# Patient Record
Sex: FEMALE | Race: BLACK OR AFRICAN AMERICAN | NOT HISPANIC OR LATINO | Employment: OTHER | URBAN - METROPOLITAN AREA
[De-identification: names, ages, dates, MRNs, and addresses within clinical notes are randomized per-mention and may not be internally consistent; named-entity substitution may affect disease eponyms.]

---

## 2017-03-20 ENCOUNTER — APPOINTMENT (OUTPATIENT)
Dept: LAB | Facility: CLINIC | Age: 47
End: 2017-03-20
Payer: COMMERCIAL

## 2017-03-20 DIAGNOSIS — E66.9 OBESITY: ICD-10-CM

## 2017-03-20 DIAGNOSIS — R73.09 OTHER ABNORMAL GLUCOSE: ICD-10-CM

## 2017-03-20 LAB
ALBUMIN SERPL BCP-MCNC: 3.3 G/DL (ref 3.5–5)
ALP SERPL-CCNC: 84 U/L (ref 46–116)
ALT SERPL W P-5'-P-CCNC: 20 U/L (ref 12–78)
ANION GAP SERPL CALCULATED.3IONS-SCNC: 9 MMOL/L (ref 4–13)
AST SERPL W P-5'-P-CCNC: 10 U/L (ref 5–45)
BILIRUB SERPL-MCNC: 0.28 MG/DL (ref 0.2–1)
BUN SERPL-MCNC: 10 MG/DL (ref 5–25)
CALCIUM SERPL-MCNC: 8.3 MG/DL (ref 8.3–10.1)
CHLORIDE SERPL-SCNC: 105 MMOL/L (ref 100–108)
CHOLEST SERPL-MCNC: 193 MG/DL (ref 50–200)
CO2 SERPL-SCNC: 25 MMOL/L (ref 21–32)
CREAT SERPL-MCNC: 1.11 MG/DL (ref 0.6–1.3)
EST. AVERAGE GLUCOSE BLD GHB EST-MCNC: 146 MG/DL
GFR SERPL CREATININE-BSD FRML MDRD: 52.5 ML/MIN/1.73SQ M
GLUCOSE P FAST SERPL-MCNC: 98 MG/DL (ref 65–99)
HBA1C MFR BLD: 6.7 % (ref 4.2–6.3)
HDLC SERPL-MCNC: 62 MG/DL (ref 40–60)
LDLC SERPL CALC-MCNC: 111 MG/DL (ref 0–100)
POTASSIUM SERPL-SCNC: 3.6 MMOL/L (ref 3.5–5.3)
PROT SERPL-MCNC: 7.6 G/DL (ref 6.4–8.2)
SODIUM SERPL-SCNC: 139 MMOL/L (ref 136–145)
TRIGL SERPL-MCNC: 101 MG/DL

## 2017-03-20 PROCEDURE — 83036 HEMOGLOBIN GLYCOSYLATED A1C: CPT

## 2017-03-20 PROCEDURE — 36415 COLL VENOUS BLD VENIPUNCTURE: CPT

## 2017-03-20 PROCEDURE — 80053 COMPREHEN METABOLIC PANEL: CPT

## 2017-03-20 PROCEDURE — 80061 LIPID PANEL: CPT

## 2017-03-21 ENCOUNTER — GENERIC CONVERSION - ENCOUNTER (OUTPATIENT)
Dept: OTHER | Facility: OTHER | Age: 47
End: 2017-03-21

## 2017-03-30 ENCOUNTER — ALLSCRIPTS OFFICE VISIT (OUTPATIENT)
Dept: OTHER | Facility: OTHER | Age: 47
End: 2017-03-30

## 2017-04-05 DIAGNOSIS — Z12.31 ENCOUNTER FOR SCREENING MAMMOGRAM FOR MALIGNANT NEOPLASM OF BREAST: ICD-10-CM

## 2017-05-03 ENCOUNTER — TRANSCRIBE ORDERS (OUTPATIENT)
Dept: ADMINISTRATIVE | Facility: HOSPITAL | Age: 47
End: 2017-05-03

## 2017-05-03 DIAGNOSIS — Z12.31 VISIT FOR SCREENING MAMMOGRAM: Primary | ICD-10-CM

## 2017-06-14 ENCOUNTER — GENERIC CONVERSION - ENCOUNTER (OUTPATIENT)
Dept: OTHER | Facility: OTHER | Age: 47
End: 2017-06-14

## 2017-06-14 ENCOUNTER — HOSPITAL ENCOUNTER (OUTPATIENT)
Dept: MAMMOGRAPHY | Facility: MEDICAL CENTER | Age: 47
Discharge: HOME | End: 2017-06-14
Payer: COMMERCIAL

## 2017-06-14 DIAGNOSIS — Z12.31 ENCOUNTER FOR SCREENING MAMMOGRAM FOR MALIGNANT NEOPLASM OF BREAST: ICD-10-CM

## 2017-06-14 DIAGNOSIS — R92.8 OTHER ABNORMAL AND INCONCLUSIVE FINDINGS ON DIAGNOSTIC IMAGING OF BREAST: ICD-10-CM

## 2017-06-14 PROCEDURE — 77063 BREAST TOMOSYNTHESIS BI: CPT

## 2017-06-14 PROCEDURE — G0202 SCR MAMMO BI INCL CAD: HCPCS

## 2017-06-19 ENCOUNTER — APPOINTMENT (OUTPATIENT)
Dept: ULTRASOUND IMAGING | Facility: CLINIC | Age: 47
End: 2017-06-19
Payer: COMMERCIAL

## 2017-06-19 ENCOUNTER — HOSPITAL ENCOUNTER (OUTPATIENT)
Dept: MAMMOGRAPHY | Facility: CLINIC | Age: 47
Discharge: HOME | End: 2017-06-19
Payer: COMMERCIAL

## 2017-06-19 DIAGNOSIS — R92.8 OTHER ABNORMAL AND INCONCLUSIVE FINDINGS ON DIAGNOSTIC IMAGING OF BREAST: ICD-10-CM

## 2017-06-19 PROCEDURE — G0206 DX MAMMO INCL CAD UNI: HCPCS

## 2017-06-19 RX ORDER — MECLIZINE HCL 12.5 MG/1
TABLET ORAL
COMMUNITY
Start: 2015-11-17 | End: 2018-09-25 | Stop reason: ALTCHOICE

## 2017-06-19 RX ORDER — OXYCODONE HYDROCHLORIDE 30 MG/1
TABLET ORAL
COMMUNITY
End: 2020-04-16

## 2017-06-19 RX ORDER — AMITRIPTYLINE HYDROCHLORIDE 25 MG/1
25 TABLET, FILM COATED ORAL
COMMUNITY
End: 2020-02-17 | Stop reason: ALTCHOICE

## 2017-06-19 RX ORDER — OMEPRAZOLE 20 MG/1
20 CAPSULE, DELAYED RELEASE ORAL DAILY
COMMUNITY
End: 2018-09-25 | Stop reason: SDUPTHER

## 2017-06-19 RX ORDER — TRAZODONE HYDROCHLORIDE 100 MG/1
TABLET ORAL
COMMUNITY
Start: 2011-12-28 | End: 2019-08-05 | Stop reason: SDUPTHER

## 2017-06-19 RX ORDER — QUETIAPINE FUMARATE 200 MG/1
TABLET, FILM COATED ORAL
COMMUNITY
Start: 2011-12-28 | End: 2020-04-16

## 2017-06-19 RX ORDER — TEMAZEPAM 30 MG/1
CAPSULE ORAL
COMMUNITY
Start: 2016-01-13 | End: 2020-04-16

## 2017-06-21 ENCOUNTER — HOSPITAL ENCOUNTER (OUTPATIENT)
Dept: MAMMOGRAPHY | Facility: CLINIC | Age: 47
Discharge: HOME | End: 2017-06-21
Admitting: RADIOLOGY
Payer: COMMERCIAL

## 2017-06-21 ENCOUNTER — HOSPITAL ENCOUNTER (OUTPATIENT)
Dept: MAMMOGRAPHY | Facility: CLINIC | Age: 47
Discharge: HOME | End: 2017-06-21
Payer: COMMERCIAL

## 2017-06-21 VITALS — SYSTOLIC BLOOD PRESSURE: 120 MMHG | DIASTOLIC BLOOD PRESSURE: 70 MMHG | HEART RATE: 72 BPM

## 2017-06-21 DIAGNOSIS — R92.0 ABNORMAL FINDING ON MAMMOGRAPHY, MICROCALCIFICATION: ICD-10-CM

## 2017-06-21 DIAGNOSIS — R92.0 MAMMOGRAPHIC MICROCALCIFICATION: ICD-10-CM

## 2017-06-21 DIAGNOSIS — R92.8 OTHER ABNORMAL AND INCONCLUSIVE FINDINGS ON DIAGNOSTIC IMAGING OF BREAST: ICD-10-CM

## 2017-06-21 PROCEDURE — 19082 BX BREAST ADD LESION STRTCTC: CPT

## 2017-06-21 PROCEDURE — 19081 BX BREAST 1ST LESION STRTCTC: CPT

## 2017-06-21 PROCEDURE — 88305 TISSUE EXAM BY PATHOLOGIST: CPT | Performed by: NURSE PRACTITIONER

## 2017-06-21 PROCEDURE — 88341 IMHCHEM/IMCYTCHM EA ADD ANTB: CPT | Performed by: NURSE PRACTITIONER

## 2017-06-21 PROCEDURE — 88342 IMHCHEM/IMCYTCHM 1ST ANTB: CPT | Performed by: NURSE PRACTITIONER

## 2017-06-21 RX ORDER — LIDOCAINE HYDROCHLORIDE 10 MG/ML
4 INJECTION, SOLUTION INFILTRATION; PERINEURAL ONCE
Status: COMPLETED | OUTPATIENT
Start: 2017-06-21 | End: 2017-06-21

## 2017-06-21 RX ORDER — LIDOCAINE HYDROCHLORIDE AND EPINEPHRINE BITARTRATE 20; .01 MG/ML; MG/ML
9 INJECTION, SOLUTION SUBCUTANEOUS ONCE
Status: COMPLETED | OUTPATIENT
Start: 2017-06-21 | End: 2017-06-21

## 2017-06-21 RX ADMIN — LIDOCAINE HYDROCHLORIDE AND EPINEPHRINE 9 ML: 20; 10 INJECTION, SOLUTION INFILTRATION; PERINEURAL at 14:16

## 2017-06-21 RX ADMIN — LIDOCAINE HYDROCHLORIDE AND EPINEPHRINE 9 ML: 20; 10 INJECTION, SOLUTION INFILTRATION; PERINEURAL at 13:23

## 2017-06-21 RX ADMIN — LIDOCAINE HYDROCHLORIDE 4 ML: 10 INJECTION, SOLUTION INFILTRATION; PERINEURAL at 13:23

## 2017-06-21 RX ADMIN — LIDOCAINE HYDROCHLORIDE 4 ML: 10 INJECTION, SOLUTION INFILTRATION; PERINEURAL at 14:16

## 2017-06-26 ENCOUNTER — GENERIC CONVERSION - ENCOUNTER (OUTPATIENT)
Dept: OTHER | Facility: OTHER | Age: 47
End: 2017-06-26

## 2017-06-29 ENCOUNTER — GENERIC CONVERSION - ENCOUNTER (OUTPATIENT)
Dept: OTHER | Facility: OTHER | Age: 47
End: 2017-06-29

## 2017-07-01 DIAGNOSIS — E11.9 TYPE 2 DIABETES MELLITUS WITHOUT COMPLICATIONS (HCC): ICD-10-CM

## 2017-08-21 ENCOUNTER — APPOINTMENT (OUTPATIENT)
Dept: LAB | Facility: CLINIC | Age: 47
End: 2017-08-21
Payer: COMMERCIAL

## 2017-08-21 DIAGNOSIS — E11.9 TYPE 2 DIABETES MELLITUS WITHOUT COMPLICATIONS (HCC): ICD-10-CM

## 2017-08-21 LAB
ALBUMIN SERPL BCP-MCNC: 3.2 G/DL (ref 3.5–5)
ALP SERPL-CCNC: 83 U/L (ref 46–116)
ALT SERPL W P-5'-P-CCNC: 28 U/L (ref 12–78)
ANION GAP SERPL CALCULATED.3IONS-SCNC: 8 MMOL/L (ref 4–13)
AST SERPL W P-5'-P-CCNC: 25 U/L (ref 5–45)
BILIRUB SERPL-MCNC: 0.23 MG/DL (ref 0.2–1)
BUN SERPL-MCNC: 9 MG/DL (ref 5–25)
CALCIUM SERPL-MCNC: 8.4 MG/DL (ref 8.3–10.1)
CHLORIDE SERPL-SCNC: 103 MMOL/L (ref 100–108)
CO2 SERPL-SCNC: 26 MMOL/L (ref 21–32)
CREAT SERPL-MCNC: 1.05 MG/DL (ref 0.6–1.3)
CREAT UR-MCNC: 266 MG/DL
EST. AVERAGE GLUCOSE BLD GHB EST-MCNC: 137 MG/DL
GFR SERPL CREATININE-BSD FRML MDRD: 63 ML/MIN/1.73SQ M
GLUCOSE P FAST SERPL-MCNC: 92 MG/DL (ref 65–99)
HBA1C MFR BLD: 6.4 % (ref 4.2–6.3)
MICROALBUMIN UR-MCNC: 10.4 MG/L (ref 0–20)
MICROALBUMIN/CREAT 24H UR: 4 MG/G CREATININE (ref 0–30)
POTASSIUM SERPL-SCNC: 4 MMOL/L (ref 3.5–5.3)
PROT SERPL-MCNC: 7.6 G/DL (ref 6.4–8.2)
SODIUM SERPL-SCNC: 137 MMOL/L (ref 136–145)

## 2017-08-21 PROCEDURE — 82043 UR ALBUMIN QUANTITATIVE: CPT

## 2017-08-21 PROCEDURE — 83036 HEMOGLOBIN GLYCOSYLATED A1C: CPT

## 2017-08-21 PROCEDURE — 36415 COLL VENOUS BLD VENIPUNCTURE: CPT

## 2017-08-21 PROCEDURE — 82570 ASSAY OF URINE CREATININE: CPT

## 2017-08-21 PROCEDURE — 80053 COMPREHEN METABOLIC PANEL: CPT

## 2017-10-27 ENCOUNTER — GENERIC CONVERSION - ENCOUNTER (OUTPATIENT)
Dept: OTHER | Facility: OTHER | Age: 47
End: 2017-10-27

## 2017-11-03 ENCOUNTER — GENERIC CONVERSION - ENCOUNTER (OUTPATIENT)
Dept: OTHER | Facility: OTHER | Age: 47
End: 2017-11-03

## 2017-11-10 ENCOUNTER — GENERIC CONVERSION - ENCOUNTER (OUTPATIENT)
Dept: OTHER | Facility: OTHER | Age: 47
End: 2017-11-10

## 2017-11-10 ENCOUNTER — LAB REQUISITION (OUTPATIENT)
Dept: LAB | Facility: HOSPITAL | Age: 47
End: 2017-11-10
Payer: COMMERCIAL

## 2017-11-10 DIAGNOSIS — L91.8 OTHER HYPERTROPHIC DISORDERS OF THE SKIN: ICD-10-CM

## 2017-11-10 PROCEDURE — 88304 TISSUE EXAM BY PATHOLOGIST: CPT | Performed by: OBSTETRICS & GYNECOLOGY

## 2018-01-10 NOTE — RESULT NOTES
Verified Results  MAMMO SCREENING BILATERAL W 3D & CAD 14Jun2017 10:19AM Ashanti Montiel Order Number: [id number]    - Patient Instructions: To schedule this appointment, please contact Central Scheduling at [phone]  Do not wear any perfume, powder, lotion or deodorant on breast or underarm area  Please bring your doctors order, referral (if needed) and insurance information with you on the day of the test  Failure to bring this information may result in this test being rescheduled  Arrive 15 minutes prior to your appointment time to register  On the day of your test, please bring any prior mammogram or breast studies with you that were not performed at a [facility]  Failure to bring prior exams may result in your test needing to be rescheduled  Test Name Result Flag Reference   MAMMO SCREENING BILATERAL W 3D & CAD (Report)     Patient History:   Family history of unknown cancer in father  Benign -WBUS BREAST GUIDANCE of the left breast, February 17, 2009  Patient is a former smoker  Patient's BMI is 30 2  Reason for exam: screening, asymptomatic  Screening     Mammo Screening Bilateral W DBT and CAD: June 14, 2017 - Check In   #: [de-identified]   2D/3D Procedure   3D views: Bilateral MLO view(s) were taken  2D views: Bilateral CC and XCCL view(s) were taken  MLO view(s)    were taken of the right breast        Technologist: BEAN Arzate T (R)(M)   Prior study comparison: March 21, 2016, mammo screening bilateral   W DBT and CAD performed at [place] [address]  December 22, 2014, bilateral digital screening mammogram   performed at [address] [facility]  January 24, 2013, bilateral digital screening mammogram,    performed at [facility]  January 16, 2012,    bilateral WB digitl bilat usha, performed at [address] [place] [address]   February 17, 2009, left breast unilateral    diagnostic mammogram, performed at [address]  February 6, 2009, bilateral WB DIGITL BILAT JOHN,    performed at [address]  January 29, 2009, bilateral DIGITAL SCREENING MAMMOGRAM, performed at [facility]      There are scattered fibroglandular densities  There is a small    group of calcifications in the upper outer right breast at    approximately the 10 o'clock position, 8 cm from the nipple  These appear generally rounded and uniform in size  However,    they have increased slightly in number over time and for this    reason, I would recommend lateral and spot magnification views to   better define their morphology  The parenchymal pattern is otherwise stable  No dominant soft    tissue mass or architectural distortion are noted in either    breast  The skin and nipple contours are within normal limits  1  Upper outer right breast calcifications  Lateral and spot    magnification views recommended  2  Stable left mammogram      ACR BI-RADSï¾® Assessments: BiRad:0 - Incomplete: needs additional    imaging evaluation - Right     Recommendation:   Further imaging of the right breast    A breast health care nurse from our facility will be contacting    the patient regarding the need for additional imaging  8-10% of cancers will be missed on mammography  Management of a    palpable abnormality must be based on clinical grounds  Patients    will be notified of their results via letter from our facility  Accredited by [employer] of Radiology and FDA  [facility]  [facility]: [id number]     Risk Value(s):   Tyrer-Cuzick 10 Year: 1 900%, Tyrer-Cuzick Lifetime: 9 100%,    Myriad Table: 1 5%, BK 5 Year: 1 4%, NCI Lifetime: 11 1%   Signed by:   Praveen Cardenas MD   6/14/17       Plan  Abnormal mammogram of right breast    · *US BREAST RIGHT LIMITED (DIAGNOSTIC);  Status:Hold For - Scheduling; Requested  [facility]:[id number];

## 2018-01-10 NOTE — PROGRESS NOTES
Medical Alert: Tobacco User  Medications: Amoxicillin 500mg    Ibuprofen 600mg  Allergies:  Since Last Visit: Medical Alert: No Change    Medications: No Change    Allergies:        No Change  Pain Scale Type: Numeric Pain ScalePain Level: 0  Description: patient presented for periodic exam, jasmyne MENA  BW's  taken  oral hygiene poor  deposits of plaque and calculus  caries charted  patient has an existing upper resin partial   adult prophylaxis done using cavitron and hand instruments, polished teeth  using prophy paste and polishing cup  OHI re-inforced  told patient that we shall treat all decay and then  treatment plan for restoring missing teeth  patient agreed  patient satisfied and left in good health      NV: start restorative  NNV: more restorative  NNNV: treatment plan for missing teeth    SCOT/Dr HENSON    ----- Signed on Friday, May 13, 2016 at 4:48:33 PM  -----  ----- Provider: [id number] - Resident One, Dentist -- Clinic: [facility] -----

## 2018-01-11 NOTE — MISCELLANEOUS
Message  telephone call to patient  Informed of results and no need for additional f/u with [facility]      Active Problems    1  Anemia (285 9) (D64 9)   2  Atherosclerosis (440 9) (I70 90)   3  Backache (724 5) (M54 9)   4  Benign paroxysmal positional vertigo (386 11) (H81 10)   5  Cervicalgia (723 1) (M54 2)   6  Constipation (564 00) (K59 00)   7  Coronary arteriosclerosis (414 00) (I25 10)   8  Depression (311) (F32 9)   9  Encounter for gynecological examination with abnormal finding (V72 31) (Z01 411)   10  Esophageal reflux (530 81) (K21 9)   11  Hyperlipidemia (272 4) (E78 5)   12  Insomnia (780 52) (G47 00)   13  History of Need for prophylactic vaccination and inoculation against influenza (V04 81)    (Z23)   14  History of Need for vaccination for DTP (V06 1) (Z23)   15  Obesity (278 00) (E66 9)   16  Paresthesia (782 0) (R20 2)   17  Uterine leiomyoma, unspecified location (218 9) (D25 9)    Current Meds   1  Meclizine HCl - 12 5 MG Oral Tablet; TAKE 1 TABLET 3 TIMES DAILY AS NEEDED; Therapy: 40SOU2007 to (Evaluate:27Nov2015)  Requested for: 19Apr2016; Last   Rx:17Nov2015 Ordered   2  OxyCODONE HCl - 30 MG Oral Tablet (Roxicodone); TAKE 1 TABLET EVERY 6 HOURS   AS NEEDED FOR PAIN; Last Rx:17Nov2015 Ordered   3  QUEtiapine Fumarate 200 MG Oral Tablet (SEROquel); TAKE 1 TABLET AT BEDTIME; Therapy: [id number] to (Evaluate:30Apr2015)  Requested for: 90Znn9140; Last   Rx:31Mar2015 Ordered   4  Temazepam 30 MG Oral Capsule; Therapy: [id number] to Recorded   5  TraZODone HCl - 100 MG Oral Tablet; TAKE 1 TABLET AT BEDTIME; Therapy: 95UIC0660 to (Evaluate:01Apr2015)  Requested for: 19Apr2016; Last   Rx:31Mar2015 Ordered    Allergies    1  Adult Aspirin EC Low Strength TBEC    2  No Known Environmental Allergies   3   Other    Signatures   Electronically signed by : Ryan Gonzalez RN; Apr 28 2016 11:36AM EST                       (Author)

## 2018-01-12 NOTE — PROGRESS NOTES
Patient Health Assessment    Date:            08/05/2016  Blood Pressure:  112/79  Pulse:           87  Age:             45  Weight:          200 lbs  Height/Length:   5' 3"  Body Mass Index: 35 4  Provider:        ELIZABETH  Clinic:          [facility]: Tobacco User  Medications: Amoxicillin 500mg    Ibuprofen 600mg  Allergies:  Since Last Visit: Medical Alert: No Change    Medications: No Change    Allergies:        No Change  Pain Scale Type: Numeric Pain ScalePain Level: 0  Description:  Haley Muro has been coming to dental clinic for a while she has very poor OH and  does not brush on a regular basis  Even with OHI  She is not very motivated  She has very high risk of   root caries  She prefers to have a complete denture   and a partial lower    MQ    ----- Signed on Friday, August 05, 2016 at 1:38:50 PM  -----  ----- Provider: ELIZABETH Mcmahon DDS -- Clinic: [place] -----

## 2018-01-12 NOTE — RESULT NOTES
Message   please call pt and inform that she has abnormal labs and I do need for her to keep her appt on 3/30/17 (since she cancelled her appt for 3/23/17) to discuss and consider meds     Verified Results  (1) HEMOGLOBIN A1C 20Mar2017 09:54AM Rajendraleonidas Cody Order Number: [id number]     Test Name Result Flag Reference   HEMOGLOBIN A1C 6 7 % H 4 2-6 3   EST  AVG   GLUCOSE 146 mg/dl         Plan  Type 2 diabetes mellitus without complication, without long-term current use of insulin    · MetFORMIN HCl - 500 MG Oral Tablet; take 1 tablet by mouth every evening

## 2018-01-13 VITALS
DIASTOLIC BLOOD PRESSURE: 68 MMHG | HEIGHT: 64 IN | HEART RATE: 100 BPM | TEMPERATURE: 98.3 F | SYSTOLIC BLOOD PRESSURE: 124 MMHG | WEIGHT: 198.41 LBS | BODY MASS INDEX: 33.87 KG/M2

## 2018-01-14 NOTE — RESULT NOTES
Verified Results  (1) TISSUE EXAM 21Jun2017 01:34PM Justina Sow     Test Name Result Flag Reference   LAB AP CASE REPORT (Report)     Surgical Pathology Report             Case: [id number]                   Authorizing Provider: LILIANA Heaton    Collected:      06/21/2017 1334        Ordering Location:   [address] Received:      06/22/2017 [place] [address]                                     Pathologist:      Ainsley Segal DO                               Specimens:  A) - Breast, Right Upper Outer, site A(1)9ga sup cc approach 6 cores with calcs,SMARK         eviva 2S-13 clip                                            B) - Breast, Right Upper Outer, site A(1) remaining cores without calcifications            C) - Breast, Right Upper Inner, site B(2)9ga sup cc approach,4cores with calcs,SMark          eviva 13 clip                                             D) - Breast, Right Upper Inner, site B(2) remaining cores without calcifications   LAB AP FINAL DIAGNOSIS (Report)     A  Breast, right upper outer, site A, 6 cores with calcifications, biopsy:  - Proliferative fibrocystic changes to include; usual duct hyperplasia,   adenosis, sclerosing adenosis, apocrine cell   changes, periductal chronic inflammation and duct ectasia  - Microcalcification present in benign glands  - Negative for carcinoma  B  Breast, right upper outer, site A, remaining cores without   calcifications, biopsy:  - Fibrocystic changes to include; adenosis, sclerosing adenosis, apocrine   cell changes and duct ectasia  - Microcalcification's present in benign glands  - Negative for carcinoma    C  Breast, right upper inner, site B, 4 cores with calcifications, biopsy:  - Proliferative fibrocystic changes to include; usual duct hyperplasia,   adenosis, sclerosing adenosis, papillary apocrine   cell hyperplasia and duct ectasia  - Columnar cell changes  - Negative for carcinoma    D   Breast, right upper inner, site B, remaining cores without   calcifications, biopsy:  - Proliferative fibrocystic changes to include; usual duct hyperplasia,   adenosis, sclerosing adenosis, papillary apocrine   cell hyperplasia and duct ectasia  - Negative for carcinoma    Interpretation performed at [facility], [address] [address]  Intradepartmental consultation is in agreement with the above diagnosis   (EMM)  Electronically signed by Lucretia Kirkland DO on 6/28/2017 at 1:25 PM  Preliminary result electronically signed by Lucretia Kirkland DO on 6/23/2017 at 1:54 PM   LAB AP MICROSCOPIC DESCRIPTION      A-D: Immunostain for CK5/6 and myosin highlight circumferential   myoepithelial cells  Appropriate positive and negative controls obtained  This is an appended report  These results have been appended to a previously preliminary verified report  LAB AP SURGICAL ADDITIONAL INFORMATION (Report)     These tests were developed and their performance characteristics   determined by [facility] or [facility]  They may not be cleared or approved by the [place]  Food and   Drug Administration  The FDA has determined that such clearance or   approval is not necessary  These tests are used for clinical purposes  They should not be regarded as investigational or for research  This   laboratory has been approved by CLIA 88, designated as a high-complexity   laboratory and is qualified to perform these tests  LAB AP GROSS DESCRIPTION (Report)     A  The specimen is received in formalin, labeled with the patient's name   and hospital number, and is designated site A (1) 9ga sup cc approach 6   cores with calcifications, are six cores of tan-yellow fibrofatty tissue   ranging in length from 1 8-3 5 cm and ranging in diameter from 0 1-0 5 cm  Entirely submitted  Three cassettes  B   The specimen is received in formalin, labeled with the patient's name   and hospital number, and is designated site A (1) remaining cores   without calcifications, are three cores of tan-yellow fibrofatty tissue   ranging in length from 0 8-3 0 cm and ranging in diameter from 0 2-0 4 cm  Entirely submitted  One cassette  C  The specimen is received in formalin, labeled with the patient's name   and hospital number, and is designated site B (2) 9ga sup cc appraoch, 4   cores with calcs, are four cores of tan-yellow fibrofatty tissue ranging   in length from 2 2-3 5 cm and ranging in diameter from 0 1-0 5 cm  Entirely submitted  Two cassettes  D  The specimen is received in formalin, labeled with the patient's name   and hospital number, and is designated site B (2) remaining cores   without calcifications, are three cores of tan-yellow fibrofatty tissue   ranging in length from 1 7-3 6 cm and ranging in diameter from 0 2-0 5 cm  Also received in the specimen container are multiple irregularly shaped   fragments of tan-yellow fibrofatty tissue measuring 8 0 x 1 3 x 0 3 cm in   aggregate  The specimen is entirely submitted with the tissue cores   submitted in cassettes D1-D2 and the remaining tissue fragments submitted   in cassette D3  Note: The estimated total formalin fixation time based upon information   provided by the submitting clinician and the standard processing schedule   is 20 0 hours      RLR   Fixed in formalin

## 2018-01-15 ENCOUNTER — GENERIC CONVERSION - ENCOUNTER (OUTPATIENT)
Dept: INTERNAL MEDICINE CLINIC | Facility: CLINIC | Age: 48
End: 2018-01-15

## 2018-01-22 VITALS
HEART RATE: 93 BPM | SYSTOLIC BLOOD PRESSURE: 135 MMHG | WEIGHT: 179 LBS | DIASTOLIC BLOOD PRESSURE: 89 MMHG | HEIGHT: 64 IN | BODY MASS INDEX: 30.56 KG/M2

## 2018-01-22 VITALS
DIASTOLIC BLOOD PRESSURE: 73 MMHG | SYSTOLIC BLOOD PRESSURE: 116 MMHG | WEIGHT: 182 LBS | HEIGHT: 64 IN | BODY MASS INDEX: 31.07 KG/M2

## 2018-01-22 VITALS
WEIGHT: 183.25 LBS | DIASTOLIC BLOOD PRESSURE: 72 MMHG | HEIGHT: 64 IN | BODY MASS INDEX: 31.28 KG/M2 | SYSTOLIC BLOOD PRESSURE: 130 MMHG

## 2018-03-26 RX ORDER — TIZANIDINE 4 MG/1
4 TABLET ORAL AS NEEDED
Refills: 1 | COMMUNITY
Start: 2018-01-14 | End: 2021-05-27 | Stop reason: ALTCHOICE

## 2018-03-26 RX ORDER — GABAPENTIN 300 MG/1
CAPSULE ORAL
Refills: 0 | COMMUNITY
Start: 2018-02-22 | End: 2020-02-17 | Stop reason: ALTCHOICE

## 2018-03-26 RX ORDER — IBUPROFEN 800 MG/1
TABLET ORAL
Refills: 0 | COMMUNITY
Start: 2018-01-15 | End: 2018-09-25 | Stop reason: ALTCHOICE

## 2018-03-26 RX ORDER — QUETIAPINE FUMARATE 400 MG/1
400 TABLET, FILM COATED ORAL 2 TIMES DAILY
Refills: 0 | COMMUNITY
Start: 2018-02-22 | End: 2019-08-05 | Stop reason: SDUPTHER

## 2018-03-26 RX ORDER — OMEPRAZOLE 20 MG/1
CAPSULE, DELAYED RELEASE ORAL
COMMUNITY
Start: 2015-03-31 | End: 2018-09-25 | Stop reason: ALTCHOICE

## 2018-03-26 RX ORDER — CLONIDINE HYDROCHLORIDE 0.1 MG/1
0.1 TABLET ORAL 2 TIMES DAILY
Refills: 0 | COMMUNITY
Start: 2018-02-22 | End: 2020-02-17 | Stop reason: ALTCHOICE

## 2018-03-26 RX ORDER — BLOOD-GLUCOSE METER
EACH MISCELLANEOUS
COMMUNITY
Start: 2017-03-30 | End: 2020-04-16

## 2018-03-26 RX ORDER — BLOOD-GLUCOSE METER
EACH MISCELLANEOUS
COMMUNITY
Start: 2017-05-18 | End: 2018-09-25 | Stop reason: ALTCHOICE

## 2018-03-26 RX ORDER — AMITRIPTYLINE HYDROCHLORIDE 50 MG/1
100 TABLET, FILM COATED ORAL
Refills: 0 | COMMUNITY
Start: 2018-02-22 | End: 2019-08-05 | Stop reason: SDUPTHER

## 2018-03-26 RX ORDER — TRAZODONE HYDROCHLORIDE 150 MG/1
450 TABLET ORAL
Refills: 0 | COMMUNITY
Start: 2018-02-22 | End: 2020-07-31 | Stop reason: SDUPTHER

## 2018-03-26 RX ORDER — OMEPRAZOLE 20 MG/1
TABLET, DELAYED RELEASE ORAL
COMMUNITY
Start: 2012-08-02 | End: 2018-09-25 | Stop reason: ALTCHOICE

## 2018-03-27 ENCOUNTER — OFFICE VISIT (OUTPATIENT)
Dept: INTERNAL MEDICINE CLINIC | Facility: CLINIC | Age: 48
End: 2018-03-27
Payer: COMMERCIAL

## 2018-03-27 VITALS
DIASTOLIC BLOOD PRESSURE: 88 MMHG | TEMPERATURE: 98.1 F | BODY MASS INDEX: 32.5 KG/M2 | HEIGHT: 63 IN | SYSTOLIC BLOOD PRESSURE: 120 MMHG | HEART RATE: 76 BPM | WEIGHT: 183.42 LBS

## 2018-03-27 DIAGNOSIS — K21.9 GASTROESOPHAGEAL REFLUX DISEASE, ESOPHAGITIS PRESENCE NOT SPECIFIED: ICD-10-CM

## 2018-03-27 DIAGNOSIS — E11.8 TYPE 2 DIABETES MELLITUS WITH COMPLICATION, UNSPECIFIED LONG TERM INSULIN USE STATUS: Primary | ICD-10-CM

## 2018-03-27 LAB — SL AMB POCT HEMOGLOBIN AIC: 6

## 2018-03-27 PROCEDURE — 99203 OFFICE O/P NEW LOW 30 MIN: CPT | Performed by: INTERNAL MEDICINE

## 2018-03-27 PROCEDURE — 83036 HEMOGLOBIN GLYCOSYLATED A1C: CPT | Performed by: INTERNAL MEDICINE

## 2018-03-27 NOTE — ASSESSMENT & PLAN NOTE
Patient with diagnosis of DM, currently on metformin, last A1c was on goal <7, patient has lost 20 pounds in the last 3 months but has not been using the metformin consistently, patient had normal foot exam today, will also perform eye exam, will order microalbumin and BMP   A1c was 6 0 off metformin, will reassess in 6 months

## 2018-03-27 NOTE — PROGRESS NOTES
INTERNAL MEDICINE FOLLOW-UP OFFICE VISIT  [facility]  [address] [place] [address]    NAME: Lauren Mason  AGE: 50 y o  SEX: female    DATE OF ENCOUNTER: 3/27/2018    Assessment and Plan     Problem List Items Addressed This Visit        Digestive    GERD (gastroesophageal reflux disease)     Currently on prilosec but does not use it consistently, she was encouraged to use it first time in the morning to help with the symptoms         Relevant Medications    omeprazole (PriLOSEC) 20 mg delayed release capsule    omeprazole (PRILOSEC OTC) 20 MG tablet       Endocrine    Type 2 diabetes mellitus with complication ([facility] [address] ) - Primary     Patient with diagnosis of DM, currently on metformin, last A1c was on goal <7, patient has lost 20 pounds in the last 3 months but has not been using the metformin consistently, patient had normal foot exam today, will also perform eye exam, will order microalbumin and BMP         Relevant Orders    POCT hemoglobin A1c (Completed)    POCT diabetic eye exam    Basic metabolic panel    Microalbumin / creatinine urine ratio          Orders Placed This Encounter   Procedures    Basic metabolic panel    Microalbumin / creatinine urine ratio    POCT hemoglobin A1c    POCT diabetic eye exam       - Counseling Documentation: patient was counseled regarding: diagnostic results, instructions for management, risk factor reductions, prognosis, patient and family education, impressions, risks and benefits of treatment options and importance of compliance with treatment  - Medication Side Effects: Adverse side effects of medications were reviewed with the patient/guardian today      Chief Complaint     Chief Complaint   Patient presents with    Follow-up     pt "acid reflux"       History of Present Illness     49 yo female patient with diagnosis of DM and GERD, she has not been compliant with the medications but has lost 20 lbs over the last 3 months, patient also has GERD and is on prilosec but also does not take it consistently        The following portions of the patient's history were reviewed and updated as appropriate: allergies, current medications, past family history, past medical history, past social history, past surgical history and problem list     Review of Systems     Review of Systems   Constitutional: Negative  Negative for fever  HENT: Negative  Eyes: Negative  Respiratory: Negative  Negative for shortness of breath  Cardiovascular: Negative  Negative for chest pain and leg swelling  Gastrointestinal: Negative  Negative for abdominal pain  Endocrine: Negative  Genitourinary: Negative  Musculoskeletal: Negative  Skin: Negative  Allergic/Immunologic: Negative  Neurological: Negative  Hematological: Negative  Psychiatric/Behavioral: Negative  All other systems reviewed and are negative  Active Problem List     Patient Active Problem List   Diagnosis    Type 2 diabetes mellitus with complication (HCC)    GERD (gastroesophageal reflux disease)       Objective     /88 (BP Location: Right arm, Patient Position: Sitting, Cuff Size: Adult)   Pulse 76   Temp 98 1 °F (36 7 °C) (Oral)   Ht 5' 3 25" (1 607 m)   Wt 83 2 kg (183 lb 6 8 oz)   BMI 32 24 kg/m²     Physical Exam   Constitutional: She is oriented to person, place, and time  She appears well-developed and well-nourished  HENT:   Head: Normocephalic and atraumatic  Mouth/Throat: Oropharynx is clear and moist    Eyes: EOM are normal  Pupils are equal, round, and reactive to light  Neck: Normal range of motion  Neck supple  Cardiovascular: Normal rate and regular rhythm  Pulmonary/Chest: Effort normal and breath sounds normal    Abdominal: Soft  Bowel sounds are normal  She exhibits no mass  There is no tenderness  There is no rebound, no guarding and no CVA tenderness     Neurological: She is alert and oriented to person, place, and time  Skin: Skin is warm and dry  Psychiatric: She has a normal mood and affect  Her behavior is normal    Nursing note and vitals reviewed        Pertinent Laboratory/Diagnostic Studies:  CBC:   Lab Results   Component Value Date/Time    WBC 10 09 04/16/2014 04:30 PM    RBC 4 73 04/16/2014 04:30 PM    HGB 10 8 (L) 04/16/2014 04:30 PM    HCT 34 3 (L) 04/16/2014 04:30 PM    MCV 73 (L) 04/16/2014 04:30 PM    MCH 22 8 (L) 04/16/2014 04:30 PM    MCHC 31 5 04/16/2014 04:30 PM    RDW 17 6 (H) 04/16/2014 04:30 PM    MPV 10 3 04/16/2014 04:30 PM     (H) 04/16/2014 04:30 PM    NEUTOPHILPCT 51 04/16/2014 04:30 PM    LYMPHOPCT 40 04/16/2014 04:30 PM    MONOPCT 7 04/16/2014 04:30 PM    EOSPCT 2 04/16/2014 04:30 PM    BASOPCT 0 04/16/2014 04:30 PM    NEUTROABS 5 15 04/16/2014 04:30 PM    LYMPHSABS 4 04 04/16/2014 04:30 PM    MONOSABS 0 71 04/16/2014 04:30 PM    EOSABS 0 20 04/16/2014 04:30 PM     Chemistry Profile:   Lab Results   Component Value Date/Time     08/21/2017 10:40 AM     10/09/2015 10:00 AM    K 4 0 08/21/2017 10:40 AM    K 4 2 10/09/2015 10:00 AM     08/21/2017 10:40 AM     10/09/2015 10:00 AM    CO2 26 08/21/2017 10:40 AM    CO2 28 10/09/2015 10:00 AM    ANIONGAP 8 08/21/2017 10:40 AM    ANIONGAP 7 10/09/2015 10:00 AM    BUN 9 08/21/2017 10:40 AM    BUN 11 10/09/2015 10:00 AM    CREATININE 1 05 08/21/2017 10:40 AM    CREATININE 1 03 10/09/2015 10:00 AM    GLUCOSE 99 10/09/2015 10:00 AM    CALCIUM 8 4 08/21/2017 10:40 AM    CALCIUM 8 8 10/09/2015 10:00 AM    MG 1 8 04/16/2014 04:30 PM    AST 25 08/21/2017 10:40 AM    AST 18 10/09/2015 10:00 AM    ALT 28 08/21/2017 10:40 AM    ALT 29 10/09/2015 10:00 AM    ALKPHOS 83 08/21/2017 10:40 AM    ALKPHOS 75 10/09/2015 10:00 AM    PROT 7 6 08/21/2017 10:40 AM    PROT 7 8 10/09/2015 10:00 AM    BILITOT 0 23 08/21/2017 10:40 AM    BILITOT 0 13 (L) 10/09/2015 10:00 AM    EGFR 63 08/21/2017 10:40 AM     Endocrine Studies:   Lab Results   Component Value Date/Time    HGBA1C 6 4 (H) 08/21/2017 10:40 AM    EFEQQBIKU0A 6 0 03/27/2018 12:07 PM    VWC0GNHOSNIO 1 220 04/19/2016 04:15 PM    ZXJ5QAFCFTVC 0 846 11/24/2014 01:22 PM    L6BQFBO 0 9 11/24/2014 01:22 PM    FREET4 0 8 11/24/2014 01:22 PM    THYMICROANTI 16 11/24/2014 01:22 PM    TRIG 101 03/20/2017 09:54 AM    TRIG 136 10/09/2015 10:00 AM    CHOL 193 03/20/2017 09:54 AM    CHOL 202 10/09/2015 10:00 AM    HDL 62 (H) 03/20/2017 09:54 AM    HDL 73 10/09/2015 10:00 AM    LDLCALC 111 (H) 03/20/2017 09:54 AM    LDLCALC 102 (H) 10/09/2015 10:00 AM     CBC:     Chemistry Profile:       Invalid input(s): GLUCOSEF  Endocrine Studies:     Results from last 6 Months  Lab Units 03/27/18  1207   SL AMB HEMOGLOBIN AIC  6 0       Current Medications     Current Outpatient Prescriptions:     Blood Glucose Monitoring Suppl (ONE TOUCH ULTRA 2) w/Device KIT, by Does not apply route, Disp: , Rfl:     cloNIDine (CATAPRES) 0 1 mg tablet, Take 0 1 mg by mouth 2 (two) times a day, Disp: , Rfl: 0    gabapentin (NEURONTIN) 300 mg capsule, TAKE 4 CAPSULES AT BEDTIME, Disp: , Rfl: 0    glucose blood test strip, by In Vitro route daily, Disp: , Rfl:     ibuprofen (MOTRIN) 800 mg tablet, TAKE 1 TABLET WITH FOOD OR MILK AS NEEDED THREE TIMES A DAY, Disp: , Rfl: 0    meclizine (ANTIVERT) 12 5 MG tablet, Meclizine HCl - 12 5 MG Oral Tablet TAKE 1 TABLET 3 TIMES DAILY AS NEEDED  Quantity: 1;  Refills: 0    Soy Fletcher;  Started 17-Nov-2015 [id number] Tablet Bottle, Disp: , Rfl:     omeprazole (PRILOSEC OTC) 20 MG tablet, Take by mouth, Disp: , Rfl:     ONETOUCH DELICA LANCETS FINE MISC, by Does not apply route, Disp: , Rfl:     oxyCODONE (ROXICODONE) 30 MG immediate release tablet, OxyCODONE HCl - 30 MG Oral Tablet TAKE 1 TABLET EVERY 6 HOURS AS NEEDED FOR PAIN  Quantity: 1;  Refills: 0    Culligan, [employer];  Active, Disp: , Rfl:     temazepam (RESTORIL) 30 mg capsule, Temazepam 30 MG Oral Capsule Refills: 0   , M D ;  Started 13-Jan-2016 Active, Disp: , Rfl:     tiZANidine (ZANAFLEX) 4 mg tablet, Take 4 mg by mouth every 8 (eight) hours, Disp: , Rfl: 1    traZODone (DESYREL) 150 mg tablet, Take 450 mg by mouth daily at bedtime, Disp: , Rfl: 0    amitriptyline (ELAVIL) 25 mg tablet, Take 25 mg by mouth daily at bedtime, Disp: , Rfl:     amitriptyline (ELAVIL) 50 mg tablet, 100 mg daily at bedtime, Disp: , Rfl: 0    Blood Glucose Monitoring Suppl (ACCU-CHEK NOREEN PLUS) w/Device KIT, by Does not apply route, Disp: , Rfl:     metFORMIN (GLUCOPHAGE) 500 mg tablet, Take 500 mg by mouth daily at bedtime, Disp: , Rfl:     omeprazole (PriLOSEC) 20 mg delayed release capsule, Take 20 mg by mouth daily, Disp: , Rfl:     omeprazole (PriLOSEC) 20 mg delayed release capsule, Take by mouth, Disp: , Rfl:     QUEtiapine (SEROquel) 200 mg tablet, QUEtiapine Fumarate 200 MG Oral Tablet TAKE 1 TABLET AT BEDTIME  Quantity: 30;  Refills: 0    Fanny James;  Started 28-Dec-2011 Active, Disp: , Rfl:     QUEtiapine (SEROquel) 400 MG tablet, Take 400 mg by mouth 2 (two) times a day, Disp: , Rfl: 0    traZODone (DESYREL) 100 mg tablet, TraZODone HCl - 100 MG Oral Tablet TAKE 1 TABLET AT BEDTIME    Quantity: 1;  Refills: 0    Eleonora Jamse;  Started 28-Dec-2011 Active, Disp: , Rfl:     Health Maintenance     Health Maintenance   Topic Date Due    HIV SCREENING  1970    PNEUMOCOCCAL POLYSACCHARIDE VACCINE AGE 2-64 HIGH RISK  11/05/1970    Diabetic Foot Exam  11/05/1978    OPHTHALMOLOGY EXAM  11/05/1978    Depression Screening PHQ-9  11/05/1980    INFLUENZA VACCINE  09/01/2017    MAMMOGRAM  06/19/2018    URINE MICROALBUMIN  08/21/2018    PAP SMEAR  03/09/2020    DTaP,Tdap,and Td Vaccines (2 - Td) 11/19/2023     Immunization History   Administered Date(s) Administered     Influenza (IM) Preservative Free 03/30/2017    Influenza TIV (IM) 11/19/2013, 11/13/2014, 11/17/2015    Tdap 11/19/2013 Anna Earl MD  Internal Medicine PGY-3  3/27/2018 12:08 PM

## 2018-03-27 NOTE — ASSESSMENT & PLAN NOTE
Currently on prilosec but does not use it consistently, she was encouraged to use it first time in the morning to help with the symptoms

## 2018-05-02 ENCOUNTER — APPOINTMENT (OUTPATIENT)
Dept: LAB | Facility: CLINIC | Age: 48
End: 2018-05-02
Payer: COMMERCIAL

## 2018-05-02 DIAGNOSIS — E11.8 TYPE 2 DIABETES MELLITUS WITH COMPLICATION (HCC): ICD-10-CM

## 2018-05-02 DIAGNOSIS — Z79.899 ENCOUNTER FOR LONG-TERM (CURRENT) USE OF MEDICATIONS: Primary | ICD-10-CM

## 2018-05-02 LAB
ALBUMIN SERPL BCP-MCNC: 3.3 G/DL (ref 3.5–5)
ALP SERPL-CCNC: 69 U/L (ref 46–116)
ALT SERPL W P-5'-P-CCNC: 20 U/L (ref 12–78)
ANION GAP SERPL CALCULATED.3IONS-SCNC: 6 MMOL/L (ref 4–13)
AST SERPL W P-5'-P-CCNC: 19 U/L (ref 5–45)
BILIRUB SERPL-MCNC: 0.25 MG/DL (ref 0.2–1)
BUN SERPL-MCNC: 11 MG/DL (ref 5–25)
CALCIUM SERPL-MCNC: 8.5 MG/DL (ref 8.3–10.1)
CHLORIDE SERPL-SCNC: 105 MMOL/L (ref 100–108)
CO2 SERPL-SCNC: 25 MMOL/L (ref 21–32)
CREAT SERPL-MCNC: 1.08 MG/DL (ref 0.6–1.3)
CREAT UR-MCNC: 211 MG/DL
EST. AVERAGE GLUCOSE BLD GHB EST-MCNC: 131 MG/DL
GFR SERPL CREATININE-BSD FRML MDRD: 70 ML/MIN/1.73SQ M
GLUCOSE P FAST SERPL-MCNC: 92 MG/DL (ref 65–99)
HBA1C MFR BLD: 6.2 % (ref 4.2–6.3)
HCYS SERPL-SCNC: 6.6 UMOL/L (ref 3.7–11.2)
MICROALBUMIN UR-MCNC: 9.6 MG/L (ref 0–20)
MICROALBUMIN/CREAT 24H UR: 5 MG/G CREATININE (ref 0–30)
POTASSIUM SERPL-SCNC: 4 MMOL/L (ref 3.5–5.3)
PROLACTIN SERPL-MCNC: 12.9 NG/ML
PROT SERPL-MCNC: 7.6 G/DL (ref 6.4–8.2)
SODIUM SERPL-SCNC: 136 MMOL/L (ref 136–145)
TRIGL SERPL-MCNC: 61 MG/DL

## 2018-05-02 PROCEDURE — 82570 ASSAY OF URINE CREATININE: CPT

## 2018-05-02 PROCEDURE — 84478 ASSAY OF TRIGLYCERIDES: CPT

## 2018-05-02 PROCEDURE — 80053 COMPREHEN METABOLIC PANEL: CPT

## 2018-05-02 PROCEDURE — 36415 COLL VENOUS BLD VENIPUNCTURE: CPT

## 2018-05-02 PROCEDURE — 83036 HEMOGLOBIN GLYCOSYLATED A1C: CPT

## 2018-05-02 PROCEDURE — 3061F NEG MICROALBUMINURIA REV: CPT | Performed by: INTERNAL MEDICINE

## 2018-05-02 PROCEDURE — 82043 UR ALBUMIN QUANTITATIVE: CPT

## 2018-05-02 PROCEDURE — 84146 ASSAY OF PROLACTIN: CPT

## 2018-05-02 PROCEDURE — 83090 ASSAY OF HOMOCYSTEINE: CPT

## 2018-09-21 RX ORDER — AZITHROMYCIN 250 MG/1
TABLET, FILM COATED ORAL
COMMUNITY
Start: 2017-08-24 | End: 2018-09-25 | Stop reason: ALTCHOICE

## 2018-09-25 ENCOUNTER — OFFICE VISIT (OUTPATIENT)
Dept: INTERNAL MEDICINE CLINIC | Facility: CLINIC | Age: 48
End: 2018-09-25
Payer: COMMERCIAL

## 2018-09-25 VITALS
SYSTOLIC BLOOD PRESSURE: 100 MMHG | DIASTOLIC BLOOD PRESSURE: 62 MMHG | BODY MASS INDEX: 31.02 KG/M2 | TEMPERATURE: 98.1 F | HEIGHT: 63 IN | HEART RATE: 80 BPM | WEIGHT: 175.04 LBS

## 2018-09-25 DIAGNOSIS — L65.9 ALOPECIA: Primary | ICD-10-CM

## 2018-09-25 DIAGNOSIS — E11.8 TYPE 2 DIABETES MELLITUS WITH COMPLICATION, UNSPECIFIED WHETHER LONG TERM INSULIN USE: ICD-10-CM

## 2018-09-25 DIAGNOSIS — Z12.39 SCREENING FOR BREAST CANCER: ICD-10-CM

## 2018-09-25 PROCEDURE — 99213 OFFICE O/P EST LOW 20 MIN: CPT | Performed by: HOSPITALIST

## 2018-09-25 PROCEDURE — 3008F BODY MASS INDEX DOCD: CPT | Performed by: HOSPITALIST

## 2018-09-25 NOTE — PROGRESS NOTES
INTERNAL MEDICINE FOLLOW-UP OFFICE VISIT  [facility]  [address] [place] [address]    NAME: Justin Gabriel  AGE: 50 y o  SEX: female    DATE OF ENCOUNTER: 9/25/2018    Assessment and Plan     1  Screening for breast cancer  Referral for screening given  - Mammo screening bilateral w cad; Future    2  Alopecia  -Patient has female type of alopecia since the past one year  -Check Thyroid functions  -Check free testosterone  -None of the medications that patient is on is known to cause alopecia  -Referral to dermatology for further evaluation of alopecia since it is severe    3  Type 2 diabetes mellitus with complication, unspecified whether long term insulin use ([facility] [address] )  -Patient was on metformin but reports not taking it- she says she took it for the first few weeks but completely stopped therapy after that  -She wants to control her diabetes with diet and exercise only  -Will repeat her HbA1c  Last one done 6 months ago was 6 2; reports not being on metformin then as well  - Hemoglobin A1C; Future    Orders Placed This Encounter   Procedures    Mammo screening bilateral w cad    TSH+Free T4    Hemoglobin A1C    Bioavailable and Free Testostosterone    Ambulatory referral to Dermatology       - Counseling Documentation: patient was counseled regarding: diagnostic results, instructions for management, risk factor reductions, prognosis, patient and family education, impressions, risks and benefits of treatment options and importance of compliance with treatment  - Counseling Time: not applicable  - Barriers to treatment: Non complaince to medications  - Medication Side Effects: Adverse side effects of medications were reviewed with the patient/guardian today    - Self Referrals: Yes     Chief Complaint     Chief Complaint   Patient presents with    Follow-up     pt "lab review; lab request for thyroid"       History of Present Illness     50year old female with PMH of GERD, Type 2 DM, Depression presents with complaints of hair loss since the past one year  Patient has noticed increased hair loss since 1 year  She is worried that the hairfall might be because of thyroid and wants to get her thyroid tested  No other symptoms of hypothyroidism  Patient has Diabetes mellitus and was put on metformin but says she never takes the medicine  When asked to take it she said she would like to continue controlling her diabetes with diet and exercise only  Hence I have discontinued the metformin  Patient is a smoker- smokes e-cigarettes and is not willing to quit          The following portions of the patient's history were reviewed and updated as appropriate: allergies, current medications, past family history, past medical history, past social history, past surgical history and problem list     Review of Systems     Review of Systems   Constitutional: Negative  HENT: Negative  Eyes: Negative  Respiratory: Negative  Cardiovascular: Negative  Gastrointestinal: Negative  Endocrine: Negative  Genitourinary: Negative  Musculoskeletal: Negative  Skin: Negative  Allergic/Immunologic: Negative  Neurological: Negative  Hematological: Negative  Psychiatric/Behavioral: Positive for sleep disturbance  Active Problem List     Patient Active Problem List   Diagnosis    Type 2 diabetes mellitus with complication (HCC)    GERD (gastroesophageal reflux disease)    Alopecia       Objective     /62 (BP Location: Left arm, Patient Position: Sitting, Cuff Size: Adult)   Pulse 80   Temp 98 1 °F (36 7 °C) (Oral)   Ht 5' 3" (1 6 m)   Wt 79 4 kg (175 lb 0 7 oz)   BMI 31 01 kg/m²     Physical Exam   Constitutional: She is oriented to person, place, and time  She appears well-developed and well-nourished  HENT:   Head: Normocephalic  Neck: Normal range of motion  Neck supple  Cardiovascular: Normal rate, regular rhythm and normal heart sounds  Pulmonary/Chest: Effort normal and breath sounds normal    Abdominal: Soft  Bowel sounds are normal    Musculoskeletal: Normal range of motion  Neurological: She is alert and oriented to person, place, and time  Skin:   Severe alopecia present- female type   Vitals reviewed        Pertinent Laboratory/Diagnostic Studies:  Chemistry Profile:   Lab Results   Component Value Date/Time     05/02/2018 09:12 AM     10/09/2015 10:00 AM    K 4 0 05/02/2018 09:12 AM    K 4 2 10/09/2015 10:00 AM     05/02/2018 09:12 AM     10/09/2015 10:00 AM    CO2 25 05/02/2018 09:12 AM    CO2 28 10/09/2015 10:00 AM    ANIONGAP 7 10/09/2015 10:00 AM    BUN 11 05/02/2018 09:12 AM    BUN 11 10/09/2015 10:00 AM    CREATININE 1 08 05/02/2018 09:12 AM    CREATININE 1 03 10/09/2015 10:00 AM    GLUF 92 05/02/2018 09:12 AM    GLUCOSE 99 10/09/2015 10:00 AM    CALCIUM 8 5 05/02/2018 09:12 AM    CALCIUM 8 8 10/09/2015 10:00 AM    MG 1 8 04/16/2014 04:30 PM    AST 19 05/02/2018 09:12 AM    AST 18 10/09/2015 10:00 AM    ALT 20 05/02/2018 09:12 AM    ALT 29 10/09/2015 10:00 AM    ALKPHOS 69 05/02/2018 09:12 AM    ALKPHOS 75 10/09/2015 10:00 AM    PROT 7 8 10/09/2015 10:00 AM    BILITOT 0 13 (L) 10/09/2015 10:00 AM    EGFR 70 05/02/2018 09:12 AM     CBC:     Chemistry Profile:   Results from last 6 Months  Lab Units 05/02/18  0912   SODIUM mmol/L 136   POTASSIUM mmol/L 4 0   CHLORIDE mmol/L 105   CO2 mmol/L 25   BUN mg/dL 11   CREATININE mg/dL 1 08   GLUCOSE FASTING mg/dL 92   CALCIUM mg/dL 8 5   AST U/L 19   ALT U/L 20   ALK PHOS U/L 69   EGFR ml/min/1 73sq m 70       Current Medications     Current Outpatient Prescriptions:     amitriptyline (ELAVIL) 50 mg tablet, 100 mg daily at bedtime, Disp: , Rfl: 0    gabapentin (NEURONTIN) 300 mg capsule, TAKE 4 CAPSULES AT BEDTIME, Disp: , Rfl: 0    oxyCODONE (ROXICODONE) 30 MG immediate release tablet, OxyCODONE HCl - 30 MG Oral Tablet TAKE 1 TABLET EVERY 6 HOURS AS NEEDED FOR PAIN   Quantity: 1;  Refills: 0    Jacob [employer]; Active, Disp: , Rfl:     QUEtiapine (SEROquel) 200 mg tablet, QUEtiapine Fumarate 200 MG Oral Tablet TAKE 1 TABLET AT BEDTIME  Quantity: 30;  Refills: 0    Geremiassamantha [facility];  Started 28-Dec-2011 Active, Disp: , Rfl:     QUEtiapine (SEROquel) 400 MG tablet, Take 400 mg by mouth 2 (two) times a day, Disp: , Rfl: 0    temazepam (RESTORIL) 30 mg capsule, Temazepam 30 MG Oral Capsule  Refills: 0   , M D ;  Started 13-Jan-2016 Active, Disp: , Rfl:     tiZANidine (ZANAFLEX) 4 mg tablet, Take 4 mg by mouth every 8 (eight) hours, Disp: , Rfl: 1    traZODone (DESYREL) 150 mg tablet, Take 450 mg by mouth daily at bedtime, Disp: , Rfl: 0    amitriptyline (ELAVIL) 25 mg tablet, Take 25 mg by mouth daily at bedtime, Disp: , Rfl:     azithromycin (ZITHROMAX) 250 mg tablet, Take 2 tablets by mouth on day one; then one tablet daily for 4 days  , Disp: , Rfl:     Blood Glucose Monitoring Suppl (ACCU-CHEK NOREEN PLUS) w/Device KIT, by Does not apply route, Disp: , Rfl:     Blood Glucose Monitoring Suppl (ONE TOUCH ULTRA 2) w/Device KIT, by Does not apply route, Disp: , Rfl:     cloNIDine (CATAPRES) 0 1 mg tablet, Take 0 1 mg by mouth 2 (two) times a day, Disp: , Rfl: 0    glucose blood test strip, by In Vitro route daily, Disp: , Rfl:     meclizine (ANTIVERT) 12 5 MG tablet, Meclizine HCl - 12 5 MG Oral Tablet TAKE 1 TABLET 3 TIMES DAILY AS NEEDED  Quantity: 1;  Refills: 0    [employer];  Started 17-Nov-2015 [id number] Tablet Bottle, Disp: , Rfl:     traZODone (DESYREL) 100 mg tablet, TraZODone HCl - 100 MG Oral Tablet TAKE 1 TABLET AT BEDTIME    Quantity: 1;  Refills: 0    [facility]ob [facility];  Started 28-Dec-2011 Active, Disp: , Rfl:     Health Maintenance     Health Maintenance   Topic Date Due    Depression Screening PHQ  1970    Diabetic Foot Exam  11/05/1978    DM Eye Exam  11/05/1978    PAP SMEAR  11/05/1989    MAMMOGRAM  06/19/2018    INFLUENZA VACCINE  10/25/2018 (Originally 9/1/2018)    Pneumococcal PPSV23 Medium Risk Adult (1 of 1 - PPSV23) 10/25/2018 (Originally 11/5/1987)    HEMOGLOBIN A1C  11/02/2018    URINE MICROALBUMIN  05/02/2019    DTaP,Tdap,and Td Vaccines (2 - Td) 11/19/2023     Immunization History   Administered Date(s) Administered     Influenza (IM) Preservative Free 03/30/2017    Influenza TIV (IM) 11/19/2013, 11/13/2014, 11/17/2015    Tdap 11/19/2013       Kieran Bread  9/25/2018 3:33 PM

## 2018-10-02 ENCOUNTER — APPOINTMENT (OUTPATIENT)
Dept: LAB | Facility: CLINIC | Age: 48
End: 2018-10-02
Payer: COMMERCIAL

## 2018-10-02 DIAGNOSIS — L65.9 ALOPECIA: ICD-10-CM

## 2018-10-02 DIAGNOSIS — E11.8 TYPE 2 DIABETES MELLITUS WITH COMPLICATION, UNSPECIFIED WHETHER LONG TERM INSULIN USE: ICD-10-CM

## 2018-10-02 LAB
EST. AVERAGE GLUCOSE BLD GHB EST-MCNC: 134 MG/DL
HBA1C MFR BLD: 6.3 % (ref 4.2–6.3)
T4 FREE SERPL-MCNC: 0.7 NG/DL (ref 0.76–1.46)
TSH SERPL DL<=0.05 MIU/L-ACNC: 3.2 UIU/ML (ref 0.36–3.74)

## 2018-10-02 PROCEDURE — 84439 ASSAY OF FREE THYROXINE: CPT

## 2018-10-02 PROCEDURE — 83036 HEMOGLOBIN GLYCOSYLATED A1C: CPT

## 2018-10-02 PROCEDURE — 36415 COLL VENOUS BLD VENIPUNCTURE: CPT

## 2018-10-02 PROCEDURE — 84410 TESTOSTERONE BIOAVAILABLE: CPT

## 2018-10-02 PROCEDURE — 84443 ASSAY THYROID STIM HORMONE: CPT

## 2018-10-09 ENCOUNTER — TELEPHONE (OUTPATIENT)
Dept: INTERNAL MEDICINE CLINIC | Facility: CLINIC | Age: 48
End: 2018-10-09

## 2018-10-09 ENCOUNTER — HOSPITAL ENCOUNTER (OUTPATIENT)
Dept: RADIOLOGY | Facility: HOSPITAL | Age: 48
Discharge: HOME | End: 2018-10-09
Payer: COMMERCIAL

## 2018-10-09 DIAGNOSIS — Z12.39 SCREENING FOR BREAST CANCER: ICD-10-CM

## 2018-10-09 LAB — MISCELLANEOUS LAB TEST RESULT: NORMAL

## 2018-10-09 PROCEDURE — 77067 SCR MAMMO BI INCL CAD: CPT

## 2018-10-09 NOTE — TELEPHONE ENCOUNTER
PATIENT CAME INTO CLINIC TODAY REQUESTING LAB RESULTS FROM 10/02/2018  PLEASE HANDLE , PATIENT WOULD LIKE TO KNOW ASAP

## 2018-10-24 NOTE — TELEPHONE ENCOUNTER
Dr Brain Campos patient came into the office again today requesting her lab test results  Patient is upset she has not been notified please review and call back the patient

## 2018-10-25 ENCOUNTER — TELEPHONE (OUTPATIENT)
Dept: INTERNAL MEDICINE CLINIC | Facility: CLINIC | Age: 48
End: 2018-10-25

## 2018-12-19 ENCOUNTER — HOSPITAL ENCOUNTER (EMERGENCY)
Facility: HOSPITAL | Age: 48
Discharge: HOME | End: 2018-12-19
Attending: EMERGENCY MEDICINE | Admitting: EMERGENCY MEDICINE
Payer: COMMERCIAL

## 2018-12-19 ENCOUNTER — APPOINTMENT (EMERGENCY)
Dept: RADIOLOGY | Facility: HOSPITAL | Age: 48
End: 2018-12-19
Payer: COMMERCIAL

## 2018-12-19 VITALS
DIASTOLIC BLOOD PRESSURE: 107 MMHG | BODY MASS INDEX: 31.02 KG/M2 | HEART RATE: 83 BPM | RESPIRATION RATE: 18 BRPM | OXYGEN SATURATION: 97 % | TEMPERATURE: 98.2 F | WEIGHT: 175.04 LBS | SYSTOLIC BLOOD PRESSURE: 180 MMHG | HEIGHT: 63 IN

## 2018-12-19 DIAGNOSIS — M79.602 LEFT ARM PAIN: Primary | ICD-10-CM

## 2018-12-19 LAB
ALBUMIN SERPL BCP-MCNC: 3.1 G/DL (ref 3.5–5)
ALP SERPL-CCNC: 74 U/L (ref 46–116)
ALT SERPL W P-5'-P-CCNC: 25 U/L (ref 12–78)
ANION GAP SERPL CALCULATED.3IONS-SCNC: 7 MMOL/L (ref 4–13)
AST SERPL W P-5'-P-CCNC: 13 U/L (ref 5–45)
BASOPHILS # BLD AUTO: 0.02 THOUSANDS/ΜL (ref 0–0.1)
BASOPHILS NFR BLD AUTO: 0 % (ref 0–1)
BILIRUB SERPL-MCNC: <0.1 MG/DL (ref 0.2–1)
BUN SERPL-MCNC: 10 MG/DL (ref 5–25)
CALCIUM SERPL-MCNC: 8.5 MG/DL (ref 8.3–10.1)
CHLORIDE SERPL-SCNC: 105 MMOL/L (ref 100–108)
CK SERPL-CCNC: 97 U/L (ref 26–192)
CO2 SERPL-SCNC: 24 MMOL/L (ref 21–32)
CREAT SERPL-MCNC: 0.95 MG/DL (ref 0.6–1.3)
EOSINOPHIL # BLD AUTO: 0.18 THOUSAND/ΜL (ref 0–0.61)
EOSINOPHIL NFR BLD AUTO: 2 % (ref 0–6)
ERYTHROCYTE [DISTWIDTH] IN BLOOD BY AUTOMATED COUNT: 16.2 % (ref 11.6–15.1)
GFR SERPL CREATININE-BSD FRML MDRD: 81 ML/MIN/1.73SQ M
GLUCOSE SERPL-MCNC: 90 MG/DL (ref 65–140)
HCT VFR BLD AUTO: 35.2 % (ref 34.8–46.1)
HGB BLD-MCNC: 11.1 G/DL (ref 11.5–15.4)
IMM GRANULOCYTES # BLD AUTO: 0.02 THOUSAND/UL (ref 0–0.2)
IMM GRANULOCYTES NFR BLD AUTO: 0 % (ref 0–2)
LYMPHOCYTES # BLD AUTO: 2.98 THOUSANDS/ΜL (ref 0.6–4.47)
LYMPHOCYTES NFR BLD AUTO: 37 % (ref 14–44)
MCH RBC QN AUTO: 24.8 PG (ref 26.8–34.3)
MCHC RBC AUTO-ENTMCNC: 31.5 G/DL (ref 31.4–37.4)
MCV RBC AUTO: 79 FL (ref 82–98)
MONOCYTES # BLD AUTO: 0.58 THOUSAND/ΜL (ref 0.17–1.22)
MONOCYTES NFR BLD AUTO: 7 % (ref 4–12)
NEUTROPHILS # BLD AUTO: 4.18 THOUSANDS/ΜL (ref 1.85–7.62)
NEUTS SEG NFR BLD AUTO: 54 % (ref 43–75)
NRBC BLD AUTO-RTO: 0 /100 WBCS
NT-PROBNP SERPL-MCNC: 34 PG/ML
PLATELET # BLD AUTO: 288 THOUSANDS/UL (ref 149–390)
PMV BLD AUTO: 11.6 FL (ref 8.9–12.7)
POTASSIUM SERPL-SCNC: 3.8 MMOL/L (ref 3.5–5.3)
PROT SERPL-MCNC: 7.4 G/DL (ref 6.4–8.2)
RBC # BLD AUTO: 4.47 MILLION/UL (ref 3.81–5.12)
SODIUM SERPL-SCNC: 136 MMOL/L (ref 136–145)
TROPONIN I SERPL-MCNC: <0.02 NG/ML
WBC # BLD AUTO: 7.96 THOUSAND/UL (ref 4.31–10.16)

## 2018-12-19 PROCEDURE — 99284 EMERGENCY DEPT VISIT MOD MDM: CPT

## 2018-12-19 PROCEDURE — 84484 ASSAY OF TROPONIN QUANT: CPT | Performed by: EMERGENCY MEDICINE

## 2018-12-19 PROCEDURE — 73060 X-RAY EXAM OF HUMERUS: CPT

## 2018-12-19 PROCEDURE — 83880 ASSAY OF NATRIURETIC PEPTIDE: CPT | Performed by: EMERGENCY MEDICINE

## 2018-12-19 PROCEDURE — 73090 X-RAY EXAM OF FOREARM: CPT

## 2018-12-19 PROCEDURE — 96372 THER/PROPH/DIAG INJ SC/IM: CPT

## 2018-12-19 PROCEDURE — 71046 X-RAY EXAM CHEST 2 VIEWS: CPT

## 2018-12-19 PROCEDURE — 82550 ASSAY OF CK (CPK): CPT | Performed by: EMERGENCY MEDICINE

## 2018-12-19 PROCEDURE — 80053 COMPREHEN METABOLIC PANEL: CPT | Performed by: EMERGENCY MEDICINE

## 2018-12-19 PROCEDURE — 36415 COLL VENOUS BLD VENIPUNCTURE: CPT

## 2018-12-19 PROCEDURE — 85025 COMPLETE CBC W/AUTO DIFF WBC: CPT | Performed by: EMERGENCY MEDICINE

## 2018-12-19 PROCEDURE — 93005 ELECTROCARDIOGRAM TRACING: CPT

## 2018-12-19 RX ORDER — HYDROMORPHONE HCL/PF 1 MG/ML
1 SYRINGE (ML) INJECTION ONCE
Status: COMPLETED | OUTPATIENT
Start: 2018-12-19 | End: 2018-12-19

## 2018-12-19 RX ADMIN — HYDROMORPHONE HYDROCHLORIDE 1 MG: 1 INJECTION, SOLUTION INTRAMUSCULAR; INTRAVENOUS; SUBCUTANEOUS at 19:33

## 2018-12-20 LAB
ATRIAL RATE: 76 BPM
P AXIS: 76 DEGREES
PR INTERVAL: 144 MS
QRS AXIS: 79 DEGREES
QRSD INTERVAL: 80 MS
QT INTERVAL: 356 MS
QTC INTERVAL: 400 MS
T WAVE AXIS: 76 DEGREES
VENTRICULAR RATE: 76 BPM

## 2018-12-20 NOTE — ED PROVIDER NOTES
History  Chief Complaint   Patient presents with    Arm Pain     left arm pain x 2 weeks  patient reports numbness and tingling  chest discomfort x few days  HPI    50yo female pmhx chronic pain, depression, insomina presents for evaluation of left arm pain  Patient says she has been having gradually worsening left forearm and upper arm pain for last 2 weeks  Pain is a 10/10 now and exacerbated by movement  Denies any trauma or heavy lifting  Admits to tingling down arm  Patient says she has been taking her prescribed oxycodone at home with no relief of pain  Denies fever, chills, chest pain, shortness of breath, abdominal pain, nausea, vomiting, diarrhea, or dysuria  Denies history of DVT or PE  No recent travel or immobilization  Prior to Admission Medications   Prescriptions Last Dose Informant Patient Reported? Taking? Blood Glucose Monitoring Suppl (ACCU-CHEK NOREEN PLUS) w/Device KIT  Self Yes No   Sig: by Does not apply route   QUEtiapine (SEROquel) 200 mg tablet  Self Yes No   Sig: QUEtiapine Fumarate 200 MG Oral Tablet  TAKE 1 TABLET AT BEDTIME  Quantity: 30;  Refills: 0       Fanny James;  Started 28-Dec-2011  Active   QUEtiapine (SEROquel) 400 MG tablet  Self Yes No   Sig: Take 400 mg by mouth 2 (two) times a day   amitriptyline (ELAVIL) 25 mg tablet  Self Yes No   Sig: Take 25 mg by mouth daily at bedtime   amitriptyline (ELAVIL) 50 mg tablet  Self Yes No   Si mg daily at bedtime   cloNIDine (CATAPRES) 0 1 mg tablet  Self Yes No   Sig: Take 0 1 mg by mouth 2 (two) times a day   gabapentin (NEURONTIN) 300 mg capsule  Self Yes No   Sig: TAKE 4 CAPSULES AT BEDTIME   glucose blood test strip  Self Yes No   Sig: by In Vitro route daily   oxyCODONE (ROXICODONE) 30 MG immediate release tablet  Self Yes No   Sig: OxyCODONE HCl - 30 MG Oral Tablet  TAKE 1 TABLET EVERY 6 HOURS AS NEEDED FOR PAIN  Quantity: 1;  Refills: 0       Culligan[employer];  Active   temazepam (RESTORIL) 30 mg capsule  Self Yes No   Sig: Temazepam 30 MG Oral Capsule   Refills: 0      , M D ;  Started 13-Jan-2016  Active   tiZANidine (ZANAFLEX) 4 mg tablet  Self Yes No   Sig: Take 4 mg by mouth every 8 (eight) hours   traZODone (DESYREL) 100 mg tablet  Self Yes No   Sig: TraZODone HCl - 100 MG Oral Tablet  TAKE 1 TABLET AT BEDTIME  Quantity: 1;  Refills: 0       Culligan, [employer];  Started 28-Dec-2011  Active   traZODone (DESYREL) 150 mg tablet  Self Yes No   Sig: Take 450 mg by mouth daily at bedtime      Facility-Administered Medications: None       Past Medical History:   Diagnosis Date    Abnormal thyroid blood test     last assessed 11/13/14    Anemia     last assessed  11/19/13    Chronic pain     back    Depression     Sleep disorder        Past Surgical History:   Procedure Laterality Date    ABSCESS DRAINAGE      incision - skin abscess    BACK SURGERY      2002    BACK SURGERY      lower    MAMMO STEREOTACTIC BREAST BIOPSY RIGHT (ALL INC) Right 6/21/2017    MAMMO STEREOTACTIC BREAST BIOPSY RIGHT (ALL INC) EACH ADD Right 6/21/2017       Family History   Problem Relation Age of Onset    Kidney disease Mother         chronic    Bone cancer Father     Heart attack Sister         Acute MI    Diabetes Sister     Hypertension Brother     Cancer Family     Diabetes Family     Heart disease Family     Hyperlipidemia Family     Hypertension Family      I have reviewed and agree with the history as documented  Social History   Substance Use Topics    Smoking status: Former Smoker    Smokeless tobacco: Never Used      Comment: pt "quit five years ago"    Alcohol use No        Review of Systems   Constitutional: Negative for chills, diaphoresis, fatigue and fever  HENT: Negative for congestion, rhinorrhea and sore throat  Eyes: Negative for photophobia and visual disturbance  Respiratory: Negative for cough, chest tightness and shortness of breath      Cardiovascular: Negative for chest pain and palpitations  Gastrointestinal: Negative for abdominal pain, blood in stool, constipation, diarrhea, nausea and vomiting  Genitourinary: Negative for dysuria, frequency and hematuria  Musculoskeletal: Positive for myalgias  Negative for back pain, gait problem, neck pain and neck stiffness  Skin: Negative for pallor and rash  Neurological: Negative for weakness, light-headedness, numbness and headaches  Hematological: Negative for adenopathy  Does not bruise/bleed easily  All other systems reviewed and are negative  Physical Exam  ED Triage Vitals [12/19/18 1737]   Temperature Pulse Respirations Blood Pressure SpO2   98 2 °F (36 8 °C) 83 18 (!) 180/107 97 %      Temp Source Heart Rate Source Patient Position - Orthostatic VS BP Location FiO2 (%)   Tympanic Monitor Sitting Right arm --      Pain Score       Worst Possible Pain           Orthostatic Vital Signs  Vitals:    12/19/18 1737   BP: (!) 180/107   Pulse: 83   Patient Position - Orthostatic VS: Sitting       Physical Exam   Constitutional: She is oriented to person, place, and time  She appears well-developed and well-nourished  No distress  Patient is alert and oriented, appears nontoxic but in moderate pain   HENT:   Head: Normocephalic and atraumatic  Mouth/Throat: Oropharynx is clear and moist  No oropharyngeal exudate  Eyes: Pupils are equal, round, and reactive to light  Conjunctivae and EOM are normal    Neck: Normal range of motion  Neck supple  Cardiovascular: Normal rate, regular rhythm, normal heart sounds and intact distal pulses  Pulmonary/Chest: Effort normal and breath sounds normal  No respiratory distress  Abdominal: Soft  Bowel sounds are normal  She exhibits no distension  There is no tenderness  Musculoskeletal: She exhibits tenderness  She exhibits no edema or deformity     LUE:  No obvious edema or deformity of arm, tender to mild palpation of forearm and upper arm, limited range of motion due to pain, able to abduct up to 20-25 degrees, strength/sensation/pulse intact   Lymphadenopathy:     She has no cervical adenopathy  Neurological: She is alert and oriented to person, place, and time  No facial asymmetry noted, CN 2-12 intact, full ROM of upper and lower extremities, muscle strength 5/5 throughout, DTRs normal, sensation intact throughout   Skin: Skin is warm and dry  Capillary refill takes less than 2 seconds  No rash noted  She is not diaphoretic  No erythema  No pallor  Psychiatric: She has a normal mood and affect  Her behavior is normal  Judgment and thought content normal    Nursing note and vitals reviewed  ED Medications  Medications   HYDROmorphone (DILAUDID) injection 1 mg (1 mg Intramuscular Given 12/19/18 1933)       Diagnostic Studies  Results Reviewed     Procedure Component Value Units Date/Time    CK (with reflex to MB) [[id number]]  (Normal) Collected:  12/19/18 1743    Lab Status:  Final result Specimen:  Blood from Arm, Right Updated:  12/19/18 2003     Total CK 97 U/L     Comprehensive metabolic panel [[id number]]  (Abnormal) Collected:  12/19/18 1743    Lab Status:  Final result Specimen:  Blood from Arm, Right Updated:  12/19/18 1815     Sodium 136 mmol/L      Potassium 3 8 mmol/L      Chloride 105 mmol/L      CO2 24 mmol/L      ANION GAP 7 mmol/L      BUN 10 mg/dL      Creatinine 0 95 mg/dL      Glucose 90 mg/dL      Calcium 8 5 mg/dL      AST 13 U/L      ALT 25 U/L      Alkaline Phosphatase 74 U/L      Total Protein 7 4 g/dL      Albumin 3 1 (L) g/dL      Total Bilirubin <0 10 (L) mg/dL      eGFR 81 ml/min/1 73sq m     Narrative:         National Kidney Disease Education Program recommendations are as follows:  GFR calculation is accurate only with a steady state creatinine  Chronic Kidney disease less than 60 ml/min/1 73 sq  meters  Kidney failure less than 15 ml/min/1 73 sq  meters      Troponin I [[id number]]  (Normal) Collected:  12/19/18 1743    Lab Status:  Final result Specimen:  Blood from Arm, Right Updated:  12/19/18 1815     Troponin I <0 02 ng/mL     B-type natriuretic peptide [[id number]]  (Normal) Collected:  12/19/18 1743    Lab Status:  Final result Specimen:  Blood from Arm, Right Updated:  12/19/18 1815     NT-proBNP 34 pg/mL     CBC and differential [[id number]]  (Abnormal) Collected:  12/19/18 1743    Lab Status:  Final result Specimen:  Blood from Arm, Right Updated:  12/19/18 1759     WBC 7 96 Thousand/uL      RBC 4 47 Million/uL      Hemoglobin 11 1 (L) g/dL      Hematocrit 35 2 %      MCV 79 (L) fL      MCH 24 8 (L) pg      MCHC 31 5 g/dL      RDW 16 2 (H) %      MPV 11 6 fL      Platelets 626 Thousands/uL      nRBC 0 /100 WBCs      Neutrophils Relative 54 %      Immat GRANS % 0 %      Lymphocytes Relative 37 %      Monocytes Relative 7 %      Eosinophils Relative 2 %      Basophils Relative 0 %      Neutrophils Absolute 4 18 Thousands/µL      Immature Grans Absolute 0 02 Thousand/uL      Lymphocytes Absolute 2 98 Thousands/µL      Monocytes Absolute 0 58 Thousand/µL      Eosinophils Absolute 0 18 Thousand/µL      Basophils Absolute 0 02 Thousands/µL                  X-ray chest 2 views    (Results Pending)   XR humerus LEFT    (Results Pending)   XR forearm 2 views LEFT    (Results Pending)         Procedures  Procedures      Phone Consults  ED Phone Contact    ED Course         HEART Risk Score      Most Recent Value   History  0 Filed at: 12/19/2018 2000   ECG  0 Filed at: 12/19/2018 2000   Age  1 Filed at: 12/19/2018 2000   Risk Factors  0 Filed at: 12/19/2018 2000   Troponin  0 Filed at: 12/19/2018 2000   Heart Score Risk Calculator   History  0 Filed at: 12/19/2018 2000   ECG  0 Filed at: 12/19/2018 2000   Age  1 Filed at: 12/19/2018 2000   Risk Factors  0 Filed at: 12/19/2018 2000   Troponin  0 Filed at: 12/19/2018 2000   HEART Score  1 Filed at: 12/19/2018 2000   HEART Score  1 Filed at: 12/19/2018 2000                            [facility]  Number of Diagnoses or Management Options  Left arm pain:   Diagnosis management comments: Impression:  47-year-old female presents for evaluation of left arm pain  Ddx: musculoskeletal, neuropathy  Plan: xray, cardiac, pain control    The patient was instructed to follow up as documented  Strict return precautions were discussed with the patient and the patient was instructed to return to the emergency department immediately if symptoms worsen  The patient/patient family member acknowledged and were in agreement with plan  CritCare Time    Disposition  Final diagnoses:   Left arm pain     Time reflects when diagnosis was documented in both MDM as applicable and the Disposition within this note     Time User Action Codes Description Comment    12/19/2018  8:33 PM Holland Avilez Add [[id number]] Left arm pain       ED Disposition     ED Disposition Condition Comment    [facility]charge  [facility]self care      Condition at discharge: Good        Follow-up Information     Follow up With Specialties Details Why Contact Info Additional [address] [facility] [place] Go to As needed, If symptoms worsen [address] [id number]  [phone] [address] [place] [address], [phone]          Discharge Medication List as of 12/19/2018  8:36 PM      CONTINUE these medications which have NOT CHANGED    Details   !! amitriptyline (ELAVIL) 25 mg tablet Take 25 mg by mouth daily at bedtime, Historical Med      !! amitriptyline (ELAVIL) 50 mg tablet 100 mg daily at bedtime, Starting Thu 2/22/2018, Historical Med      Blood Glucose Monitoring Suppl (ACCU-CHEK NOREEN PLUS) w/Device KIT by Does not apply route, Starting Thu 3/30/2017, Historical Med      cloNIDine (CATAPRES) 0 1 mg tablet Take 0 1 mg by mouth 2 (two) times a day, Starting Thu 2/22/2018, Historical Med      gabapentin (NEURONTIN) 300 mg capsule TAKE 4 CAPSULES AT BEDTIME, Historical Med      glucose blood test strip by In Vitro route daily, Starting u 3/30/2017, Historical Med      oxyCODONE (ROXICODONE) 30 MG immediate release tablet OxyCODONE HCl - 30 MG Oral Tablet  TAKE 1 TABLET EVERY 6 HOURS AS NEEDED FOR PAIN  Quantity: 1;  Refills: 0       Culligan, [employer]; Active, Historical Med      !! QUEtiapine (SEROquel) 200 mg tablet QUEtiapine Fumarate 200 MG Oral Tablet  TAKE 1 TABLET AT BEDTIME  Quantity: 30;  Refills: 0       Fanny James;  Started 28-Dec-2011  Active, Historical Med      !! QUEtiapine (SEROquel) 400 MG tablet Take 400 mg by mouth 2 (two) times a day, Starting Thu 2/22/2018, Historical Med      temazepam (RESTORIL) 30 mg capsule Temazepam 30 MG Oral Capsule   Refills: 0      , M D ;  Started 13-Jan-2016  Active, Historical Med      tiZANidine (ZANAFLEX) 4 mg tablet Take 4 mg by mouth every 8 (eight) hours, Starting Sun 1/14/2018, Historical Med      !! traZODone (DESYREL) 100 mg tablet TraZODone HCl - 100 MG Oral Tablet  TAKE 1 TABLET AT BEDTIME  Quantity: 1;  Refills: 0       Culligan, [employer] [facility];  Started 28-Dec-2011  Active, Historical Med      !! traZODone (DESYREL) 150 mg tablet Take 450 mg by mouth daily at bedtime, Starting Thu 2/22/2018, Historical Med       !! - Potential duplicate medications found  Please discuss with provider  No discharge procedures on file  ED Provider  Attending physically available and evaluated Zandra Mckeon  VIRGIE managed the patient along with the ED Attending      Electronically Signed by         Myesha Rico MD  12/20/18 5097

## 2018-12-20 NOTE — ED PROCEDURE NOTE
Procedure  ECG 12 Lead Documentation  Date/Time: 12/20/2018 1:45 AM  Performed by: Reina Paredes  Authorized by: Reina Paredes     Indications / Diagnosis:  Left arm pain  ECG reviewed by me, the ED Provider: yes    Patient location:  ED and bedside  Previous ECG:     Previous ECG:  Compared to current    Similarity:  No change  Interpretation:     Interpretation: normal    Rate:     ECG rate:  76    ECG rate assessment: normal    Rhythm:     Rhythm: sinus rhythm    Ectopy:     Ectopy: none    QRS:     QRS axis:  Normal    QRS intervals:  Normal  Conduction:     Conduction: normal    ST segments:     ST segments:  Normal  T waves:     T waves: normal                       Leslie Lange MD  12/20/18 [id number]

## 2018-12-20 NOTE — DISCHARGE INSTRUCTIONS
Arm Pain   WHAT YOU NEED TO KNOW:   Your arm pain may be caused by a number of conditions  Examples include arthritis, nerve problems, or an awkward position while you sleep  X-rays did not show a broken bone in your arm or wrist  Arm pain may be a sign of a serious condition that needs immediate care, such as a heart attack  DISCHARGE INSTRUCTIONS:   Call 911 for any of the following: You have any of the following signs of a heart attack:   · Squeezing, pressure, or pain in your chest that lasts longer than 5 minutes or returns    · Discomfort or pain in your back, neck, jaw, stomach, or arm     · Trouble breathing or a fast, fluttery heartbeat    · Nausea or vomiting    · Lightheadedness or a sudden cold sweat, especially with chest pain or trouble breathing  Return to the emergency department if:   · You have severe pain, or pain that spreads from your arm to other areas  · You have swelling, tingling, or numbness in your hand or fingers, or the skin turns blue  · You cannot move your arm  Contact your healthcare provider if:   · You have questions or concerns about your condition or care  Medicines: You may need any of the following:  · Prescription pain medicine  may be given  Ask how to take this medicine safely  · NSAIDs , such as ibuprofen, help decrease swelling, pain, and fever  This medicine is available with or without a doctor's order  NSAIDs can cause stomach bleeding or kidney problems in certain people  If you take blood thinner medicine, always ask your healthcare provider if NSAIDs are safe for you  Always read the medicine label and follow directions  · Take your medicine as directed  Contact your healthcare provider if you think your medicine is not helping or if you have side effects  Tell him or her if you are allergic to any medicine  Keep a list of the medicines, vitamins, and herbs you take  Include the amounts, and when and why you take them   Bring the list or the pill bottles to follow-up visits  Carry your medicine list with you in case of an emergency  Self-care:   · Rest your arm as directed  A sling may be used to keep your arm from moving while it heals  · Apply ice as directed  Ice helps decrease pain and swelling  Ice may also help prevent tissue damage  Use an ice pack, or put crushed ice in a plastic bag  Cover it with a towel  Apply it to your arm for 20 minutes every few hours, or as directed  Ask how many times to apply ice each day, and for how many days  · Elevate your arm above the level of your heart as often as you can  This will help decrease swelling and pain  Prop your arm on pillows or blankets to keep the area elevated comfortably  · Adjust your position if you work in front of a computer  You may need arm or wrist supports or change the height of your chair  · Keep a pain record  Write down when your pain happens and how severe it is  Include any other symptoms you have with your pain  A record will help you keep track of pain cycles  Bring the record with you to your follow-up visits  It may also help your healthcare provider find out what is causing your pain  Follow up with your healthcare provider as directed: You may need physical therapy  You may need to see an orthopedic specialist  Write down your questions so you remember to ask them during your visits  © 2017 [employer] Information is for End User's use only and may not be sold, redistributed or otherwise used for commercial purposes  All illustrations and images included in CareNotes® are the copyrighted property of [employer]  or [facility]  The above information is an  only  It is not intended as medical advice for individual conditions or treatments  Talk to your doctor, nurse or pharmacist before following any medical regimen to see if it is safe and effective for you

## 2018-12-20 NOTE — ED ATTENDING ATTESTATION
Placido Encarnacion MD, saw and evaluated the patient  I have discussed the patient with the resident/non-physician practitioner and agree with the resident's/non-physician practitioner's findings, Plan of Care, and MDM as documented in the resident's/non-physician practitioner's note, except where noted  All available labs and Radiology studies were reviewed  At this point I agree with the current assessment done in the Emergency Department    I have conducted an independent evaluation of this patient a history and physical is as follows:  Patient complains of left arm pain that started approximately 2 weeks ago she states the pain is worse if she moves her elbow or shoulder  No swelling, no skin rash no no chills no neck pain no chest pain or shortness of breath  Past medical history hypertension    Chronic low back pain for which she uses oxycodone  Patient states she took Tylenol and Motrin and oxycodone with minimal relief  Exam neck no JVD normal carotid upstrokes no bruits neck nontender lungs clear heart regular mild tenderness over the left shoulder patient has pain when she ranges her shoulder the forearm and upper arm is soft there is no skin changes is no redness there is no warmth normal pulses normal sensation normal motor strength  First nurse cardiac labs were sent and triage EKG is normal Labs unremarkable  Will x-ray the humerus and elbow  The [facility]  ED narcotic database was queried   Patient is on 235 mg morphine equivalent a day  Last prescription 12 13 150 tablets of 30 mg of oxycodone  Patient was also noted to be prescribed benzodiazepines and Soma and sleeping pills      Critical Care Time  CritCare Time    Procedures

## 2019-05-08 ENCOUNTER — TELEPHONE (OUTPATIENT)
Dept: INTERNAL MEDICINE CLINIC | Facility: CLINIC | Age: 49
End: 2019-05-08

## 2019-07-10 ENCOUNTER — APPOINTMENT (EMERGENCY)
Dept: RADIOLOGY | Facility: HOSPITAL | Age: 49
End: 2019-07-10
Payer: COMMERCIAL

## 2019-07-10 ENCOUNTER — HOSPITAL ENCOUNTER (EMERGENCY)
Facility: HOSPITAL | Age: 49
Discharge: HOME | End: 2019-07-10
Attending: EMERGENCY MEDICINE | Admitting: EMERGENCY MEDICINE
Payer: COMMERCIAL

## 2019-07-10 VITALS
DIASTOLIC BLOOD PRESSURE: 76 MMHG | BODY MASS INDEX: 31.28 KG/M2 | TEMPERATURE: 99.3 F | RESPIRATION RATE: 16 BRPM | SYSTOLIC BLOOD PRESSURE: 114 MMHG | HEIGHT: 62 IN | OXYGEN SATURATION: 97 % | HEART RATE: 83 BPM | WEIGHT: 170 LBS

## 2019-07-10 DIAGNOSIS — S93.401A RIGHT ANKLE SPRAIN: Primary | ICD-10-CM

## 2019-07-10 PROCEDURE — 73630 X-RAY EXAM OF FOOT: CPT

## 2019-07-10 PROCEDURE — 73610 X-RAY EXAM OF ANKLE: CPT

## 2019-07-10 PROCEDURE — 99283 EMERGENCY DEPT VISIT LOW MDM: CPT | Performed by: PHYSICIAN ASSISTANT

## 2019-07-10 PROCEDURE — 99283 EMERGENCY DEPT VISIT LOW MDM: CPT

## 2019-07-10 NOTE — ED PROVIDER NOTES
History  Chief Complaint   Patient presents with    Ankle Injury     pt states twisted R ankle yesterday, has broken R ankle in past     Susy Truong is a 46 y o  female who presents to the ED with complaints of right lateral ankle pain and swelling x2 days  Patient states yesterday she accidentally twisted her right ankle outward  Patient states immediately after the injury she was able to ambulate but was having pain with ambulation  Denies numbness, tingling, weakness, erythema, decreased range of motion, previous surgery, chest pain, shortness of breath, fever, chills  Patient states she did previously fracture her lateral malleolus  No OTC medications taken PTA  Patient was able to drive to the emergency room  History provided by:  Patient  Leg Pain   Location:  Ankle  Time since incident:  2 days  Injury: yes    Ankle location:  R ankle  Pain details:     Duration:  2 days    Timing:  Constant  Associated symptoms: swelling    Associated symptoms: no back pain, no decreased ROM, no fatigue, no fever, no itching, no muscle weakness, no neck pain, no numbness, no stiffness and no tingling        Prior to Admission Medications   Prescriptions Last Dose Informant Patient Reported? Taking? Blood Glucose Monitoring Suppl (ACCU-CHEK NOREEN PLUS) w/Device KIT  Self Yes Yes   Sig: by Does not apply route   QUEtiapine (SEROquel) 200 mg tablet  Self Yes Yes   Sig: QUEtiapine Fumarate 200 MG Oral Tablet  TAKE 1 TABLET AT BEDTIME     Quantity: 30;  Refills: 0       Fanny James;  Started 28-Dec-2011  Active   QUEtiapine (SEROquel) 400 MG tablet  Self Yes Yes   Sig: Take 400 mg by mouth 2 (two) times a day   amitriptyline (ELAVIL) 25 mg tablet  Self Yes Yes   Sig: Take 25 mg by mouth daily at bedtime   amitriptyline (ELAVIL) 50 mg tablet  Self Yes Yes   Si mg daily at bedtime   cloNIDine (CATAPRES) 0 1 mg tablet  Self Yes Yes   Sig: Take 0 1 mg by mouth 2 (two) times a day   gabapentin (NEURONTIN) 300 mg capsule  Self Yes Yes   Sig: TAKE 4 CAPSULES AT BEDTIME   glucose blood test strip  Self Yes Yes   Sig: by In Vitro route daily   oxyCODONE (ROXICODONE) 30 MG immediate release tablet  Self Yes Yes   Sig: OxyCODONE HCl - 30 MG Oral Tablet  TAKE 1 TABLET EVERY 6 HOURS AS NEEDED FOR PAIN  Quantity: 1;  Refills: 0       [employer]; Active   temazepam (RESTORIL) 30 mg capsule  Self Yes Yes   Sig: Temazepam 30 MG Oral Capsule   Refills: 0      , M D ;  Started 13-Jan-2016  Active   tiZANidine (ZANAFLEX) 4 mg tablet Not Taking at Unknown time Self Yes No   Sig: Take 4 mg by mouth every 8 (eight) hours   traZODone (DESYREL) 100 mg tablet  Self Yes Yes   Sig: TraZODone HCl - 100 MG Oral Tablet  TAKE 1 TABLET AT BEDTIME  Quantity: 1;  Refills: 0       [employer] [facility] [employer];  Started 28-Dec-2011  Active   traZODone (DESYREL) 150 mg tablet  Self Yes Yes   Sig: Take 450 mg by mouth daily at bedtime      Facility-Administered Medications: None       Past Medical History:   Diagnosis Date    Abnormal thyroid blood test     last assessed 11/13/14    Anemia     last assessed  11/19/13    Chronic pain     back    Depression     Sleep disorder        Past Surgical History:   Procedure Laterality Date    ABSCESS DRAINAGE      incision - skin abscess    BACK SURGERY      2002    BACK SURGERY      lower    MAMMO STEREOTACTIC BREAST BIOPSY RIGHT (ALL INC) Right 6/21/2017    MAMMO STEREOTACTIC BREAST BIOPSY RIGHT (ALL INC) EACH ADD Right 6/21/2017       Family History   Problem Relation Age of Onset    Kidney disease Mother         chronic    Bone cancer Father     Heart attack Sister         Acute MI    Diabetes Sister     Hypertension Brother     Cancer Family     Diabetes Family     Heart disease Family     Hyperlipidemia Family     Hypertension Family      I have reviewed and agree with the history as documented      Social History     Tobacco Use    Smoking status: Former Smoker    Smokeless tobacco: Never Used    Tobacco comment: pt "quit five years ago"   Substance Use Topics    Alcohol use: No    Drug use: No        Review of Systems   Constitutional: Negative for appetite change, chills, fatigue, fever and unexpected weight change  HENT: Negative for congestion, drooling, ear pain, rhinorrhea, sore throat, trouble swallowing and voice change  Eyes: Negative for pain, discharge, redness and visual disturbance  Respiratory: Negative for cough, shortness of breath, wheezing and stridor  Cardiovascular: Negative for chest pain, palpitations and leg swelling  Gastrointestinal: Negative for abdominal pain, blood in stool, constipation, diarrhea, nausea and vomiting  Genitourinary: Negative for dysuria, flank pain, frequency, hematuria and urgency  Musculoskeletal: Positive for arthralgias  Negative for back pain, gait problem, joint swelling, neck pain, neck stiffness and stiffness  Skin: Negative for color change, itching and rash  Neurological: Negative for dizziness, seizures, light-headedness and headaches  Physical Exam  Physical Exam   Constitutional: She is oriented to person, place, and time  She appears well-developed and well-nourished  HENT:   Head: Normocephalic and atraumatic  Nose: Nose normal    Eyes: Pupils are equal, round, and reactive to light  Conjunctivae and EOM are normal    Cardiovascular: Normal rate, regular rhythm and intact distal pulses  Pulmonary/Chest: Effort normal and breath sounds normal    Musculoskeletal:        Right ankle: She exhibits swelling  She exhibits normal range of motion  Tenderness  Lateral malleolus and AITFL tenderness found  Achilles tendon normal    No bony deformities, edema, or TTP of the medial malleolus, dorsal and plantar aspect of the foot  Full ROM dorsi/plantar flexion, inversion and eversion  NVI  Neurological: She is alert and oriented to person, place, and time   She has normal strength and normal reflexes  GCS eye subscore is 4  GCS verbal subscore is 5  GCS motor subscore is 6  Skin: Skin is warm and dry  Capillary refill takes less than 2 seconds  Psychiatric: She has a normal mood and affect  Nursing note and vitals reviewed  Vital Signs  ED Triage Vitals [07/10/19 1111]   Temperature Pulse Respirations Blood Pressure SpO2   99 3 °F (37 4 °C) 83 16 114/76 97 %      Temp Source Heart Rate Source Patient Position - Orthostatic VS BP Location FiO2 (%)   Oral -- -- -- --      Pain Score       Worst Possible Pain           Vitals:    07/10/19 1111   BP: 114/76   Pulse: 83         Visual Acuity      ED Medications  Medications - No data to display    Diagnostic Studies  Results Reviewed     None                 XR foot 3+ views RIGHT   Final Result by  (07/10 1628)   Addendum 1 of 1 by Karissa Dugan MD (07/10 1309)   ADDENDUM:   IMPRESSION:      NO Acute displaced fracture   Mild tibiotalar arthritis   Hallux valgus deformity            Final      XR ankle 3+ views RIGHT   Final Result by Karissa Dugan MD (07/10 1237)      No acute displaced fracture               Workstation performed: [id number]                    Procedures  Procedures       ED Course  ED Course as of Jul 10 1628   Wed Jul 10, 2019   1258 Educated patient regarding diagnosis and management  Advised patient to follow up with PCP  Advised patient to RTER for persistent or worsening symptoms  MDM  Number of Diagnoses or Management Options  Right ankle sprain: new and does not require workup  Diagnosis management comments: Differential diagnosis included but not limited to:  Fracture, dislocation, sprain, strain, contusion, ligament injury, tendon injury, unlikely septic arthritis    X-ray right ankle without acute findings  X-ray right foot without acute findings  Patient was instructed to follow up at outpatient orthopedics  I provided patient with strict RTER precautions   I advised patient follow-up with PCP in 24-48 hours  Patient verbalized understanding  Amount and/or Complexity of Data Reviewed  Tests in the radiology section of CPT®: ordered and reviewed  Review and summarize past medical records: yes    Risk of Complications, Morbidity, and/or Mortality  Presenting problems: low  Diagnostic procedures: low  Management options: low    Patient Progress  Patient progress: improved      Disposition  Final diagnoses:   Right ankle sprain     Time reflects when diagnosis was documented in both MDM as applicable and the Disposition within this note     Time User Action Codes Description Comment    7/10/2019 12:56 PM Dionicio Chavez Add [T07 [id number]] Right ankle sprain       ED Disposition     ED Disposition Condition Date/Time Comment    Discharge Stable Wed Jul 10, 2019 12:56 PM Justin Gabriel discharge to home/self care              Follow-up Information     Follow up With Specialties Details Why Contact Info Additional [address] Emergency Department Emergency Medicine Go to  If symptoms worsen [address] [address]  [phone] AN ED,  [address] [place] [address], [address] [place], [place] [address]in [facility] Schedule an appointment as soon as possible for a visit   Parmjit Phipps [address] [place] [address], [phone]          Discharge Medication List as of 7/10/2019  1:00 PM      CONTINUE these medications which have NOT CHANGED    Details   !! amitriptyline (ELAVIL) 25 mg tablet Take 25 mg by mouth daily at bedtime, Historical Med      !! amitriptyline (ELAVIL) 50 mg tablet 100 mg daily at bedtime, Starting Thu 2/22/2018, Historical Med      Blood Glucose Monitoring Suppl (ACCU-CHEK NOREEN PLUS) w/Device KIT by Does not apply route, Starting u 3/30/2017, Historical Med      cloNIDine (CATAPRES) 0 1 mg tablet Take 0 1 mg by mouth 2 (two) times a day, Starting u 2/22/2018, Historical Med      gabapentin (NEURONTIN) 300 mg capsule TAKE 4 CAPSULES AT BEDTIME, Historical Med      glucose blood test strip by In Vitro route daily, Starting u 3/30/2017, Historical Med      oxyCODONE (ROXICODONE) 30 MG immediate release tablet OxyCODONE HCl - 30 MG Oral Tablet  TAKE 1 TABLET EVERY 6 HOURS AS NEEDED FOR PAIN  Quantity: 1;  Refills: 0       Culligan, [employer] [facility] [employer]; Active, Historical Med      !! QUEtiapine (SEROquel) 200 mg tablet QUEtiapine Fumarate 200 MG Oral Tablet  TAKE 1 TABLET AT BEDTIME  Quantity: 30;  Refills: 0       Fanny James;  Started 28-Dec-2011  Active, Historical Med      !! QUEtiapine (SEROquel) 400 MG tablet Take 400 mg by mouth 2 (two) times a day, Starting Thu 2/22/2018, Historical Med      temazepam (RESTORIL) 30 mg capsule Temazepam 30 MG Oral Capsule   Refills: 0      , M D ;  Started 13-Jan-2016  Active, Historical Med      !! traZODone (DESYREL) 100 mg tablet TraZODone HCl - 100 MG Oral Tablet  TAKE 1 TABLET AT BEDTIME  Quantity: 1;  Refills: 0       Fanny James;  Started 28-Dec-2011  Active, Historical Med      !! traZODone (DESYREL) 150 mg tablet Take 450 mg by mouth daily at bedtime, Starting Thu 2/22/2018, Historical Med      tiZANidine (ZANAFLEX) 4 mg tablet Take 4 mg by mouth every 8 (eight) hours, Starting Sun 1/14/2018, Historical Med       !! - Potential duplicate medications found  Please discuss with provider  No discharge procedures on file      ED Provider  Electronically Signed by           Manohar Hitchcock PA-C  07/10/19 4987

## 2019-07-10 NOTE — DISCHARGE INSTRUCTIONS

## 2019-08-05 ENCOUNTER — OFFICE VISIT (OUTPATIENT)
Dept: INTERNAL MEDICINE CLINIC | Facility: CLINIC | Age: 49
End: 2019-08-05

## 2019-08-05 VITALS
TEMPERATURE: 97.9 F | BODY MASS INDEX: 31.88 KG/M2 | HEART RATE: 80 BPM | WEIGHT: 179.9 LBS | SYSTOLIC BLOOD PRESSURE: 114 MMHG | DIASTOLIC BLOOD PRESSURE: 76 MMHG | HEIGHT: 63 IN

## 2019-08-05 DIAGNOSIS — E11.8 TYPE 2 DIABETES MELLITUS WITH COMPLICATION, UNSPECIFIED WHETHER LONG TERM INSULIN USE: Primary | ICD-10-CM

## 2019-08-05 DIAGNOSIS — Z12.11 SCREENING FOR COLON CANCER: ICD-10-CM

## 2019-08-05 DIAGNOSIS — Z12.4 CERVICAL CANCER SCREENING: ICD-10-CM

## 2019-08-05 PROBLEM — Z87.898 HISTORY OF PREDIABETES: Status: ACTIVE | Noted: 2019-08-05

## 2019-08-05 LAB — SL AMB POCT HEMOGLOBIN AIC: 5.8 (ref ?–6.5)

## 2019-08-05 PROCEDURE — 99213 OFFICE O/P EST LOW 20 MIN: CPT | Performed by: INTERNAL MEDICINE

## 2019-08-05 PROCEDURE — 83036 HEMOGLOBIN GLYCOSYLATED A1C: CPT | Performed by: INTERNAL MEDICINE

## 2019-08-05 PROCEDURE — 3008F BODY MASS INDEX DOCD: CPT | Performed by: INTERNAL MEDICINE

## 2019-08-05 RX ORDER — VENLAFAXINE HYDROCHLORIDE 150 MG/1
CAPSULE, EXTENDED RELEASE ORAL
COMMUNITY
Start: 2017-06-15 | End: 2020-04-16

## 2019-08-05 RX ORDER — GABAPENTIN 600 MG/1
TABLET ORAL
COMMUNITY
Start: 2019-05-08 | End: 2019-08-05 | Stop reason: SDUPTHER

## 2019-08-05 RX ORDER — OMEPRAZOLE 20 MG/1
CAPSULE, DELAYED RELEASE ORAL
COMMUNITY
Start: 2017-06-25 | End: 2019-12-10

## 2019-08-05 RX ORDER — MIRTAZAPINE 30 MG/1
TABLET, FILM COATED ORAL
COMMUNITY
Start: 2017-06-15 | End: 2020-04-16

## 2019-08-05 RX ORDER — AZITHROMYCIN 250 MG/1
TABLET, FILM COATED ORAL
COMMUNITY
Start: 2017-08-24 | End: 2019-08-05 | Stop reason: ALTCHOICE

## 2019-08-05 NOTE — PROGRESS NOTES
INTERNAL MEDICINE FOLLOW-UP OFFICE VISIT  [facility]  [address] [place] [address] [facility]    NAME: Ben Beyer  AGE: 46 y o  SEX: female    DATE OF ENCOUNTER: 8/5/2019    Assessment and Plan     1  Prediabetes  -A1c went from 6 3-->5 8  -Patient not on any medication  -Continue with diet and exercise  - POCT hemoglobin A1c  - Microalbumin / creatinine urine ratio    2  Screening for colon cancer  - Ambulatory referral to Gastroenterology; Future    3  Cervical cancer screening  - Ambulatory referral to Gynecology; Future    4  Health maintenance  -Mammography done in 2018 was normal  -Due for colonoscopy  -Due for pap smear    Orders Placed This Encounter   Procedures    Microalbumin / creatinine urine ratio    Ambulatory referral to Gastroenterology    Ambulatory referral to Gynecology    POCT hemoglobin A1c       - Counseling Documentation: patient was counseled regarding: diagnostic results, instructions for management, risk factor reductions, prognosis, patient and family education, impressions, risks and benefits of treatment options and importance of compliance with treatment  - Counseling Time: not applicable  - Barriers to treatment: None  - Medication Side Effects: Adverse side effects of medications were reviewed with the patient/guardian today  - Self Referrals: No    Chief Complaint     Chief Complaint   Patient presents with    Diabetes    Follow-up       History of Present Illness     17-year-old female with a past medical history of prediabetes, alopecia for which she sees Dermatology presents for routine follow-up  Patient is here because she was to follow up on her A1c  She denies any symptoms at this point  She was screened for breast cancer last year and abnormal mammography  She is due for a colonoscopy and Pap smear        The following portions of the patient's history were reviewed and updated as appropriate: allergies, current medications, past family history, past medical history, past social history, past surgical history and problem list     Review of Systems     Review of Systems   All other systems reviewed and are negative  Active Problem List     Patient Active Problem List   Diagnosis    GERD (gastroesophageal reflux disease)    Alopecia    History of prediabetes       Objective     /76 (BP Location: Right arm, Patient Position: Sitting, Cuff Size: Adult)   Pulse 80   Temp 97 9 °F (36 6 °C) (Oral)   Ht 5' 3" (1 6 m)   Wt 81 6 kg (179 lb 14 3 oz)   BMI 31 87 kg/m²     Physical Exam:   GENERAL: NAD  HEENT:  NC/AT, MMM, no scleral icterus  CARDIAC:  RRR, +S1/S2, no S3/S4 heard, no m/g/r  PULMONARY:  CTA B/L, no wheezing/rales/rhonci, non-labored breathing  ABDOMEN:  Soft, NT/ND, +BS, no rebound/guarding/rigidity  Extremities:  2+ Pulses in DP/PT  No edema, cyanosis, or clubbing  NEUROLOGIC:  Alert/oriented x3  SKIN:  No rashes or erythema    Current Medications     Current Outpatient Medications:     amitriptyline (ELAVIL) 25 mg tablet, Take 25 mg by mouth daily at bedtime, Disp: , Rfl:     cloNIDine (CATAPRES) 0 1 mg tablet, Take 0 1 mg by mouth 2 (two) times a day, Disp: , Rfl: 0    gabapentin (NEURONTIN) 300 mg capsule, TAKE 4 CAPSULES AT BEDTIME, Disp: , Rfl: 0    mirtazapine (REMERON) 30 mg tablet, , Disp: , Rfl:     omeprazole (PriLOSEC) 20 mg delayed release capsule, , Disp: , Rfl:     oxyCODONE (ROXICODONE) 30 MG immediate release tablet, OxyCODONE HCl - 30 MG Oral Tablet TAKE 1 TABLET EVERY 6 HOURS AS NEEDED FOR PAIN  Quantity: 1;  Refills: 0    Culligan, [employer] [facility] [employer]; Active, Disp: , Rfl:     QUEtiapine (SEROquel) 200 mg tablet, QUEtiapine Fumarate 200 MG Oral Tablet TAKE 1 TABLET AT BEDTIME    Quantity: 30;  Refills: 0    Fanny James;  Started 28-Dec-2011 Active, Disp: , Rfl:     temazepam (RESTORIL) 30 mg capsule, Temazepam 30 MG Oral Capsule  Refills: 0   , M D ;  Started 13-Jan-2016 Active, Disp: , Rfl:     traZODone (DESYREL) 150 mg tablet, Take 450 mg by mouth daily at bedtime, Disp: , Rfl: 0    Blood Glucose Monitoring Suppl (ACCU-CHEK NOREEN PLUS) w/Device KIT, by Does not apply route, Disp: , Rfl:     glucose blood test strip, by In Vitro route daily, Disp: , Rfl:     metFORMIN (GLUCOPHAGE) 500 mg tablet, , Disp: , Rfl:     tiZANidine (ZANAFLEX) 4 mg tablet, Take 4 mg by mouth every 8 (eight) hours, Disp: , Rfl: 1    venlafaxine (EFFEXOR-XR) 150 mg 24 hr capsule, , Disp: , Rfl:     Health Maintenance     Health Maintenance   Topic Date Due    Medicare Annual Wellness Visit (AWV)  1970    CRC Screening: Colonoscopy  1970    Pneumococcal Vaccine: Pediatrics (0 to 5 Years) and At-Risk Patients (6 to 59 Years) (1 of 3 - PCV13) 11/05/1974    Diabetic Foot Exam  11/05/1978    DM Eye Exam  11/05/1978    BMI: Followup Plan  11/05/1986    HEPATITIS B VACCINES (1 of 3 - Risk 3-dose series) 11/05/1987    HEMOGLOBIN A1C  04/02/2019    URINE MICROALBUMIN  05/02/2019    INFLUENZA VACCINE  07/01/2019    MAMMOGRAM  10/09/2019    PAP SMEAR  03/09/2020    BMI: Adult  08/05/2020    DTaP,Tdap,and Td Vaccines (2 - Td) 11/19/2023    Pneumococcal Vaccine: 65+ Years (1 of 2 - PCV13) 11/05/2033     Immunization History   Administered Date(s) Administered     Influenza (IM) Preservative Free 03/30/2017    INFLUENZA 03/30/2017    Influenza TIV (IM) 11/19/2013, 11/13/2014, 11/17/2015    Tdap 11/19/2013       Christine Perez  8/5/2019 4:09 PM

## 2019-09-30 ENCOUNTER — ANNUAL EXAM (OUTPATIENT)
Dept: OBGYN CLINIC | Facility: CLINIC | Age: 49
End: 2019-09-30

## 2019-09-30 VITALS
HEART RATE: 80 BPM | WEIGHT: 178 LBS | BODY MASS INDEX: 30.39 KG/M2 | HEIGHT: 64 IN | SYSTOLIC BLOOD PRESSURE: 113 MMHG | DIASTOLIC BLOOD PRESSURE: 72 MMHG

## 2019-09-30 DIAGNOSIS — Z12.4 CERVICAL CANCER SCREENING: ICD-10-CM

## 2019-09-30 DIAGNOSIS — Z20.2 POSSIBLE EXPOSURE TO STD: Primary | ICD-10-CM

## 2019-09-30 PROCEDURE — 99213 OFFICE O/P EST LOW 20 MIN: CPT | Performed by: OBSTETRICS & GYNECOLOGY

## 2019-09-30 PROCEDURE — 3725F SCREEN DEPRESSION PERFORMED: CPT | Performed by: OBSTETRICS & GYNECOLOGY

## 2019-09-30 PROCEDURE — 87491 CHLMYD TRACH DNA AMP PROBE: CPT | Performed by: OBSTETRICS & GYNECOLOGY

## 2019-09-30 PROCEDURE — 87591 N.GONORRHOEAE DNA AMP PROB: CPT | Performed by: OBSTETRICS & GYNECOLOGY

## 2019-09-30 NOTE — PROGRESS NOTES
Annual Well Woman visit  Momo Oakes is a 46 y o  female who presents for annual well woman exam  Periods are regular every 28-30 days, lasting 5 days  No intermenstrual bleeding, spotting, or discharge  GYN:  · Denies vaginal discharge, labial erythema or lesions, dyspareunia  · Contraception:  Tubal ligation 22 years ago  · Patient is  sexually active with 1 partner  · No gynecologic surgeries  OB:  · P6 female; all vaginal deliveries; sexually active  · Tubal 22 years ago    :  · Denies dysuria, urinary frequency or urgency  · Denies hematuria, flank pain, incontinence  Breast:  · Denies breast mass, skin changes, dimpling, reddening, nipple retraction  · Denies breast discharge  · Patient does not have a family history of breast, endometrial, or ovarian ca  General:  · Diet:  Recently changed diet and has lost 20 lb  · Exercise:  Stationary bicycle and walking  · Work:  Not employed, current looking for employment  · ETOH use:  None  · Tobacco use:  None  · Recreational drug use:  None    Screening:  · Cervical cancer: last pap smear in 2015  Results were normal; next Pap due in 2020  · Breast cancer: last mammogram in 2018  Results were normal   · Colon cancer:  Scheduled for this year  · STD screening:  Patient requesting STD testing today    Review of Systems  Pertinent items are noted in HPI        Objective      /72 (BP Location: Left arm, Patient Position: Sitting, Cuff Size: Adult)   Pulse 80   Ht 5' 3 5" (1 613 m)   Wt 80 7 kg (178 lb)   LMP 09/15/2019   BMI 31 04 kg/m²     General:   alert and oriented, in no acute distress, alert, appears stated age and cooperative   Heart: regular rate and rhythm, S1, S2 normal, no murmur, click, rub or gallop   Lungs: clear to auscultation bilaterally   Abdomen: soft, non-tender, without masses or organomegaly   Vulva: normal   Vagina: normal mucosa, normal discharge   Cervix: multiparous appearance, no cervical motion tenderness and no lesions   Uterus: normal size, anteverted, normal shape and consistency   Adnexa: no mass, fullness, tenderness             Assessment:  78-year-old P6 female who presents for annual well-woman exam   She has no complaints at this time  She is still getting periods regularly  No complaints of menopausal symptoms  Colonoscopy is scheduled      Plan     Patient requesting STD testing:  HIV, RPR, hep B ordered today; GC/CT collected  Pap smear due in 1 year  RTC in 1 year for routine exam or as needed      D/w Dr Mary Dubon MD, PGY-2  9/30/2019  11:05 AM

## 2019-10-01 LAB
C TRACH DNA SPEC QL NAA+PROBE: NEGATIVE
N GONORRHOEA DNA SPEC QL NAA+PROBE: NEGATIVE

## 2019-10-04 ENCOUNTER — TRANSCRIBE ORDERS (OUTPATIENT)
Dept: INTERNAL MEDICINE CLINIC | Facility: CLINIC | Age: 49
End: 2019-10-04

## 2019-10-04 DIAGNOSIS — L65.9 ALOPECIA: Primary | ICD-10-CM

## 2019-10-07 ENCOUNTER — TRANSCRIBE ORDERS (OUTPATIENT)
Dept: ADMINISTRATIVE | Facility: HOSPITAL | Age: 49
End: 2019-10-07

## 2019-10-07 DIAGNOSIS — Z12.31 SCREENING MAMMOGRAM FOR HIGH-RISK PATIENT: Primary | ICD-10-CM

## 2019-10-09 ENCOUNTER — APPOINTMENT (OUTPATIENT)
Dept: LAB | Facility: CLINIC | Age: 49
End: 2019-10-09
Payer: COMMERCIAL

## 2019-10-09 DIAGNOSIS — Z20.2 POSSIBLE EXPOSURE TO STD: ICD-10-CM

## 2019-10-09 LAB — HBV SURFACE AG SER QL: NORMAL

## 2019-10-09 PROCEDURE — 87389 HIV-1 AG W/HIV-1&-2 AB AG IA: CPT

## 2019-10-09 PROCEDURE — 86592 SYPHILIS TEST NON-TREP QUAL: CPT

## 2019-10-09 PROCEDURE — 87340 HEPATITIS B SURFACE AG IA: CPT

## 2019-10-09 PROCEDURE — 36415 COLL VENOUS BLD VENIPUNCTURE: CPT

## 2019-10-10 LAB
HIV 1+2 AB+HIV1 P24 AG SERPL QL IA: NORMAL
RPR SER QL: NORMAL

## 2019-11-04 ENCOUNTER — APPOINTMENT (OUTPATIENT)
Dept: LAB | Facility: CLINIC | Age: 49
End: 2019-11-04
Payer: COMMERCIAL

## 2019-11-04 DIAGNOSIS — Z79.899 ENCOUNTER FOR LONG-TERM (CURRENT) USE OF OTHER MEDICATIONS: Primary | ICD-10-CM

## 2019-11-04 LAB
ALBUMIN SERPL BCP-MCNC: 3.5 G/DL (ref 3.5–5)
ALP SERPL-CCNC: 73 U/L (ref 46–116)
ALT SERPL W P-5'-P-CCNC: 14 U/L (ref 12–78)
ANION GAP SERPL CALCULATED.3IONS-SCNC: 7 MMOL/L (ref 4–13)
AST SERPL W P-5'-P-CCNC: 14 U/L (ref 5–45)
BILIRUB SERPL-MCNC: 0.19 MG/DL (ref 0.2–1)
BUN SERPL-MCNC: 10 MG/DL (ref 5–25)
CALCIUM SERPL-MCNC: 8.4 MG/DL (ref 8.3–10.1)
CHLORIDE SERPL-SCNC: 107 MMOL/L (ref 100–108)
CO2 SERPL-SCNC: 25 MMOL/L (ref 21–32)
CREAT SERPL-MCNC: 1.06 MG/DL (ref 0.6–1.3)
EST. AVERAGE GLUCOSE BLD GHB EST-MCNC: 126 MG/DL
GFR SERPL CREATININE-BSD FRML MDRD: 71 ML/MIN/1.73SQ M
GLUCOSE P FAST SERPL-MCNC: 90 MG/DL (ref 65–99)
HBA1C MFR BLD: 6 % (ref 4.2–6.3)
POTASSIUM SERPL-SCNC: 3.9 MMOL/L (ref 3.5–5.3)
PROT SERPL-MCNC: 7.2 G/DL (ref 6.4–8.2)
SODIUM SERPL-SCNC: 139 MMOL/L (ref 136–145)
TRIGL SERPL-MCNC: 123 MG/DL

## 2019-11-04 PROCEDURE — 80335 ANTIDEPRESSANT TRICYCLIC 1/2: CPT

## 2019-11-04 PROCEDURE — 83036 HEMOGLOBIN GLYCOSYLATED A1C: CPT

## 2019-11-04 PROCEDURE — 80053 COMPREHEN METABOLIC PANEL: CPT

## 2019-11-04 PROCEDURE — 84478 ASSAY OF TRIGLYCERIDES: CPT

## 2019-11-04 PROCEDURE — 36415 COLL VENOUS BLD VENIPUNCTURE: CPT

## 2019-11-07 LAB — NORTRIP SERPL-MCNC: 133 NG/ML (ref 50–150)

## 2019-12-05 ENCOUNTER — OFFICE VISIT (OUTPATIENT)
Dept: INTERNAL MEDICINE CLINIC | Facility: CLINIC | Age: 49
End: 2019-12-05

## 2019-12-05 VITALS
SYSTOLIC BLOOD PRESSURE: 114 MMHG | HEART RATE: 79 BPM | WEIGHT: 179.45 LBS | TEMPERATURE: 97.9 F | BODY MASS INDEX: 31.8 KG/M2 | OXYGEN SATURATION: 97 % | HEIGHT: 63 IN | DIASTOLIC BLOOD PRESSURE: 82 MMHG

## 2019-12-05 DIAGNOSIS — R13.19 ESOPHAGEAL DYSPHAGIA: Primary | ICD-10-CM

## 2019-12-05 DIAGNOSIS — R42 VERTIGO: ICD-10-CM

## 2019-12-05 PROCEDURE — 99213 OFFICE O/P EST LOW 20 MIN: CPT | Performed by: HOSPITALIST

## 2019-12-05 RX ORDER — MECLIZINE HCL 12.5 MG/1
12.5 TABLET ORAL EVERY 8 HOURS PRN
Qty: 30 TABLET | Refills: 1 | Status: SHIPPED | OUTPATIENT
Start: 2019-12-05 | End: 2020-04-16

## 2019-12-05 RX ORDER — ZALEPLON 10 MG/1
CAPSULE ORAL
Refills: 2 | COMMUNITY
Start: 2019-09-22 | End: 2020-04-16

## 2019-12-05 RX ORDER — ZOLPIDEM TARTRATE 12.5 MG/1
12.5 TABLET, FILM COATED, EXTENDED RELEASE ORAL
Refills: 2 | COMMUNITY
Start: 2019-10-29 | End: 2020-02-17 | Stop reason: ALTCHOICE

## 2019-12-05 RX ORDER — CYCLOBENZAPRINE HCL 10 MG
TABLET ORAL
COMMUNITY
Start: 2019-09-28 | End: 2020-02-17 | Stop reason: ALTCHOICE

## 2019-12-05 RX ORDER — IBUPROFEN 800 MG/1
TABLET ORAL
Refills: 0 | COMMUNITY
Start: 2019-09-05 | End: 2020-04-16

## 2019-12-05 NOTE — PROGRESS NOTES
ASSESSMENT/PLAN:  Diagnoses and all orders for this visit:    Esophageal dysphagia  -     Ambulatory referral to Gastroenterology; Future  Suspect this is likely due to esophageal stricture  Will refer patient to GI for endoscopy    Vertigo  Chronic, stable issue, has few episodes of vertigo sporadically throughout year  -will refill meclizine      Health Maintenance:  Advised diet and exercise  Advised to refrain from tobacco, alcohol, illicit drug use  Advised medical compliance      Schedule a follow-up appointment in 3 months with PCP  CHIEF COMPLAINT:   Difficulty swallowing    HISTORY OF PRESENT ILLNESS:  Patient is a 61-year-old female with past medical history significant for obesity, prediabetes and depression who presents for evaluation of difficulty swallowing  Patient states this restarted roughly 5-6 months ago, states that she has intermittent episodes of pain located in the center of her chest, immediately after eating  States "it feels like the food is getting stuck in her chest"  States the pain lasted approximately 2-3 minutes  States these episodes occur roughly 3 to 4 times a week  Admits to occasionally vomiting up undigested food after these episodes  States this only occurs with solid foods, does not occur with liquids  States this pain is different than GERD pain she had in past   Denies any headaches, chest pain, chest pain on exertion, dyspnea, nausea, diarrhea, constipation, or urinary changes        The following portions of the patient's history were reviewed and updated as appropriate: allergies, current medications, past family history, past medical history, past social history, past surgical history and problem list     Review of Systems   Constitutional: Negative  HENT: Negative  Eyes: Negative  Respiratory: Negative  Cardiovascular: Negative  Gastrointestinal: Negative  Genitourinary: Negative  Musculoskeletal: Negative      Neurological: Negative  Hematological: Negative  Psychiatric/Behavioral: Negative  OBJECTIVE:  Vitals:    12/05/19 1016   BP: 114/82   BP Location: Right arm   Patient Position: Sitting   Cuff Size: Adult   Pulse: 79   Temp: 97 9 °F (36 6 °C)   TempSrc: Oral   SpO2: 97%   Weight: 81 4 kg (179 lb 7 3 oz)   Height: 5' 3" (1 6 m)     Physical Exam   Constitutional: She is oriented to person, place, and time  She appears well-developed and well-nourished  Obese   HENT:   Head: Normocephalic and atraumatic  Eyes: Pupils are equal, round, and reactive to light  Conjunctivae and EOM are normal    Neck: Normal range of motion  Neck supple  Cardiovascular: Normal rate, regular rhythm, normal heart sounds and intact distal pulses  Pulmonary/Chest: Effort normal and breath sounds normal    Abdominal: Soft  Bowel sounds are normal    Musculoskeletal: Normal range of motion  Neurological: She is alert and oriented to person, place, and time  Skin: Skin is warm and dry  Psychiatric: She has a normal mood and affect  Her behavior is normal  Judgment and thought content normal    Nursing note and vitals reviewed  Current Outpatient Medications:     mirtazapine (REMERON) 30 mg tablet, , Disp: , Rfl:     omeprazole (PriLOSEC) 20 mg delayed release capsule, , Disp: , Rfl:     oxyCODONE (ROXICODONE) 30 MG immediate release tablet, OxyCODONE HCl - 30 MG Oral Tablet TAKE 1 TABLET EVERY 6 HOURS AS NEEDED FOR PAIN  Quantity: 1;  Refills: 0    Culligan, [facility] Ef[facility]; Active, Disp: , Rfl:     QUEtiapine (SEROquel) 200 mg tablet, QUEtiapine Fumarate 200 MG Oral Tablet TAKE 1 TABLET AT BEDTIME    Quantity: 30;  Refills: 0    Fanny James;  Started 28-Dec-2011 Active, Disp: , Rfl:     temazepam (RESTORIL) 30 mg capsule, Temazepam 30 MG Oral Capsule  Refills: 0   , M D ;  Started 13-Jan-2016 Active, Disp: , Rfl:     tiZANidine (ZANAFLEX) 4 mg tablet, Take 4 mg by mouth every 8 (eight) hours, Disp: , Rfl: 1   traZODone (DESYREL) 150 mg tablet, Take 450 mg by mouth daily at bedtime, Disp: , Rfl: 0    venlafaxine (EFFEXOR-XR) 150 mg 24 hr capsule, , Disp: , Rfl:     zaleplon (SONATA) 10 MG capsule, daily at bedtime, Disp: , Rfl: 2    zolpidem (AMBIEN CR) 12 5 MG CR tablet, Take 12 5 mg by mouth daily at bedtime, Disp: , Rfl: 2    amitriptyline (ELAVIL) 25 mg tablet, Take 25 mg by mouth daily at bedtime, Disp: , Rfl:     Blood Glucose Monitoring Suppl (ACCU-CHEK NOREEN PLUS) w/Device KIT, by Does not apply route, Disp: , Rfl:     cloNIDine (CATAPRES) 0 1 mg tablet, Take 0 1 mg by mouth 2 (two) times a day, Disp: , Rfl: 0    cyclobenzaprine (FLEXERIL) 10 mg tablet, , Disp: , Rfl:     gabapentin (NEURONTIN) 300 mg capsule, TAKE 4 CAPSULES AT BEDTIME, Disp: , Rfl: 0    glucose blood test strip, by In Vitro route daily, Disp: , Rfl:     ibuprofen (MOTRIN) 800 mg tablet, TAKE 1 TABLET BY MOUTH THREE TIMES A DAY AS NEEDED WITH FOOD OR MILK 90 DAYS, Disp: , Rfl: 0    Past Medical History:   Diagnosis Date    Abnormal thyroid blood test     last assessed 11/13/14    Anemia     last assessed  11/19/13    Chronic pain     back    Depression     Sleep disorder      Past Surgical History:   Procedure Laterality Date    ABSCESS DRAINAGE      incision - skin abscess    BACK SURGERY      2002    BACK SURGERY      lower    MAMMO STEREOTACTIC BREAST BIOPSY RIGHT (ALL INC) Right 6/21/2017    MAMMO STEREOTACTIC BREAST BIOPSY RIGHT (ALL INC) EACH ADD Right 6/21/2017     Social History     Socioeconomic History    Marital status: /Civil Union     Spouse name: Not on file    Number of children: Not on file    Years of education: Not on file    Highest education level: Not on file   Occupational History    Not on file   Social Needs    Financial resource strain: Not on file    Food insecurity:     Worry: Not on file     Inability: Not on file    Transportation needs:     Medical: Not on file     Non-medical: Not on file   Tobacco Use    Smoking status: Former Smoker    Smokeless tobacco: Never Used    Tobacco comment: pt "quit five years ago"   Substance and Sexual Activity    Alcohol use: No    Drug use: No    Sexual activity: Yes     Partners: Male     Birth control/protection: None   Lifestyle    Physical activity:     Days per week: Not on file     Minutes per session: Not on file    Stress: Not on file   Relationships    Social connections:     Talks on phone: Not on file     Gets together: Not on file     Attends Confucianist service: Not on file     Active member of club or organization: Not on file     Attends meetings of clubs or organizations: Not on file     Relationship status: Not on file    Intimate partner violence:     Fear of current or ex partner: Not on file     Emotionally abused: Not on file     Physically abused: Not on file     Forced sexual activity: Not on file   Other Topics Concern    Not on file   Social History Narrative    Death in the family - sister    Lack of adequate sleep    Parentage    Sedentary lifestyle    Under stress     Family History   Problem Relation Age of Onset    Kidney disease Mother         chronic    Bone cancer Father     Heart attack Sister         Acute MI    Diabetes Sister     Hypertension Brother     Cancer Family     Diabetes Family     Heart disease Family     Hyperlipidemia Family     Hypertension Family        ==  DO Jos[facility] Rainey [facility] PGY-2    [facility]  [address] , [address] [address] [facility] [address] [facility]  Office: [phone]  Fax: [phone]     Portions of the record may have been created with voice recognition software  Occasional wrong word or "sound a like" substitutions may have occurred due to the inherent limitations of voice recognition software  Read the chart carefully and recognize, using context, where substitutions have occurred

## 2019-12-10 ENCOUNTER — TRANSCRIBE ORDERS (OUTPATIENT)
Dept: GASTROENTEROLOGY | Facility: CLINIC | Age: 49
End: 2019-12-10

## 2019-12-10 ENCOUNTER — OFFICE VISIT (OUTPATIENT)
Dept: GASTROENTEROLOGY | Facility: CLINIC | Age: 49
End: 2019-12-10
Payer: COMMERCIAL

## 2019-12-10 VITALS
TEMPERATURE: 97.1 F | SYSTOLIC BLOOD PRESSURE: 114 MMHG | HEIGHT: 63 IN | BODY MASS INDEX: 31.71 KG/M2 | WEIGHT: 179 LBS | HEART RATE: 85 BPM | DIASTOLIC BLOOD PRESSURE: 77 MMHG

## 2019-12-10 DIAGNOSIS — R11.11 VOMITING WITHOUT NAUSEA, INTRACTABILITY OF VOMITING NOT SPECIFIED, UNSPECIFIED VOMITING TYPE: ICD-10-CM

## 2019-12-10 DIAGNOSIS — Z12.11 SCREENING FOR COLON CANCER: ICD-10-CM

## 2019-12-10 DIAGNOSIS — K21.9 GASTROESOPHAGEAL REFLUX DISEASE, ESOPHAGITIS PRESENCE NOT SPECIFIED: Primary | ICD-10-CM

## 2019-12-10 DIAGNOSIS — R13.19 ESOPHAGEAL DYSPHAGIA: ICD-10-CM

## 2019-12-10 PROCEDURE — 99204 OFFICE O/P NEW MOD 45 MIN: CPT | Performed by: INTERNAL MEDICINE

## 2019-12-10 RX ORDER — OMEPRAZOLE 40 MG/1
40 CAPSULE, DELAYED RELEASE ORAL DAILY
Qty: 90 CAPSULE | Refills: 1 | Status: SHIPPED | OUTPATIENT
Start: 2019-12-10 | End: 2020-04-16

## 2019-12-10 RX ORDER — OXYCODONE 27 MG/1
CAPSULE, EXTENDED RELEASE ORAL
Refills: 0 | COMMUNITY
Start: 2019-11-08 | End: 2020-02-17 | Stop reason: ALTCHOICE

## 2019-12-10 RX ORDER — GABAPENTIN 600 MG/1
1200 TABLET ORAL 2 TIMES DAILY
Refills: 1 | COMMUNITY
Start: 2019-11-27 | End: 2020-07-31 | Stop reason: SDUPTHER

## 2019-12-10 NOTE — PATIENT INSTRUCTIONS
Avoid NSAIDs  Omeprazole 40 milligrams once daily at least 30 minutes before 1st meal of the day  Schedule EGD and colonoscopy    Gastroesophageal Reflux Disease   WHAT YOU NEED TO KNOW:   Gastroesophageal reflux occurs when acid and food in the stomach back up into the esophagus  Gastroesophageal reflux disease (GERD) is reflux that occurs more than twice a week for a few weeks  It usually causes heartburn and other symptoms  GERD can cause other health problems over time if it is not treated  DISCHARGE INSTRUCTIONS:   Return to the emergency department if:   · You feel full and cannot burp or vomit  · You have severe chest pain and sudden trouble breathing  · Your bowel movements are black, bloody, or tarry-looking  · Your vomit looks like coffee grounds or has blood in it  Contact your healthcare provider if:   · You vomit large amounts, or you vomit often  · You have trouble breathing after you vomit  · You have trouble swallowing, or pain with swallowing  · You are losing weight without trying  · Your symptoms get worse or do not improve with treatment  · You have questions or concerns about your condition or care  Medicines:   · Medicines  are used to decrease stomach acid  Medicine may also be used to help your lower esophageal sphincter and stomach contract (tighten) more  · Take your medicine as directed  Contact your healthcare provider if you think your medicine is not helping or if you have side effects  Tell him of her if you are allergic to any medicine  Keep a list of the medicines, vitamins, and herbs you take  Include the amounts, and when and why you take them  Bring the list or the pill bottles to follow-up visits  Carry your medicine list with you in case of an emergency  Manage GERD:   · Do not have foods or drinks that may increase heartburn    These include chocolate, peppermint, fried or fatty foods, drinks that contain caffeine, or carbonated drinks (soda)  Other foods include spicy foods, onions, tomatoes, and tomato-based foods  Do not have foods or drinks that can irritate your esophagus, such as citrus fruits, juices, and alcohol  · Do not eat large meals  When you eat a lot of food at one time, your stomach needs more acid to digest it  Eat 6 small meals each day instead of 3 large ones, and eat slowly  Do not eat meals 2 to 3 hours before bedtime  · Elevate the head of your bed  Place 6-inch blocks under the head of your bed frame  You may also use more than one pillow under your head and shoulders while you sleep  · Maintain a healthy weight  If you are overweight, weight loss may help relieve symptoms of GERD  · Do not smoke  Smoking weakens the lower esophageal sphincter and increases the risk of GERD  Ask your healthcare provider for information if you currently smoke and need help to quit  E-cigarettes or smokeless tobacco still contain nicotine  Talk to your healthcare provider before you use these products  · Do not wear clothing that is tight around your waist   Tight clothing can put pressure on your stomach and cause or worsen GERD symptoms  Follow up with your healthcare provider as directed:  Write down your questions so you remember to ask them during your visits  © 2017 [facility] Information is for End User's use only and may not be sold, redistributed or otherwise used for commercial purposes  All illustrations and images included in CareNotes® are the copyrighted property of A D A M , Inc  or Jim Mcclellan  The above information is an  only  It is not intended as medical advice for individual conditions or treatments  Talk to your doctor, nurse or pharmacist before following any medical regimen to see if it is safe and effective for you  EGD/colonoscopy 2/17/20 with Dr Thalia Beckford at [facility]  Golytely instructions given to pt during OV

## 2019-12-10 NOTE — PROGRESS NOTES
[facility] - Outpatient Consultation  Blair Luna 44 y o  female MRN: [id number]  Encounter: [id number]          ASSESSMENT AND PLAN:      1  Screening for colon cancer  -overdue for colon cancer screening given of  descent  -patient agreeable to proceed with colonoscopy for colon cancer screening  -schedule colonoscopy  - Ambulatory referral to Gastroenterology  - polyethylene glycol (GOLYTELY) 4000 mL solution; Take 4,000 mL by mouth once for 1 dose  Dispense: 4000 mL; Refill: 0  - Colonoscopy; Future    2  Gastroesophageal reflux disease, esophagitis presence not specified  3  Esophageal dysphagia  4  Vomiting  -patient with longstanding GERD previously controlled on PPI however has not been on it for quite some time  -patient with complaints of esophageal dysphagia and odynophagia to mainly solids but also with liquids associated with vomiting  -differential diagnosis includes GERD, candidiasis, stricture, web, gastroparesis, esophageal dysmotility especially in the setting of opioid use  -start omeprazole 40 milligrams once daily  -schedule EGD  -may need to consider esophageal manometry +/-pH monitoring  -avoid NSAIDs  - omeprazole (PriLOSEC) 40 MG capsule; Take 1 capsule (40 mg total) by mouth daily  Dispense: 90 capsule; Refill: 1  - EGD; Future    Return to clinic after procedure in about 3 months        ______________________________________________________________________    HPI:  47-year-old female seen in consultation for colon cancer screening as well as complaints of GERD, esophageal dysphagia and odynophagia with vomiting  Patient with complaints of GERD symptoms particularly heartburn for many years previously controlled on PPI however has not been on it for many months  She also has complaints of difficulty swallowing mainly solids but sometimes with liquids as well as painful swallowing that has been going on for the past few months    She denies unintentional weight loss but endorses 22 pound weight loss with dietary lifestyle modification  Has vomiting with certain types of foods but denies nausea or abdominal pain  Patient denies family history of GI tract cancers  Never has undergone endoscopic evaluation the past   Takes ibuprofen occasionally  REVIEW OF SYSTEMS:    CONSTITUTIONAL: Denies any fever, chills, rigors, and weight loss  HEENT: No earache or tinnitus  Denies hearing loss or visual disturbances  CARDIOVASCULAR: No chest pain or palpitations  RESPIRATORY: Denies any cough, hemoptysis, shortness of breath or dyspnea on exertion  GASTROINTESTINAL: As noted in the History of Present Illness  GENITOURINARY: No problems with urination  Denies any hematuria or dysuria  NEUROLOGIC: No dizziness or vertigo, denies headaches  MUSCULOSKELETAL: Denies any muscle or joint pain  SKIN: Denies skin rashes or itching  ENDOCRINE: Denies excessive thirst  Denies intolerance to heat or cold  PSYCHOSOCIAL: Denies depression or anxiety  Denies any recent memory loss         Historical Information   Past Medical History:   Diagnosis Date    Abnormal thyroid blood test     last assessed 11/13/14    Anemia     last assessed  11/19/13    Chronic pain     back    Depression     Sleep disorder      Past Surgical History:   Procedure Laterality Date    ABSCESS DRAINAGE      incision - skin abscess    BACK SURGERY      2002    BACK SURGERY      lower    MAMMO STEREOTACTIC BREAST BIOPSY RIGHT (ALL INC) Right 6/21/2017    MAMMO STEREOTACTIC BREAST BIOPSY RIGHT (ALL INC) EACH ADD Right 6/21/2017     Social History   Social History     Substance and Sexual Activity   Alcohol Use No     Social History     Substance and Sexual Activity   Drug Use No     Social History     Tobacco Use   Smoking Status Former Smoker   Smokeless Tobacco Never Used   Tobacco Comment    pt "quit five years ago"     Family History   Problem Relation Age of Onset    Kidney disease Mother         chronic    Bone cancer Father     Heart attack Sister         Acute MI    Diabetes Sister     Hypertension Brother     Cancer Family     Diabetes Family     Heart disease Family     Hyperlipidemia Family     Hypertension Family        Meds/Allergies       Current Outpatient Medications:     amitriptyline (ELAVIL) 25 mg tablet    cloNIDine (CATAPRES) 0 1 mg tablet    cyclobenzaprine (FLEXERIL) 10 mg tablet    diclofenac sodium (VOLTAREN) 1 %    gabapentin (NEURONTIN) 300 mg capsule    gabapentin (NEURONTIN) 600 MG tablet    ibuprofen (MOTRIN) 800 mg tablet    meclizine (ANTIVERT) 12 5 MG tablet    mirtazapine (REMERON) 30 mg tablet    oxyCODONE (ROXICODONE) 30 MG immediate release tablet    QUEtiapine (SEROquel) 200 mg tablet    temazepam (RESTORIL) 30 mg capsule    tiZANidine (ZANAFLEX) 4 mg tablet    traZODone (DESYREL) 150 mg tablet    venlafaxine (EFFEXOR-XR) 150 mg 24 hr capsule    XTAMPZA ER 27 MG C12A    zaleplon (SONATA) 10 MG capsule    zolpidem (AMBIEN CR) 12 5 MG CR tablet    Blood Glucose Monitoring Suppl (ACCU-CHEK NOREEN PLUS) w/Device KIT    glucose blood test strip    omeprazole (PriLOSEC) 40 MG capsule    polyethylene glycol (GOLYTELY) 4000 mL solution    Allergies   Allergen Reactions    Aspirin GI Intolerance     Other reaction(s): Other (See Comments)  Other reaction(s): Other (See Comments)  Other reaction(s): Unknown Allergic Reaction    Other      Annotation - [id number]: raw onions- causes throat to feel like it wants to close           Objective     Blood pressure 114/77, pulse 85, temperature (!) 97 1 °F (36 2 °C), temperature source Tympanic, height 5' 3" (1 6 m), weight 81 2 kg (179 lb)  Body mass index is 31 71 kg/m²          PHYSICAL EXAM:      General Appearance:   Alert, cooperative, no distress   HEENT:   Normocephalic, atraumatic, anicteric      Neck:  Supple, symmetrical, trachea midline   Lungs:   Clear to auscultation bilaterally; no rales, rhonchi or wheezing; respirations unlabored    Heart[de-identified]   Regular rate and rhythm; no murmur, rub, or gallop  Abdomen:   Soft, non-tender, non-distended; normal bowel sounds; no masses, no organomegaly    Genitalia:   Deferred    Rectal:   Deferred    Extremities:  No cyanosis, clubbing or edema        Skin:  No jaundice, rashes, or lesions    Lymph nodes:  No palpable cervical lymphadenopathy        Lab Results:   No visits with results within 1 Day(s) from this visit  Latest known visit with results is:   Appointment on 11/04/2019   Component Date Value    Hemoglobin A1C 11/04/2019 6 0     EAG 11/04/2019 126     Sodium 11/04/2019 139     Potassium 11/04/2019 3 9     Chloride 11/04/2019 107     CO2 11/04/2019 25     ANION GAP 11/04/2019 7     BUN 11/04/2019 10     Creatinine 11/04/2019 1 06     Glucose, Fasting 11/04/2019 90     Calcium 11/04/2019 8 4     AST 11/04/2019 14     ALT 11/04/2019 14     Alkaline Phosphatase 11/04/2019 73     Total Protein 11/04/2019 7 2     Albumin 11/04/2019 3 5     Total Bilirubin 11/04/2019 0 19*    eGFR 11/04/2019 71     Triglycerides 11/04/2019 123     NORTRIPTYLINE, SERUM 11/04/2019 133          Radiology Results:   No results found

## 2019-12-23 ENCOUNTER — TRANSCRIBE ORDERS (OUTPATIENT)
Dept: ADMINISTRATIVE | Facility: HOSPITAL | Age: 49
End: 2019-12-23

## 2019-12-23 DIAGNOSIS — M79.605 LEFT LEG PAIN: Primary | ICD-10-CM

## 2019-12-30 ENCOUNTER — HOSPITAL ENCOUNTER (OUTPATIENT)
Dept: RADIOLOGY | Facility: HOSPITAL | Age: 49
Discharge: HOME | End: 2019-12-30
Payer: COMMERCIAL

## 2019-12-30 DIAGNOSIS — M79.605 LEFT LEG PAIN: ICD-10-CM

## 2019-12-30 PROCEDURE — 73700 CT LOWER EXTREMITY W/O DYE: CPT

## 2020-02-03 ENCOUNTER — ANESTHESIA EVENT (OUTPATIENT)
Dept: GASTROENTEROLOGY | Facility: AMBULARY SURGERY CENTER | Age: 50
End: 2020-02-03

## 2020-02-05 ENCOUNTER — TELEPHONE (OUTPATIENT)
Dept: GASTROENTEROLOGY | Facility: CLINIC | Age: 50
End: 2020-02-05

## 2020-02-05 ENCOUNTER — TRANSCRIBE ORDERS (OUTPATIENT)
Dept: GASTROENTEROLOGY | Facility: CLINIC | Age: 50
End: 2020-02-05

## 2020-02-05 NOTE — TELEPHONE ENCOUNTER
Called [facility] and spoke to [employer] for a prior authorization for a colonoscopy & EGD  No auth required    Call ref #[id number]

## 2020-02-17 ENCOUNTER — ANESTHESIA (OUTPATIENT)
Dept: GASTROENTEROLOGY | Facility: AMBULARY SURGERY CENTER | Age: 50
End: 2020-02-17

## 2020-02-17 ENCOUNTER — HOSPITAL ENCOUNTER (OUTPATIENT)
Dept: GASTROENTEROLOGY | Facility: AMBULARY SURGERY CENTER | Age: 50
Setting detail: OUTPATIENT SURGERY
Discharge: HOME | End: 2020-02-17
Attending: INTERNAL MEDICINE | Admitting: INTERNAL MEDICINE
Payer: COMMERCIAL

## 2020-02-17 VITALS
RESPIRATION RATE: 18 BRPM | SYSTOLIC BLOOD PRESSURE: 112 MMHG | BODY MASS INDEX: 33.23 KG/M2 | HEART RATE: 64 BPM | DIASTOLIC BLOOD PRESSURE: 68 MMHG | OXYGEN SATURATION: 100 % | HEIGHT: 62 IN | TEMPERATURE: 98.1 F | WEIGHT: 180.6 LBS

## 2020-02-17 DIAGNOSIS — R13.19 ESOPHAGEAL DYSPHAGIA: ICD-10-CM

## 2020-02-17 DIAGNOSIS — Z12.11 SCREENING FOR COLON CANCER: ICD-10-CM

## 2020-02-17 DIAGNOSIS — K21.9 GASTROESOPHAGEAL REFLUX DISEASE, ESOPHAGITIS PRESENCE NOT SPECIFIED: ICD-10-CM

## 2020-02-17 PROCEDURE — 43239 EGD BIOPSY SINGLE/MULTIPLE: CPT | Performed by: INTERNAL MEDICINE

## 2020-02-17 PROCEDURE — G0121 COLON CA SCRN NOT HI RSK IND: HCPCS | Performed by: INTERNAL MEDICINE

## 2020-02-17 PROCEDURE — 88305 TISSUE EXAM BY PATHOLOGIST: CPT | Performed by: PATHOLOGY

## 2020-02-17 PROCEDURE — NC001 PR NO CHARGE: Performed by: INTERNAL MEDICINE

## 2020-02-17 PROCEDURE — 88342 IMHCHEM/IMCYTCHM 1ST ANTB: CPT | Performed by: PATHOLOGY

## 2020-02-17 RX ORDER — GLYCOPYRROLATE 0.2 MG/ML
INJECTION INTRAMUSCULAR; INTRAVENOUS AS NEEDED
Status: DISCONTINUED | OUTPATIENT
Start: 2020-02-17 | End: 2020-02-17 | Stop reason: SURG

## 2020-02-17 RX ORDER — PROPOFOL 10 MG/ML
INJECTION, EMULSION INTRAVENOUS AS NEEDED
Status: DISCONTINUED | OUTPATIENT
Start: 2020-02-17 | End: 2020-02-17 | Stop reason: SURG

## 2020-02-17 RX ORDER — SODIUM CHLORIDE, SODIUM LACTATE, POTASSIUM CHLORIDE, CALCIUM CHLORIDE 600; 310; 30; 20 MG/100ML; MG/100ML; MG/100ML; MG/100ML
INJECTION, SOLUTION INTRAVENOUS CONTINUOUS PRN
Status: DISCONTINUED | OUTPATIENT
Start: 2020-02-17 | End: 2020-02-17 | Stop reason: SURG

## 2020-02-17 RX ORDER — LIDOCAINE HYDROCHLORIDE 10 MG/ML
INJECTION, SOLUTION EPIDURAL; INFILTRATION; INTRACAUDAL; PERINEURAL AS NEEDED
Status: DISCONTINUED | OUTPATIENT
Start: 2020-02-17 | End: 2020-02-17 | Stop reason: SURG

## 2020-02-17 RX ADMIN — PROPOFOL 20 MG: 10 INJECTION, EMULSION INTRAVENOUS at 08:03

## 2020-02-17 RX ADMIN — PROPOFOL 120 MG: 10 INJECTION, EMULSION INTRAVENOUS at 08:01

## 2020-02-17 RX ADMIN — GLYCOPYRROLATE 0.2 MG: 0.2 INJECTION, SOLUTION INTRAMUSCULAR; INTRAVENOUS at 08:15

## 2020-02-17 RX ADMIN — PROPOFOL 20 MG: 10 INJECTION, EMULSION INTRAVENOUS at 08:10

## 2020-02-17 RX ADMIN — PROPOFOL 20 MG: 10 INJECTION, EMULSION INTRAVENOUS at 08:06

## 2020-02-17 RX ADMIN — PROPOFOL 20 MG: 10 INJECTION, EMULSION INTRAVENOUS at 08:26

## 2020-02-17 RX ADMIN — SODIUM CHLORIDE, SODIUM LACTATE, POTASSIUM CHLORIDE, AND CALCIUM CHLORIDE: .6; .31; .03; .02 INJECTION, SOLUTION INTRAVENOUS at 07:57

## 2020-02-17 RX ADMIN — PROPOFOL 20 MG: 10 INJECTION, EMULSION INTRAVENOUS at 08:29

## 2020-02-17 RX ADMIN — PROPOFOL 20 MG: 10 INJECTION, EMULSION INTRAVENOUS at 08:14

## 2020-02-17 RX ADMIN — LIDOCAINE HYDROCHLORIDE 50 MG: 10 INJECTION, SOLUTION EPIDURAL; INFILTRATION; INTRACAUDAL; PERINEURAL at 08:01

## 2020-02-17 RX ADMIN — PROPOFOL 20 MG: 10 INJECTION, EMULSION INTRAVENOUS at 08:23

## 2020-02-17 RX ADMIN — PROPOFOL 20 MG: 10 INJECTION, EMULSION INTRAVENOUS at 08:21

## 2020-02-17 RX ADMIN — PROPOFOL 20 MG: 10 INJECTION, EMULSION INTRAVENOUS at 08:18

## 2020-02-17 NOTE — ANESTHESIA POSTPROCEDURE EVALUATION
Post-Op Assessment Note    CV Status:  Stable    Pain management: adequate     Mental Status:  Alert and awake   Hydration Status:  Euvolemic   PONV Controlled:  Controlled   Airway Patency:  Patent   Post Op Vitals Reviewed: Yes      Staff: CRNA           /65 (02/17/20 0842)    Temp 98 °F (36 7 °C) (02/17/20 0842)    Pulse 64 (02/17/20 0842)   Resp 18 (02/17/20 0842)    SpO2 99 % (02/17/20 0842)

## 2020-02-17 NOTE — ANESTHESIA PREPROCEDURE EVALUATION
Review of Systems/Medical History  Patient summary reviewed  Chart reviewed  No history of anesthetic complications     Cardiovascular  Exercise tolerance (METS): >4,     Pulmonary  Negative pulmonary ROS        GI/Hepatic    GERD , Bowel prep       Negative  ROS        Endo/Other  Negative endo/other ROS      GYN  Negative gynecology ROS          Hematology  Anemia ,     Musculoskeletal  Negative musculoskeletal ROS        Neurology  Negative neurology ROS      Psychology   Depression ,              Physical Exam    Airway    Mallampati score: II  TM Distance: >3 FB  Neck ROM: full     Dental   upper dentures,     Cardiovascular  Rhythm: regular, Rate: normal, Cardiovascular exam normal    Pulmonary  Pulmonary exam normal Breath sounds clear to auscultation,     Other Findings        Anesthesia Plan  ASA Score- 2     Anesthesia Type- general with ASA Monitors  Additional Monitors:   Airway Plan:         Plan Factors- Patient instructed to abstain from smoking on day of procedure  Patient did not smoke on day of surgery  Induction- intravenous  Postoperative Plan-     Informed Consent- Anesthetic plan and risks discussed with patient  I personally reviewed this patient with the CRNA  Discussed and agreed on the Anesthesia Plan with the CRNA  Nabila Medrano

## 2020-02-17 NOTE — H&P
History and Physical -  [facility]  Rebeka Tran 44 y o  female MRN: [id number]    HPI: Rebeka Tran is a 44y o  year old female who presents for EGD and colonoscopy  She has gastroesophageal reflux disease with dysphagia  Colonoscopy is for colon cancer screening  No change in medical conditions since her office visit  Review of Systems    Historical Information   Past Medical History:   Diagnosis Date    Abnormal thyroid blood test     last assessed 11/13/14    Anemia     last assessed  11/19/13    Chronic pain     back    Depression     Sleep disorder      Past Surgical History:   Procedure Laterality Date    ABSCESS DRAINAGE      incision - skin abscess    BACK SURGERY      2002    BACK SURGERY      lower    MAMMO STEREOTACTIC BREAST BIOPSY RIGHT (ALL INC) Right 6/21/2017    MAMMO STEREOTACTIC BREAST BIOPSY RIGHT (ALL INC) EACH ADD Right 6/21/2017     Social History   Social History     Substance and Sexual Activity   Alcohol Use No     Social History     Substance and Sexual Activity   Drug Use No     Social History     Tobacco Use   Smoking Status Former Smoker   Smokeless Tobacco Never Used   Tobacco Comment    pt "quit five years ago"     Family History   Problem Relation Age of Onset    Kidney disease Mother         chronic    Bone cancer Father     Heart attack Sister         Acute MI    Diabetes Sister     Hypertension Brother     Cancer Family     Diabetes Family     Heart disease Family     Hyperlipidemia Family     Hypertension Family        Meds/Allergies       (Not in a hospital admission)    Allergies   Allergen Reactions    Aspirin GI Intolerance     Other reaction(s): Other (See Comments)  Other reaction(s):  Other (See Comments)  Other reaction(s): Unknown Allergic Reaction    Other      Annotation - [id number]: raw onions- causes throat to feel like it wants to close       Objective     There were no vitals taken for this visit       PHYSICAL EXAM    Gen: NAD  CV: RRR  CHEST: Clear  ABD: soft, NT/ND  EXT: no edema  Neuro: AAO      ASSESSMENT/PLAN:  This is a 44y o  year old female here for EGD for evaluation of GERD and dysphagia  Colonoscopy for colon cancer screening      PLAN:   Procedure:  EGD and colonoscopy with biopsy and possible polypectomy

## 2020-02-20 DIAGNOSIS — K29.70 HELICOBACTER PYLORI GASTRITIS: Primary | ICD-10-CM

## 2020-02-20 DIAGNOSIS — B96.81 HELICOBACTER PYLORI GASTRITIS: Primary | ICD-10-CM

## 2020-02-20 RX ORDER — TETRACYCLINE HYDROCHLORIDE 500 MG/1
500 CAPSULE ORAL 4 TIMES DAILY
Qty: 56 CAPSULE | Refills: 0 | Status: SHIPPED | OUTPATIENT
Start: 2020-02-20 | End: 2020-03-05

## 2020-02-20 RX ORDER — METRONIDAZOLE 250 MG/1
250 TABLET ORAL 4 TIMES DAILY
Qty: 56 TABLET | Refills: 0 | Status: SHIPPED | OUTPATIENT
Start: 2020-02-20 | End: 2020-03-05

## 2020-02-20 RX ORDER — OMEPRAZOLE 40 MG/1
40 CAPSULE, DELAYED RELEASE ORAL
Qty: 28 CAPSULE | Refills: 0 | Status: SHIPPED | OUTPATIENT
Start: 2020-02-20 | End: 2020-04-16

## 2020-02-20 RX ORDER — BISMUTH SUBSALICYLATE 262 MG/1
524 TABLET, CHEWABLE ORAL
Qty: 112 TABLET | Refills: 0 | Status: SHIPPED | OUTPATIENT
Start: 2020-02-20 | End: 2020-03-05

## 2020-02-25 ENCOUNTER — TELEPHONE (OUTPATIENT)
Dept: INTERNAL MEDICINE CLINIC | Facility: CLINIC | Age: 50
End: 2020-02-25

## 2020-02-25 DIAGNOSIS — Z11.1 TUBERCULOSIS SCREENING: Primary | ICD-10-CM

## 2020-02-25 NOTE — TELEPHONE ENCOUNTER
Patient needs a PPD for her work  She said she can't have a PPD done  She has to go for a chest xray  Can this be ordered? She states she does not need a physical   Please let patient know

## 2020-02-26 NOTE — TELEPHONE ENCOUNTER
Called patient and made her aware/  Patient will get lab work done and then will call in 1-2 days after test for results

## 2020-02-28 ENCOUNTER — APPOINTMENT (OUTPATIENT)
Dept: LAB | Facility: CLINIC | Age: 50
End: 2020-02-28
Payer: COMMERCIAL

## 2020-02-28 DIAGNOSIS — Z11.1 TUBERCULOSIS SCREENING: ICD-10-CM

## 2020-02-28 PROCEDURE — 86480 TB TEST CELL IMMUN MEASURE: CPT

## 2020-02-28 PROCEDURE — 36415 COLL VENOUS BLD VENIPUNCTURE: CPT

## 2020-03-02 ENCOUNTER — HOSPITAL ENCOUNTER (EMERGENCY)
Facility: HOSPITAL | Age: 50
Discharge: HOME | End: 2020-03-02
Attending: EMERGENCY MEDICINE | Admitting: EMERGENCY MEDICINE
Payer: COMMERCIAL

## 2020-03-02 ENCOUNTER — APPOINTMENT (EMERGENCY)
Dept: RADIOLOGY | Facility: HOSPITAL | Age: 50
End: 2020-03-02
Payer: COMMERCIAL

## 2020-03-02 VITALS
SYSTOLIC BLOOD PRESSURE: 150 MMHG | BODY MASS INDEX: 33.13 KG/M2 | RESPIRATION RATE: 16 BRPM | HEART RATE: 82 BPM | WEIGHT: 180 LBS | OXYGEN SATURATION: 99 % | DIASTOLIC BLOOD PRESSURE: 80 MMHG | HEIGHT: 62 IN | TEMPERATURE: 98.2 F

## 2020-03-02 DIAGNOSIS — J06.9 VIRAL URI WITH COUGH: Primary | ICD-10-CM

## 2020-03-02 DIAGNOSIS — R10.9 ABDOMINAL PAIN: ICD-10-CM

## 2020-03-02 LAB
GAMMA INTERFERON BACKGROUND BLD IA-ACNC: 0.16 IU/ML
M TB IFN-G BLD-IMP: POSITIVE
M TB IFN-G CD4+ BCKGRND COR BLD-ACNC: >10 IU/ML
M TB IFN-G CD4+ BCKGRND COR BLD-ACNC: >10 IU/ML
MITOGEN IGNF BCKGRD COR BLD-ACNC: >10 IU/ML

## 2020-03-02 PROCEDURE — 99283 EMERGENCY DEPT VISIT LOW MDM: CPT

## 2020-03-02 PROCEDURE — 71046 X-RAY EXAM CHEST 2 VIEWS: CPT

## 2020-03-02 PROCEDURE — 99284 EMERGENCY DEPT VISIT MOD MDM: CPT | Performed by: EMERGENCY MEDICINE

## 2020-03-02 RX ORDER — BENZONATATE 100 MG/1
100 CAPSULE ORAL EVERY 8 HOURS
Qty: 21 CAPSULE | Refills: 0 | Status: SHIPPED | OUTPATIENT
Start: 2020-03-02 | End: 2020-04-16

## 2020-03-02 RX ORDER — BENZONATATE 100 MG/1
100 CAPSULE ORAL EVERY 8 HOURS
Qty: 21 CAPSULE | Refills: 0 | Status: SHIPPED | OUTPATIENT
Start: 2020-03-02 | End: 2020-03-02 | Stop reason: SDUPTHER

## 2020-03-03 ENCOUNTER — OFFICE VISIT (OUTPATIENT)
Dept: INTERNAL MEDICINE CLINIC | Facility: CLINIC | Age: 50
End: 2020-03-03

## 2020-03-03 VITALS
HEART RATE: 77 BPM | OXYGEN SATURATION: 97 % | BODY MASS INDEX: 31.97 KG/M2 | HEIGHT: 62 IN | SYSTOLIC BLOOD PRESSURE: 126 MMHG | WEIGHT: 173.72 LBS | TEMPERATURE: 97.9 F | DIASTOLIC BLOOD PRESSURE: 82 MMHG

## 2020-03-03 DIAGNOSIS — J06.9 VIRAL URI: ICD-10-CM

## 2020-03-03 DIAGNOSIS — Z22.7 LATENT TUBERCULOSIS BY BLOOD TEST: Primary | ICD-10-CM

## 2020-03-03 PROCEDURE — 3008F BODY MASS INDEX DOCD: CPT | Performed by: INTERNAL MEDICINE

## 2020-03-03 PROCEDURE — 99213 OFFICE O/P EST LOW 20 MIN: CPT | Performed by: INTERNAL MEDICINE

## 2020-03-03 PROCEDURE — 1036F TOBACCO NON-USER: CPT | Performed by: INTERNAL MEDICINE

## 2020-03-03 RX ORDER — QUETIAPINE FUMARATE 400 MG/1
400 TABLET, FILM COATED ORAL 2 TIMES DAILY
COMMUNITY
Start: 2020-02-06 | End: 2020-07-31 | Stop reason: SDUPTHER

## 2020-03-03 RX ORDER — NORTRIPTYLINE HYDROCHLORIDE 50 MG/1
CAPSULE ORAL
COMMUNITY
Start: 2020-02-10 | End: 2020-07-31 | Stop reason: SDUPTHER

## 2020-03-03 RX ORDER — TEMAZEPAM 15 MG/1
CAPSULE ORAL
COMMUNITY
Start: 2020-01-27 | End: 2021-02-02 | Stop reason: ALTCHOICE

## 2020-03-03 RX ORDER — AMITRIPTYLINE HYDROCHLORIDE 100 MG/1
TABLET, FILM COATED ORAL
COMMUNITY
End: 2020-04-16

## 2020-03-03 RX ORDER — TRAZODONE HYDROCHLORIDE 50 MG/1
TABLET ORAL
COMMUNITY
End: 2020-04-16

## 2020-03-03 RX ORDER — QUETIAPINE FUMARATE 300 MG/1
TABLET, FILM COATED ORAL
COMMUNITY
End: 2020-04-16

## 2020-03-03 RX ORDER — OXYCODONE HYDROCHLORIDE 30 MG/1
TABLET, FILM COATED, EXTENDED RELEASE ORAL
COMMUNITY
End: 2020-04-16

## 2020-03-03 NOTE — PROGRESS NOTES
[address]  INTERNAL MEDICINE  [address], [facility]    NAME: Patricia Hwang  AGE: 44 y o  SEX: female    DATE OF ENCOUNTER: 3/3/2020    Assessment and Plan     1  Latent tuberculosis by blood test  · Patient was apparently in contact with someone who had tuberculosis 20 years ago  · Quantiferon gold was positive  · CXR did not reveal any fibrosis or cavity or nodules  · Patient does not have chronic TB symptoms   · We will treat her with rifampin 600mg daily x 4 months with monthly LFT monitoring  · Adverse effects and interaction with psychiatric medications explained to her  · She was explained to not stop the medication if her secretions turn orange  - rifampin (RIFADIN) 300 mg capsule; Take 2 capsules (600 mg total) by mouth daily  Dispense: 60 capsule; Refill: 3  - Hepatic function panel; Future    2  Viral URI  - pseudoephedrine-dextromethorphan-guaifenesin (ROBITUSSIN-PE) 30- MG/5ML solution; Take 10 mL by mouth 4 (four) times a day as needed for allergies  Dispense: 118 mL; Refill: 0    Orders Placed This Encounter   Procedures    Hepatic function panel       - Counseling Documentation: patient was counseled regarding: diagnostic results, instructions for management, risk factor reductions, prognosis, patient and family education, impressions, risks and benefits of treatment options and importance of compliance with treatment  - Counseling Time: counseling time more than 50% of visit: 20 minutes  - Barriers to treatment: None  - Medication Side Effects: Adverse side effects of medications were reviewed with the patient/guardian today  - Self Referrals: No    Chief Complaint     Chief Complaint   Patient presents with    Follow-up       History of Present Illness     Patient complains of 2 days of viral URI symptoms, says her children have been sick too  She has cough and a mild sore throat  No fevers or chills   She is here because her quantiferon gold was positive  She denies chronic fevers, chills, night sweats, cough, SOB, weight loss  She was in contact with someone who had TB 20 years ago (does not want to share information)       The following portions of the patient's history were reviewed and updated as appropriate: allergies, current medications, past family history, past medical history, past social history, past surgical history and problem list     Review of Systems     Review of Systems   Respiratory: Positive for cough  All other systems reviewed and are negative  Active Problem List     Patient Active Problem List   Diagnosis    GERD (gastroesophageal reflux disease)    Alopecia    History of prediabetes    Vertigo    Esophageal dysphagia    Latent tuberculosis by blood test       Objective     /82 (BP Location: Left arm, Patient Position: Sitting, Cuff Size: Standard)   Pulse 77   Temp 97 9 °F (36 6 °C) (Oral)   Ht 5' 2" (1 575 m)   Wt 78 8 kg (173 lb 11 6 oz)   SpO2 97%   BMI 31 77 kg/m²     Physical Exam:   GENERAL: NAD, patient tearful after hearing about her TB test   HEENT:  NC/AT, MMM, no scleral icterus  CARDIAC:  RRR, +S1/S2, no S3/S4 heard, no m/g/r  PULMONARY:  CTA B/L, no wheezing/rales/rhonci, non-labored breathing  ABDOMEN:  Soft, NT/ND, +BS, no rebound/guarding/rigidity  Extremities:  2+ Pulses in DP/PT  No edema, cyanosis, or clubbing  NEUROLOGIC:  Alert/oriented x3     SKIN:  No rashes or erythema      Current Medications     Current Outpatient Medications:     amitriptyline (ELAVIL) 100 mg tablet, Take by mouth, Disp: , Rfl:     benzonatate (TESSALON PERLES) 100 mg capsule, Take 1 capsule (100 mg total) by mouth every 8 (eight) hours, Disp: 21 capsule, Rfl: 0    bismuth subsalicylate (PEPTO BISMOL) 262 MG chewable tablet, Chew 2 tablets (524 mg total) 4 (four) times a day (before meals and at bedtime) for 14 days, Disp: 112 tablet, Rfl: 0    Blood Glucose Monitoring Suppl (ACCU-CHEK NOREEN PLUS) w/Device KIT, by Does not apply route, Disp: , Rfl:     diclofenac sodium (VOLTAREN) 1 %, 4 (four) times a day Apply to affectedd area, Disp: , Rfl: 0    gabapentin (NEURONTIN) 600 MG tablet, 1,200 mg 2 (two) times a day, Disp: , Rfl: 1    glucose blood test strip, by In Vitro route daily, Disp: , Rfl:     ibuprofen (MOTRIN) 800 mg tablet, TAKE 1 TABLET BY MOUTH THREE TIMES A DAY AS NEEDED WITH FOOD OR MILK 90 DAYS, Disp: , Rfl: 0    meclizine (ANTIVERT) 12 5 MG tablet, Take 1 tablet (12 5 mg total) by mouth every 8 (eight) hours as needed for dizziness, Disp: 30 tablet, Rfl: 1    metroNIDAZOLE (FLAGYL) 250 mg tablet, Take 1 tablet (250 mg total) by mouth 4 (four) times a day for 14 days, Disp: 56 tablet, Rfl: 0    mirtazapine (REMERON) 30 mg tablet, , Disp: , Rfl:     nortriptyline (PAMELOR) 50 mg capsule, TAKE 2 CAPSULES AT 8 AM AND 1 CAP AT BEDTIME, Disp: , Rfl:     omeprazole (PriLOSEC) 40 MG capsule, Take 1 capsule (40 mg total) by mouth daily, Disp: 90 capsule, Rfl: 1    omeprazole (PriLOSEC) 40 MG capsule, Take 1 capsule (40 mg total) by mouth 2 (two) times a day before meals for 14 days, Disp: 28 capsule, Rfl: 0    oxyCODONE HCl ER (OxyCONTIN) 30 MG T12A, Take by mouth, Disp: , Rfl:     temazepam (RESTORIL) 15 mg capsule, , Disp: , Rfl:     tetracycline (ACHROMYCIN,SUMYCIN) 500 MG capsule, Take 1 capsule (500 mg total) by mouth 4 (four) times a day for 14 days, Disp: 56 capsule, Rfl: 0    tiZANidine (ZANAFLEX) 4 mg tablet, Take 4 mg by mouth every 8 (eight) hours, Disp: , Rfl: 1    traZODone (DESYREL) 150 mg tablet, Take 450 mg by mouth daily at bedtime, Disp: , Rfl: 0    traZODone (DESYREL) 50 mg tablet, Take by mouth, Disp: , Rfl:     venlafaxine (EFFEXOR-XR) 150 mg 24 hr capsule, , Disp: , Rfl:     zaleplon (SONATA) 10 MG capsule, daily at bedtime, Disp: , Rfl: 2    oxyCODONE (ROXICODONE) 30 MG immediate release tablet, OxyCODONE HCl - 30 MG Oral Tablet TAKE 1 TABLET EVERY 6 HOURS AS NEEDED FOR PAIN  Quantity: 1;  Refills: 0    [employer]; Active, Disp: , Rfl:     pseudoephedrine-dextromethorphan-guaifenesin (ROBITUSSIN-PE) 30- MG/5ML solution, Take 10 mL by mouth 4 (four) times a day as needed for allergies, Disp: 118 mL, Rfl: 0    QUEtiapine (SEROquel) 200 mg tablet, QUEtiapine Fumarate 200 MG Oral Tablet TAKE 1 TABLET AT BEDTIME    Quantity: 30;  Refills: 0    Culligan, [employer];  Started 28-Dec-2011 Active, Disp: , Rfl:     QUEtiapine (SEROquel) 300 mg tablet, Take by mouth, Disp: , Rfl:     QUEtiapine (SEROquel) 400 MG tablet, Take 400 mg by mouth 2 (two) times a day, Disp: , Rfl:     rifampin (RIFADIN) 300 mg capsule, Take 2 capsules (600 mg total) by mouth daily, Disp: 60 capsule, Rfl: 3    temazepam (RESTORIL) 30 mg capsule, Temazepam 30 MG Oral Capsule  Refills: 0   , M D ;  Started 13-Jan-2016 Active, Disp: , Rfl:     Health Maintenance     Health Maintenance   Topic Date Due    Medicare Annual Wellness Visit (AWV)  1970    Pneumococcal Vaccine: Pediatrics (0 to 5 Years) and At-Risk Patients (6 to 59 Years) (1 of 1 - PPSV23) 11/05/1974    BMI: Followup Plan  11/05/1986    MAMMOGRAM  10/09/2019    Cervical Cancer Screening  03/09/2020    Influenza Vaccine  03/30/2020 (Originally 7/1/2019)    Depression Screening PHQ  09/30/2020    CRC Screening: Colonoscopy  02/17/2021    BMI: Adult  03/02/2021    DTaP,Tdap,and Td Vaccines (2 - Td) 11/19/2023    Pneumococcal Vaccine: 65+ Years (1 of 2 - PCV13) 11/05/2033    HIV Screening  Completed    Hepatitis C Screening  Completed    HIB Vaccine  Aged Out    Hepatitis B Vaccine  Aged Out    IPV Vaccine  Aged Out    Hepatitis A Vaccine  Aged Out    Meningococcal ACWY Vaccine  Aged Out    HPV Vaccine  Aged [employer] History   Administered Date(s) Administered     Influenza (IM) Preservative Free 03/30/2017    INFLUENZA 03/30/2017    Influenza TIV (IM) 11/19/2013, 11/13/2014, 11/17/2015    Tdap 11/19/2013         Asha Guzman  3/3/2020 5:01 PM

## 2020-03-03 NOTE — ED ATTENDING ATTESTATION
3/2/2020  I, Michael Lennon MD, saw and evaluated the patient  I have discussed the patient with the resident/non-physician practitioner and agree with the resident's/non-physician practitioner's findings, Plan of Care, and MDM as documented in the resident's/non-physician practitioner's note, except where noted  All available labs and Radiology studies were reviewed  I was present for key portions of any procedure(s) performed by the resident/non-physician practitioner and I was immediately available to provide assistance  At this point I agree with the current assessment done in the Emergency Department  I have conducted an independent evaluation of this patient a history and physical is as follows:   Pt has multiple co Pt was at PMD today but came to ED also Pt has been on antibiotics for a stomach infection following an EGD  Pt has had 3 days of diarrhea and a cough no fevers cough is productive for some phlegm   No cp no sob no abd pain Pt alos co insomnia which is long standing and for which she has medications PE: alert nad heart reg lungs clear abd soft nontender  MDM: will co cxr instructed about diet for diarrhea and likely secondary to antibiotics   ED Course         Critical Care Time  Procedures

## 2020-03-03 NOTE — ED PROVIDER NOTES
History  Chief Complaint   Patient presents with    Cough     Pt c/o productive cough with green/yellow sputum for several days  Pt also states she is taking antibiotics for infection and has the "runs "     HPI  Patient is a 64-year-old female presenting with multiple complaints including productive cough, loose bowel movements, insomnia  Patient states that she has had persistent insomnia for which [facility]  Patient states that she had issues getting his prescriptions and has had difficulty sleeping for the past few nights  Patient states that she was able to see her primary care provider today and was provided with several new prescriptions all those concern that they will not be covered by her insurance  Patient additionally complaining of 3 days of diarrhea, cough productive of green sputum  Patient had EGD several weeks ago and was started on course of tetracycline and metronidazole for possible H pylori infection although patient is unsure of this and unable to find documentation of such  Patient states that she started her antibiotics several days ago and loose bowel movements started following this  Patient denies bloody stool, mucousy stool, abdominal pain, nausea, vomiting, fevers, chills  Patient denies significant wheezing or shortness of breath  Patient denies recent travel or sick contacts  Prior to Admission Medications   Prescriptions Last Dose Informant Patient Reported? Taking? Blood Glucose Monitoring Suppl (ACCU-CHEK NOREEN PLUS) w/Device KIT  Self Yes No   Sig: by Does not apply route   QUEtiapine (SEROquel) 200 mg tablet  Self Yes No   Sig: QUEtiapine Fumarate 200 MG Oral Tablet  TAKE 1 TABLET AT BEDTIME     Quantity: 30;  Refills: 0       Fanny James;  Started 28-Dec-2011  Active   bismuth subsalicylate (PEPTO BISMOL) 262 MG chewable tablet   No No   Sig: Chew 2 tablets (524 mg total) 4 (four) times a day (before meals and at bedtime) for 14 days   diclofenac sodium (VOLTAREN) 1 %  Self Yes No   Si (four) times a day Apply to affectedd area   gabapentin (NEURONTIN) 600 MG tablet  Self Yes No   Si,200 mg 2 (two) times a day   glucose blood test strip  Self Yes No   Sig: by In Vitro route daily   ibuprofen (MOTRIN) 800 mg tablet  Self Yes No   Sig: TAKE 1 TABLET BY MOUTH THREE TIMES A DAY AS NEEDED WITH FOOD OR MILK 90 DAYS   meclizine (ANTIVERT) 12 5 MG tablet  Self No No   Sig: Take 1 tablet (12 5 mg total) by mouth every 8 (eight) hours as needed for dizziness   metroNIDAZOLE (FLAGYL) 250 mg tablet   No No   Sig: Take 1 tablet (250 mg total) by mouth 4 (four) times a day for 14 days   mirtazapine (REMERON) 30 mg tablet  Self Yes No   omeprazole (PriLOSEC) 40 MG capsule   No No   Sig: Take 1 capsule (40 mg total) by mouth daily   omeprazole (PriLOSEC) 40 MG capsule   No No   Sig: Take 1 capsule (40 mg total) by mouth 2 (two) times a day before meals for 14 days   oxyCODONE (ROXICODONE) 30 MG immediate release tablet  Self Yes No   Sig: OxyCODONE HCl - 30 MG Oral Tablet  TAKE 1 TABLET EVERY 6 HOURS AS NEEDED FOR PAIN  Quantity: 1;  Refills: 0       Culligan, [employer];  Active   temazepam (RESTORIL) 30 mg capsule  Self Yes No   Sig: Temazepam 30 MG Oral Capsule   Refills: 0      , M D ;  Started 2016  Active   tetracycline (ACHROMYCIN,SUMYCIN) 500 MG capsule   No No   Sig: Take 1 capsule (500 mg total) by mouth 4 (four) times a day for 14 days   tiZANidine (ZANAFLEX) 4 mg tablet  Self Yes No   Sig: Take 4 mg by mouth every 8 (eight) hours   traZODone (DESYREL) 150 mg tablet  Self Yes No   Sig: Take 450 mg by mouth daily at bedtime   venlafaxine (EFFEXOR-XR) 150 mg 24 hr capsule  Self Yes No   zaleplon (SONATA) 10 MG capsule  Self Yes No   Sig: daily at bedtime      Facility-Administered Medications: None       Past Medical History:   Diagnosis Date    Abnormal thyroid blood test     last assessed 14    Anemia last assessed  11/19/13    Chronic pain     back    Depression     Sleep disorder        Past Surgical History:   Procedure Laterality Date    ABSCESS DRAINAGE      incision - skin abscess    BACK SURGERY      2002    BACK SURGERY      lower    MAMMO STEREOTACTIC BREAST BIOPSY RIGHT (ALL INC) Right 6/21/2017    MAMMO STEREOTACTIC BREAST BIOPSY RIGHT (ALL INC) EACH ADD Right 6/21/2017       Family History   Problem Relation Age of Onset    Kidney disease Mother         chronic    Bone cancer Father     Heart attack Sister         Acute MI    Diabetes Sister     Hypertension Brother     Cancer Family     Diabetes Family     Heart disease Family     Hyperlipidemia Family     Hypertension Family     Colon cancer Paternal Grandfather      I have reviewed and agree with the history as documented  E-Cigarette/Vaping     E-Cigarette/Vaping Substances     Social History     Tobacco Use    Smoking status: Former Smoker    Smokeless tobacco: Never Used    Tobacco comment: pt "quit five years ago"   Substance Use Topics    Alcohol use: No    Drug use: No        Review of Systems   Constitutional: Positive for fatigue (secondary to sleep disturbance)  Negative for chills and fever  HENT: Negative for hearing loss  Eyes: Negative for visual disturbance  Respiratory: Positive for cough  Negative for chest tightness and shortness of breath  Cardiovascular: Negative for chest pain  Gastrointestinal: Positive for diarrhea  Negative for abdominal distention, abdominal pain, blood in stool, constipation, nausea, rectal pain and vomiting  Endocrine: Negative for polydipsia and polyuria  Genitourinary: Negative for dysuria and hematuria  Skin: Negative for color change and rash  Neurological: Negative for dizziness and headaches         Physical Exam  ED Triage Vitals [03/02/20 1805]   Temperature Pulse Respirations Blood Pressure SpO2   98 2 °F (36 8 °C) 82 16 150/80 99 %      Temp Source Viral URI with cough:   Diagnosis management comments: 61-year-old female presenting with multiple complaints including cough, loose bowel movements, insomnia  Patient counseled on sleep hygiene  Nonfocal lung exam but given productive nature cough, checked chest x-ray which did not demonstrate effusion or consolidation  Patient recommended to continue her previously prescribed course of antibiotics for presumed H pylori infection, educated that loose bowel movements are common side effect of antibiotics and to seek further evaluation if this is persistent following completion of her course of antibiotics  Patient discharged with return precautions  Amount and/or Complexity of Data Reviewed  Decide to obtain previous medical records or to obtain history from someone other than the patient: yes  Review and summarize past medical records: yes  Independent visualization of images, tracings, or specimens: yes    Risk of Complications, Morbidity, and/or Mortality  Presenting problems: low  Diagnostic procedures: low  Management options: minimal    Patient Progress  Patient progress: improved        Disposition  Final diagnoses:   Viral URI with cough   Abdominal pain     Time reflects when diagnosis was documented in both MDM as applicable and the Disposition within this note     Time User Action Codes Description Comment    3/2/2020  8:22 PM Iris Fong Add [J06 9,  B97 89] Viral URI with cough     3/2/2020  8:22 PM Iris Fong Add [R10 9] Abdominal pain       ED Disposition     ED Disposition Condition Date/Time Comment    Discharge Stable Mon Mar 2, 2020  8:22 PM Yue Bowser discharge to home/self care              Follow-up Information    None         Discharge Medication List as of 3/2/2020  8:23 PM      START taking these medications    Details   benzonatate (TESSALON PERLES) 100 mg capsule Take 1 capsule (100 mg total) by mouth every 8 (eight) hours, Starting Mon 3/2/2020, Normal CONTINUE these medications which have NOT CHANGED    Details   bismuth subsalicylate (PEPTO BISMOL) 262 MG chewable tablet Chew 2 tablets (524 mg total) 4 (four) times a day (before meals and at bedtime) for 14 days, Starting Thu 2/20/2020, Until Thu 3/5/2020, Normal      Blood Glucose Monitoring Suppl (ACCU-CHEK NOREEN PLUS) w/Device KIT by Does not apply route, Starting Thu 3/30/2017, Historical Med      diclofenac sodium (VOLTAREN) 1 % 4 (four) times a day Apply to affectedd area, Starting Tue 11/5/2019, Historical Med      gabapentin (NEURONTIN) 600 MG tablet 1,200 mg 2 (two) times a day, Starting Wed 11/27/2019, Historical Med      glucose blood test strip by In Vitro route daily, Starting Thu 3/30/2017, Historical Med      ibuprofen (MOTRIN) 800 mg tablet TAKE 1 TABLET BY MOUTH THREE TIMES A DAY AS NEEDED WITH FOOD OR MILK 90 DAYS, Historical Med      meclizine (ANTIVERT) 12 5 MG tablet Take 1 tablet (12 5 mg total) by mouth every 8 (eight) hours as needed for dizziness, Starting Thu 12/5/2019, Normal      metroNIDAZOLE (FLAGYL) 250 mg tablet Take 1 tablet (250 mg total) by mouth 4 (four) times a day for 14 days, Starting Thu 2/20/2020, Until Thu 3/5/2020, Normal      mirtazapine (REMERON) 30 mg tablet Starting Thu 6/15/2017, Historical Med      !! omeprazole (PriLOSEC) 40 MG capsule Take 1 capsule (40 mg total) by mouth daily, Starting Tue 12/10/2019, Normal      !! omeprazole (PriLOSEC) 40 MG capsule Take 1 capsule (40 mg total) by mouth 2 (two) times a day before meals for 14 days, Starting Thu 2/20/2020, Until Thu 3/5/2020, Normal      oxyCODONE (ROXICODONE) 30 MG immediate release tablet OxyCODONE HCl - 30 MG Oral Tablet  TAKE 1 TABLET EVERY 6 HOURS AS NEEDED FOR PAIN  Quantity: 1;  Refills: 0       Culligan, [employer]; Active, Historical Med      QUEtiapine (SEROquel) 200 mg tablet QUEtiapine Fumarate 200 MG Oral Tablet  TAKE 1 TABLET AT BEDTIME     Quantity: 30;  Refills: 0       Mary Gutierrez LILIANA;  Started 28-Dec-2011  Active, Historical Med      temazepam (RESTORIL) 30 mg capsule Temazepam 30 MG Oral Capsule   Refills: 0      , M D ;  Started 13-Jan-2016  Active, Historical Med      tetracycline (ACHROMYCIN,SUMYCIN) 500 MG capsule Take 1 capsule (500 mg total) by mouth 4 (four) times a day for 14 days, Starting Thu 2/20/2020, Until Thu 3/5/2020, Normal      tiZANidine (ZANAFLEX) 4 mg tablet Take 4 mg by mouth every 8 (eight) hours, Starting Sun 1/14/2018, Historical Med      traZODone (DESYREL) 150 mg tablet Take 450 mg by mouth daily at bedtime, Starting Thu 2/22/2018, Historical Med      venlafaxine (EFFEXOR-XR) 150 mg 24 hr capsule Starting Thu 6/15/2017, Historical Med      zaleplon (SONATA) 10 MG capsule daily at bedtime, Starting Sun 9/22/2019, Historical Med       !! - Potential duplicate medications found  Please discuss with provider  No discharge procedures on file  PDMP Review     None           ED Provider  Attending physically available and evaluated Saúl Doran  VIRGIE managed the patient along with the ED Attending      Electronically Signed by         Ro Pyle MD  03/03/20 8510

## 2020-03-10 ENCOUNTER — OFFICE VISIT (OUTPATIENT)
Dept: GASTROENTEROLOGY | Facility: CLINIC | Age: 50
End: 2020-03-10
Payer: COMMERCIAL

## 2020-03-10 VITALS
BODY MASS INDEX: 32.57 KG/M2 | SYSTOLIC BLOOD PRESSURE: 113 MMHG | HEART RATE: 100 BPM | TEMPERATURE: 97.3 F | WEIGHT: 177 LBS | DIASTOLIC BLOOD PRESSURE: 61 MMHG | HEIGHT: 62 IN

## 2020-03-10 DIAGNOSIS — A04.8 H. PYLORI INFECTION: ICD-10-CM

## 2020-03-10 DIAGNOSIS — R13.19 ESOPHAGEAL DYSPHAGIA: Primary | ICD-10-CM

## 2020-03-10 DIAGNOSIS — K21.00 GASTROESOPHAGEAL REFLUX DISEASE WITH ESOPHAGITIS: ICD-10-CM

## 2020-03-10 PROCEDURE — 99214 OFFICE O/P EST MOD 30 MIN: CPT | Performed by: INTERNAL MEDICINE

## 2020-03-10 PROCEDURE — 3008F BODY MASS INDEX DOCD: CPT | Performed by: INTERNAL MEDICINE

## 2020-03-10 PROCEDURE — 1036F TOBACCO NON-USER: CPT | Performed by: INTERNAL MEDICINE

## 2020-03-10 RX ORDER — OMEPRAZOLE 40 MG/1
CAPSULE, DELAYED RELEASE ORAL
COMMUNITY
Start: 2020-03-06 | End: 2020-04-16

## 2020-03-10 NOTE — PATIENT INSTRUCTIONS
Submit stool sample after 1 month of H pylori treatment and 2 weeks off of omeprazole  During the 2 weeks you are off omeprazole if you have symptoms of heartburn ok to take over the counter pepcid  If no symptoms, ok to stary off of acid suppression medication  Schedule colonoscopy for 2021    High Fiber Diet   WHAT YOU NEED TO KNOW:   A high-fiber diet includes foods that have a high amount of fiber  Fiber is the part of fruits, vegetables, and grains that is not broken down by your body  Fiber keeps your bowel movements regular  Fiber can also help lower your cholesterol level, control blood sugar in people with diabetes, and relieve constipation  Fiber can also help you control your weight because it helps you feel full faster  Most adults should eat 25 to 35 grams of fiber each day  Talk to your dietitian or healthcare provider about the amount of fiber you need  DISCHARGE INSTRUCTIONS:   Good sources of fiber:   · Foods with at least 4 grams of fiber per serving:      ¨ ? to ½ cup of high-fiber cereal (check the nutrition label on the box)    ¨ ½ cup of blackberries or raspberries    ¨ 4 dried prunes    ¨ 1 cooked artichoke    ¨ ½ cup of cooked legumes, such as lentils, or red, kidney, and rivera beans    · Foods with 1 to 3 grams of fiber per serving:      ¨ 1 slice of whole-wheat, pumpernickel, or rye bread    ¨ ½ cup of cooked brown rice    ¨ 4 whole-wheat crackers    ¨ 1 cup of oatmeal    ¨ ½ cup of cereal with 1 to 3 grams of fiber per serving (check the nutrition label on the box)    ¨ 1 small piece of fruit, such as an apple, banana, pear, kiwi, or orange    ¨ 3 dates    ¨ ½ cup of canned apricots, fruit cocktail, peaches, or pears    ¨ ½ cup of raw or cooked vegetables, such as carrots, cauliflower, cabbage, spinach, squash, or corn  Ways that you can increase fiber in your diet:   · Choose brown or wild rice instead of white rice       · Use whole wheat flour in recipes instead of white or all-purpose flour  · Add beans and peas to casseroles or soups  · Choose fresh fruit and vegetables with peels or skins on instead of juices  Other diet guidelines to follow:   · Add fiber to your diet slowly  You may have abdominal discomfort, bloating, and gas if you add fiber to your diet too quickly  · Drink plenty of liquids as you add fiber to your diet  You may have nausea or develop constipation if you do not drink enough water  Ask how much liquid to drink each day and which liquids are best for you  © 2017 [facility] Information is for End User's use only and may not be sold, redistributed or otherwise used for commercial purposes  All illustrations and images included in CareNotes® are the copyrighted property of A D A M , Inc  or Jim Mcclellan  The above information is an  only  It is not intended as medical advice for individual conditions or treatments  Talk to your doctor, nurse or pharmacist before following any medical regimen to see if it is safe and effective for you

## 2020-04-16 ENCOUNTER — TELEMEDICINE (OUTPATIENT)
Dept: INTERNAL MEDICINE CLINIC | Facility: CLINIC | Age: 50
End: 2020-04-16

## 2020-04-16 DIAGNOSIS — Z22.7 LATENT TUBERCULOSIS BY BLOOD TEST: ICD-10-CM

## 2020-04-16 DIAGNOSIS — Z22.7 TB LUNG, LATENT: ICD-10-CM

## 2020-04-16 DIAGNOSIS — N18.2 STAGE 2 CHRONIC KIDNEY DISEASE: Primary | ICD-10-CM

## 2020-04-16 PROCEDURE — G2012 BRIEF CHECK IN BY MD/QHP: HCPCS | Performed by: INTERNAL MEDICINE

## 2020-04-16 PROCEDURE — 3066F NEPHROPATHY DOC TX: CPT | Performed by: INTERNAL MEDICINE

## 2020-04-16 RX ORDER — DOXEPIN HYDROCHLORIDE 10 MG/1
30 CAPSULE ORAL
COMMUNITY
Start: 2020-03-30 | End: 2020-07-31 | Stop reason: SDUPTHER

## 2020-04-16 RX ORDER — DOXEPIN HYDROCHLORIDE 25 MG/1
25 CAPSULE ORAL
COMMUNITY
End: 2020-04-16

## 2020-04-16 RX ORDER — CYCLOBENZAPRINE HCL 10 MG
10 TABLET ORAL AS NEEDED
COMMUNITY
Start: 2020-04-06

## 2020-05-22 ENCOUNTER — TELEPHONE (OUTPATIENT)
Dept: INTERNAL MEDICINE CLINIC | Facility: CLINIC | Age: 50
End: 2020-05-22

## 2020-05-23 ENCOUNTER — APPOINTMENT (OUTPATIENT)
Dept: LAB | Facility: HOSPITAL | Age: 50
End: 2020-05-23
Payer: COMMERCIAL

## 2020-05-23 DIAGNOSIS — N18.2 STAGE 2 CHRONIC KIDNEY DISEASE: ICD-10-CM

## 2020-05-23 DIAGNOSIS — Z22.7 LATENT TUBERCULOSIS BY BLOOD TEST: ICD-10-CM

## 2020-05-23 LAB
ALBUMIN SERPL BCP-MCNC: 3.5 G/DL (ref 3.5–5)
ALP SERPL-CCNC: 67 U/L (ref 46–116)
ALT SERPL W P-5'-P-CCNC: 28 U/L (ref 12–78)
ANION GAP SERPL CALCULATED.3IONS-SCNC: 4 MMOL/L (ref 4–13)
AST SERPL W P-5'-P-CCNC: 22 U/L (ref 5–45)
BACTERIA UR QL AUTO: ABNORMAL /HPF
BASOPHILS # BLD AUTO: 0.03 THOUSANDS/ΜL (ref 0–0.1)
BASOPHILS NFR BLD AUTO: 1 % (ref 0–1)
BILIRUB DIRECT SERPL-MCNC: 0.15 MG/DL (ref 0–0.2)
BILIRUB SERPL-MCNC: 0.31 MG/DL (ref 0.2–1)
BILIRUB UR QL STRIP: ABNORMAL
BUN SERPL-MCNC: 10 MG/DL (ref 5–25)
CALCIUM SERPL-MCNC: 8.9 MG/DL (ref 8.3–10.1)
CAOX CRY URNS QL MICRO: ABNORMAL /HPF
CHLORIDE SERPL-SCNC: 110 MMOL/L (ref 100–108)
CLARITY UR: ABNORMAL
CO2 SERPL-SCNC: 26 MMOL/L (ref 21–32)
COLOR UR: ABNORMAL
CREAT SERPL-MCNC: 1.09 MG/DL (ref 0.6–1.3)
CREAT UR-MCNC: 316 MG/DL
EOSINOPHIL # BLD AUTO: 0.17 THOUSAND/ΜL (ref 0–0.61)
EOSINOPHIL NFR BLD AUTO: 3 % (ref 0–6)
ERYTHROCYTE [DISTWIDTH] IN BLOOD BY AUTOMATED COUNT: 18.6 % (ref 11.6–15.1)
EST. AVERAGE GLUCOSE BLD GHB EST-MCNC: 134 MG/DL
GFR SERPL CREATININE-BSD FRML MDRD: 68 ML/MIN/1.73SQ M
GLUCOSE SERPL-MCNC: 64 MG/DL (ref 65–140)
GLUCOSE UR STRIP-MCNC: NEGATIVE MG/DL
HBA1C MFR BLD: 6.3 %
HCT VFR BLD AUTO: 37.8 % (ref 34.8–46.1)
HGB BLD-MCNC: 11.7 G/DL (ref 11.5–15.4)
HGB UR QL STRIP.AUTO: NEGATIVE
IMM GRANULOCYTES # BLD AUTO: 0.02 THOUSAND/UL (ref 0–0.2)
IMM GRANULOCYTES NFR BLD AUTO: 0 % (ref 0–2)
KETONES UR STRIP-MCNC: ABNORMAL MG/DL
LEUKOCYTE ESTERASE UR QL STRIP: ABNORMAL
LYMPHOCYTES # BLD AUTO: 2.18 THOUSANDS/ΜL (ref 0.6–4.47)
LYMPHOCYTES NFR BLD AUTO: 44 % (ref 14–44)
MCH RBC QN AUTO: 24.1 PG (ref 26.8–34.3)
MCHC RBC AUTO-ENTMCNC: 31 G/DL (ref 31.4–37.4)
MCV RBC AUTO: 78 FL (ref 82–98)
MICROALBUMIN UR-MCNC: 22.1 MG/L (ref 0–20)
MICROALBUMIN/CREAT 24H UR: 7 MG/G CREATININE (ref 0–30)
MONOCYTES # BLD AUTO: 0.67 THOUSAND/ΜL (ref 0.17–1.22)
MONOCYTES NFR BLD AUTO: 13 % (ref 4–12)
NEUTROPHILS # BLD AUTO: 1.97 THOUSANDS/ΜL (ref 1.85–7.62)
NEUTS SEG NFR BLD AUTO: 39 % (ref 43–75)
NITRITE UR QL STRIP: POSITIVE
NON-SQ EPI CELLS URNS QL MICRO: ABNORMAL /HPF
NRBC BLD AUTO-RTO: 0 /100 WBCS
PH UR STRIP.AUTO: 5.5 [PH]
PLATELET # BLD AUTO: 318 THOUSANDS/UL (ref 149–390)
PMV BLD AUTO: 12.1 FL (ref 8.9–12.7)
POTASSIUM SERPL-SCNC: 3.9 MMOL/L (ref 3.5–5.3)
PROT SERPL-MCNC: 7.9 G/DL (ref 6.4–8.2)
PROT UR STRIP-MCNC: ABNORMAL MG/DL
RBC # BLD AUTO: 4.86 MILLION/UL (ref 3.81–5.12)
RBC #/AREA URNS AUTO: ABNORMAL /HPF
SODIUM SERPL-SCNC: 140 MMOL/L (ref 136–145)
SP GR UR STRIP.AUTO: 1.03 (ref 1–1.03)
UROBILINOGEN UR QL STRIP.AUTO: 0.2 E.U./DL
WBC # BLD AUTO: 5.04 THOUSAND/UL (ref 4.31–10.16)
WBC #/AREA URNS AUTO: ABNORMAL /HPF

## 2020-05-23 PROCEDURE — 36415 COLL VENOUS BLD VENIPUNCTURE: CPT | Performed by: INTERNAL MEDICINE

## 2020-05-23 PROCEDURE — 82570 ASSAY OF URINE CREATININE: CPT | Performed by: INTERNAL MEDICINE

## 2020-05-23 PROCEDURE — 81001 URINALYSIS AUTO W/SCOPE: CPT | Performed by: INTERNAL MEDICINE

## 2020-05-23 PROCEDURE — 3044F HG A1C LEVEL LT 7.0%: CPT | Performed by: INTERNAL MEDICINE

## 2020-05-23 PROCEDURE — 82043 UR ALBUMIN QUANTITATIVE: CPT | Performed by: INTERNAL MEDICINE

## 2020-05-23 PROCEDURE — 80053 COMPREHEN METABOLIC PANEL: CPT | Performed by: INTERNAL MEDICINE

## 2020-05-23 PROCEDURE — 82248 BILIRUBIN DIRECT: CPT

## 2020-05-23 PROCEDURE — 83036 HEMOGLOBIN GLYCOSYLATED A1C: CPT

## 2020-05-23 PROCEDURE — 85025 COMPLETE CBC W/AUTO DIFF WBC: CPT | Performed by: INTERNAL MEDICINE

## 2020-06-19 DIAGNOSIS — Z22.7 LATENT TUBERCULOSIS BY BLOOD TEST: ICD-10-CM

## 2020-06-30 ENCOUNTER — TELEPHONE (OUTPATIENT)
Dept: OBGYN CLINIC | Facility: CLINIC | Age: 50
End: 2020-06-30

## 2020-07-01 ENCOUNTER — OFFICE VISIT (OUTPATIENT)
Dept: OBGYN CLINIC | Facility: CLINIC | Age: 50
End: 2020-07-01

## 2020-07-01 VITALS
WEIGHT: 165 LBS | TEMPERATURE: 98.5 F | SYSTOLIC BLOOD PRESSURE: 129 MMHG | HEIGHT: 61 IN | BODY MASS INDEX: 31.15 KG/M2 | HEART RATE: 92 BPM | DIASTOLIC BLOOD PRESSURE: 87 MMHG

## 2020-07-01 DIAGNOSIS — R35.0 URINARY FREQUENCY: ICD-10-CM

## 2020-07-01 DIAGNOSIS — Z20.2 POSSIBLE EXPOSURE TO STD: Primary | ICD-10-CM

## 2020-07-01 DIAGNOSIS — R39.15 URINARY URGENCY: ICD-10-CM

## 2020-07-01 DIAGNOSIS — N89.8 VAGINAL DISCHARGE: ICD-10-CM

## 2020-07-01 LAB
BACTERIA UR QL AUTO: ABNORMAL /HPF
BILIRUB UR QL STRIP: NEGATIVE
BV WHIFF TEST VAG QL: NORMAL
CAOX CRY URNS QL MICRO: ABNORMAL /HPF
CLARITY UR: ABNORMAL
CLUE CELLS SPEC QL WET PREP: NORMAL
COLOR UR: YELLOW
GLUCOSE UR STRIP-MCNC: NEGATIVE MG/DL
HGB UR QL STRIP.AUTO: NEGATIVE
KETONES UR STRIP-MCNC: NEGATIVE MG/DL
LEUKOCYTE ESTERASE UR QL STRIP: NEGATIVE
MUCOUS THREADS UR QL AUTO: ABNORMAL
NITRITE UR QL STRIP: NEGATIVE
NON-SQ EPI CELLS URNS QL MICRO: ABNORMAL /HPF
PH SMN: 5 [PH]
PH UR STRIP.AUTO: 5.5 [PH]
PROT UR STRIP-MCNC: ABNORMAL MG/DL
RBC #/AREA URNS AUTO: ABNORMAL /HPF
SL AMB  POCT GLUCOSE, UA: NEGATIVE
SL AMB LEUKOCYTE ESTERASE,UA: ABNORMAL
SL AMB POCT BILIRUBIN,UA: NEGATIVE
SL AMB POCT BLOOD,UA: ABNORMAL
SL AMB POCT CLARITY,UA: ABNORMAL
SL AMB POCT COLOR,UA: YELLOW
SL AMB POCT KETONES,UA: NEGATIVE
SL AMB POCT NITRITE,UA: NEGATIVE
SL AMB POCT PH,UA: 6
SL AMB POCT SPECIFIC GRAVITY,UA: 1.03
SL AMB POCT URINE PROTEIN: ABNORMAL
SL AMB POCT UROBILINOGEN: 0.2
SP GR UR STRIP.AUTO: 1.03 (ref 1–1.03)
T VAGINALIS VAG QL WET PREP: NORMAL
UROBILINOGEN UR QL STRIP.AUTO: 0.2 E.U./DL
WBC #/AREA URNS AUTO: ABNORMAL /HPF
YEAST VAG QL WET PREP: NORMAL

## 2020-07-01 PROCEDURE — 81001 URINALYSIS AUTO W/SCOPE: CPT | Performed by: OBSTETRICS & GYNECOLOGY

## 2020-07-01 PROCEDURE — 3066F NEPHROPATHY DOC TX: CPT | Performed by: OBSTETRICS & GYNECOLOGY

## 2020-07-01 PROCEDURE — 3060F POS MICROALBUMINURIA REV: CPT | Performed by: OBSTETRICS & GYNECOLOGY

## 2020-07-01 PROCEDURE — 3008F BODY MASS INDEX DOCD: CPT | Performed by: OBSTETRICS & GYNECOLOGY

## 2020-07-01 PROCEDURE — 99213 OFFICE O/P EST LOW 20 MIN: CPT | Performed by: OBSTETRICS & GYNECOLOGY

## 2020-07-01 PROCEDURE — 1036F TOBACCO NON-USER: CPT | Performed by: OBSTETRICS & GYNECOLOGY

## 2020-07-01 PROCEDURE — 81002 URINALYSIS NONAUTO W/O SCOPE: CPT | Performed by: OBSTETRICS & GYNECOLOGY

## 2020-07-01 PROCEDURE — 87210 SMEAR WET MOUNT SALINE/INK: CPT | Performed by: OBSTETRICS & GYNECOLOGY

## 2020-07-01 PROCEDURE — 3061F NEG MICROALBUMINURIA REV: CPT | Performed by: INTERNAL MEDICINE

## 2020-07-01 PROCEDURE — 3044F HG A1C LEVEL LT 7.0%: CPT | Performed by: OBSTETRICS & GYNECOLOGY

## 2020-07-01 RX ORDER — CLONIDINE HYDROCHLORIDE 0.1 MG/1
TABLET ORAL
COMMUNITY
Start: 2020-06-11 | End: 2020-07-31 | Stop reason: SDUPTHER

## 2020-07-01 RX ORDER — IBUPROFEN 800 MG/1
800 TABLET ORAL AS NEEDED
COMMUNITY
Start: 2020-06-04 | End: 2022-06-07

## 2020-07-01 NOTE — PROGRESS NOTES
Assessment/Plan:      Diagnoses and all orders for this visit:    Possible exposure to STD  -     Chlamydia/GC amplified DNA by PCR; Future        - Last unprotected unprotected intercourse on 6/21/20, patient counseled she should have testing done 2 weeks after exposure, as testing now would potentially be a false negative  Advised to go to lab next week   Vaginal discharge  -     POCT wet mount; neg for vaginal infection    Urinary urgency  -     UA w Reflex to Microscopic w Reflex to Culture   - POC urine dip with trace blood and leuks  Will follow-up with final UA  Will hold off on antibiotics at this time  Pt encouraged to increase hydration  Urinary frequency  -     UA w Reflex to Microscopic w Reflex to Culture          Will need annual exam with pap smear, last pap 3/2015  Last annual exam 9/30/19    Subjective:     Patient ID: Ryan Ortega is a 44 y o  female  HPI  Pt is a 52yo [id number] with CKD, T2DM presenting with complaint of urinary urgency and frequency since Friday  Pt reports some discomfort in perineal area, but denies dysuria, hematuria, fever, pruritus, odor  Pt reports a white discharge, but without odor  Pt states she has been drinking cranberry juice and taking azo  Pt is , but unsure of her partner's STD exposure and desires testing  Last episode of unprotected intercourse on 6/21/20  Pt is still menstruating every month with periods lasting approximately 7 days  Denies any hot flashes  Review of Systems    As noted in HPI  Objective:  Vitals:    07/01/20 1313   BP: 129/87   Pulse: 92   Temp: 98 5 °F (36 9 °C)        Physical Exam   Constitutional: She is oriented to person, place, and time  She appears well-developed and well-nourished  No distress  HENT:   Head: Normocephalic and atraumatic  Pulmonary/Chest: Effort normal  No respiratory distress  Abdominal: Soft  There is no tenderness  There is no rebound and no guarding     Genitourinary: There is no rash, tenderness, lesion or injury on the right labia  There is no rash, tenderness, lesion or injury on the left labia  Uterus is enlarged  Uterus is not tender  Cervix exhibits no motion tenderness and no friability  Right adnexum displays no mass, no tenderness and no fullness  Left adnexum displays no mass, no tenderness and no fullness  No erythema, tenderness (thin, white discharge without odor  Most-likely physiologic) or bleeding in the vagina  No foreign body in the vagina  No signs of injury around the vagina  Vaginal discharge found  Genitourinary Comments: Large fibroid uterus, mobile, with fibroid appreciated posteriorly  Neurological: She is alert and oriented to person, place, and time  Skin: She is not diaphoretic  Vitals reviewed        Puma Westfall MD  OBGYN, PGY-4  7/1/2020 1:49 PM

## 2020-07-08 ENCOUNTER — TELEPHONE (OUTPATIENT)
Dept: OBGYN CLINIC | Facility: CLINIC | Age: 50
End: 2020-07-08

## 2020-07-08 NOTE — TELEPHONE ENCOUNTER
Discussed urine results with patient advised looks good  Patient states she is no longer having urinary sx  Advised to call office if sx return and to have her labs/gc done at the lab at her earliest convenience

## 2020-07-24 ENCOUNTER — TELEPHONE (OUTPATIENT)
Dept: INTERNAL MEDICINE CLINIC | Facility: CLINIC | Age: 50
End: 2020-07-24

## 2020-07-31 ENCOUNTER — OFFICE VISIT (OUTPATIENT)
Dept: INTERNAL MEDICINE CLINIC | Facility: CLINIC | Age: 50
End: 2020-07-31

## 2020-07-31 ENCOUNTER — APPOINTMENT (OUTPATIENT)
Dept: LAB | Facility: HOSPITAL | Age: 50
End: 2020-07-31
Payer: COMMERCIAL

## 2020-07-31 VITALS
BODY MASS INDEX: 30.8 KG/M2 | TEMPERATURE: 96.7 F | HEIGHT: 61 IN | WEIGHT: 163.14 LBS | SYSTOLIC BLOOD PRESSURE: 130 MMHG | DIASTOLIC BLOOD PRESSURE: 84 MMHG | HEART RATE: 95 BPM

## 2020-07-31 DIAGNOSIS — K29.70 HELICOBACTER PYLORI GASTRITIS: ICD-10-CM

## 2020-07-31 DIAGNOSIS — F41.9 ANXIETY AND DEPRESSION: Chronic | ICD-10-CM

## 2020-07-31 DIAGNOSIS — Z20.2 POSSIBLE EXPOSURE TO STD: ICD-10-CM

## 2020-07-31 DIAGNOSIS — F32.A ANXIETY AND DEPRESSION: Chronic | ICD-10-CM

## 2020-07-31 DIAGNOSIS — Z72.51 RISK FOR SEXUALLY TRANSMITTED DISEASE: ICD-10-CM

## 2020-07-31 DIAGNOSIS — K21.00 GASTROESOPHAGEAL REFLUX DISEASE WITH ESOPHAGITIS: ICD-10-CM

## 2020-07-31 DIAGNOSIS — G62.9 NEUROPATHY: Primary | ICD-10-CM

## 2020-07-31 DIAGNOSIS — F51.04 PSYCHOPHYSIOLOGICAL INSOMNIA: Primary | ICD-10-CM

## 2020-07-31 DIAGNOSIS — B96.81 HELICOBACTER PYLORI GASTRITIS: ICD-10-CM

## 2020-07-31 DIAGNOSIS — Z22.7 LATENT TUBERCULOSIS BY BLOOD TEST: ICD-10-CM

## 2020-07-31 LAB
C TRACH DNA SPEC QL NAA+PROBE: NEGATIVE
N GONORRHOEA DNA SPEC QL NAA+PROBE: NEGATIVE

## 2020-07-31 PROCEDURE — 3044F HG A1C LEVEL LT 7.0%: CPT | Performed by: INTERNAL MEDICINE

## 2020-07-31 PROCEDURE — 87338 HPYLORI STOOL AG IA: CPT

## 2020-07-31 PROCEDURE — 3008F BODY MASS INDEX DOCD: CPT | Performed by: INTERNAL MEDICINE

## 2020-07-31 PROCEDURE — 99213 OFFICE O/P EST LOW 20 MIN: CPT | Performed by: INTERNAL MEDICINE

## 2020-07-31 PROCEDURE — 1036F TOBACCO NON-USER: CPT | Performed by: INTERNAL MEDICINE

## 2020-07-31 PROCEDURE — 87591 N.GONORRHOEAE DNA AMP PROB: CPT

## 2020-07-31 PROCEDURE — 86592 SYPHILIS TEST NON-TREP QUAL: CPT

## 2020-07-31 PROCEDURE — 36415 COLL VENOUS BLD VENIPUNCTURE: CPT

## 2020-07-31 PROCEDURE — 87491 CHLMYD TRACH DNA AMP PROBE: CPT

## 2020-07-31 PROCEDURE — 3066F NEPHROPATHY DOC TX: CPT | Performed by: INTERNAL MEDICINE

## 2020-07-31 PROCEDURE — 3060F POS MICROALBUMINURIA REV: CPT | Performed by: INTERNAL MEDICINE

## 2020-07-31 RX ORDER — NORTRIPTYLINE HYDROCHLORIDE 50 MG/1
CAPSULE ORAL
Qty: 90 CAPSULE | Refills: 1 | Status: SHIPPED | OUTPATIENT
Start: 2020-07-31 | End: 2021-01-22 | Stop reason: SDUPTHER

## 2020-07-31 RX ORDER — GABAPENTIN 600 MG/1
1200 TABLET ORAL 2 TIMES DAILY
Qty: 360 TABLET | Refills: 0 | Status: SHIPPED | OUTPATIENT
Start: 2020-07-31 | End: 2021-02-11 | Stop reason: SDUPTHER

## 2020-07-31 RX ORDER — GABAPENTIN 600 MG/1
1200 TABLET ORAL 2 TIMES DAILY
Qty: 120 TABLET | Refills: 2 | Status: SHIPPED | OUTPATIENT
Start: 2020-07-31 | End: 2020-07-31

## 2020-07-31 RX ORDER — CLONIDINE HYDROCHLORIDE 0.1 MG/1
0.1 TABLET ORAL DAILY
Qty: 30 TABLET | Refills: 2 | Status: SHIPPED | OUTPATIENT
Start: 2020-07-31 | End: 2020-07-31

## 2020-07-31 RX ORDER — QUETIAPINE FUMARATE 400 MG/1
400 TABLET, FILM COATED ORAL 2 TIMES DAILY
Qty: 60 TABLET | Refills: 2 | Status: SHIPPED | OUTPATIENT
Start: 2020-07-31 | End: 2020-10-20 | Stop reason: SDUPTHER

## 2020-07-31 RX ORDER — TRAZODONE HYDROCHLORIDE 150 MG/1
450 TABLET ORAL
Qty: 90 TABLET | Refills: 2 | Status: SHIPPED | OUTPATIENT
Start: 2020-07-31 | End: 2020-08-19 | Stop reason: ALTCHOICE

## 2020-07-31 RX ORDER — CLONIDINE HYDROCHLORIDE 0.1 MG/1
0.1 TABLET ORAL EVERY 12 HOURS SCHEDULED
Qty: 60 TABLET | Refills: 2 | Status: SHIPPED | OUTPATIENT
Start: 2020-07-31 | End: 2020-10-20 | Stop reason: SDUPTHER

## 2020-07-31 RX ORDER — DOXEPIN HYDROCHLORIDE 10 MG/1
30 CAPSULE ORAL
Qty: 30 CAPSULE | Refills: 2 | Status: SHIPPED | OUTPATIENT
Start: 2020-07-31 | End: 2020-08-19 | Stop reason: ALTCHOICE

## 2020-07-31 RX ORDER — OXYCODONE HYDROCHLORIDE 30 MG/1
30 TABLET ORAL 2 TIMES DAILY
COMMUNITY
Start: 2020-07-07

## 2020-08-02 LAB — H PYLORI AG STL QL IA: POSITIVE

## 2020-08-03 ENCOUNTER — TELEPHONE (OUTPATIENT)
Dept: OTHER | Facility: OTHER | Age: 50
End: 2020-08-03

## 2020-08-03 LAB — RPR SER QL: NORMAL
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
## 2020-08-05 RX ORDER — TEMAZEPAM 15 MG/1
CAPSULE ORAL
Qty: 30 CAPSULE | OUTPATIENT
Start: 2020-08-05

## 2020-08-05 NOTE — TELEPHONE ENCOUNTER
Because the pt is on so may psychiatric meds as well as oxycodone the temazepam will have to be refilled by her next psychiatrist     Dr Claudell Pickle

## 2020-08-05 NOTE — TELEPHONE ENCOUNTER
Name of medication, dose, quantity and frequency  Requested Prescriptions     Pending Prescriptions Disp Refills    temazepam (RESTORIL) 15 mg capsule 30 capsule        Number of refills left:    Amount of medication left:    Pharmacy verified and updated    Additional information:

## 2020-08-05 NOTE — TELEPHONE ENCOUNTER
Patient stated she discussed refilling with Dr Shaheen Keith at recent visit but she did not remember the name of the medication and told him she would call him back with the name so he can send in a new script   Last RX given in May and she stated she is no longer seeing that provider due to insurance changes and needs to establish care with new mental health provider which she is working on

## 2020-08-06 ENCOUNTER — PATIENT OUTREACH (OUTPATIENT)
Dept: INTERNAL MEDICINE CLINIC | Facility: CLINIC | Age: 50
End: 2020-08-06

## 2020-08-06 DIAGNOSIS — F32.A ANXIETY AND DEPRESSION: Primary | Chronic | ICD-10-CM

## 2020-08-06 DIAGNOSIS — F41.9 ANXIETY AND DEPRESSION: Primary | Chronic | ICD-10-CM
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
## 2020-08-06 NOTE — PROGRESS NOTES
KRYSTAL spoke with this 44 t/o female pt to assist with OP MH resources  Pt relates er insurance changed and her previous psychiatrist could no longer see her  Pt relates she suffers from anxiety and depression  She denies any SI/HI  Pt is going through some family issues/divorce and has suffers several loses  Pt reports she is now living with her supportive brother  She reports she is safe  Pt also relates she has the # for Crisis Intervention  Pt has attempted to reach [facility]  She may have missed their call but relates she attempted to reach them and has not received a return call as yet  With pt's permission and at her request KRYSTAL has placed a referral and sent an in basket request to their Intake Dept  Krystal has also provided contact info for [facility], [address] [place] [address], and [employer] for pt to call  KRYSTAL requested she get back to KRYSTAL with where she can get an appointment  SW to remain available to assist as indicted

## 2020-08-07 ENCOUNTER — TELEPHONE (OUTPATIENT)
Dept: PSYCHIATRY | Facility: CLINIC | Age: 50
End: 2020-08-07

## 2020-08-07 NOTE — TELEPHONE ENCOUNTER
Behavorial Health Outpatient Intake Questions    Referred by: PCP    Please advised interviewee that they need to answer all questions truthfully to allow for best care and any misrepresentations of information may affect their ability to be seen at this clinic   => Was this discussed? Yes     [facility]     Presenting Problem (in patient's words): depression, anxiety, insomnia  Are there any developmental disabilities? ? If yes, can they speak to you on the phone? If they are too limited to speak to you on phone, refer out No    Are you taking any psychiatric medications? Yes    => If yes, who prescribes? If yes, are they injectable medications? Seroquel, clonidine, gabapentin - PCP    Does the patient have a language barrier or hearing impairment? No    Have you been treated at [facility] by a therapist or a doctor in the past? If yes, who? Yes 7 years ago cannot remember who  Has the patient been hospitalized for mental health? No   If yes, how long ago was last hospitalization and where was it? Do you actively use alcohol or marijuana or illegal substances? If yes, what and how much - refer out to Drug and alcohol treatment if use is excessive or daily use of illegal substances No concerns of substance abuse are reported  Do you have a community treatment team or ? No    Legal History-     Does the patient have any history of arrests, custodial/shelter time, or DUIs? No  If Yes-  1) What types of charges? 2) When were they last incarcerated? 3) Are they currently on parole or probation? Minor Child-    Who has custody of the child? Is there a custody agreement? If there is a custody agreement remind parent that they must bring a copy to the first appt or they will not be seen       Intake Team, please check with provider before scheduling if flags come up such as:  - complex case  - legal history (other than DUI)  - communication barrier concerns are present  - if, in your judgment, this needs further review    ACCEPTED as a patient Yes  => Appointment Date: 10/01/2020 w/ Ezequiel Calhoun & 10/21/2020 w/ ANNIE LOMBARDO    Referred Elsewhere? No    Name of Insurance Co: [employer] ID# [id number]  [id number] Phone #  If ins is primary or secondary  If patient is a minor, parents information such as Name, D  O B of guarantor

## 2020-08-13 ENCOUNTER — TELEPHONE (OUTPATIENT)
Dept: GASTROENTEROLOGY | Facility: CLINIC | Age: 50
End: 2020-08-13

## 2020-08-13 NOTE — TELEPHONE ENCOUNTER
Result Communications        Result Notes       Divya Tate   8/13/2020  1:30 PM       Called patient, [facility] for patient to call back for results  Clerical Team anyway we can get patient scheduled sooner w/ Dr Faustina Valladares   When she calls back Ill try and look      Mary Joseph PA-C   8/9/2020  1:30 PM       Clinical team - Please call patient and let her know her stool test is still coming back positive for H pylori bacteria   This indicates that the antibiotics did not get rid of the infection   We would like her to follow up in the office to discuss her symptoms and other antibiotic regimens        Clerical team - Please schedule follow-up visit with Dr Kika Merlos in the next few weeks

## 2020-08-14 ENCOUNTER — PATIENT OUTREACH (OUTPATIENT)
Dept: INTERNAL MEDICINE CLINIC | Facility: CLINIC | Age: 50
End: 2020-08-14

## 2020-08-14 NOTE — TELEPHONE ENCOUNTER
According to notes below, patient is to follow up in the office to discuss next step in her care  Please assist patient with scheduling an appointment with Dr Taylor to discuss positive H  Pylori

## 2020-08-14 NOTE — PROGRESS NOTES
In Basket [facility] that [facility] is [facility] that pt is scheduled for Psychiatrist 10/2/20 and Therapist 10/21/20  SW to remain available to assist as indicted

## 2020-08-19 ENCOUNTER — OFFICE VISIT (OUTPATIENT)
Dept: SLEEP CENTER | Facility: CLINIC | Age: 50
End: 2020-08-19
Payer: COMMERCIAL

## 2020-08-19 VITALS
WEIGHT: 169 LBS | BODY MASS INDEX: 29.95 KG/M2 | DIASTOLIC BLOOD PRESSURE: 60 MMHG | HEIGHT: 63 IN | SYSTOLIC BLOOD PRESSURE: 100 MMHG

## 2020-08-19 DIAGNOSIS — F51.04 PSYCHOPHYSIOLOGICAL INSOMNIA: Primary | ICD-10-CM

## 2020-08-19 PROCEDURE — 99203 OFFICE O/P NEW LOW 30 MIN: CPT | Performed by: INTERNAL MEDICINE

## 2020-08-19 PROCEDURE — 1036F TOBACCO NON-USER: CPT | Performed by: INTERNAL MEDICINE

## 2020-08-19 PROCEDURE — 3044F HG A1C LEVEL LT 7.0%: CPT | Performed by: INTERNAL MEDICINE

## 2020-08-19 RX ORDER — MIRTAZAPINE 30 MG/1
30 TABLET, FILM COATED ORAL
Qty: 30 TABLET | Refills: 1 | Status: SHIPPED | OUTPATIENT
Start: 2020-08-19 | End: 2020-10-13 | Stop reason: SDUPTHER

## 2020-08-19 NOTE — PROGRESS NOTES
Consultation - Sleep Center   Earle Damon : 1970  MRN: [id number]      Assessment:  The patient has an extensive combination of medications that she is taking for her insomnia  Some of them have overlapping mechanisms of action  I will discontinue doxepin and trazodone and continue mirtazapine with a higher dose  She will continue temazepam 50 mg  There is some caution in light of her taking opiates at bedtime for chronic back pain  Respiratory suppression as possible  Four years ago she had sleep testing which showed no sleep apnea  She denies any restless legs and has no periodic limb movements on previous testing  Plan:  I will try Belsomra, discontinue doxepin and trazodone and increase mirtazapine to 30 mg    Follow up:  Four weeks    History of Present Illness:   44 y  o female with a history of chronic insomnia which began with the death of to her her siblings in   She is unable to get a full night's sleep without a cocktail of medications including doxepin, trazodone, mirtazapine, temazepam and OxyContin  She gets approximately 4 hours of sleep with this combination  Her sleep hygiene is unremarkable  She denies any history of stimulant use, but did smoke marijuana for number of years  She at times consumed alcohol as well, but has been sober for 31 years        Review of Systems      Genitourinary need to urinate more than twice a night   Cardiology none   Gastrointestinal none   Neurology difficulty with memory   Constitutional none   Integumentary none   Psychiatry anxiety and depression   Musculoskeletal back pain and legs twitching/jerking   Pulmonary none   ENT throat clearing   Endocrine excessive thirst and frequent urination   Hematological none         I have reviewed and updated the review of systems as necessary    Historical Information    Past Medical History:  Chronic back pain, GERD, dysphagia, CKD, anxiety and depression    Family History: non-contributory    Social History     Socioeconomic History    Marital status: /Civil Union     Spouse name: Not on file    Number of children: Not on file    Years of education: Not on file    Highest education level: Not on file   Occupational History    Not on file   Social Needs    Financial resource strain: Not on file    Food insecurity     Worry: Not on file     Inability: Not on file   [employer] needs     Medical: Not on file     Non-medical: Not on file   Tobacco Use    Smoking status: Former Smoker    Smokeless tobacco: Never Used    Tobacco comment: pt "quit five years ago"   Substance and Sexual Activity    Alcohol use: No    Drug use: No    Sexual activity: Yes     Partners: Male     Birth control/protection: None, Female Sterilization   Lifestyle    Physical activity     Days per week: Not on file     Minutes per session: Not on file    Stress: Not on file   Relationships    Social connections     Talks on phone: Not on file     Gets together: Not on file     Attends Caodaism service: Not on file     Active member of club or organization: Not on file     Attends meetings of clubs or organizations: Not on file     Relationship status: Not on file    Intimate partner violence     Fear of current or ex partner: Not on file     Emotionally abused: Not on file     Physically abused: Not on file     Forced sexual activity: Not on file   Other Topics Concern    Not on file   Social History Narrative    Death in the family - sister    Lack of adequate sleep    Parentage    Sedentary lifestyle    Under stress         Sleep Schedule: unremarkable    Snoring:  Yes    Witnessed Apnea:  No    Medications/Allergies:    Current Outpatient Medications:     cloNIDine (CATAPRES) 0 1 mg tablet, Take 1 tablet (0 1 mg total) by mouth every 12 (twelve) hours, Disp: 60 tablet, Rfl: 2    cyclobenzaprine (FLEXERIL) 10 mg tablet, Take 10 mg by mouth 2 (two) times a day as needed, Disp: , Rfl:   diclofenac sodium (VOLTAREN) 1 %, , Disp: , Rfl:     doxepin (SINEquan) 10 mg capsule, Take 3 capsules (30 mg total) by mouth daily at bedtime, Disp: 30 capsule, Rfl: 2    gabapentin (NEURONTIN) 600 MG tablet, TAKE 2 TABLETS (1,200 MG TOTAL) BY MOUTH 2 (TWO) TIMES A DAY, Disp: 360 tablet, Rfl: 0    ibuprofen (MOTRIN) 800 mg tablet, , Disp: , Rfl:     nortriptyline (PAMELOR) 50 mg capsule, Take 2 caps at 8AM and 1 at bedtime, Disp: 90 capsule, Rfl: 1    oxyCODONE (ROXICODONE) 30 MG immediate release tablet, Take 30 mg by mouth 2 (two) times a day, Disp: , Rfl:     QUEtiapine (SEROquel) 400 MG tablet, Take 1 tablet (400 mg total) by mouth 2 (two) times a day, Disp: 60 tablet, Rfl: 2    temazepam (RESTORIL) 15 mg capsule, , Disp: , Rfl:     tiZANidine (ZANAFLEX) 4 mg tablet, Take 4 mg by mouth every 8 (eight) hours, Disp: , Rfl: 1    traZODone (DESYREL) 150 mg tablet, Take 3 tablets (450 mg total) by mouth daily at bedtime, Disp: 90 tablet, Rfl: 2        No notes on file                  Objective:    Vital Signs:   Vitals:    08/19/20 1000   BP: 100/60   Weight: 76 7 kg (169 lb)   Height: 5' 3" (1 6 m)     Neck Circumference: 14 5      Ezel Sleepiness Scale: Total score: 9    Physical Exam:    General: Alert, appropriate, cooperative, overweight    Head: NC/AT, no retrognathia    Nose: No septal deviation    Throat: Airway diminished, tongue base thickened, no tonsils visualized    Neck: Normal, no thyromegaly or lymphadenopathy, no JVD    Heart: RR, normal S1 and S2, no murmurs    Chest: Clear bilaterally    Extremity: No clubbing, cyanosis, no edema    Skin: Warm, dry    Neuro: No motor abnormalities, cranial nerves appear intact    Sleep Study Results:   Negative for YAKELIN or PLMD      Counseling / Coordination of Care  A description of the counseling / coordination of care: We discussed various treatments for insomnia including cognitive behavioral therapy        Board Certified Sleep Specialist    Portions of the record may have been created with voice recognition software  Occasional wrong word or "sound a like" substitutions may have occurred due to the inherent limitations of voice recognition software  Read the chart carefully and recognize, using context, where substitutions have occurred

## 2020-08-26 ENCOUNTER — OFFICE VISIT (OUTPATIENT)
Dept: GASTROENTEROLOGY | Facility: CLINIC | Age: 50
End: 2020-08-26
Payer: COMMERCIAL

## 2020-08-26 VITALS
TEMPERATURE: 97 F | HEIGHT: 63 IN | WEIGHT: 166 LBS | SYSTOLIC BLOOD PRESSURE: 115 MMHG | HEART RATE: 73 BPM | BODY MASS INDEX: 29.41 KG/M2 | DIASTOLIC BLOOD PRESSURE: 77 MMHG

## 2020-08-26 DIAGNOSIS — A04.8 H. PYLORI INFECTION: Primary | ICD-10-CM

## 2020-08-26 DIAGNOSIS — K21.00 GASTROESOPHAGEAL REFLUX DISEASE WITH ESOPHAGITIS: ICD-10-CM

## 2020-08-26 DIAGNOSIS — Z12.11 COLON CANCER SCREENING: ICD-10-CM

## 2020-08-26 PROCEDURE — 3066F NEPHROPATHY DOC TX: CPT | Performed by: INTERNAL MEDICINE

## 2020-08-26 PROCEDURE — 1036F TOBACCO NON-USER: CPT | Performed by: INTERNAL MEDICINE

## 2020-08-26 PROCEDURE — 99214 OFFICE O/P EST MOD 30 MIN: CPT | Performed by: INTERNAL MEDICINE

## 2020-08-26 PROCEDURE — 3008F BODY MASS INDEX DOCD: CPT | Performed by: INTERNAL MEDICINE

## 2020-08-26 PROCEDURE — 3060F POS MICROALBUMINURIA REV: CPT | Performed by: INTERNAL MEDICINE

## 2020-08-26 PROCEDURE — 3044F HG A1C LEVEL LT 7.0%: CPT | Performed by: INTERNAL MEDICINE

## 2020-08-26 RX ORDER — AMOXICILLIN 500 MG/1
1000 CAPSULE ORAL EVERY 12 HOURS SCHEDULED
Qty: 56 CAPSULE | Refills: 0 | Status: SHIPPED | OUTPATIENT
Start: 2020-08-26 | End: 2020-09-09

## 2020-08-26 RX ORDER — OMEPRAZOLE 40 MG/1
CAPSULE, DELAYED RELEASE ORAL
Qty: 88 CAPSULE | Refills: 0 | Status: SHIPPED | OUTPATIENT
Start: 2020-08-26 | End: 2020-11-04

## 2020-08-26 RX ORDER — LEVOFLOXACIN 500 MG/1
500 TABLET, FILM COATED ORAL EVERY 24 HOURS
Qty: 14 TABLET | Refills: 0 | Status: SHIPPED | OUTPATIENT
Start: 2020-08-26 | End: 2020-09-09

## 2020-08-26 NOTE — PROGRESS NOTES
[facility] - Outpatient Follow-up Note  Zandra Mckeon 44 y o  female MRN: [id number]  Encounter: [id number]          ASSESSMENT AND PLAN:      1  H  pylori infection  -persistent H pylori infection following quadruple therapy  -will treat with levofloxacin therapy  -will need repeat H pylori stool antigen at least 1 month after treatment and off PPI for 2 weeks  - levofloxacin (LEVAQUIN) 500 mg tablet; Take 1 tablet (500 mg total) by mouth every 24 hours for 14 days  Dispense: 14 tablet; Refill: 0  - omeprazole (PriLOSEC) 40 MG capsule; Take 1 capsule (40 mg total) by mouth 2 (two) times a day before meals for 14 days, THEN 1 capsule (40 mg total) daily before breakfast   Dispense: 88 capsule; Refill: 0  - amoxicillin (AMOXIL) 500 mg capsule; Take 2 capsules (1,000 mg total) by mouth every 12 (twelve) hours for 14 days  Dispense: 56 capsule; Refill: 0    2  Gastroesophageal reflux disease with esophagitis  -still with intermittent symptoms of vomiting and heartburn and dysphagia recommend resuming PPI once daily at least 30 minutes before breakfast after completion her H pylori treatment    3  Colon cancer screening  -last colonoscopy 02/2020 with poor prep, repeat in 2021    RTC in 2 mos    ______________________________________________________________________    SUBJECTIVE:  49-year-old female seen in follow-up for GERD, dysphagia and vomiting symptoms  She underwent EGD/colonoscopy 02/2020 and found to have LA class a esophagitis and H pylori infection which was treated with quadruple therapy however has persistent infection  Her colonoscopy unfortunatelywas of poor prep and recommended repeat in 1 year  Overall her symptoms are improved but she has intermittent symptoms of vomiting and heartburn particularly to trigger foods  Her dysphagia has significantly improved as well  She is currently not taking any acid suppressive medication        REVIEW OF SYSTEMS IS OTHERWISE NEGATIVE        Historical Information   Past Medical History:   Diagnosis Date    Abnormal thyroid blood test     last assessed 11/13/14    Anemia     last assessed  11/19/13    Chronic pain     back    Depression     Sleep disorder      Past Surgical History:   Procedure Laterality Date    ABSCESS DRAINAGE      incision - skin abscess    BACK SURGERY      2002    BACK SURGERY      lower    MAMMO STEREOTACTIC BREAST BIOPSY RIGHT (ALL INC) Right 6/21/2017    MAMMO STEREOTACTIC BREAST BIOPSY RIGHT (ALL INC) EACH ADD Right 6/21/2017     Social History   Social History     Substance and Sexual Activity   Alcohol Use No     Social History     Substance and Sexual Activity   Drug Use No     Social History     Tobacco Use   Smoking Status Former Smoker   Smokeless Tobacco Never Used   Tobacco Comment    pt "quit five years ago"     Family History   Problem Relation Age of Onset    Kidney disease Mother         chronic    Bone cancer Father     Heart attack Sister         Acute MI    Diabetes Sister     Hypertension Brother     Cancer Family     Diabetes Family     Heart disease Family     Hyperlipidemia Family     Hypertension Family     Colon cancer Paternal Grandfather        Meds/Allergies       Current Outpatient Medications:     cloNIDine (CATAPRES) 0 1 mg tablet    gabapentin (NEURONTIN) 600 MG tablet    ibuprofen (MOTRIN) 800 mg tablet    nortriptyline (PAMELOR) 50 mg capsule    oxyCODONE (ROXICODONE) 30 MG immediate release tablet    QUEtiapine (SEROquel) 400 MG tablet    Suvorexant 20 MG TABS    tiZANidine (ZANAFLEX) 4 mg tablet    amoxicillin (AMOXIL) 500 mg capsule    cyclobenzaprine (FLEXERIL) 10 mg tablet    diclofenac sodium (VOLTAREN) 1 %    levofloxacin (LEVAQUIN) 500 mg tablet    mirtazapine (REMERON) 30 mg tablet    omeprazole (PriLOSEC) 40 MG capsule    temazepam (RESTORIL) 15 mg capsule    Allergies   Allergen Reactions    Aspirin GI Intolerance     Other reaction(s): Other (See Comments)  Other reaction(s): Other (See Comments)  Other reaction(s): Unknown Allergic Reaction    Other      Annotation - [id number]: raw onions- causes throat to feel like it wants to close           Objective     Blood pressure 115/77, pulse 73, temperature (!) 97 °F (36 1 °C), temperature source Tympanic, height 5' 3" (1 6 m), weight 75 3 kg (166 lb)  Body mass index is 29 41 kg/m²  PHYSICAL EXAM:      General Appearance:   Alert, cooperative, no distress   HEENT:   Normocephalic, atraumatic, anicteric      Neck:  Supple, symmetrical, trachea midline   Lungs:   Clear to auscultation bilaterally; no rales, rhonchi or wheezing; respirations unlabored    Heart[de-identified]   Regular rate and rhythm; no murmur, rub, or gallop  Abdomen:   Soft, non-tender, non-distended; normal bowel sounds; no masses, no organomegaly    Genitalia:   Deferred    Rectal:   Deferred    Extremities:  No cyanosis, clubbing or edema        Skin:  No jaundice, rashes, or lesions    Lymph nodes:  No palpable cervical lymphadenopathy        Lab Results:   No visits with results within 1 Day(s) from this visit  Latest known visit with results is:   Appointment on 07/31/2020   Component Date Value    N gonorrhoeae, DNA Probe 07/31/2020 Negative     Chlamydia trachomatis, D* 07/31/2020 Negative     RPR 07/31/2020 Non-Reactive     H pylori Ag, Stl 07/31/2020 Positive*         Radiology Results:   No results found

## 2020-09-04 ENCOUNTER — TELEPHONE (OUTPATIENT)
Dept: INTERNAL MEDICINE CLINIC | Facility: CLINIC | Age: 50
End: 2020-09-04
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
## 2020-09-30 ENCOUNTER — OFFICE VISIT (OUTPATIENT)
Dept: SLEEP CENTER | Facility: CLINIC | Age: 50
End: 2020-09-30
Payer: COMMERCIAL

## 2020-09-30 VITALS
SYSTOLIC BLOOD PRESSURE: 112 MMHG | WEIGHT: 177.6 LBS | HEART RATE: 69 BPM | HEIGHT: 63 IN | DIASTOLIC BLOOD PRESSURE: 62 MMHG | BODY MASS INDEX: 31.47 KG/M2 | TEMPERATURE: 96.8 F

## 2020-09-30 DIAGNOSIS — F51.04 PSYCHOPHYSIOLOGICAL INSOMNIA: ICD-10-CM

## 2020-09-30 PROCEDURE — 99213 OFFICE O/P EST LOW 20 MIN: CPT | Performed by: INTERNAL MEDICINE

## 2020-09-30 PROCEDURE — 1036F TOBACCO NON-USER: CPT | Performed by: INTERNAL MEDICINE

## 2020-10-13 DIAGNOSIS — F51.04 PSYCHOPHYSIOLOGICAL INSOMNIA: ICD-10-CM

## 2020-10-14 RX ORDER — MIRTAZAPINE 30 MG/1
30 TABLET, FILM COATED ORAL
Qty: 30 TABLET | Refills: 1 | Status: SHIPPED | OUTPATIENT
Start: 2020-10-14 | End: 2021-01-22 | Stop reason: SDUPTHER

## 2020-10-15 ENCOUNTER — TELEPHONE (OUTPATIENT)
Dept: OBGYN CLINIC | Facility: CLINIC | Age: 50
End: 2020-10-15

## 2020-10-19 RX ORDER — DOXEPIN HYDROCHLORIDE 10 MG/1
CAPSULE ORAL
COMMUNITY
Start: 2020-08-26 | End: 2021-02-02 | Stop reason: ALTCHOICE

## 2020-10-19 RX ORDER — CLOBETASOL PROPIONATE 0.46 MG/ML
SOLUTION TOPICAL
COMMUNITY
Start: 2020-09-04

## 2020-10-20 DIAGNOSIS — F32.A ANXIETY AND DEPRESSION: Chronic | ICD-10-CM

## 2020-10-20 DIAGNOSIS — F41.9 ANXIETY AND DEPRESSION: Chronic | ICD-10-CM

## 2020-10-21 ENCOUNTER — OFFICE VISIT (OUTPATIENT)
Dept: INTERNAL MEDICINE CLINIC | Facility: CLINIC | Age: 50
End: 2020-10-21

## 2020-10-21 VITALS
HEIGHT: 63 IN | DIASTOLIC BLOOD PRESSURE: 70 MMHG | SYSTOLIC BLOOD PRESSURE: 118 MMHG | HEART RATE: 70 BPM | BODY MASS INDEX: 32.36 KG/M2 | WEIGHT: 182.6 LBS | TEMPERATURE: 98.4 F

## 2020-10-21 DIAGNOSIS — N39.3 STRESS INCONTINENCE: Primary | ICD-10-CM

## 2020-10-21 DIAGNOSIS — F41.9 ANXIETY: ICD-10-CM

## 2020-10-21 DIAGNOSIS — G47.00 INSOMNIA, UNSPECIFIED TYPE: ICD-10-CM

## 2020-10-21 PROCEDURE — 99213 OFFICE O/P EST LOW 20 MIN: CPT | Performed by: HOSPITALIST

## 2020-10-21 RX ORDER — QUETIAPINE FUMARATE 400 MG/1
400 TABLET, FILM COATED ORAL 2 TIMES DAILY
Qty: 60 TABLET | Refills: 2 | Status: SHIPPED | OUTPATIENT
Start: 2020-10-21 | End: 2021-01-04

## 2020-10-21 RX ORDER — CLONIDINE HYDROCHLORIDE 0.1 MG/1
0.1 TABLET ORAL EVERY 12 HOURS SCHEDULED
Qty: 60 TABLET | Refills: 2 | Status: SHIPPED | OUTPATIENT
Start: 2020-10-21 | End: 2021-03-18

## 2020-10-31 DIAGNOSIS — A04.8 H. PYLORI INFECTION: ICD-10-CM

## 2020-11-04 RX ORDER — OMEPRAZOLE 40 MG/1
40 CAPSULE, DELAYED RELEASE ORAL
Qty: 30 CAPSULE | Refills: 0 | Status: SHIPPED | OUTPATIENT
Start: 2020-11-04 | End: 2021-05-27 | Stop reason: ALTCHOICE

## 2020-11-11 ENCOUNTER — TELEPHONE (OUTPATIENT)
Dept: PSYCHIATRY | Facility: CLINIC | Age: 50
End: 2020-11-11

## 2020-11-12 ENCOUNTER — TRANSCRIBE ORDERS (OUTPATIENT)
Dept: ADMINISTRATIVE | Facility: HOSPITAL | Age: 50
End: 2020-11-12

## 2020-11-12 DIAGNOSIS — Z12.31 ENCOUNTER FOR SCREENING MAMMOGRAM FOR MALIGNANT NEOPLASM OF BREAST: Primary | ICD-10-CM

## 2020-11-27 DIAGNOSIS — A04.8 H. PYLORI INFECTION: ICD-10-CM

## 2020-12-01 DIAGNOSIS — F51.04 PSYCHOPHYSIOLOGICAL INSOMNIA: ICD-10-CM

## 2020-12-01 RX ORDER — MIRTAZAPINE 30 MG/1
30 TABLET, FILM COATED ORAL
Qty: 30 TABLET | Refills: 1 | OUTPATIENT
Start: 2020-12-01

## 2020-12-02 RX ORDER — OMEPRAZOLE 40 MG/1
CAPSULE, DELAYED RELEASE ORAL
Qty: 30 CAPSULE | Refills: 0 | OUTPATIENT
Start: 2020-12-02

## 2021-01-04 DIAGNOSIS — F32.A ANXIETY AND DEPRESSION: Chronic | ICD-10-CM

## 2021-01-04 DIAGNOSIS — F41.9 ANXIETY AND DEPRESSION: Chronic | ICD-10-CM

## 2021-01-04 RX ORDER — QUETIAPINE FUMARATE 400 MG/1
TABLET, FILM COATED ORAL
Qty: 90 TABLET | Refills: 1 | Status: SHIPPED | OUTPATIENT
Start: 2021-01-04 | End: 2021-04-12 | Stop reason: SDUPTHER

## 2021-01-13 DIAGNOSIS — F51.04 PSYCHOPHYSIOLOGICAL INSOMNIA: ICD-10-CM

## 2021-01-15 RX ORDER — MIRTAZAPINE 30 MG/1
30 TABLET, FILM COATED ORAL
Qty: 30 TABLET | Refills: 1 | OUTPATIENT
Start: 2021-01-15

## 2021-01-15 NOTE — TELEPHONE ENCOUNTER
Patient needs to see a psychatrist because she is on a lot of medications for depression/anxiety   We were only filling her meds temporarily till she is able to see them

## 2021-01-18 DIAGNOSIS — F51.04 PSYCHOPHYSIOLOGICAL INSOMNIA: ICD-10-CM

## 2021-01-18 NOTE — TELEPHONE ENCOUNTER
Name of medication, dose, quantity and frequency  Requested Prescriptions     Pending Prescriptions Disp Refills    mirtazapine (REMERON) 30 mg tablet 30 tablet 1     Sig: Take 1 tablet (30 mg total) by mouth daily at bedtime       Number of refills left:0    Amount of medication left:0    Pharmacy verified and updated    Additional information:

## 2021-01-19 RX ORDER — MIRTAZAPINE 30 MG/1
30 TABLET, FILM COATED ORAL
Qty: 30 TABLET | Refills: 1 | OUTPATIENT
Start: 2021-01-19

## 2021-01-20 ENCOUNTER — TELEPHONE (OUTPATIENT)
Dept: PSYCHIATRY | Facility: CLINIC | Age: 51
End: 2021-01-20

## 2021-01-20 NOTE — TELEPHONE ENCOUNTER
Called to set up appt for Dr & therapist  She does not need  An Intake had one in Aug  I do see however that she had cancelled a few appts   Please call

## 2021-01-20 NOTE — TELEPHONE ENCOUNTER
Pt called back, I informed her that she needs to see mental health for this med  Pt verbalized understanding   I gave her the phone number to info link to get the phone number to  mental health providers

## 2021-01-22 ENCOUNTER — TELEPHONE (OUTPATIENT)
Dept: PSYCHIATRY | Facility: CLINIC | Age: 51
End: 2021-01-22

## 2021-01-22 DIAGNOSIS — F32.A ANXIETY AND DEPRESSION: Chronic | ICD-10-CM

## 2021-01-22 DIAGNOSIS — F51.04 PSYCHOPHYSIOLOGICAL INSOMNIA: ICD-10-CM

## 2021-01-22 DIAGNOSIS — F41.9 ANXIETY AND DEPRESSION: Chronic | ICD-10-CM

## 2021-01-22 RX ORDER — MIRTAZAPINE 30 MG/1
30 TABLET, FILM COATED ORAL
Qty: 30 TABLET | Refills: 2 | Status: SHIPPED | OUTPATIENT
Start: 2021-01-22 | End: 2021-04-12 | Stop reason: SDUPTHER

## 2021-01-22 RX ORDER — NORTRIPTYLINE HYDROCHLORIDE 50 MG/1
CAPSULE ORAL
Qty: 90 CAPSULE | Refills: 1 | Status: SHIPPED | OUTPATIENT
Start: 2021-01-22 | End: 2021-04-12 | Stop reason: SDUPTHER

## 2021-01-22 NOTE — TELEPHONE ENCOUNTER
Patient called in requesting these medications  AS well as temazepam and trazodone  As per verbal communication with Dr Manju Pereyra we cannot prescribe patient temzaepam as that is a controlled substance and given by psych  Also, trazadone is not on current med list and we are unable to fill that at this time as well  I informed patient as per note

## 2021-01-22 NOTE — TELEPHONE ENCOUNTER
Patient was scheduled as med management  Needs to be in a np slot  Guera Rai requested that the appt be rescheduled

## 2021-02-01 ENCOUNTER — PATIENT OUTREACH (OUTPATIENT)
Dept: INTERNAL MEDICINE CLINIC | Facility: CLINIC | Age: 51
End: 2021-02-01

## 2021-02-02 ENCOUNTER — TELEMEDICINE (OUTPATIENT)
Dept: INTERNAL MEDICINE CLINIC | Facility: CLINIC | Age: 51
End: 2021-02-02

## 2021-02-02 DIAGNOSIS — G44.52 NEW DAILY PERSISTENT HEADACHE: Primary | ICD-10-CM

## 2021-02-02 PROCEDURE — G2025 DIS SITE TELE SVCS RHC/FQHC: HCPCS | Performed by: INTERNAL MEDICINE

## 2021-02-02 NOTE — PROGRESS NOTES
Virtual Regular Visit    Assessment/Plan:    Problem List Items Addressed This Visit        Other    New daily persistent headache - Primary        New daily persistent headache   Associated with vomiting over last few days, FALLON present over the last 1 month, worsening over last 2 weeks  In a 44year old F, a new headache would at the very least necessitate outpatient MRI for further evaluation, however given the worsening state of this new headache associated with vomiting in her sleep, posing a risk for aspiration, she should be evaluated in person to rule out an intracranial process, to evaluate her blood sugar and vital signs, and for a full neurologic physical exam with consideration for imaging  I have requested patient call 911 for an ambulance to be brought to the hospital for further evaluation given the concerning nature of her worsening symptoms  She reports she will wait for her  to return home from work today before going/calling  I have discussed with patient that if she does not go to the hospital, she needs to be seen in the next 2 days in person at our [facility]  Reason for visit is No chief complaint on file  Encounter provider Delphine Maharaj DO    Provider located at [facility] [id number] [phone]      Recent Visits  No visits were found meeting these conditions  Showing recent visits within past 7 days and meeting all other requirements     Future Appointments  No visits were found meeting these conditions  Showing future appointments within next 150 days and meeting all other requirements      The patient was identified by name and date of birth  Brandtbradley Cl was informed that this is a telemedicine visit and that the visit is being conducted through [facility] and patient was informed that this is a secure, HIPAA-compliant platform  She agrees to proceed     My office door was closed  No one else was in the room  She acknowledged consent and understanding of privacy and security of the video platform  The patient has agreed to participate and understands they can discontinue the visit at any time  Patient is aware this is a billable service  Suggested LOS [id number]  It was my intent to perform this visit via video technology but the patient was not able to do a video connection (poor internet connection) so the visit was completed via audio telephone only  [place] is a 44 y o  female with a history of insomnia, anxiety, depression who presents for a virtual visit today for headaches ongoing for one month  She reports concerns about high blood pressure or diabetes causing these headaches  Her HA starts at back of neck and travels around back of head to the top of both eyes  It is constant  Ibuprofen 800mg did not help  Tylenol didn't help  She has tried a cream with no relief (she was unsure of name but agreed with bengay/emiliana)  Nothing makes it worse or better  It is a new headache  Pt reports some R arm some tingling but then reports that this has been ongoing and this may be related to her sister having had an MI  She has no blood pressure cuff at home nor a glucometer  She reports being diagnosed with diabetes in the past, being temporarily on medications, but has not been on medications recently (last a1c per chart review in 5/2020 was 6 3)  She reports over the last few days, she has been waking up vomiting in her sleep, she has had vomit come up her nose, at which time she wakes up and heaves until her stomach contents are emptied  She denied this being simply an acidic taste in her mouth from GERD  Pt denies this ever occurring before  She reports increased urination but denies blurry vision, skin sensation changes besides ongoing R arm tingling, denies polydipsia, denies photo sensitivity or sensitivity to sound      Pt reports she continues to take clonidine and has not missed any doses  She denied taking doxepin or demazepam and these were removed from her medication list  She continues to take seroquel, suvorexant, nortriptyline, mirtazapine, gabapentin      HPI     Past Medical History:   Diagnosis Date    Abnormal thyroid blood test     last assessed 11/13/14    Anemia     last assessed  11/19/13    Chronic pain     back    Depression     Sleep disorder        Past Surgical History:   Procedure Laterality Date    ABSCESS DRAINAGE      incision - skin abscess    BACK SURGERY      2002    BACK SURGERY      lower    MAMMO STEREOTACTIC BREAST BIOPSY RIGHT (ALL INC) Right 6/21/2017    MAMMO STEREOTACTIC BREAST BIOPSY RIGHT (ALL INC) EACH ADD Right 6/21/2017       Current Outpatient Medications   Medication Sig Dispense Refill    clobetasol (TEMOVATE) 0 05 % external solution APPLY TO SCALP TWICE A DAY      cloNIDine (CATAPRES) 0 1 mg tablet Take 1 tablet (0 1 mg total) by mouth every 12 (twelve) hours 60 tablet 2    cyclobenzaprine (FLEXERIL) 10 mg tablet Take 10 mg by mouth 2 (two) times a day as needed      diclofenac sodium (VOLTAREN) 1 %       gabapentin (NEURONTIN) 600 MG tablet TAKE 2 TABLETS (1,200 MG TOTAL) BY MOUTH 2 (TWO) TIMES A  tablet 0    ibuprofen (MOTRIN) 800 mg tablet       mirtazapine (REMERON) 30 mg tablet Take 1 tablet (30 mg total) by mouth daily at bedtime 30 tablet 2    nortriptyline (PAMELOR) 50 mg capsule Take 2 caps at 8AM and 1 at bedtime 90 capsule 1    omeprazole (PriLOSEC) 40 MG capsule Take 1 capsule (40 mg total) by mouth daily before breakfast 30 capsule 0    oxyCODONE (ROXICODONE) 30 MG immediate release tablet Take 30 mg by mouth 2 (two) times a day      QUEtiapine (SEROquel) 400 MG tablet TAKE 1 TABLET BY MOUTH TWICE A DAY 90 tablet 1    Suvorexant 20 MG TABS One by mouth at bedtime 90 tablet 1    tiZANidine (ZANAFLEX) 4 mg tablet Take 4 mg by mouth every 8 (eight) hours  1 No current facility-administered medications for this visit  Allergies   Allergen Reactions    Aspirin GI Intolerance     Other reaction(s): Other (See Comments)  Other reaction(s): Other (See Comments)  Other reaction(s): Unknown Allergic Reaction    Other      Annotation - [id number]: raw onions- causes throat to feel like it wants to close       Review of Systems   HENT: Positive for trouble swallowing  Respiratory: Negative for cough and shortness of breath  Gastrointestinal: Positive for nausea and vomiting  Negative for constipation and diarrhea  Neurological: Positive for headaches  Negative for dizziness and light-headedness  There were no vitals filed for this visit  I spent 25 minutes directly with the patient during this visit      VIRTUAL VISIT DISCLAIMER    Rebeka Tran acknowledges that she has consented to an online visit or consultation  She understands that the online visit is based solely on information provided by her, and that, in the absence of a face-to-face physical evaluation by the physician, the diagnosis she receives is both limited and provisional in terms of accuracy and completeness  This is not intended to replace a full medical face-to-face evaluation by the physician  Rebeka Tran understands and accepts these terms

## 2021-02-02 NOTE — PATIENT INSTRUCTIONS
Acute Headache   WHAT YOU NEED TO KNOW:   An acute headache is pain or discomfort that starts suddenly and gets worse quickly  You may have an acute headache only when you feel stress or eat certain foods  Other acute headache pain can happen every day, and sometimes several times a day  DISCHARGE INSTRUCTIONS:   Return to the emergency department if:   · You have severe pain  · You have numbness or weakness on one side of your face or body  · You have a headache that occurs after a blow to the head, a fall, or other trauma  · You have a headache, are forgetful or confused, or have trouble speaking  · You have a headache, stiff neck, and a fever  Contact your healthcare provider if:   · You have a constant headache and are vomiting  · You have a headache each day that does not get better, even after treatment  · You have changes in your headaches, or new symptoms that occur when you have a headache  · You have questions or concerns about your condition or care  Medicines: You may need any of the following:  · Prescription pain medicine  may be given  The medicine your healthcare provider recommends will depend on the kind of headaches you have  You will need to take prescription headache medicines as directed to prevent a problem called rebound headache  These headaches happen with regular use of pain relievers for headache disorders  · NSAIDs , such as ibuprofen, help decrease swelling, pain, and fever  This medicine is available with or without a doctor's order  NSAIDs can cause stomach bleeding or kidney problems in certain people  If you take blood thinner medicine, always ask your healthcare provider if NSAIDs are safe for you  Always read the medicine label and follow directions  · Acetaminophen  decreases pain and fever  It is available without a doctor's order  Ask how much to take and how often to take it  Follow directions   Read the labels of all other medicines you are using to see if they also contain acetaminophen, or ask your doctor or pharmacist  Acetaminophen can cause liver damage if not taken correctly  Do not use more than 3 grams (3,000 milligrams) total of acetaminophen in one day  · Antidepressants  may be given for some kinds of headaches  · Take your medicine as directed  Contact your healthcare provider if you think your medicine is not helping or if you have side effects  Tell him or her if you are allergic to any medicine  Keep a list of the medicines, vitamins, and herbs you take  Include the amounts, and when and why you take them  Bring the list or the pill bottles to follow-up visits  Carry your medicine list with you in case of an emergency  Manage your symptoms:   · Apply heat or ice  on the headache area  Use a heat or ice pack  For an ice pack, you can also put crushed ice in a plastic bag  Cover the pack or bag with a towel before you apply it to your skin  Ice and heat both help decrease pain, and heat also helps decrease muscle spasms  Apply heat for 20 to 30 minutes every 2 hours  Apply ice for 15 to 20 minutes every hour  Apply heat or ice for as long and for as many days as directed  You may alternate heat and ice  · Relax your muscles  Lie down in a comfortable position and close your eyes  Relax your muscles slowly  Start at your toes and work your way up your body  · Keep a record of your headaches  Write down when your headaches start and stop  Include your symptoms and what you were doing when the headache began  Record what you ate or drank for 24 hours before the headache started  Describe the pain and where it hurts  Keep track of what you did to treat your headache and if it worked  Prevent an acute headache:   · Avoid anything that triggers an acute headache  Examples include exposure to chemicals, going to high altitude, or not getting enough sleep  Create a regular sleep routine   Go to sleep at the same time and wake up at the same time each day  Do not use electronic devices before bedtime  These may trigger a headache or prevent you from sleeping well  · Do not smoke  Nicotine and other chemicals in cigarettes and cigars can trigger an acute headache or make it worse  Ask your healthcare provider for information if you currently smoke and need help to quit  E-cigarettes or smokeless tobacco still contain nicotine  Talk to your healthcare provider before you use these products  · Limit alcohol as directed  Alcohol can trigger an acute headache or make it worse  If you have cluster headaches, do not drink alcohol during an episode  For other types of headaches, ask your healthcare provider if it is safe for you to drink alcohol  Ask how much is safe for you to drink, and how often  · Exercise as directed  Exercise can reduce tension and help with headache pain  Aim for 30 minutes of physical activity on most days of the week  Your healthcare provider can help you create an exercise plan  · Eat a variety of healthy foods  Healthy foods include fruits, vegetables, low-fat dairy products, lean meats, fish, whole grains, and cooked beans  Your healthcare provider or dietitian can help you create meals plans if you need to avoid foods that trigger headaches  Follow up with your healthcare provider as directed:  Bring your headache record with you when you see your healthcare provider  Write down your questions so you remember to ask them during your visits  © Copyright [address] Information is for End User's use only and may not be sold, redistributed or otherwise used for commercial purposes  All illustrations and images included in CareNotes® are the copyrighted property of A D A M , Inc  or [address] [place], [place] [address]zakiya   The above information is an  only  It is not intended as medical advice for individual conditions or treatments   Talk to your doctor, nurse or pharmacist before following any medical regimen to see if it is safe and effective for you

## 2021-02-02 NOTE — Clinical Note
This patient was referred to the ED on 2/2 during a virtual visit  Please call her when clinic re-opens to schedule her for an in-person evaluation if she did not go to ED

## 2021-02-03 ENCOUNTER — TELEPHONE (OUTPATIENT)
Dept: INTERNAL MEDICINE CLINIC | Facility: CLINIC | Age: 51
End: 2021-02-03

## 2021-02-03 DIAGNOSIS — G44.52 NEW DAILY PERSISTENT HEADACHE: Primary | ICD-10-CM

## 2021-02-03 NOTE — TELEPHONE ENCOUNTER
Patient called and said she had a virtual appointment yesterday with Dr Magno Ramirez  And as per Dr Magno Ramirez she was supposed to go to the ED if symptoms continues  Patient says she refuses to go to ER due to covid scare  Patient wants a script for MRI so she can schedule appointment

## 2021-02-06 ENCOUNTER — TRANSCRIBE ORDERS (OUTPATIENT)
Dept: LAB | Facility: HOSPITAL | Age: 51
End: 2021-02-06

## 2021-02-06 ENCOUNTER — LAB (OUTPATIENT)
Dept: LAB | Facility: HOSPITAL | Age: 51
End: 2021-02-06
Payer: COMMERCIAL

## 2021-02-06 DIAGNOSIS — G44.52 NEW DAILY PERSISTENT HEADACHE: ICD-10-CM

## 2021-02-06 LAB
ALBUMIN SERPL BCP-MCNC: 3.7 G/DL (ref 3.5–5)
ALP SERPL-CCNC: 86 U/L (ref 46–116)
ALT SERPL W P-5'-P-CCNC: 48 U/L (ref 12–78)
ANION GAP SERPL CALCULATED.3IONS-SCNC: 4 MMOL/L (ref 4–13)
AST SERPL W P-5'-P-CCNC: 25 U/L (ref 5–45)
BASOPHILS # BLD AUTO: 0.03 THOUSANDS/ΜL (ref 0–0.1)
BASOPHILS NFR BLD AUTO: 1 % (ref 0–1)
BILIRUB SERPL-MCNC: 0.16 MG/DL (ref 0.2–1)
BUN SERPL-MCNC: 11 MG/DL (ref 5–25)
CALCIUM SERPL-MCNC: 9.6 MG/DL (ref 8.3–10.1)
CHLORIDE SERPL-SCNC: 109 MMOL/L (ref 100–108)
CO2 SERPL-SCNC: 28 MMOL/L (ref 21–32)
CREAT SERPL-MCNC: 1.19 MG/DL (ref 0.6–1.3)
EOSINOPHIL # BLD AUTO: 0.26 THOUSAND/ΜL (ref 0–0.61)
EOSINOPHIL NFR BLD AUTO: 5 % (ref 0–6)
ERYTHROCYTE [DISTWIDTH] IN BLOOD BY AUTOMATED COUNT: 15.7 % (ref 11.6–15.1)
GFR SERPL CREATININE-BSD FRML MDRD: 61 ML/MIN/1.73SQ M
GLUCOSE P FAST SERPL-MCNC: 100 MG/DL (ref 65–99)
HCT VFR BLD AUTO: 40.8 % (ref 34.8–46.1)
HGB BLD-MCNC: 12.7 G/DL (ref 11.5–15.4)
IMM GRANULOCYTES # BLD AUTO: 0.01 THOUSAND/UL (ref 0–0.2)
IMM GRANULOCYTES NFR BLD AUTO: 0 % (ref 0–2)
LYMPHOCYTES # BLD AUTO: 2.55 THOUSANDS/ΜL (ref 0.6–4.47)
LYMPHOCYTES NFR BLD AUTO: 51 % (ref 14–44)
MCH RBC QN AUTO: 25.5 PG (ref 26.8–34.3)
MCHC RBC AUTO-ENTMCNC: 31.1 G/DL (ref 31.4–37.4)
MCV RBC AUTO: 82 FL (ref 82–98)
MONOCYTES # BLD AUTO: 0.53 THOUSAND/ΜL (ref 0.17–1.22)
MONOCYTES NFR BLD AUTO: 11 % (ref 4–12)
NEUTROPHILS # BLD AUTO: 1.59 THOUSANDS/ΜL (ref 1.85–7.62)
NEUTS SEG NFR BLD AUTO: 32 % (ref 43–75)
NRBC BLD AUTO-RTO: 0 /100 WBCS
PLATELET # BLD AUTO: 291 THOUSANDS/UL (ref 149–390)
PMV BLD AUTO: 11.5 FL (ref 8.9–12.7)
POTASSIUM SERPL-SCNC: 4.2 MMOL/L (ref 3.5–5.3)
PROT SERPL-MCNC: 7.8 G/DL (ref 6.4–8.2)
RBC # BLD AUTO: 4.98 MILLION/UL (ref 3.81–5.12)
SODIUM SERPL-SCNC: 141 MMOL/L (ref 136–145)
WBC # BLD AUTO: 4.97 THOUSAND/UL (ref 4.31–10.16)

## 2021-02-06 PROCEDURE — 36415 COLL VENOUS BLD VENIPUNCTURE: CPT

## 2021-02-06 PROCEDURE — 80053 COMPREHEN METABOLIC PANEL: CPT

## 2021-02-06 PROCEDURE — 85025 COMPLETE CBC W/AUTO DIFF WBC: CPT

## 2021-02-08 ENCOUNTER — HOSPITAL ENCOUNTER (OUTPATIENT)
Dept: RADIOLOGY | Facility: HOSPITAL | Age: 51
Discharge: HOME | End: 2021-02-08
Payer: COMMERCIAL

## 2021-02-08 DIAGNOSIS — G44.52 NEW DAILY PERSISTENT HEADACHE: ICD-10-CM

## 2021-02-08 PROCEDURE — G1004 CDSM NDSC: HCPCS

## 2021-02-08 PROCEDURE — 70450 CT HEAD/BRAIN W/O DYE: CPT

## 2021-02-09 ENCOUNTER — TELEPHONE (OUTPATIENT)
Dept: GASTROENTEROLOGY | Facility: CLINIC | Age: 51
End: 2021-02-09

## 2021-02-09 ENCOUNTER — OFFICE VISIT (OUTPATIENT)
Dept: SLEEP CENTER | Facility: CLINIC | Age: 51
End: 2021-02-09
Payer: COMMERCIAL

## 2021-02-09 VITALS
HEART RATE: 86 BPM | DIASTOLIC BLOOD PRESSURE: 74 MMHG | HEIGHT: 63 IN | BODY MASS INDEX: 34.2 KG/M2 | WEIGHT: 193 LBS | SYSTOLIC BLOOD PRESSURE: 130 MMHG

## 2021-02-09 DIAGNOSIS — A04.8 H. PYLORI INFECTION: Primary | ICD-10-CM

## 2021-02-09 DIAGNOSIS — F51.04 PSYCHOPHYSIOLOGICAL INSOMNIA: ICD-10-CM

## 2021-02-09 DIAGNOSIS — G47.33 OSA (OBSTRUCTIVE SLEEP APNEA): Primary | ICD-10-CM

## 2021-02-09 PROCEDURE — 99213 OFFICE O/P EST LOW 20 MIN: CPT | Performed by: INTERNAL MEDICINE

## 2021-02-09 NOTE — TELEPHONE ENCOUNTER
Patients GI provider:  Dr Tyra Hughes    Number to return call: [phone]    Reason for call: Pt calling wanting to know if stool sample order will be placed before follow up appt? Scheduled procedure/appointment date if applicable: Appt   04/7/21

## 2021-02-09 NOTE — TELEPHONE ENCOUNTER
Patient notified of this and states she has not been taking her omeprazole and will the stool study done soon

## 2021-02-09 NOTE — PROGRESS NOTES
Progress Note - Sleep Center   Lisa Side :1970 MRN: [id number]      Reason for Visit:    44 y  o female with chronic insomnia    Assessment:  The patient had been doing well on Belsomra, but began to add additional medications until she is essentially back to her original combination  I will try Jewels Sellar, but if she is unimproved I have little else to offer  Plan:  Dayvigo 10 mg at bedtime  I will start with samples and send in a prescription if it is effective  Follow up: The patient will call to let me know how she is doing after trying the new medication    History of Present Illness:  Chronic insomnia refractory to any individual medication  She seemed to do well on Belsomra for some time, but the effect was not lasting        Review of Systems      Genitourinary need to urinate more than twice a night and post menopausal (no peroids)   Cardiology none   Gastrointestinal none   Neurology none   Constitutional none   Integumentary none   Psychiatry none   Musculoskeletal back pain   Pulmonary snoring   ENT none   Endocrine none   Hematological none           I have reviewed and updated the review of systems as necessary     Historical Information    Past Medical History:   Diagnosis Date    Abnormal thyroid blood test     last assessed 14    Anemia     last assessed  13    Chronic pain     back    Depression     Sleep disorder          Past Surgical History:   Procedure Laterality Date    ABSCESS DRAINAGE      incision - skin abscess    BACK SURGERY      2002    BACK SURGERY      lower    MAMMO STEREOTACTIC BREAST BIOPSY RIGHT (ALL INC) Right 2017    MAMMO STEREOTACTIC BREAST BIOPSY RIGHT (ALL INC) EACH ADD Right 2017         Social History     Socioeconomic History    Marital status: /Civil Union     Spouse name: None    Number of children: None    Years of education: None    Highest education level: None   Occupational History    None   Social Needs    Financial resource strain: None    Food insecurity     Worry: None     Inability: None    Transportation needs     Medical: None     Non-medical: None   Tobacco Use    Smoking status: Former Smoker    Smokeless tobacco: Never Used    Tobacco comment: pt "quit five years ago"   Substance and Sexual Activity    Alcohol use: No    Drug use: No    Sexual activity: Yes     Partners: Male     Birth control/protection: None, Female Sterilization   Lifestyle    Physical activity     Days per week: None     Minutes per session: None    Stress: None   Relationships    Social connections     Talks on phone: None     Gets together: None     Attends Scientologist service: None     Active member of club or organization: None     Attends meetings of clubs or organizations: None     Relationship status: None    Intimate partner violence     Fear of current or ex partner: None     Emotionally abused: None     Physically abused: None     Forced sexual activity: None   Other Topics Concern    None   Social History Narrative    Death in the family - sister    Lack of adequate sleep    Parentage    Sedentary lifestyle    Under stress           History   Alcohol use: Not on file       History   Smoking Status    Not on file   Smokeless Tobacco    Not on file       Family History:   Family History   Problem Relation Age of Onset    Kidney disease Mother         chronic    Bone cancer Father     Heart attack Sister         Acute MI    Diabetes Sister     Hypertension Brother     Cancer Family     Diabetes Family     Heart disease Family     Hyperlipidemia Family     Hypertension Family     Colon cancer Paternal Grandfather        Medications/Allergies:      Current Outpatient Medications:     clobetasol (TEMOVATE) 0 05 % external solution, APPLY TO SCALP TWICE A DAY, Disp: , Rfl:     cloNIDine (CATAPRES) 0 1 mg tablet, Take 1 tablet (0 1 mg total) by mouth every 12 (twelve) hours, Disp: 60 tablet, Rfl: 2    cyclobenzaprine (FLEXERIL) 10 mg tablet, Take 10 mg by mouth 2 (two) times a day as needed, Disp: , Rfl:     diclofenac sodium (VOLTAREN) 1 %, , Disp: , Rfl:     gabapentin (NEURONTIN) 600 MG tablet, TAKE 2 TABLETS (1,200 MG TOTAL) BY MOUTH 2 (TWO) TIMES A DAY, Disp: 360 tablet, Rfl: 0    ibuprofen (MOTRIN) 800 mg tablet, , Disp: , Rfl:     mirtazapine (REMERON) 30 mg tablet, Take 1 tablet (30 mg total) by mouth daily at bedtime, Disp: 30 tablet, Rfl: 2    nortriptyline (PAMELOR) 50 mg capsule, Take 2 caps at 8AM and 1 at bedtime, Disp: 90 capsule, Rfl: 1    omeprazole (PriLOSEC) 40 MG capsule, Take 1 capsule (40 mg total) by mouth daily before breakfast, Disp: 30 capsule, Rfl: 0    oxyCODONE (ROXICODONE) 30 MG immediate release tablet, Take 30 mg by mouth 2 (two) times a day, Disp: , Rfl:     QUEtiapine (SEROquel) 400 MG tablet, TAKE 1 TABLET BY MOUTH TWICE A DAY, Disp: 90 tablet, Rfl: 1    Suvorexant 20 MG TABS, One by mouth at bedtime, Disp: 90 tablet, Rfl: 1    tiZANidine (ZANAFLEX) 4 mg tablet, Take 4 mg by mouth every 8 (eight) hours, Disp: , Rfl: 1      Objective    Vital Signs:   Vitals:    02/09/21 1436   BP: 130/74   Pulse: 86   Weight: 87 5 kg (193 lb)   Height: 5' 3" (1 6 m)     [place] Sleepiness Scale: Total score: 7    Physical Exam:    General: Alert, appropriate, cooperative, overweight    Head: NC/AT    Skin: Warm, dry    Neuro: No motor abnormalities, cranial nerves appear intact    Psych: Normal affect            RICHARD Pichardo    Board Certified Sleep Specialist

## 2021-02-09 NOTE — TELEPHONE ENCOUNTER
Hi all,     I called this patient to give Dr Belkis Aviles instructions  Patient states she has been vomiting at night the last few nights, she denied any diarrhea, stomach aches  etc but states she has been having headaches (of which she is being worked up by her PCP for)  Please advise     ARIANEI- I advised pt to try changing her diet for the next few days, hydrating, and to call office if symptoms become worse        Thank you

## 2021-02-09 NOTE — PROGRESS NOTES
Progress Note - Sleep Center   Dalia Rea :1970 MRN: [id number]      Reason for Visit:    44 y  o female with chronic insomnia    Assessment:  Doing well on Belsomra 20 mg at bedtime    Plan:  Continue current regimen    Follow up: One year    History of Present Illness:  Chronic insomnia refractory to treatment  She seems to be doing better since starting Belsomra  She sleeps approximately 8 hours per night and feels alert and refreshed during the day      Review of Systems      Genitourinary need to urinate more than twice a night and excessive blood loss during menses   Cardiology none   Gastrointestinal none   Neurology forgetfulness   Constitutional none   Integumentary none   Psychiatry anxiety and depression   Musculoskeletal back pain, sciatica and leg cramps   Pulmonary snoring   ENT none   Endocrine frequent urination   Hematological abnormal blood loss         I have reviewed and updated the review of systems as necessary     Historical Information    Past Medical History:   Diagnosis Date    Abnormal thyroid blood test     last assessed 14    Anemia     last assessed  13    Chronic pain     back    Depression     Sleep disorder          Past Surgical History:   Procedure Laterality Date    ABSCESS DRAINAGE      incision - skin abscess    BACK SURGERY          BACK SURGERY      lower    MAMMO STEREOTACTIC BREAST BIOPSY RIGHT (ALL INC) Right 2017    MAMMO STEREOTACTIC BREAST BIOPSY RIGHT (ALL INC) EACH ADD Right 2017         Social History     Socioeconomic History    Marital status: /Civil Union     Spouse name: Not on file    Number of children: Not on file    Years of education: Not on file    Highest education level: Not on file   Occupational History    Not on file   Social Needs    Financial resource strain: Not on file    Food insecurity     Worry: Not on file     Inability: Not on file    Transportation needs     Medical: Not on file     Non-medical: Not on file   Tobacco Use    Smoking status: Former Smoker    Smokeless tobacco: Never Used    Tobacco comment: pt "quit five years ago"   Substance and Sexual Activity    Alcohol use: No    Drug use: No    Sexual activity: Yes     Partners: Male     Birth control/protection: None, Female Sterilization   Lifestyle    Physical activity     Days per week: Not on file     Minutes per session: Not on file    Stress: Not on file   Relationships    Social connections     Talks on phone: Not on file     Gets together: Not on file     Attends Quaker service: Not on file     Active member of club or organization: Not on file     Attends meetings of clubs or organizations: Not on file     Relationship status: Not on file    Intimate partner violence     Fear of current or ex partner: Not on file     Emotionally abused: Not on file     Physically abused: Not on file     Forced sexual activity: Not on file   Other Topics Concern    Not on file   Social History Narrative    Death in the family - sister    Lack of adequate sleep    Parentage    Sedentary lifestyle    Under stress           History   Alcohol use: Not on file       History   Smoking Status    Not on file   Smokeless Tobacco    Not on file       Family History:   Family History   Problem Relation Age of Onset    Kidney disease Mother         chronic    Bone cancer Father     Heart attack Sister         Acute MI    Diabetes Sister     Hypertension Brother     Cancer Family     Diabetes Family     Heart disease Family     Hyperlipidemia Family     Hypertension Family     Colon cancer Paternal Grandfather        Medications/Allergies:      Current Outpatient Medications:     cloNIDine (CATAPRES) 0 1 mg tablet, Take 1 tablet (0 1 mg total) by mouth every 12 (twelve) hours, Disp: 60 tablet, Rfl: 2    cyclobenzaprine (FLEXERIL) 10 mg tablet, Take 10 mg by mouth 2 (two) times a day as needed, Disp: , Rfl:     diclofenac sodium (VOLTAREN) 1 %, , Disp: , Rfl:     gabapentin (NEURONTIN) 600 MG tablet, TAKE 2 TABLETS (1,200 MG TOTAL) BY MOUTH 2 (TWO) TIMES A DAY, Disp: 360 tablet, Rfl: 0    ibuprofen (MOTRIN) 800 mg tablet, , Disp: , Rfl:     mirtazapine (REMERON) 30 mg tablet, Take 1 tablet (30 mg total) by mouth daily at bedtime (Patient not taking: Reported on 8/26/2020), Disp: 30 tablet, Rfl: 1    nortriptyline (PAMELOR) 50 mg capsule, Take 2 caps at 8AM and 1 at bedtime, Disp: 90 capsule, Rfl: 1    omeprazole (PriLOSEC) 40 MG capsule, Take 1 capsule (40 mg total) by mouth 2 (two) times a day before meals for 14 days, THEN 1 capsule (40 mg total) daily before breakfast , Disp: 88 capsule, Rfl: 0    oxyCODONE (ROXICODONE) 30 MG immediate release tablet, Take 30 mg by mouth 2 (two) times a day, Disp: , Rfl:     QUEtiapine (SEROquel) 400 MG tablet, Take 1 tablet (400 mg total) by mouth 2 (two) times a day, Disp: 60 tablet, Rfl: 2    Suvorexant 20 MG TABS, One by mouth at bedtime, Disp: 90 tablet, Rfl: 1    temazepam (RESTORIL) 15 mg capsule, , Disp: , Rfl:     tiZANidine (ZANAFLEX) 4 mg tablet, Take 4 mg by mouth every 8 (eight) hours, Disp: , Rfl: 1      Objective    Vital Signs:   Vitals:    09/30/20 1300   BP: 112/62   BP Location: Left arm   Cuff Size: Standard   Pulse: 69   Temp: (!) 96 8 °F (36 °C)   Weight: 80 6 kg (177 lb 9 6 oz)   Height: 5' 3" (1 6 m)     [place] Sleepiness Scale: Total score: 0    Physical Exam:    General: Alert, appropriate, cooperative, overweight    Head: NC/AT    Skin: Warm, dry    Neuro: No motor abnormalities, cranial nerves appear intact    Psych: Normal affect            RICHARD Betancourt    Board Certified Sleep Specialist

## 2021-02-09 NOTE — TELEPHONE ENCOUNTER
Hi all,     It looks like this patient is suppose to have a repeat stool test to confirm eradication of H pylori, but it does not look like an order was placed   Can you please put in this order and I will let the patient know      Thank you

## 2021-02-10 ENCOUNTER — OFFICE VISIT (OUTPATIENT)
Dept: INTERNAL MEDICINE CLINIC | Facility: CLINIC | Age: 51
End: 2021-02-10

## 2021-02-10 VITALS
WEIGHT: 196.8 LBS | SYSTOLIC BLOOD PRESSURE: 118 MMHG | DIASTOLIC BLOOD PRESSURE: 81 MMHG | HEART RATE: 92 BPM | TEMPERATURE: 97.7 F | BODY MASS INDEX: 34.86 KG/M2

## 2021-02-10 DIAGNOSIS — Z87.898 HISTORY OF PREDIABETES: Primary | ICD-10-CM

## 2021-02-10 DIAGNOSIS — E11.9 TYPE 2 DIABETES MELLITUS WITHOUT COMPLICATION, WITHOUT LONG-TERM CURRENT USE OF INSULIN (HCC): ICD-10-CM

## 2021-02-10 DIAGNOSIS — F41.9 ANXIETY: ICD-10-CM

## 2021-02-10 PROCEDURE — 83036 HEMOGLOBIN GLYCOSYLATED A1C: CPT | Performed by: INTERNAL MEDICINE

## 2021-02-10 PROCEDURE — 99214 OFFICE O/P EST MOD 30 MIN: CPT | Performed by: INTERNAL MEDICINE

## 2021-02-10 RX ORDER — HYDROXYZINE HYDROCHLORIDE 10 MG/1
10 TABLET, FILM COATED ORAL EVERY 6 HOURS PRN
Qty: 30 TABLET | Refills: 0 | Status: SHIPPED | OUTPATIENT
Start: 2021-02-10 | End: 2021-04-12 | Stop reason: DRUGHIGH

## 2021-02-10 RX ORDER — METHYLPREDNISOLONE 4 MG/1
TABLET ORAL
COMMUNITY
Start: 2021-02-03 | End: 2021-05-27 | Stop reason: ALTCHOICE

## 2021-02-10 NOTE — TELEPHONE ENCOUNTER
Can this medication be refilled or does she need an appt with Dr Iván Benoit?     Requested Prescriptions     Pending Prescriptions Disp Refills    clobetasol (TEMOVATE) 0 05 % external solution 50 mL

## 2021-02-10 NOTE — TELEPHONE ENCOUNTER
Prescription was done by Dr Argentina Charles, original given to the patient, copy scanned to patient's chart  3 month follow up was scheduled for the patient

## 2021-02-10 NOTE — PROGRESS NOTES
[facility] Visit Note  Lisa Side 44 y o  female   MRN: [id number]    Assessment and Plan      Headaches   · New onset headache of 1 month duration  She described pain as originating from her occipital region/posterior neck and radiating to the front of her head 8/10 in severity no relieving factor, She believes her symptoms are from her depression/stressors at home  Patient denied number less/tingling sensation of her extremities, fever, chest pain, shortness of breath, palpitation, photophobia, phonophobia, nausea  She admits to vomiting which is not related to headache she has a history of GERD and has been of PPI for 2 weeks due to upcoming lab H pylori eradication  · she had a CT head ordered to rule out intracranial causes of her headaches  CT head reviewed with patient, significant for no intracranial abnormality/masses, no midline shift  She had normal range of motion of her neck today on examination, Spurling test negative  · Headache is likely tension headache less likely migraine  · Patient already on Flexeril 10 mg b i d  P r n  will make it scheduled b i d , continue to hold Motrin p r n  As she is of PPI at this time and restart Motrin when on PPI  · Patient follows up with pain management she is on oxycodone 30 mg for chronic lower back pain  Continue to follow pain management    Anxiety/depression patient states she has not seen her psych doctor/behavioral health a in almost a year  She denies SI/HI at this time  She admits to anxiety  · Will order Atarax 10 mg p r n  For anxiety  · Continue current psych medication    GERD patient follows up with GI  She admits to nonbloody vomiting of partially digested food mainly at night  She denies weight loss and could not states if she has metallic taste upon lying down  Patient off PPI at this time until after she test for H pylori eradication which will be in 2 days time    Plan  · Restart omeprazole 40 mg daily, patient was encouraged to avoid chocolate, coffee, spicy food  She was also also encouraged to elevated head of bed  · Continue to Follow-up with GI      Schedule a follow-up appointment with PCP    Chief Complaint:  Follow-up of CT scan results  Subjective     History of Present Illness:  Patient is a 78-year-old female with a past medical history of anxiety/depression, insomnia, latent TB treated with 4 months of rifampin pain with negative chest x-ray  Presents today for follow-up of CT scan results and her headaches  Patient had a virtual visit on 02/02/2021 when she complained of daily persistent headaches associated with vomiting of 1 month duration she had a CT head ordered to rule out intracranial causes of her headaches  CT head reviewed with patient, significant for no intracranial abnormality/masses, no midline shift  Patient states that she has pain at her occipital region that radiates to the front of her head , 8/10 in severity no relieving factor  She states she vomits mostly at night due to acid reflux, vomitus is partially digested food, could not state if she has metallic taste in her mouth  Patient has been off PPI for 2 weeks due to her upcoming lab work to confirm H pylori eradication  She also admits to anxiety, she has not seen her psychiatrist/behavioral health for several months  She has upcoming appointments in April 2021 with her psychiatrist   She believes her symptoms are from her depression/stressors at home  Patient denied number less/tingling sensation of her extremities, fever, chest pain, shortness of breath, palpitation, photophobia, phonophobia, nausea, abdominal pain, diarrhea, constipation             Review of Systems 12 point review of system remarkable except that listed in my HPI above      Current Outpatient Medications:     clobetasol (TEMOVATE) 0 05 % external solution, APPLY TO SCALP TWICE A DAY, Disp: , Rfl:     cloNIDine (CATAPRES) 0 1 mg tablet, Take 1 tablet (0 1 mg total) by mouth every 12 (twelve) hours, Disp: 60 tablet, Rfl: 2    gabapentin (NEURONTIN) 600 MG tablet, TAKE 2 TABLETS (1,200 MG TOTAL) BY MOUTH 2 (TWO) TIMES A DAY, Disp: 360 tablet, Rfl: 0    mirtazapine (REMERON) 30 mg tablet, Take 1 tablet (30 mg total) by mouth daily at bedtime, Disp: 30 tablet, Rfl: 2    nortriptyline (PAMELOR) 50 mg capsule, Take 2 caps at 8AM and 1 at bedtime, Disp: 90 capsule, Rfl: 1    oxyCODONE (ROXICODONE) 30 MG immediate release tablet, Take 30 mg by mouth 2 (two) times a day, Disp: , Rfl:     QUEtiapine (SEROquel) 400 MG tablet, TAKE 1 TABLET BY MOUTH TWICE A DAY, Disp: 90 tablet, Rfl: 1    Suvorexant 20 MG TABS, One by mouth at bedtime, Disp: 90 tablet, Rfl: 1    cyclobenzaprine (FLEXERIL) 10 mg tablet, Take 10 mg by mouth 2 (two) times a day, Disp: , Rfl:     diclofenac sodium (VOLTAREN) 1 %, , Disp: , Rfl:     ibuprofen (MOTRIN) 800 mg tablet, , Disp: , Rfl:     methylPREDNISolone 4 MG tablet therapy pack, , Disp: , Rfl:     omeprazole (PriLOSEC) 40 MG capsule, Take 1 capsule (40 mg total) by mouth daily before breakfast (Patient not taking: Reported on 2/10/2021), Disp: 30 capsule, Rfl: 0    tiZANidine (ZANAFLEX) 4 mg tablet, Take 4 mg by mouth every 8 (eight) hours, Disp: , Rfl: 1  Past Medical History:   Diagnosis Date    Abnormal thyroid blood test     last assessed 11/13/14    Anemia     last assessed  11/19/13    Chronic pain     back    Depression     Sleep disorder      Past Surgical History:   Procedure Laterality Date    ABSCESS DRAINAGE      incision - skin abscess    BACK SURGERY      2002    BACK SURGERY      lower    MAMMO STEREOTACTIC BREAST BIOPSY RIGHT (ALL INC) Right 6/21/2017    MAMMO STEREOTACTIC BREAST BIOPSY RIGHT (ALL INC) EACH ADD Right 6/21/2017     Social History     Socioeconomic History    Marital status: /Civil Union     Spouse name: Not on file    Number of children: Not on file    Years of education: Not on file    Highest education level: Not on file   Occupational History    Not on file   Social Needs    Financial resource strain: Not on file    Food insecurity     Worry: Not on file     Inability: Not on file    Transportation needs     Medical: Not on file     Non-medical: Not on file   Tobacco Use    Smoking status: Former Smoker    Smokeless tobacco: Never Used    Tobacco comment: pt "quit five years ago"   Substance and Sexual Activity    Alcohol use: No    Drug use: No    Sexual activity: Yes     Partners: Male     Birth control/protection: None, Female Sterilization   Lifestyle    Physical activity     Days per week: Not on file     Minutes per session: Not on file    Stress: Not on file   Relationships    Social connections     Talks on phone: Not on file     Gets together: Not on file     Attends Church service: Not on file     Active member of club or organization: Not on file     Attends meetings of clubs or organizations: Not on file     Relationship status: Not on file    Intimate partner violence     Fear of current or ex partner: Not on file     Emotionally abused: Not on file     Physically abused: Not on file     Forced sexual activity: Not on file   Other Topics Concern    Not on file   Social History Narrative    Death in the family - sister    Lack of adequate sleep    Parentage    Sedentary lifestyle    Under stress     Family History   Problem Relation Age of Onset    Kidney disease Mother         chronic    Bone cancer Father     Heart attack Sister         Acute MI    Diabetes Sister     Hypertension Brother     Cancer Family     Diabetes Family     Heart disease Family     Hyperlipidemia Family     Hypertension Family     Colon cancer Paternal Grandfather      Allergies   Allergen Reactions    Aspirin GI Intolerance     Other reaction(s): Other (See Comments)  Other reaction(s):  Other (See Comments)  Other reaction(s): Unknown Allergic Reaction    Other      Annotation - [id number]: raw onions- causes throat to feel like it wants to close       Objective     Vitals:    02/10/21 1428   BP: 118/81   BP Location: Left arm   Patient Position: Sitting   Cuff Size: Standard   Pulse: 92   Temp: 97 7 °F (36 5 °C)   TempSrc: Temporal   Weight: 89 3 kg (196 lb 12 8 oz)       Physical exam:     GENERAL:  Looks uncomfortable  HEENT:  Tenderness on palpation of her occipital/posterior neck, negative Spurling sign NC/AT, PERRL, EOMI, no scleral icterus  CARDIAC:  RRR, +S1/S2, no S3/S4 heard, no m/g/r  PULMONARY:  CTA B/L, no wheezing/rales/rhonci, non-labored breathing  ABDOMEN:  Soft, NT/ND,no rebound/guarding/rigidity  Extremities:  No edema, cyanosis, or clubbing  NEUROLOGIC: Grossly intact  SKIN:  No rashes or erythema noted on exposed skin  Psych: Normal affect        ==  PLEASE NOTE:  This encounter was completed utilizing the [employer] Direct Speech Voice Recognition Software  Grammatical errors, random word insertions, pronoun errors and incomplete sentences are occasional consequences of the system due to software limitations, ambient noise and hardware issues  These may be missed by proof reading prior to affixing electronic signature  Any questions or concerns about the content, text or information contained within the body of this dictation should be directly addressed to the physician for clarification  Please do not hesitate to call me directly if you have any any questions or concerns

## 2021-02-11 ENCOUNTER — TELEPHONE (OUTPATIENT)
Dept: MULTI SPECIALTY CLINIC | Facility: CLINIC | Age: 51
End: 2021-02-11

## 2021-02-11 DIAGNOSIS — G62.9 NEUROPATHY: ICD-10-CM

## 2021-02-11 DIAGNOSIS — F32.A ANXIETY AND DEPRESSION: Chronic | ICD-10-CM

## 2021-02-11 DIAGNOSIS — F41.9 ANXIETY AND DEPRESSION: Chronic | ICD-10-CM

## 2021-02-11 LAB — SL AMB POCT HEMOGLOBIN AIC: 6 (ref ?–6.5)

## 2021-02-11 RX ORDER — GABAPENTIN 600 MG/1
1200 TABLET ORAL 2 TIMES DAILY
Qty: 360 TABLET | Refills: 0 | Status: SHIPPED | OUTPATIENT
Start: 2021-02-11 | End: 2021-04-12 | Stop reason: SDUPTHER

## 2021-02-11 RX ORDER — CLOBETASOL PROPIONATE 0.46 MG/ML
SOLUTION TOPICAL
Qty: 50 ML | Status: CANCELLED | OUTPATIENT
Start: 2021-02-11

## 2021-02-11 NOTE — TELEPHONE ENCOUNTER
Patient of Dr Artemio Andrews, last seen 8/26/20    History of recurrent h pylori, GERD    Called and spoke with patient to see how she is doing  She states that she is vomiting every night  She states she has not been taking omeprazole and is aware she needs to hold for 2 weeks prior to stool antigen test  She states she will have it done in a few days  I explained that vomiting could be sign of h pylori so I stressed the importance of having test done, as she has not gotten test done in the past  Patient states she saw her PCP yesterday who reviewed all this with her and she is aware of the plan  No further questions   I advised we will follow up once we have results

## 2021-02-11 NOTE — TELEPHONE ENCOUNTER
Requested Prescriptions     Pending Prescriptions Disp Refills    gabapentin (NEURONTIN) 600 MG tablet 360 tablet 0     Sig: Take 2 tablets (1,200 mg total) by mouth 2 (two) times a day

## 2021-02-19 ENCOUNTER — TELEPHONE (OUTPATIENT)
Dept: SLEEP CENTER | Facility: CLINIC | Age: 51
End: 2021-02-19

## 2021-02-19 NOTE — TELEPHONE ENCOUNTER
Patient called, states she is out of samples of Dayvigo  patient asking for more samples  States she has been taking both Belsomra 20 mg and Dayvigo 10 mg and is sleeping well  Advised will discuss with DR Amaya      Please advise

## 2021-02-20 ENCOUNTER — HOSPITAL ENCOUNTER (OUTPATIENT)
Dept: RADIOLOGY | Facility: HOSPITAL | Age: 51
Discharge: HOME | End: 2021-02-20
Payer: COMMERCIAL

## 2021-02-20 DIAGNOSIS — G44.52 NEW DAILY PERSISTENT HEADACHE: ICD-10-CM

## 2021-02-20 PROCEDURE — 70553 MRI BRAIN STEM W/O & W/DYE: CPT

## 2021-02-20 PROCEDURE — G1004 CDSM NDSC: HCPCS

## 2021-02-20 PROCEDURE — A9585 GADOBUTROL INJECTION: HCPCS | Performed by: INTERNAL MEDICINE

## 2021-02-20 RX ADMIN — GADOBUTROL 9 ML: 604.72 INJECTION INTRAVENOUS at 12:53

## 2021-02-25 DIAGNOSIS — F51.04 PSYCHOPHYSIOLOGICAL INSOMNIA: ICD-10-CM

## 2021-02-25 DIAGNOSIS — F51.04 PSYCHOPHYSIOLOGICAL INSOMNIA: Primary | ICD-10-CM

## 2021-02-25 NOTE — TELEPHONE ENCOUNTER
Patient called asking if Dr Ian Le ordered her medication yet  She is requesting a prescription be sent in to her pharmacy for [facility] instead of getting samples  Dr Ian Le, would you be willing to write Rx for Dayvigo? Send to [facility]  [employer]  [place]

## 2021-02-25 NOTE — TELEPHONE ENCOUNTER
I would prefer that she not take both Belsomra and Renato Oiler, but for the time being, she may  I sent in a prescription for Dayvigo

## 2021-02-26 RX ORDER — LEMBOREXANT 10 MG/1
TABLET, FILM COATED ORAL
Qty: 30 TABLET | Refills: 5 | Status: SHIPPED | OUTPATIENT
Start: 2021-02-26 | End: 2021-09-01 | Stop reason: SDUPTHER

## 2021-03-01 ENCOUNTER — TELEPHONE (OUTPATIENT)
Dept: SLEEP CENTER | Facility: CLINIC | Age: 51
End: 2021-03-01

## 2021-03-01 DIAGNOSIS — D32.9 MENINGIOMA (HCC): Primary | ICD-10-CM

## 2021-03-01 NOTE — RESULT ENCOUNTER NOTE
Received MRI brain results and I reviewed MRI results with Ms  Jacobo Carrillo by phone  Chronicity of "probable/possible" meningioma and contribution to her ongoing new headaches is unclear, but I have advised she followup outpatient with neurosurgery to discuss further evaluation/monitoring  We discussed return precautions for worsening symptoms or new neurologic deficits, vision changes, worsening nausea, etc  Will place referral for neurosurgery  Clinical staff, please call patient to provide phone number to schedule with neurosurgery

## 2021-03-03 ENCOUNTER — TELEPHONE (OUTPATIENT)
Dept: OBGYN CLINIC | Facility: CLINIC | Age: 51
End: 2021-03-03

## 2021-03-11 ENCOUNTER — CONSULT (OUTPATIENT)
Dept: NEUROSURGERY | Facility: CLINIC | Age: 51
End: 2021-03-11
Payer: COMMERCIAL

## 2021-03-11 VITALS
DIASTOLIC BLOOD PRESSURE: 88 MMHG | SYSTOLIC BLOOD PRESSURE: 134 MMHG | TEMPERATURE: 98.6 F | BODY MASS INDEX: 34.02 KG/M2 | HEIGHT: 63 IN | WEIGHT: 192 LBS | HEART RATE: 104 BPM | RESPIRATION RATE: 16 BRPM

## 2021-03-11 DIAGNOSIS — M54.12 RADICULOPATHY, CERVICAL: Primary | ICD-10-CM

## 2021-03-11 DIAGNOSIS — D32.9 MENINGIOMA (HCC): ICD-10-CM

## 2021-03-11 PROCEDURE — 99204 OFFICE O/P NEW MOD 45 MIN: CPT | Performed by: NURSE PRACTITIONER

## 2021-03-11 NOTE — PATIENT INSTRUCTIONS
Patient will follow-up in 2 weeks once MRI cervical spine completed or sooner if symptoms worsen      Patient will follow-up in 3 months for surveillance of presumed meningioma    Placed ambulatory referral to Neurology

## 2021-03-11 NOTE — PROGRESS NOTES
Neurosurgery Office Note  Xena Magallanes 44 y o  female MRN: [id number]      Assessment/Plan     Meningioma [facility])  Patient seen in outpatient office today as a referral by Magnolia Brooks for a presumed 7 mm meningioma  · Patient saw her PCP on 02/02/2021 with worsening headaches and neck pain  MRI brain was ordered which demonstrated a possible 7 mm meningioma extending from the anterior falx  Imaging:    MRI brain w/wo 2/20/21:No acute disease  Probable 7x 3 mm en plaque, anterior left paramedian falx meningioma  2-3 tiny nonspecific foci of high signal in the subcortical bifrontal white matter  Plan:  · Reviewed imaging with patient  · Patient currently complaining of the HAs and neck pain with radiation into the front of her head  She also reports neck pain which radiates down into her right arm with occasional numbness in right hand  Patient reports her headaches and neck pain are constant  · Patient also complaining of back pain, will continue to monitor symptoms at this time  Patient needs further workup of cervical spine at this time  · Placed ambulatory referral to Neurology for nonspecific foci of high signal in the subcortical bifrontal white matter and headache management  · Per NCCN guidelines patient will follow-up in 3 months with MRI brain with and without contrast for surveillance of presumed meningioma extending from the anterior falx  · Patient advised if she develops worsening visual changes, worsening headaches, seizures, weakness in arms or legs and or memory loss to follow-up sooner  · Patient made aware to contact Neurosurgery with any further questions or concerns  Radiculopathy, cervical  Patient complaining of worsening headaches and neck pain with pain going down right arm with occasional numbness in right hand  Plan:  · Ordered MRI cervical spine to rule out any etiology given patient's cervical radicular complaints and right sided UE weakness     · Patient will follow-up in 2 weeks once cervical spine MRI completed or sooner symptoms worsen  · Pain control with OTC medication and prescribed oxycodone, gabapentin, flexeril as needed  · Patient reports she follows with pain management for chronic back pain  She also reports receiving injections in her neck in the past, most recent one a few months ago without any relief in her pain or symptoms  · Patient aware to follow-up sooner if she develops any new neurological changes or red flags  · Patient made aware to contact Neurosurgery with any further questions or concerns  Diagnoses and all orders for this visit:    Radiculopathy, cervical  -     MRI cervical spine without contrast; Future    Meningioma [facility])  -     Ambulatory referral to Neurosurgery  -     MRI brain with and without contrast; Future  -     Ambulatory referral to Neurology; Future            CHIEF COMPLAINT    Chief Complaint   Patient presents with    Consult       HISTORY    History of Present Illness     44y o  year old female with past medical history significant for GERD, pre diabetes, chronic kidney disease, vertigo, H pylori infection, anxiety, depression, and chronic back pain  Patient presents to the outpatient office today as a referral by Felipe Siu for a presumed 7 mm meningioma  Patient states her symptoms started in January of this year with no precipitating events  Patient reports that her headaches were different and she had neck pain  Patient does report 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 and she thought the smoke was just getting to her head but her symptoms did not improve  Patient saw her PCP in February 2020 with these symptoms and a CT head was ordered  CT head demonstrated no acute abnormalities  MRI brain was ordered which demonstrated a presumed 7 mm meningioma extending from the anterior falx with tiny nonspecific foci of high signal in the subcortical bifrontal white matter    Patient was referred to our service for evaluation  Patient currently complaining of a 8/10 headache which starts in her neck and radiates into the front of her head  Patient also reports neck pain which radiates down into her right arm with occasional right hand numbness  Patient reports her headaches and neck pain are constant  Patient is unsure if anything makes her neck pain or headaches worse or better  She does report taking Advil or Motrin which does not help with her pain or symptoms  She denies any recent falls or traumas or difficulty with her balance  She denies any difficulty with fine motor skills or dropping objects  Patient does report following with a pain management doctor for her chronic low back pain  She does report receiving injections in her neck in the past which most recently was a few months ago without any relief  Patient also complaining of back pain  Patient also reports history of vertigo which she states she has not had episode in over 6 months  She also reports chest discomfort at times  She also reports nausea and vomiting all the time secondary to GERD  Patient is following with GI for recurrent H pylori infections  Patient also reports right hand numbness at times  She also reports dullness to light touch in her anterior neck and RUE/RLE  She denies any dizziness, blurry vision, chest pain, shortness of breath, abdominal pain, diarrhea, no problems with bowel or bladder, no new weakness or numbness/ tingling  HPI    See Discussion    REVIEW OF SYSTEMS    Review of Systems   Constitutional: Negative  HENT: Negative  Eyes: Negative  Respiratory: Negative  Cardiovascular: Negative  Gastrointestinal: Positive for constipation  Endocrine: Negative  Genitourinary: Negative  Musculoskeletal: Positive for back pain, gait problem (veritgo), myalgias and neck pain (up into the back of her head)  Skin: Negative  Allergic/Immunologic: Negative  Neurological: Positive for dizziness (vertigo), numbness (sometimes in her right hand) and headaches (pressure kind of head, started about 2 months ago)  Negative for tremors, seizures and weakness  Hematological: Negative  Psychiatric/Behavioral: Negative  ROS reviewed with patient and agree and changes were made as needed      Meds/Allergies     Current Outpatient Medications   Medication Sig Dispense Refill    cloNIDine (CATAPRES) 0 1 mg tablet Take 1 tablet (0 1 mg total) by mouth every 12 (twelve) hours 60 tablet 2    DayVigo 10 MG TABS TAKE 1 TABLET BY MOUTH DAILY AT BEDTIME AS NEEDED (INSOMNIA) 30 tablet 5    diclofenac sodium (VOLTAREN) 1 %       gabapentin (NEURONTIN) 600 MG tablet Take 2 tablets (1,200 mg total) by mouth 2 (two) times a day 360 tablet 0    hydrOXYzine HCL (ATARAX) 10 mg tablet Take 1 tablet (10 mg total) by mouth every 6 (six) hours as needed for itching 30 tablet 0    ibuprofen (MOTRIN) 800 mg tablet       mirtazapine (REMERON) 30 mg tablet Take 1 tablet (30 mg total) by mouth daily at bedtime 30 tablet 2    nortriptyline (PAMELOR) 50 mg capsule Take 2 caps at 8AM and 1 at bedtime 90 capsule 1    oxyCODONE (ROXICODONE) 30 MG immediate release tablet Take 30 mg by mouth 2 (two) times a day      QUEtiapine (SEROquel) 400 MG tablet TAKE 1 TABLET BY MOUTH TWICE A DAY (Patient taking differently: Take 400 mg by mouth daily at bedtime ) 90 tablet 1    Suvorexant 20 MG TABS One by mouth at bedtime 90 tablet 1    tiZANidine (ZANAFLEX) 4 mg tablet Take 4 mg by mouth every 8 (eight) hours  1    clobetasol (TEMOVATE) 0 05 % external solution APPLY TO SCALP TWICE A DAY      cyclobenzaprine (FLEXERIL) 10 mg tablet Take 10 mg by mouth 2 (two) times a day      methylPREDNISolone 4 MG tablet therapy pack       omeprazole (PriLOSEC) 40 MG capsule Take 1 capsule (40 mg total) by mouth daily before breakfast (Patient not taking: Reported on 2/10/2021) 30 capsule 0     No current facility-administered medications for this visit  Allergies   Allergen Reactions    Aspirin GI Intolerance     Other reaction(s): Other (See Comments)  Other reaction(s): Other (See Comments)  Other reaction(s): Unknown Allergic Reaction    Other      Annotation - [id number]: raw onions- causes throat to feel like it wants to close       PAST HISTORY    Past Medical History:   Diagnosis Date    Abnormal thyroid blood test     last assessed 11/13/14    Anemia     last assessed  11/19/13    Chronic pain     back    Depression     Sleep disorder        Past Surgical History:   Procedure Laterality Date    ABSCESS DRAINAGE      incision - skin abscess    BACK SURGERY      2002    BACK SURGERY      lower    MAMMO STEREOTACTIC BREAST BIOPSY RIGHT (ALL INC) Right 6/21/2017    MAMMO STEREOTACTIC BREAST BIOPSY RIGHT (ALL INC) EACH ADD Right 6/21/2017       Social History     Tobacco Use    Smoking status: Former Smoker    Smokeless tobacco: Never Used    Tobacco comment: pt "quit five years ago"   Substance Use Topics    Alcohol use: No    Drug use: No       Family History   Problem Relation Age of Onset    Kidney disease Mother         chronic    Bone cancer Father     Heart attack Sister         Acute MI    Diabetes Sister     Hypertension Brother     Cancer Family     Diabetes Family     Heart disease Family     Hyperlipidemia Family     Hypertension Family     Colon cancer Paternal Grandfather          Above history personally reviewed  EXAM    Vitals:Blood pressure 134/88, pulse 104, temperature 98 6 °F (37 °C), temperature source Temporal, resp  rate 16, height 5' 3" (1 6 m), weight 87 1 kg (192 lb)  ,Body mass index is 34 01 kg/m²  Physical Exam  Vitals signs reviewed  Constitutional:       General: She is awake  She is not in acute distress  Appearance: Normal appearance  She is not ill-appearing  HENT:      Head: Normocephalic and atraumatic     Eyes: Extraocular Movements: Extraocular movements intact and EOM normal       Conjunctiva/sclera: Conjunctivae normal       Pupils: Pupils are equal, round, and reactive to light  Neck:      Musculoskeletal: Normal range of motion and neck supple  Spinous process tenderness and muscular tenderness present  Comments:   Subjective dullness to light touch to anterior neck  Cardiovascular:      Rate and Rhythm: Normal rate  Pulmonary:      Effort: Pulmonary effort is normal  No respiratory distress  Chest:      Chest wall: No tenderness  Abdominal:      General: There is no distension  Palpations: Abdomen is soft  Tenderness: There is no abdominal tenderness  Musculoskeletal: Normal range of motion  Cervical back: She exhibits tenderness  Thoracic back: She exhibits tenderness  Lumbar back: She exhibits tenderness  Skin:     General: Skin is warm and dry  Neurological:      Mental Status: She is alert and oriented to person, place, and time  Coordination: Finger-Nose-Finger Test normal       Gait: Gait is intact  Deep Tendon Reflexes:      Reflex Scores:       Tricep reflexes are 1+ on the right side and 1+ on the left side  Bicep reflexes are 1+ on the right side and 1+ on the left side  Brachioradialis reflexes are 1+ on the right side and 1+ on the left side  Patellar reflexes are 1+ on the right side and 1+ on the left side  Achilles reflexes are 1+ on the right side and 1+ on the left side  Psychiatric:         Attention and Perception: Attention and perception normal          Mood and Affect: Mood and affect normal          Speech: Speech normal          Behavior: Behavior normal  Behavior is cooperative  Thought Content: Thought content normal          Cognition and Memory: Cognition and memory normal          Judgment: Judgment normal          Neurologic Exam     Mental Status   Oriented to person, place, and time     Follows 2 step commands  Attention: normal  Concentration: normal    Speech: speech is normal   Level of consciousness: alert  Knowledge: good  Able to perform simple calculations  Able to name object  Able to repeat  Normal comprehension  Cranial Nerves     CN III, IV, VI   Pupils are equal, round, and reactive to light  Extraocular motions are normal    CN III: no CN III palsy  CN VI: no CN VI palsy  Nystagmus: none   Diplopia: none  Conjugate gaze: present    CN V   Facial sensation intact  CN VII   Facial expression full, symmetric  CN VIII   CN VIII normal    Hearing: intact    CN IX, X   CN IX normal      CN XI   CN XI normal      CN XII   CN XII normal      Motor Exam   Muscle bulk: normal  Overall muscle tone: normal  Right arm pronator drift: absent  Left arm pronator drift: absent    Strength   Strength 5/5 except as noted  R  4/5, R WF/WE 4-/5, IOs 4-/5       Sensory Exam   Left arm light touch: normal  Left leg light touch: normal  Proprioception normal    Pinprick normal    Decreased to LT in RUE/RLE and decreased sensation to PP in RUE, along no dermatome     Gait, Coordination, and Reflexes     Gait  Gait: normal    Coordination   Finger to nose coordination: normal    Tremor   Resting tremor: absent  Intention tremor: absent  Action tremor: absent    Reflexes   Right brachioradialis: 1+  Left brachioradialis: 1+  Right biceps: 1+  Left biceps: 1+  Right triceps: 1+  Left triceps: 1+  Right patellar: 1+  Left patellar: 1+  Right achilles: 1+  Left achilles: 1+  Right Bright: absent  Left Bright: absent  Right ankle clonus: absent  Left ankle clonus: absent        MEDICAL DECISION MAKING    Imaging Studies:     Mri Brain W Wo Contrast    Result Date: 2/23/2021  Narrative: MRI BRAIN WITH AND WITHOUT CONTRAST INDICATION: G44 52: New daily persistent headache (ndph)  COMPARISON:  CT 2/8/2021 TECHNIQUE: Sagittal T1, axial T2, axial FLAIR, axial T1, axial [place], axial diffusion   Sagittal, axial T1 postcontrast   Axial bravo postcontrast with coronal reconstructions  IV Contrast:  9 mL of Gadobutrol injection (SINGLE-DOSE)  IMAGE QUALITY:   Permanent maxillary retainer produces significant artifact at the base of the frontal lobes, orbits and sinuses  FINDINGS: BRAIN PARENCHYMA:  There is no discrete parenchymal mass, mass effect or midline shift  There is no intracranial hemorrhage  Normal posterior fossa  Diffusion imaging is unremarkable  Possible 2 x 7 mm en plaque meningioma extending from the anterior falx into the left hemicranium, 12:77, 9:12  No mass effect or edema  2-3 punctate subcortical foci of high signal in the frontal parenchyma nonspecific  These could reflect sequela of precocious small vessel disease  Other considerations include complicated migraine, connective tissue disease or Lyme disease  Postcontrast imaging of the brain demonstrates no additional foci of abnormal enhancement  VENTRICLES:  Normal for the patient's age  SELLA AND PITUITARY GLAND:  Normal  ORBITS:  Normal  PARANASAL SINUSES:  Limited VASCULATURE:  Evaluation of the major intracranial vasculature demonstrates appropriate flow voids  CALVARIUM AND SKULL BASE:  Normal  EXTRACRANIAL SOFT TISSUES:  Normal      Impression: No acute disease  Probable 7x 3 mm en plaque, anterior left paramedian falx meningioma  2-3 tiny nonspecific foci of high signal in the subcortical bifrontal white matter  Workstation performed: [id number]       I have personally reviewed pertinent reports     and I have personally reviewed pertinent films in PACS

## 2021-03-11 NOTE — ASSESSMENT & PLAN NOTE
Patient complaining of worsening headaches and neck pain with pain going down right arm with occasional numbness in right hand  Plan:  · Ordered MRI cervical spine to rule out any etiology given patient's cervical radicular complaints and right sided UE weakness  · Patient will follow-up in 2 weeks once cervical spine MRI completed or sooner symptoms worsen  · Pain control with OTC medication and prescribed oxycodone, gabapentin, flexeril as needed  · Patient reports she follows with pain management for chronic back pain  She also reports receiving injections in her neck in the past, most recent one a few months ago without any relief in her pain or symptoms  · Patient aware to follow-up sooner if she develops any new neurological changes or red flags  · Patient made aware to contact Neurosurgery with any further questions or concerns

## 2021-03-11 NOTE — ASSESSMENT & PLAN NOTE
Patient seen in outpatient office today as a referral by David Buerger for a presumed 7 mm meningioma  · Patient saw her PCP on 02/02/2021 with worsening headaches and neck pain  MRI brain was ordered which demonstrated a possible 7 mm meningioma extending from the anterior falx  Imaging:    MRI brain w/wo 2/20/21:No acute disease  Probable 7x 3 mm en plaque, anterior left paramedian falx meningioma  2-3 tiny nonspecific foci of high signal in the subcortical bifrontal white matter  Plan:  · Reviewed imaging with patient  · Patient currently complaining of the HAs and neck pain with radiation into the front of her head  She also reports neck pain which radiates down into her right arm with occasional numbness in right hand  Patient reports her headaches and neck pain are constant  · Patient also complaining of back pain, will continue to monitor symptoms at this time  Patient needs further workup of cervical spine at this time  · Placed ambulatory referral to Neurology for nonspecific foci of high signal in the subcortical bifrontal white matter and headache management  · Per NCCN guidelines patient will follow-up in 3 months with MRI brain with and without contrast for surveillance of presumed meningioma extending from the anterior falx  · Patient advised if she develops worsening visual changes, worsening headaches, seizures, weakness in arms or legs and or memory loss to follow-up sooner  · Patient made aware to contact Neurosurgery with any further questions or concerns

## 2021-03-15 DIAGNOSIS — F41.9 ANXIETY AND DEPRESSION: Chronic | ICD-10-CM

## 2021-03-15 DIAGNOSIS — F32.A ANXIETY AND DEPRESSION: Chronic | ICD-10-CM

## 2021-03-18 RX ORDER — CLONIDINE HYDROCHLORIDE 0.1 MG/1
0.1 TABLET ORAL EVERY 12 HOURS SCHEDULED
Qty: 180 TABLET | Refills: 0 | Status: SHIPPED | OUTPATIENT
Start: 2021-03-18 | End: 2021-06-22

## 2021-03-23 ENCOUNTER — HOSPITAL ENCOUNTER (OUTPATIENT)
Dept: RADIOLOGY | Facility: HOSPITAL | Age: 51
Discharge: HOME | End: 2021-03-23
Payer: COMMERCIAL

## 2021-03-23 DIAGNOSIS — M54.12 RADICULOPATHY, CERVICAL: ICD-10-CM

## 2021-03-23 PROCEDURE — G1004 CDSM NDSC: HCPCS

## 2021-03-23 PROCEDURE — 72141 MRI NECK SPINE W/O DYE: CPT

## 2021-03-31 ENCOUNTER — TELEPHONE (OUTPATIENT)
Dept: NEUROLOGY | Facility: CLINIC | Age: 51
End: 2021-03-31

## 2021-03-31 ENCOUNTER — OFFICE VISIT (OUTPATIENT)
Dept: NEUROSURGERY | Facility: CLINIC | Age: 51
End: 2021-03-31
Payer: COMMERCIAL

## 2021-03-31 VITALS
RESPIRATION RATE: 16 BRPM | DIASTOLIC BLOOD PRESSURE: 77 MMHG | HEIGHT: 63 IN | BODY MASS INDEX: 33.13 KG/M2 | HEART RATE: 96 BPM | WEIGHT: 187 LBS | SYSTOLIC BLOOD PRESSURE: 118 MMHG | TEMPERATURE: 98 F

## 2021-03-31 DIAGNOSIS — M54.12 RADICULOPATHY, CERVICAL: Primary | ICD-10-CM

## 2021-03-31 PROCEDURE — 99213 OFFICE O/P EST LOW 20 MIN: CPT | Performed by: PHYSICIAN ASSISTANT

## 2021-03-31 NOTE — PROGRESS NOTES
Neurosurgery Office Note  Octavio Evans 44 y o  female MRN: [id number]      Assessment/Plan     Radiculopathy, cervical  Patient complaining of worsening headaches and neck pain with pain going down right arm with occasional numbness in right hand  · Here today to review MRI cervical spine    Imaging:  · MRI cervical spine w/o, 3/23/21: Minimal spondylotic degenerative changes of the cervical spine causing no significant spinal canal stenosis or neural foraminal narrowing  No signal abnormality within the cervical spinal cord  Straightening of cervical lordosis  Plan:  · Imaging reviewed in detail with the patient, showing no pathology to explain her symptoms  · Pain control with OTC medication and prescribed oxycodone, gabapentin, flexeril as needed (ordered by pain management)  · Patient reports she follows with pain management for chronic back pain  She also reports receiving injections in her neck in the past, most recent one a few months ago without any relief in her pain or symptoms  · Discussed physical therapy with the patient today; patient is agreeable so a referral was placed  · Follow up with neurosurgery on an as needed basis for cervical radiculopathy; follow up in 3 months as scheduled for meningioma follow up       Diagnoses and all orders for this visit:    Radiculopathy, cervical  -     Ambulatory referral to Physical Therapy; Future            CHIEF COMPLAINT    Chief Complaint   Patient presents with    Follow-up     Shared Bernardo/Dr Navarro Brought - 2 week f/u after MRI cervical spine wo contrast 3/23/21 to rule out any etiology given patient's cervical radicular complaints and right sided UE weakness  HISTORY    This is a 49-year-old female with a past medical history significant for GERD, chronic kidney disease, anxiety, depression, chronic pain, meningioma who is here today for evaluation of MRI cervical spine  She has a longstanding history of neck pain and occipital headaches  She has attempted several occipital injections with no relief  [facility] and is on multiple medications including oxycodone, muscle relaxers, gabapentin, Voltaren gel with some relief  Her symptoms were worsening so MRI cervical spine was ordered for further evaluation  Currently she states that her neck pain and headaches are stable  She does have right upper extremity numbness and subjective weakness which is longstanding and stable  She states that occasionally she does drop things with her right hand  She denies any difficulty with gait or balance, falls, bowel or bladder complaints  Imaging was reviewed with the patient which shows some mild degenerative changes but no pathology to explain her right upper extremity weakness and numbness  There is no appreciate below neural foraminal stenosis on the right side  She does have straightening of her cervical lordosis indicating muscle spasms  I recommended a course of physical therapy which she is agreeable to  I recommend that she continues to follow with her pain doctor for further recommendations regarding medication adjustments and additional injections  We discussed that there are currently no indications for surgical intervention  REVIEW OF SYSTEMS    Review of Systems   Constitutional: Negative  HENT: Negative  Eyes: Negative  Respiratory: Negative  Cardiovascular: Negative  Gastrointestinal: Positive for constipation  Endocrine: Negative  Genitourinary: Negative  Musculoskeletal: Positive for back pain (LBP radiating down the leg to the bottom of the foot), myalgias, neck pain (up into the head) and neck stiffness  Skin: Negative  Allergic/Immunologic: Negative  Neurological: Positive for dizziness (intermittent vertigo), numbness (n/t occasionally in the right hand) and headaches (coming from the neck to the front)  Hematological: Negative      Psychiatric/Behavioral: Negative  All other systems reviewed and are negative  ROS was personally reviewed and changes made as needed     Meds/Allergies     Current Outpatient Medications   Medication Sig Dispense Refill    clobetasol (TEMOVATE) 0 05 % external solution APPLY TO SCALP TWICE A DAY      cloNIDine (CATAPRES) 0 1 mg tablet TAKE 1 TABLET (0 1 MG TOTAL) BY MOUTH EVERY 12 (TWELVE) HOURS 180 tablet 0    cyclobenzaprine (FLEXERIL) 10 mg tablet Take 10 mg by mouth as needed       DayVigo 10 MG TABS TAKE 1 TABLET BY MOUTH DAILY AT BEDTIME AS NEEDED (INSOMNIA) 30 tablet 5    diclofenac sodium (VOLTAREN) 1 % Apply topically as needed       gabapentin (NEURONTIN) 600 MG tablet Take 2 tablets (1,200 mg total) by mouth 2 (two) times a day 360 tablet 0    hydrOXYzine HCL (ATARAX) 10 mg tablet Take 1 tablet (10 mg total) by mouth every 6 (six) hours as needed for itching (Patient taking differently: Take 10 mg by mouth as needed for itching ) 30 tablet 0    ibuprofen (MOTRIN) 800 mg tablet Take 800 mg by mouth as needed       mirtazapine (REMERON) 30 mg tablet Take 1 tablet (30 mg total) by mouth daily at bedtime 30 tablet 2    nortriptyline (PAMELOR) 50 mg capsule Take 2 caps at 8AM and 1 at bedtime 90 capsule 1    omeprazole (PriLOSEC) 40 MG capsule Take 1 capsule (40 mg total) by mouth daily before breakfast (Patient taking differently: Take 40 mg by mouth as needed ) 30 capsule 0    oxyCODONE (ROXICODONE) 30 MG immediate release tablet Take 30 mg by mouth 2 (two) times a day      QUEtiapine (SEROquel) 400 MG tablet TAKE 1 TABLET BY MOUTH TWICE A DAY (Patient taking differently: Take 400 mg by mouth daily at bedtime ) 90 tablet 1    Suvorexant 20 MG TABS One by mouth at bedtime 90 tablet 1    tiZANidine (ZANAFLEX) 4 mg tablet Take 4 mg by mouth as needed   1    methylPREDNISolone 4 MG tablet therapy pack        No current facility-administered medications for this visit          Allergies   Allergen Reactions    Aspirin GI Intolerance     Other reaction(s): Other (See Comments)  Other reaction(s): Other (See Comments)  Other reaction(s): Unknown Allergic Reaction    Other      Annotation - [id number]: raw onions- causes throat to feel like it wants to close       PAST HISTORY    Past Medical History:   Diagnosis Date    Abnormal thyroid blood test     last assessed 11/13/14    Anemia     last assessed  11/19/13    Chronic pain     back    Depression     Sleep disorder        Past Surgical History:   Procedure Laterality Date    ABSCESS DRAINAGE      incision - skin abscess    BACK SURGERY      2002    BACK SURGERY      lower    MAMMO STEREOTACTIC BREAST BIOPSY RIGHT (ALL INC) Right 6/21/2017    MAMMO STEREOTACTIC BREAST BIOPSY RIGHT (ALL INC) EACH ADD Right 6/21/2017       Social History     Tobacco Use    Smoking status: Former Smoker    Smokeless tobacco: Never Used    Tobacco comment: pt "quit five years ago"   Substance Use Topics    Alcohol use: No    Drug use: No       Family History   Problem Relation Age of Onset    Kidney disease Mother         chronic    Bone cancer Father     Heart attack Sister         Acute MI    Diabetes Sister     Hypertension Brother     Cancer Family     Diabetes Family     Heart disease Family     Hyperlipidemia Family     Hypertension Family     Colon cancer Paternal Grandfather          Above history personally reviewed  EXAM    Vitals:Blood pressure 118/77, pulse 96, temperature 98 °F (36 7 °C), temperature source Probe, resp  rate 16, height 5' 3" (1 6 m), weight 84 8 kg (187 lb)  ,Body mass index is 33 13 kg/m²  Physical Exam  Vitals signs and nursing note reviewed  Constitutional:       Appearance: Normal appearance  She is well-developed and normal weight  HENT:      Head: Normocephalic and atraumatic  Eyes:      Extraocular Movements: Extraocular movements intact  Pupils: Pupils are equal, round, and reactive to light     Neck: Musculoskeletal: Normal range of motion  Cardiovascular:      Rate and Rhythm: Normal rate  Pulmonary:      Effort: Pulmonary effort is normal  No respiratory distress  Abdominal:      Palpations: Abdomen is soft  Musculoskeletal: Normal range of motion  Skin:     General: Skin is warm and dry  Neurological:      Mental Status: She is alert and oriented to person, place, and time  Gait: Gait is intact  Deep Tendon Reflexes: Strength normal       Reflex Scores:       Tricep reflexes are 2+ on the right side and 2+ on the left side  Bicep reflexes are 2+ on the right side and 2+ on the left side  Brachioradialis reflexes are 2+ on the right side and 2+ on the left side  Patellar reflexes are 2+ on the right side and 2+ on the left side  Psychiatric:         Mood and Affect: Mood normal          Speech: Speech normal          Behavior: Behavior normal          Thought Content: Thought content normal          Judgment: Judgment normal          Neurologic Exam     Mental Status   Oriented to person, place, and time  Follows 2 step commands  Attention: normal  Concentration: normal    Speech: speech is normal   Level of consciousness: alert  Knowledge: good  Able to repeat  Normal comprehension  Cranial Nerves   Cranial nerves II through XII intact  CN III, IV, VI   Pupils are equal, round, and reactive to light  Motor Exam   Muscle bulk: normal  Overall muscle tone: normal    Strength   Strength 5/5 throughout       Sensory Exam   Decreased sensation entire RUE and RLE in no specific distrubution     Gait, Coordination, and Reflexes     Gait  Gait: normal    Tremor   Resting tremor: absent    Reflexes   Right brachioradialis: 2+  Left brachioradialis: 2+  Right biceps: 2+  Left biceps: 2+  Right triceps: 2+  Left triceps: 2+  Right patellar: 2+  Left patellar: 2+  Right Bright: absent  Left Bright: absent  Right ankle clonus: absent  Left ankle clonus: absent        MEDICAL DECISION MAKING    Imaging Studies:     Mri Cervical Spine Without Contrast    Result Date: 3/27/2021  Narrative: MRI CERVICAL SPINE WITHOUT CONTRAST INDICATION: M54 12: Radiculopathy, cervical region  COMPARISON:  MRI cervical spine dated November 7, 2006  TECHNIQUE:  Sagittal T1, sagittal T2, sagittal inversion recovery, axial T2, axial  2D merge IMAGE QUALITY:  Diagnostic FINDINGS: ALIGNMENT:  Normal alignment of the cervical spine  No compression fracture  No subluxation  No scoliosis  MARROW SIGNAL:  Normal marrow signal is identified within the visualized bony structures  No discrete marrow lesion  CERVICAL AND VISUALIZED THORACIC CORD:  Normal signal within the visualized cord  PREVERTEBRAL AND PARASPINAL SOFT TISSUES:  Normal  VISUALIZED POSTERIOR FOSSA:  The visualized posterior fossa demonstrates no abnormal signal  CERVICAL DISC SPACES: C2-C3:  There is no disc bulge, spinal canal stenosis or neural foraminal narrowing  C3-C4:  There is no disc bulge, spinal canal stenosis or neural foraminal narrowing  C4-C5:  There is no disc bulge, spinal canal stenosis or neural foraminal narrowing  C5-C6:  There is a tiny diffuse disc bulge causing no significant spinal canal stenosis or neural foraminal narrowing  C6-C7:  There is a tiny central focal disc protrusion causing no significant spinal canal stenosis or neural foraminal narrowing  C7-T1:  There is no disc bulge, spinal canal stenosis or neural foraminal narrowing  UPPER THORACIC DISC SPACES:  Normal      Impression: Minimal spondylotic degenerative changes of the cervical spine causing no significant spinal canal stenosis or neural foraminal narrowing  No signal abnormality within the cervical spinal cord  Workstation performed: [id number]       I have personally reviewed pertinent reports     and I have personally reviewed pertinent films in PACS

## 2021-03-31 NOTE — PATIENT INSTRUCTIONS
Follow up with pain management for further recommendations regarding medication changes/injections  Physical therapy    Follow up in 3 months as scheduled for meningioma follow up

## 2021-03-31 NOTE — TELEPHONE ENCOUNTER
Best contact number for MAYANK:[phone]    Emergency Contact name and number:    Referring provider and telephone number:    Primary Care Provider Name and if affiliated with [facility]:     Reason for Appointment/Dx: meningioma     Have you seen and followed up with a pediatric Neurologist for this disease in the past?  no    No (If yes ok to schedule with Dr oSnido Caldwell)    Neurology Location patient would like to be seen:    Order received? Yes                                                 Records Received? Yes    Have you ever seen another Neurologist?       No    Insurance Information    Insurance Name:    ID/Policy #:    Secondary Insurance:    ID/Policy#: Workman's Comp/ Accident/ School  Information      Workman's Comp/Accident/School related?        No    If yes name of Insurance company:    Date of Injury:    Type of Injury:    [address] Name and Telephone Number:    Notes:                   Appointment date: 08/2021

## 2021-03-31 NOTE — ASSESSMENT & PLAN NOTE
Patient complaining of worsening headaches and neck pain with pain going down right arm with occasional numbness in right hand  · Here today to review MRI cervical spine    Imaging:  · MRI cervical spine w/o, 3/23/21: Minimal spondylotic degenerative changes of the cervical spine causing no significant spinal canal stenosis or neural foraminal narrowing  No signal abnormality within the cervical spinal cord  Straightening of cervical lordosis  Plan:  · Imaging reviewed in detail with the patient, showing no pathology to explain her symptoms  · Pain control with OTC medication and prescribed oxycodone, gabapentin, flexeril as needed (ordered by pain management)  · Patient reports she follows with pain management for chronic back pain  She also reports receiving injections in her neck in the past, most recent one a few months ago without any relief in her pain or symptoms    · Discussed physical therapy with the patient today; patient is agreeable so a referral was placed  · Follow up with neurosurgery on an as needed basis for cervical radiculopathy; follow up in 3 months as scheduled for meningioma follow up

## 2021-04-01 NOTE — TELEPHONE ENCOUNTER
Rcved email from Iam Wagoner to get patient in sooner   Spoke to patient and cindy consult appt for 4/13 3pm with Ursula Sparks

## 2021-04-09 DIAGNOSIS — F32.A ANXIETY AND DEPRESSION: Chronic | ICD-10-CM

## 2021-04-09 DIAGNOSIS — F41.9 ANXIETY AND DEPRESSION: Chronic | ICD-10-CM
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
## 2021-04-12 ENCOUNTER — OFFICE VISIT (OUTPATIENT)
Dept: PSYCHIATRY | Facility: CLINIC | Age: 51
End: 2021-04-12
Payer: COMMERCIAL

## 2021-04-12 DIAGNOSIS — Z79.899 ENCOUNTER FOR LONG-TERM (CURRENT) USE OF OTHER MEDICATIONS: ICD-10-CM

## 2021-04-12 DIAGNOSIS — F33.2 MODERATELY SEVERE RECURRENT MAJOR DEPRESSION (HCC): Primary | ICD-10-CM

## 2021-04-12 DIAGNOSIS — F51.04 PSYCHOPHYSIOLOGICAL INSOMNIA: ICD-10-CM

## 2021-04-12 DIAGNOSIS — G62.9 NEUROPATHY: ICD-10-CM

## 2021-04-12 DIAGNOSIS — F41.9 ANXIETY: ICD-10-CM

## 2021-04-12 PROCEDURE — 90792 PSYCH DIAG EVAL W/MED SRVCS: CPT | Performed by: PHYSICIAN ASSISTANT

## 2021-04-12 RX ORDER — HYDROXYZINE HYDROCHLORIDE 25 MG/1
TABLET, FILM COATED ORAL
Qty: 90 TABLET | Refills: 0 | Status: SHIPPED | OUTPATIENT
Start: 2021-04-12 | End: 2021-05-04

## 2021-04-12 RX ORDER — QUETIAPINE FUMARATE 400 MG/1
TABLET, FILM COATED ORAL
Qty: 180 TABLET | OUTPATIENT
Start: 2021-04-12

## 2021-04-12 RX ORDER — MIRTAZAPINE 30 MG/1
30 TABLET, FILM COATED ORAL
Qty: 30 TABLET | Refills: 2 | Status: SHIPPED | OUTPATIENT
Start: 2021-04-12 | End: 2021-06-25 | Stop reason: SDUPTHER

## 2021-04-12 RX ORDER — GABAPENTIN 600 MG/1
1200 TABLET ORAL 2 TIMES DAILY
Qty: 360 TABLET | Refills: 0 | Status: SHIPPED | OUTPATIENT
Start: 2021-04-12 | End: 2021-06-25 | Stop reason: SDUPTHER

## 2021-04-12 RX ORDER — NORTRIPTYLINE HYDROCHLORIDE 50 MG/1
CAPSULE ORAL
Qty: 90 CAPSULE | Refills: 1 | Status: SHIPPED | OUTPATIENT
Start: 2021-04-12 | End: 2021-06-25 | Stop reason: SDUPTHER

## 2021-04-12 RX ORDER — QUETIAPINE FUMARATE 400 MG/1
400 TABLET, FILM COATED ORAL 2 TIMES DAILY
Qty: 180 TABLET | Refills: 0 | Status: SHIPPED | OUTPATIENT
Start: 2021-04-12 | End: 2021-06-25 | Stop reason: SDUPTHER

## 2021-04-12 NOTE — BH TREATMENT PLAN
TREATMENT PLAN (Medication Management Only)        [facility]    Name and Date of Birth:  Maci Johnston 44 y o  1970  Date of Treatment Plan: April 12, 2021  Diagnosis/Diagnoses:    1  Moderately severe recurrent major depression ([address] )    2  Anxiety    3  Psychophysiological insomnia    4  Encounter for long-term (current) use of other medications    5  Neuropathy      Strengths/Personal Resources for Self-Care: "My grandkids; My kids; Working; My  makes me somewhat happy"  Area/Areas of need (in own words): "It's my sleep"  1  Long Term Goal: Maintain mood stability and control of anxiety  Target Date:3-6 months  Person/Persons responsible for completion of goal: Jane Sapp  2  Short Term Objective (s) - How will we reach this goal?:   A  Provider new recommended medication/dosage changes and/or continue medication(s): Pt is having Sxs indicative of at least a moderately severe depression and anxiety, in the setting of being off of Quetiapine the past 4 days  Pt states she would feel better when on the Quetiapine steadily  Tx options discussed and I will continue the present medicines, restart the Quetiapine, but also increase Hydroxyzine for anxiety and insomnia  I recommended psychotherapy and wants referral to the [facility]'s team--and understands there could be a wait  She voices no special preferences for a therapist   Treatment plan done and Pt accepts the plan     Increase Hydroxyzine to 25mg (1) tab po tid prn for anxiety or insomnia # 270  Restart: Quetiapine 400mg (1) tab po bid # 180  Continue:  Nortriptyline 50mg (2) caps po q8:00AM and (1) cap qhs # 270  Gabapentin 600mg (2) tabs po bid # 360  Clonidine 0 1mg (1) tab po q12 hrs--renewed 3/18/2020 for a 90 day supply by PCP  Mirtazapine 30mg (1) tab po qhs # 90  Dayvigo 10mg--per sleep specialist  Get a CBC with diff, TSH, nortriptyline level, UDS with ETOH level  Return 5/4/2021, the sooner available appt (availability is tight)  Can call any time sooner prn   A copy of Tx plan is being given to Pt per her request  Target Date:3-6 months  Person/Persons Responsible for Completion of Goal: Pleasant Farmer  Progress Towards Goals: stable, starting treatment  Treatment Modality: Medication mgt, referral to psychotherapy  Review due 180 days from date of this plan: 4-6 months  Expected length of service: ongoing treatment  My Physician/PA/NP and I have developed this plan together and I agree to work on the goals and objectives  I understand the treatment goals that were developed for my treatment

## 2021-04-13 ENCOUNTER — CONSULT (OUTPATIENT)
Dept: NEUROLOGY | Facility: CLINIC | Age: 51
End: 2021-04-13
Payer: COMMERCIAL

## 2021-04-13 VITALS
BODY MASS INDEX: 33.05 KG/M2 | WEIGHT: 186.6 LBS | SYSTOLIC BLOOD PRESSURE: 118 MMHG | HEART RATE: 82 BPM | DIASTOLIC BLOOD PRESSURE: 64 MMHG

## 2021-04-13 DIAGNOSIS — M54.81 BILATERAL OCCIPITAL NEURALGIA: ICD-10-CM

## 2021-04-13 DIAGNOSIS — D32.9 MENINGIOMA (HCC): ICD-10-CM

## 2021-04-13 DIAGNOSIS — M79.18 CERVICAL MYOFASCIAL PAIN SYNDROME: Primary | ICD-10-CM

## 2021-04-13 PROCEDURE — 20553 NJX 1/MLT TRIGGER POINTS 3/>: CPT | Performed by: PSYCHIATRY & NEUROLOGY

## 2021-04-13 PROCEDURE — 99205 OFFICE O/P NEW HI 60 MIN: CPT | Performed by: PSYCHIATRY & NEUROLOGY

## 2021-04-13 RX ORDER — LIDOCAINE HYDROCHLORIDE 10 MG/ML
8 INJECTION, SOLUTION INFILTRATION; PERINEURAL ONCE
Status: COMPLETED | OUTPATIENT
Start: 2021-04-13 | End: 2021-04-13

## 2021-04-13 RX ADMIN — LIDOCAINE HYDROCHLORIDE 8 ML: 10 INJECTION, SOLUTION INFILTRATION; PERINEURAL at 16:31

## 2021-04-13 NOTE — PROGRESS NOTES
[id number] [facility] NOTE       Name:  Yajaira Damon  MRN: [id number]  : 1970  Date: 21    ASSESSMENT & PLAN     Yajaira Damon is a very pleasant right handed 44 y o  female with a past medical history that includes headache referred here for evaluation of headache  Bilateral occipital neuralgia  2 months of headache from the neck/base of the skull bilaterally with pain on palpation and tinel sign positive over the occipital nerves  Has been trying advil wo success  MRI brain and c spine were unremarkable for cause of headache (does have a small meningioma on MRI brain and follows with neurosurgery)    Impression: occipital neuralgia b/l    Plan:  - 4ml lidocaine given b/l occipital nerves  - take medrol dose pack which she already has if pain is not relieved  - take magnesium oxide 250-500 mg daily  - return to clinic in 4-6 months         Diagnoses and all orders for this visit:    Cervical myofascial pain syndrome  -     lidocaine (XYLOCAINE) 1 % injection 8 mL    Meningioma (HCC)  -     Ambulatory referral to Neurology    Bilateral occipital neuralgia  -     lidocaine (XYLOCAINE) 1 % injection 8 mL           CHIEF COMPLAINT     We had the pleasure of evaluating Yajaira Damon in neurological consultation today  Yajaira Damon is a   right handed female who presents today for evaluation of headaches  History obtained from patient as well as available medical record review  HISTORY OF PRESENT ILLNESS     Current medical illnesses include meningioma    SUBJECTIVE: Yajaira Damon presents to resident clinic for evaluation of headache  She reports 2 months of throbbing headache which starts from the back of her head/neck b/l  She had MRI brain and c spine which were negative for source of headache  She reports only taking advil for the pain which is mostly unhelpful  She does report a history of migraine although these headaches are different       PREVIOUS WORKUP:    Labs REVIEWED:  - cbc, cmp, h pylori, tsh, ua, rpr    Imaging REVIEWED:  - MRI c spine which was not abnormal, MRI brain showed small meningioma    MIDAS - MIGRAINE DISABILITY ASSESSMENT TOOL  How many days in the last 3 months did you miss work of school because of your headache? 0   How many days in the last 3 months was your productivity at work/school reduced by half or more because of your headaches? 0   How many days in the last 3 months did you not do household work because of your headaches? 0   How many days in the last 3 months was your productivity in household work reduced by more than half due to headaches? 0   How many days in the last 3 months did you miss family, social or leisure activities because of your headaches? 0   Total 0     MIDAS grade Definition MIDAS Score   I Little to no disability 0-5   II Mild disability 6-10   III Moderate disability 11-20   IV Severe disability 20+     Headaches started at what age? 44years old  How often do the headaches occur? All day everyday  - as of 4/13/2021: What time of the day do the headaches start? No particular time of day, present all day  How long do the headaches last? All day  Are you ever headache free? No    Aura? without aura     Last eye exam: within the last year    Where is your headache located and pain quality? Both sides coming from the neck  What is the intensity of pain? Average: 7/10, worst 10/10  Associated symptoms:   [x] Nausea       [] Vomiting        [] Diarrhea  [x] Insomnia    [x] Stiff or sore neck   [] Problems with concentration  [x] Photophobia     []Phonophobia      [] Osmophobia  [] Blurred vision   [] Prefer quiet, dark room  [] Light-headed or dizzy     [] Tinnitus   [] Hands or feet tingle or feel numb/paresthesias    [] Red ear      [] Ptosis      [] Facial droop  [] Lacrimation  [] Nasal congestion/rhinorrhea   [] Flushing of face  [] Change in pupil size    Things that make the headache worse?  No specific movements, any movement    Headache triggers:  no    Have you seen someone else for headaches or pain? No  Have you had trigger point injection performed and how often? No  Have you had Botox injection performed and how often? No   Have you had epidural injections or transforaminal injections performed? No  Are you current pregnant or planning on getting pregnant? No  Have you ever had any Brain imaging? yes MRI brain and c spine    What medications do you take or have you taken for your headaches? PREVENTATIVE ABORTIVE   CURRENT n/a ibuprofen   PREVIOUS n/a n/a     Alternative therapies used in the past for headaches? no other headache interventions have been tried    Neck pain?: Yes    LIFESTYLE    Sleep   - averages: 3-4 hours a night  Problems falling asleep?:   Yes  Problems staying asleep?:  Yes    How often do you get up at night? numerous  Do you snore while asleep? No  Do you wake up with headaches?  Yes    Physical activity: works as an aide    Water: 60-80oz per day  Caffeine: 1-2 per day    Mood: reports she has had a bad couple of years her sister and twin sister passed away  Denies history of anxiety or depression or other diagnosed mood disorder    The following portions of the patient's history were reviewed and updated as appropriate: allergies, current medications, past family history, past medical history, past social history, past surgical history and problem list     Pertinent family history:  Family history of headaches:  no known family members with significant headaches  Any family history of aneurysms - No    Pertinent social history:  Work: as a CNA  Education: college  Lives with lives with their family    Illicit Drugs: denies  Alcohol/tobacco: Denies alcohol use, Denies tobacco use    PAST MEDICAL HISTORY     Past Medical History:   Diagnosis Date    Abnormal thyroid blood test     last assessed 11/13/14    Anemia     last assessed  11/19/13    Chronic pain     back    Depression  Sleep disorder        Patient Active Problem List   Diagnosis    GERD (gastroesophageal reflux disease)    Alopecia    History of prediabetes    Vertigo    Esophageal dysphagia    Latent tuberculosis by blood test    H  pylori infection    Stage 2 chronic kidney disease    Moderately severe recurrent major depression ([address] )    New persistent daily headache    Meningioma (HCC)    Radiculopathy, cervical    Anxiety    Encounter for long-term (current) use of other medications    Bilateral occipital neuralgia       MEDICATIONS        Current Outpatient Medications   Medication Sig Dispense Refill    clobetasol (TEMOVATE) 0 05 % external solution APPLY TO SCALP TWICE A DAY      cloNIDine (CATAPRES) 0 1 mg tablet TAKE 1 TABLET (0 1 MG TOTAL) BY MOUTH EVERY 12 (TWELVE) HOURS 180 tablet 0    cyclobenzaprine (FLEXERIL) 10 mg tablet Take 10 mg by mouth as needed       DayVigo 10 MG TABS TAKE 1 TABLET BY MOUTH DAILY AT BEDTIME AS NEEDED (INSOMNIA) 30 tablet 5    diclofenac sodium (VOLTAREN) 1 % Apply topically as needed       gabapentin (NEURONTIN) 600 MG tablet Take 2 tablets (1,200 mg total) by mouth 2 (two) times a day 360 tablet 0    hydrOXYzine HCL (ATARAX) 25 mg tablet Take 1 tab po tid prn anxiety or insomnia 90 tablet 0    ibuprofen (MOTRIN) 800 mg tablet Take 800 mg by mouth as needed       mirtazapine (REMERON) 30 mg tablet Take 1 tablet (30 mg total) by mouth daily at bedtime 30 tablet 2    nortriptyline (PAMELOR) 50 mg capsule Take 2 caps at 8AM and 1 at bedtime 90 capsule 1    omeprazole (PriLOSEC) 40 MG capsule Take 1 capsule (40 mg total) by mouth daily before breakfast (Patient taking differently: Take 40 mg by mouth as needed ) 30 capsule 0    oxyCODONE (ROXICODONE) 30 MG immediate release tablet Take 30 mg by mouth 2 (two) times a day      QUEtiapine (SEROquel) 400 MG tablet Take 1 tablet (400 mg total) by mouth 2 (two) times a day 180 tablet 0    Suvorexant 20 MG TABS One by mouth at bedtime 90 tablet 1    tiZANidine (ZANAFLEX) 4 mg tablet Take 4 mg by mouth as needed   1    methylPREDNISolone 4 MG tablet therapy pack        Current Facility-Administered Medications   Medication Dose Route Frequency Provider Last Rate Last Admin    lidocaine (XYLOCAINE) 1 % injection 8 mL  8 mL Infiltration Once Rachael Devarinti, DO            ALLERGIES        Allergies   Allergen Reactions    Aspirin GI Intolerance     Other reaction(s): Other (See Comments)  Other reaction(s): Other (See Comments)  Other reaction(s): Unknown Allergic Reaction    Other      Annotation - [id number]: raw onions- causes throat to feel like it wants to close       OBJECTIVE     Patient ID: Elsy Ruggiero is a 44 y o  female    Blood pressure 118/64, pulse 82, weight 84 6 kg (186 lb 9 6 oz)  GENERAL EXAM:    CONSTITUTIONAL: Well developed, well nourished, well groomed  No dysmorphic features  Eyes:  PERRLA, EOM normal      Neck:  Normal ROM, neck supple  HEENT:  Normocephalic atraumatic  No meningismus  Oropharynx is clear and moist  No oral mucosal lesions  Chest:  Respirations regular and unlabored  Cardiovascular:  Distal extremities warm without palpable edema or tenderness, no observed significant swelling  Musculoskeletal:  Full range of motion  Skin:  warm and dry   Psychiatric:  Normal behavior and appropriate affect      NEUROLOGICAL EXAM:    Neurologic Exam     Mental Status   Oriented to person, place, and time  Attention: normal  Concentration: normal    Speech: speech is normal   Level of consciousness: alert  Knowledge: good  Normal comprehension  Cranial Nerves     CN II   Visual fields full to confrontation  CN III, IV, VI   Pupils are equal, round, and reactive to light  Extraocular motions are normal      CN V   Facial sensation intact  CN VII   Facial expression full, symmetric       CN VIII   CN VIII normal      CN IX, X   CN IX normal    CN X normal  CN XI   CN XI normal      CN XII   CN XII normal    Tinel sign positive b/l at occipital nerves     Motor Exam   Muscle bulk: normal  Overall muscle tone: normal  Right arm pronator drift: absent  Left arm pronator drift: absent    Strength   Strength 5/5 throughout  Sensory Exam   Light touch normal    Pinprick normal      Gait, Coordination, and Reflexes     Coordination   Finger to nose coordination: normal  Heel to shin coordination: normal    Reflexes   Reflexes 2+ except as noted  Right plantar: normal  Left plantar: normal         ROS     Review of systems as documented by the MA was reviewed in full by myself, Douglas Jeter MD    ROS:    Review of Systems   Constitutional: Negative  Negative for appetite change and fever  HENT: Negative  Negative for hearing loss, tinnitus, trouble swallowing and voice change  Eyes: Negative  Negative for photophobia and pain  Respiratory: Negative  Negative for shortness of breath  Cardiovascular: Negative  Negative for palpitations  Gastrointestinal: Negative  Negative for nausea and vomiting  Endocrine: Negative  Negative for cold intolerance  Genitourinary: Negative  Negative for dysuria, frequency and urgency  Musculoskeletal: Positive for neck pain  Negative for myalgias  Skin: Negative  Negative for rash  Neurological: Positive for headaches  Negative for dizziness, tremors, seizures, syncope, facial asymmetry, speech difficulty, weakness, light-headedness and numbness  Hematological: Negative  Does not bruise/bleed easily  Psychiatric/Behavioral: Negative  Negative for confusion, hallucinations and sleep disturbance

## 2021-04-13 NOTE — ASSESSMENT & PLAN NOTE
2 months of headache from the neck/base of the skull bilaterally with pain on palpation and tinel sign positive over the occipital nerves  Has been trying advil wo success  MRI brain and c spine were unremarkable for cause of headache (does have a small meningioma on MRI brain and follows with neurosurgery)    Impression: occipital neuralgia b/l    Plan:  - 4ml lidocaine given b/l occipital nerves  - take medrol dose pack which she already has if pain is not relieved  - take magnesium oxide 250-500 mg daily  - return to clinic in 4-6 months

## 2021-04-27 ENCOUNTER — HOSPITAL ENCOUNTER (OUTPATIENT)
Dept: RADIOLOGY | Age: 51
Discharge: HOME | End: 2021-04-27
Payer: COMMERCIAL

## 2021-04-27 VITALS — HEIGHT: 63 IN | BODY MASS INDEX: 32.6 KG/M2 | WEIGHT: 184 LBS

## 2021-04-27 DIAGNOSIS — Z12.31 ENCOUNTER FOR SCREENING MAMMOGRAM FOR MALIGNANT NEOPLASM OF BREAST: ICD-10-CM

## 2021-04-27 PROCEDURE — 77067 SCR MAMMO BI INCL CAD: CPT

## 2021-04-27 PROCEDURE — 77063 BREAST TOMOSYNTHESIS BI: CPT

## 2021-05-04 DIAGNOSIS — F41.9 ANXIETY: ICD-10-CM

## 2021-05-04 DIAGNOSIS — F51.04 PSYCHOPHYSIOLOGICAL INSOMNIA: ICD-10-CM

## 2021-05-04 RX ORDER — HYDROXYZINE HYDROCHLORIDE 25 MG/1
TABLET, FILM COATED ORAL
Qty: 270 TABLET | Refills: 0 | Status: SHIPPED | OUTPATIENT
Start: 2021-05-04 | End: 2021-06-25 | Stop reason: SDUPTHER

## 2021-05-05 ENCOUNTER — TELEPHONE (OUTPATIENT)
Dept: PSYCHIATRY | Facility: CLINIC | Age: 51
End: 2021-05-05

## 2021-05-05 NOTE — TELEPHONE ENCOUNTER
----- Message from Dominic Irvin sent at 5/5/2021  5:13 PM EDT -----  Regarding: Patient No Show  Brayan Martinez  No Showed 05/05/21  for Erin Medrano PA-C and did not call with proper notice to Cx appt   They are marked as a No Show for today's visit

## 2021-05-06 ENCOUNTER — TELEPHONE (OUTPATIENT)
Dept: PSYCHIATRY | Facility: CLINIC | Age: 51
End: 2021-05-06

## 2021-05-11 ENCOUNTER — HOSPITAL ENCOUNTER (OUTPATIENT)
Dept: MAMMOGRAPHY | Facility: CLINIC | Age: 51
Discharge: HOME | End: 2021-05-11
Payer: COMMERCIAL

## 2021-05-11 ENCOUNTER — HOSPITAL ENCOUNTER (OUTPATIENT)
Dept: ULTRASOUND IMAGING | Facility: CLINIC | Age: 51
Discharge: HOME | End: 2021-05-11
Payer: COMMERCIAL

## 2021-05-11 VITALS — BODY MASS INDEX: 32.6 KG/M2 | HEIGHT: 63 IN | WEIGHT: 184 LBS

## 2021-05-11 DIAGNOSIS — R92.8 ABNORMAL SCREENING MAMMOGRAM: ICD-10-CM

## 2021-05-11 PROCEDURE — 77066 DX MAMMO INCL CAD BI: CPT

## 2021-05-11 PROCEDURE — 76642 ULTRASOUND BREAST LIMITED: CPT

## 2021-05-11 PROCEDURE — G0279 TOMOSYNTHESIS, MAMMO: HCPCS

## 2021-05-12 ENCOUNTER — TELEPHONE (OUTPATIENT)
Dept: PSYCHIATRY | Facility: CLINIC | Age: 51
End: 2021-05-12

## 2021-05-12 ENCOUNTER — OFFICE VISIT (OUTPATIENT)
Dept: GASTROENTEROLOGY | Facility: CLINIC | Age: 51
End: 2021-05-12
Payer: COMMERCIAL

## 2021-05-12 VITALS
TEMPERATURE: 98.1 F | WEIGHT: 185 LBS | BODY MASS INDEX: 32.78 KG/M2 | SYSTOLIC BLOOD PRESSURE: 112 MMHG | HEART RATE: 79 BPM | HEIGHT: 63 IN | DIASTOLIC BLOOD PRESSURE: 72 MMHG

## 2021-05-12 DIAGNOSIS — A04.8 H. PYLORI INFECTION: ICD-10-CM

## 2021-05-12 DIAGNOSIS — E66.2: ICD-10-CM

## 2021-05-12 DIAGNOSIS — Z12.11 COLON CANCER SCREENING: Primary | ICD-10-CM

## 2021-05-12 PROCEDURE — 99213 OFFICE O/P EST LOW 20 MIN: CPT | Performed by: INTERNAL MEDICINE

## 2021-05-12 RX ORDER — POLYETHYLENE GLYCOL 3350 17 G/17G
POWDER, FOR SOLUTION ORAL
Qty: 238 G | Refills: 0 | Status: SHIPPED | OUTPATIENT
Start: 2021-05-12 | End: 2021-05-27 | Stop reason: ALTCHOICE

## 2021-05-12 NOTE — TELEPHONE ENCOUNTER
1st attempt (encounter routed to writer):  Left message for patient and/or Parent/Guardian to call office back at [phone] to schedule medication management appointment with Carmela Wyman PA-C      Reason:   Follow up    Last completed appointment with provider:   4/12/2021

## 2021-05-12 NOTE — PROGRESS NOTES
[facility] - Outpatient Follow-up Note  Shania Zarate 44 y o  female MRN: [id number]  Encounter: [id number]          ASSESSMENT AND PLAN:          1  Colon cancer screening  - Patient is 44 y o , she has been screened for colon cancer in the past, last colonoscopy with fair prep last year and she was recommended to repeat in 1 year, denies any family history of GI malignancy or IBD, no alarm symptoms at this point, currently having normal bowel movements  - Currently average risk for colonoscopy, other options for colorectal cancer screening were discussed with the patient, will perform colonoscopy, risk and benefits of the procedure were discussed with the patient including but not limited to bleeding, infection, perforation and missing an adenoma      2  H  pylori infection   patient was found to have H pylori infection during prior EGD  she was treated with quadruple therapy but stool antigen was positive after treatment and then she was retreated   the most recent treatment included levofloxacin, amoxicillin and omeprazole   she had a stool antigen order after treatment but she did not have the test done   patient was instructed to have the stool antigen performed, if this is negative after the recent treatment will hold off on EGD   if the test is still positive will proceed with EGD with biopsy for H pylori culture which can be performed at [facility], the lab needs to be contacted during the procedure to provide the supplies required to transport the tissue    ______________________________________________________________________    SUBJECTIVE:  Patient seen and examined,  she is a 42-year-old female patient with past medical history significant for H pylori infection, denies any recent events, currently is tolerating PO route, denies nausea but she is having some episodes of vomiting, is passing flatus and having daily bowel movements of normal consistency, denies abdominal pain, no recent weight loss      REVIEW OF SYSTEMS IS OTHERWISE NEGATIVE        Historical Information   Past Medical History:   Diagnosis Date    Abnormal thyroid blood test     last assessed 11/13/14    Anemia     last assessed  11/19/13    Chronic pain     back    Depression     Sleep disorder      Past Surgical History:   Procedure Laterality Date    ABSCESS DRAINAGE      incision - skin abscess    BACK SURGERY      2002    BACK SURGERY      lower    MAMMO STEREOTACTIC BREAST BIOPSY RIGHT (ALL INC) Right 6/21/2017    MAMMO STEREOTACTIC BREAST BIOPSY RIGHT (ALL INC) EACH ADD Right 6/21/2017    TUBAL LIGATION  approx 1998     Social History   Social History     Substance and Sexual Activity   Alcohol Use No     Social History     Substance and Sexual Activity   Drug Use Not Currently    Types: Marijuana    Comment: Tried THC in grammar school and high school      Social History     Tobacco Use   Smoking Status Former Smoker    Types: Cigarettes   Smokeless Tobacco Never Used   Tobacco Comment    pt "quit five years ago"--Pt clarified 2013 during a 4/12/2021 visit     Family History   Problem Relation Age of Onset    Kidney disease Mother         chronic    Diabetes Mother     Bone cancer Father 54    Heart attack Sister         Acute MI    Diabetes Sister     Hypertension Brother     Cancer Family     Diabetes Family     Heart disease Family     Hyperlipidemia Family     Hypertension Family     Colon cancer Paternal Grandfather 80    No Known Problems Maternal Grandmother     No Known Problems Maternal Grandfather     No Known Problems Paternal Grandmother     No Known Problems Sister     No Known Problems Sister     No Known Problems Sister     No Known Problems Brother     No Known Problems Brother     No Known Problems Brother     No Known Problems Daughter     No Known Problems Daughter     No Known Problems Son     No Known Problems Son     No Known Problems Son  No Known Problems Son        Meds/Allergies       Current Outpatient Medications:     clobetasol (TEMOVATE) 0 05 % external solution    cloNIDine (CATAPRES) 0 1 mg tablet    cyclobenzaprine (FLEXERIL) 10 mg tablet    DayVigo 10 MG TABS    diclofenac sodium (VOLTAREN) 1 %    gabapentin (NEURONTIN) 600 MG tablet    hydrOXYzine HCL (ATARAX) 25 mg tablet    ibuprofen (MOTRIN) 800 mg tablet    methylPREDNISolone 4 MG tablet therapy pack    mirtazapine (REMERON) 30 mg tablet    nortriptyline (PAMELOR) 50 mg capsule    omeprazole (PriLOSEC) 40 MG capsule    oxyCODONE (ROXICODONE) 30 MG immediate release tablet    QUEtiapine (SEROquel) 400 MG tablet    Suvorexant 20 MG TABS    tiZANidine (ZANAFLEX) 4 mg tablet    bisacodyl (DULCOLAX) 5 mg EC tablet    magnesium citrate solution    polyethylene glycol (GLYCOLAX) 17 GM/SCOOP powder    Allergies   Allergen Reactions    Aspirin GI Intolerance     Other reaction(s): Other (See Comments)  Other reaction(s): Other (See Comments)  Other reaction(s): Unknown Allergic Reaction    Other      Annotation - [id number]: raw onions- causes throat to feel like it wants to close           Objective     Blood pressure 112/72, pulse 79, temperature 98 1 °F (36 7 °C), temperature source Tympanic, height 5' 3" (1 6 m), weight 83 9 kg (185 lb), not currently breastfeeding  Body mass index is 32 77 kg/m²  PHYSICAL EXAM:      General Appearance:   Alert, cooperative, no distress   HEENT:   Normocephalic, atraumatic, anicteric      Neck:  Supple, symmetrical, trachea midline   Lungs:   Clear to auscultation bilaterally; no rales, rhonchi or wheezing; respirations unlabored    Heart[de-identified]   Regular rate and rhythm; no murmur, rub, or gallop     Abdomen:   Soft, non-tender, non-distended; normal bowel sounds; no masses, no organomegaly    Genitalia:   Deferred    Rectal:   Deferred    Extremities:  No cyanosis, clubbing or edema    Pulses:  2+ and symmetric    Skin:  No jaundice, rashes, or lesions    Lymph nodes:  No palpable cervical lymphadenopathy        Lab Results:   No visits with results within 1 Day(s) from this visit  Latest known visit with results is:   Office Visit on 02/10/2021   Component Date Value    Hemoglobin A1C 02/11/2021 6 0          Radiology Results:   Mammo Diagnostic Bilateral W 3d & Cad, Us Breast Bilateral Limited (diagnostic)    Result Date: 5/11/2021  Narrative: DIAGNOSIS: Abnormal screening mammogram TECHNIQUE: Digital diagnostic mammography was performed  Computer Aided Detection (CAD) analyzed all applicable images  Ultrasound of the bilateral breast(s) was performed  COMPARISONS: Prior breast imaging dated: 04/27/2021, 10/09/2018, 06/21/2017, 06/19/2017, 06/14/2017, 03/21/2016, and 12/22/2014 , 01/24/2013, 01/16/2012  RELEVANT HISTORY: Family Breast Cancer History: No known family history of breast cancer  Family Medical History: Family medical history includes colon cancer in paternal grandfather  Personal History: Hormone history includes birth control  No known relevant surgical history  No known relevant medical history  RISK ASSESSMENT: 5 Year Tyrer-Cuzick: 0 85 % 10 Year Tyrer-Cuzick: 1 83 % Lifetime Tyrer-Cuzick: 7 24 % TISSUE DENSITY: There are scattered areas of fibroglandular density  INDICATION: Jeanna Hurd is a 44 y o  female presenting for Abnormal screening mammogram   Previous benign biopsies  FINDINGS: LEFT 2) ASYMMETRY: Diagnostic mammogram with tomosynthesis and CAD:  There is an asymmetry seen in the outer region of the left breast in the posterior depth, seen on the CC view  There is a possible correlate at the level of the nipple on the lateral view (3:00)  This coincides with the expected location based on tomosynthesis  Limited left breast ultrasound (diagnostic): The lateral breast was evaluated from 2:00 to 4 o'clock  No sonographic abnormality was seen   RIGHT 1) ASYMMETRY: Diagnostic mammogram with tomosynthesis and CAD:  There is an asymmetry seen in the inner region of the right breast in the posterior depth, seen on the CC view  There is a possible correlate at 2-3 o'clock on the lateral view  Limited right breast ultrasound (diagnostic): The superior breast was evaluated from 11:00 to 4:00  No sonographic abnormality was seen  Impression: Bilateral asymmetries are probably benign  Six-month follow-up mammogram is recommended  Repeat ultrasound can be performed at that time if indicated based on the imaging  ASSESSMENT/BI-RADS CATEGORY: Left: 3 - Probably Benign Right: 3 - Probably Benign Overall: 3 - Probably Benign RECOMMENDATION:      - Diagnostic mammogram in 6 months for both breasts  - Ultrasound in 6 months for both breasts  Workstation ID: [id number]     Mammo Screening Bilateral W 3d & Cad    Result Date: 4/28/2021  Narrative: DIAGNOSIS: Encounter for screening mammogram for malignant neoplasm of breast TECHNIQUE: Digital screening mammography was performed  Computer Aided Detection (CAD) analyzed all applicable images  COMPARISONS: Prior breast imaging dated: 10/09/2018, 06/21/2017, 06/21/2017, 06/19/2017, 06/14/2017, 03/21/2016, 12/22/2014, and 01/24/2013 RELEVANT HISTORY: Family Breast Cancer History: No known family history of breast cancer  Family Medical History: Family medical history includes colon cancer in paternal grandfather  Personal History: Hormone history includes birth control  No known relevant surgical history  No known relevant medical history  The patient is scheduled in a reminder system for screening mammography  8-10% of cancers will be missed on mammography  Management of a palpable abnormality must be based on clinical grounds  Patients will be notified of their results via letter from our facility  Accredited by [employer] of Radiology and FDA   RISK ASSESSMENT: 5 Year Tyrer-Cuzick: 0 85 % 10 Year Tyrer-Cuzick: 1 83 % Lifetime Tyrer-Cuzick: 7 24 % TISSUE DENSITY: There are scattered areas of fibroglandular density  INDICATION: Laura Gomez is a 44 y o  female presenting for screening mammography  FINDINGS: LEFT 2) ASYMMETRY: There is an asymmetry seen in the outer region of the left breast in the posterior depth on the CC view  There is possible architectural distortion  RIGHT 1) ARCHITECTURAL DISTORTION: There is architectural distortion seen in the inner region of the right breast in the middle to posterior depth on the CC view  Impression: Additional imaging required  A breast health care nurse from our facility will be contacting the patient regarding the need for additional imaging ASSESSMENT/BI-RADS CATEGORY: Left: 0 - Incomplete: Needs Additional Imaging Evaluation Right: 0 - Incomplete: Needs Additional Imaging Evaluation Overall: 0 - Incomplete: Needs Additional Imaging Evaluation RECOMMENDATION:      - Diagnostic mammogram at the current time for both breasts  - Ultrasound at the current time for both breasts   Workstation ID: [id number]

## 2021-05-12 NOTE — PATIENT INSTRUCTIONS
Colon/EGD scheduled for Thursday, 5/27/21, with Dr Demetri Lyons at [address] [facility] Miralax/Dulcolax prep provided in office to patient by Siomara

## 2021-05-26 ENCOUNTER — TELEPHONE (OUTPATIENT)
Dept: GASTROENTEROLOGY | Facility: HOSPITAL | Age: 51
End: 2021-05-26

## 2021-05-26 ENCOUNTER — TRANSCRIBE ORDERS (OUTPATIENT)
Dept: GASTROENTEROLOGY | Facility: CLINIC | Age: 51
End: 2021-05-26

## 2021-05-27 ENCOUNTER — HOSPITAL ENCOUNTER (OUTPATIENT)
Dept: GASTROENTEROLOGY | Facility: HOSPITAL | Age: 51
Setting detail: OUTPATIENT SURGERY
Discharge: HOME | End: 2021-05-27
Attending: INTERNAL MEDICINE | Admitting: INTERNAL MEDICINE
Payer: COMMERCIAL

## 2021-05-27 ENCOUNTER — ANESTHESIA EVENT (OUTPATIENT)
Dept: GASTROENTEROLOGY | Facility: HOSPITAL | Age: 51
End: 2021-05-27

## 2021-05-27 ENCOUNTER — ANESTHESIA (OUTPATIENT)
Dept: GASTROENTEROLOGY | Facility: HOSPITAL | Age: 51
End: 2021-05-27

## 2021-05-27 VITALS
HEIGHT: 63 IN | OXYGEN SATURATION: 99 % | BODY MASS INDEX: 30.12 KG/M2 | WEIGHT: 170 LBS | HEART RATE: 65 BPM | RESPIRATION RATE: 16 BRPM | SYSTOLIC BLOOD PRESSURE: 114 MMHG | TEMPERATURE: 97.2 F | DIASTOLIC BLOOD PRESSURE: 67 MMHG

## 2021-05-27 DIAGNOSIS — A04.8 H. PYLORI INFECTION: ICD-10-CM

## 2021-05-27 DIAGNOSIS — Z12.11 COLON CANCER SCREENING: ICD-10-CM

## 2021-05-27 PROCEDURE — 88305 TISSUE EXAM BY PATHOLOGIST: CPT | Performed by: PATHOLOGY

## 2021-05-27 PROCEDURE — G0121 COLON CA SCRN NOT HI RSK IND: HCPCS | Performed by: INTERNAL MEDICINE

## 2021-05-27 PROCEDURE — 43239 EGD BIOPSY SINGLE/MULTIPLE: CPT | Performed by: INTERNAL MEDICINE

## 2021-05-27 RX ORDER — ONDANSETRON 2 MG/ML
4 INJECTION INTRAMUSCULAR; INTRAVENOUS ONCE AS NEEDED
Status: CANCELLED | OUTPATIENT
Start: 2021-05-27

## 2021-05-27 RX ORDER — PROPOFOL 10 MG/ML
INJECTION, EMULSION INTRAVENOUS AS NEEDED
Status: DISCONTINUED | OUTPATIENT
Start: 2021-05-27 | End: 2021-05-27

## 2021-05-27 RX ORDER — FENTANYL CITRATE/PF 50 MCG/ML
12.5 SYRINGE (ML) INJECTION
Status: CANCELLED | OUTPATIENT
Start: 2021-05-27

## 2021-05-27 RX ORDER — SODIUM CHLORIDE 9 MG/ML
INJECTION, SOLUTION INTRAVENOUS CONTINUOUS PRN
Status: DISCONTINUED | OUTPATIENT
Start: 2021-05-27 | End: 2021-05-27

## 2021-05-27 RX ADMIN — SODIUM CHLORIDE: 0.9 INJECTION, SOLUTION INTRAVENOUS at 15:11

## 2021-05-27 RX ADMIN — PROPOFOL 40 MG: 10 INJECTION, EMULSION INTRAVENOUS at 15:37

## 2021-05-27 RX ADMIN — PROPOFOL 70 MG: 10 INJECTION, EMULSION INTRAVENOUS at 15:34

## 2021-05-27 RX ADMIN — PROPOFOL 30 MG: 10 INJECTION, EMULSION INTRAVENOUS at 15:41

## 2021-05-27 NOTE — ANESTHESIA PREPROCEDURE EVALUATION
Procedure:  EGD  COLONOSCOPY    Relevant Problems   GI/HEPATIC   (+) Esophageal dysphagia   (+) GERD (gastroesophageal reflux disease)      /RENAL   (+) Stage 2 chronic kidney disease      NEURO/PSYCH   (+) Anxiety   (+) History of prediabetes   (+) Moderately severe recurrent major depression (HCC)   (+) New persistent daily headache        Physical Exam    Airway    Mallampati score: II         Dental   No notable dental hx     Cardiovascular  Cardiovascular exam normal    Pulmonary  Pulmonary exam normal     Other Findings        Anesthesia Plan  ASA Score- 2     Anesthesia Type- IV sedation with anesthesia with ASA Monitors  Additional Monitors:   Airway Plan: NTT  Plan Factors-Exercise tolerance (METS): >4 METS  Chart reviewed  Existing labs reviewed  Patient is not a current smoker  Patient not instructed to abstain from smoking on day of procedure  Patient did not smoke on day of surgery  Induction- intravenous  Postoperative Plan-     Informed Consent- Anesthetic plan and risks discussed with patient  I personally reviewed this patient with the CRNA  Discussed and agreed on the Anesthesia Plan with the CRNA  Tobi Wolf

## 2021-05-27 NOTE — ANESTHESIA POSTPROCEDURE EVALUATION
Post-Op Assessment Note    CV Status:  Stable  Pain Score: 0    Pain management: adequate     Mental Status:  Alert and awake   Hydration Status:  Stable   PONV Controlled:  None   Airway Patency:  Patent      Post Op Vitals Reviewed: Yes      Staff: CRNA         No complications documented      BP 94/52 (05/27/21 1547)    Temp (!) 97 2 °F (36 2 °C) (05/27/21 1547)    Pulse 64 (05/27/21 1547)   Resp 16 (05/27/21 1547)    SpO2 97 % (05/27/21 1547)

## 2021-06-03 ENCOUNTER — TELEPHONE (OUTPATIENT)
Dept: GASTROENTEROLOGY | Facility: CLINIC | Age: 51
End: 2021-06-03

## 2021-06-03 DIAGNOSIS — A04.8 H. PYLORI INFECTION: Primary | ICD-10-CM

## 2021-06-03 RX ORDER — OMEPRAZOLE MAGNESIUM, AMOXICILLIN AND RIFABUTIN 10; 250; 12.5 MG/1; MG/1; MG/1
250 CAPSULE, DELAYED RELEASE ORAL
Qty: 42 CAPSULE | Refills: 0 | Status: SHIPPED | OUTPATIENT
Start: 2021-06-03 | End: 2021-06-04 | Stop reason: SDUPTHER

## 2021-06-03 NOTE — TELEPHONE ENCOUNTER
Called patient went to voicemail  LMOM  Patient is positive for H pylori  Will prescribe Charol Blow which is a Rifabutin based H pylori treatment which has worked in the past for patients that I have given with resistant H pylori infection  Hopefully insurance covers this medication for her

## 2021-06-03 NOTE — TELEPHONE ENCOUNTER
Patients GI provider:  Dr Stan Mercado    Number to return call: [phone]    Reason for call: Pt calling looking for results from her EGD of 05/27/21    Scheduled procedure/appointment date if applicable: NA

## 2021-06-03 NOTE — TELEPHONE ENCOUNTER
Patient called back  Told her that [facility]   She will need to do a stool test for H pylori after completing treatment and then follow up in the office after that  Staff, please set up follow-up with fellows during fellows clinic

## 2021-06-03 NOTE — PROGRESS NOTES
Patient has Helicobacter pylori infection in the stomach causing inflammation  Will prescribe a 2 week course of antibiotics for this and send to the pharmacy  Also a stool test to confirm eradication was ordered which should be sent 1-2 weeks after completing her antibiotics    Please notify patient

## 2021-06-04 ENCOUNTER — TELEPHONE (OUTPATIENT)
Dept: GASTROENTEROLOGY | Facility: AMBULARY SURGERY CENTER | Age: 51
End: 2021-06-04

## 2021-06-04 DIAGNOSIS — A04.8 H. PYLORI INFECTION: ICD-10-CM

## 2021-06-04 RX ORDER — OMEPRAZOLE MAGNESIUM, AMOXICILLIN AND RIFABUTIN 10; 250; 12.5 MG/1; MG/1; MG/1
250 CAPSULE, DELAYED RELEASE ORAL
Qty: 168 CAPSULE | Refills: 0 | Status: SHIPPED | OUTPATIENT
Start: 2021-06-04 | End: 2021-06-18

## 2021-06-04 NOTE — TELEPHONE ENCOUNTER
I called Nathaniel Ulloa @ [phone] and spoke to Maddison correia, I was able to submit the prior auth over the phone and medication was approved from 6/4/2021-12/2021       Case # [id number]   **There is a copay for the patient of $100 00, patient notified of this, I advised that she can go on the website for the medication to see if there are any discount codes or coupons available

## 2021-06-04 NOTE — TELEPHONE ENCOUNTER
Patients GI provider:  Dr Sandy Esparza     Number to return call: [phone]  Reason for call: Pt calling b/c her triple Therapy  requires a medication auth       Scheduled procedure/appointment date if applicable: 8/64/55

## 2021-06-04 NOTE — TELEPHONE ENCOUNTER
Called and advised patient updated prescription had been sent  She is aware to take medication TID for only 14 days

## 2021-06-04 NOTE — TELEPHONE ENCOUNTER
Called and spoke with patient who advised I need to speak with pharmacy regarding script  I called the pharmacy and they are unable to dispense 42 pills  Dorrine Cali comes in packages of 168 tablets and can only be dispensed as such  Please advise, since I am unfamiliar with this drug  Patient told me she has failed triple and quadruple therapy before   Please advise

## 2021-06-04 NOTE — TELEPHONE ENCOUNTER
Pt calling stating [facility] told her that the quantity of the Medication Talicia should read 709  Pt would like to be called at [phone]

## 2021-06-04 NOTE — TELEPHONE ENCOUNTER
The dosing for this medication is three times daily x 14 days so I'm not sure why it only comes in a package of 168 tablets but if she has failed prior therapy I recommend she take the recommended drug and we will just need to dispense a larger quality then she needs  This was updated and sent to the pharmacy

## 2021-06-04 NOTE — TELEPHONE ENCOUNTER
Hi all,    I was able to get medication approved  However, there is a copay for the patient of $100 00  I advised patient of this and asked her to go on the website to see if there are any discount codes or coupons available  Patient wanted me to ask if there is an alternative that can be sent in instead  Please advise if there is something else that could be sent in, I did let patient know that she still might have a high copay but she still wanted me to ask          Thank you

## 2021-06-08 ENCOUNTER — TELEPHONE (OUTPATIENT)
Dept: PSYCHIATRY | Facility: CLINIC | Age: 51
End: 2021-06-08

## 2021-06-08 NOTE — LETTER
06/23/21       Chelly Thorpe   [address] [address]       Dear Chelly Thorpe :    Given no response to multiple outreach attempts, your treatment with Jaron Mcmillan PA-C at [address] [facility] [address] is being closed at this time  If you wish to return to [facility], you will need to have another initial assessment and intake appointment  Please call [phone] to schedule a new psychiatric intake if you are interested in doing so  Please follow-up with your primary care provider for continual care  When you have scheduled an appointment with another agency, please feel free to complete a release of information so that your records can be transferred to your new provider prior to your first appointment  This will aid with the continuity of your care  Sincerely,           Jaron Mcmillan PA-C     [facility]        We will continue to provide psychotropic medications and/or emergency counseling as deemed appropriate by clinical staff for 45 days from receipt of this letter  For a referral to another agency, we would suggest that you contact your primary health care provider or insurance company  Please see Provider's List of agencies below:    Outpatient Mental Health Adult  [facility]  [address] [facility]   [phone]   Karrie Aguillon to / [address]  [address] [facility] [address] [facility]  [address] [facility] Thien Palafox MD [facility]  [address] Meryl Morgan MD, [address] [facility] [address], Adolescents and Family  [place] [facility] [address]: 100 [facility], [address] [place] [address] [address]  [address]  [address] [place], [facility]  and [facility], [address] Morgan, [address] (14 and over)  [address]  [address]  [place], Sophia [place]  Rachana Snow Lao Speaking  [facility] [address]   [place] [address], Tate Diogenes [address]  [address]:   [facility] [place], [address] Phone: [phone] 2500 [facility], [facility]6  [address] [place] New Admissions [phone] [address] [facility]:   [address] THTIUILP:86 [address] 29 [facility], [address] [place] [address] Phone: [phone] Outpatient: [address] [place], [address] Phone: [address] [facility]  [phone] / [place]en  [phone]  Drug & Alcohol Services:  [facility]:   [phone] or [address]  [facility]:  [phone] or [address]:  [address] [place] [address] Street:  [phone]  [facility] [place]: [phone]    [facility] SYSTEM:   [place] [address] Alcohol Commission:  [address]  [place] Gray, [address]  Phone: [phone]:    [place]:  [address]: [phone] or [address] [facility]: [facility]: [phone]    [facility]: [facility] [address]: [phone] ([phone])    [place]: [phone]    National Suicide Prevention Hotline:  5-758.606.3738 Lisha Alcocer)

## 2021-06-08 NOTE — LETTER
21    Lauren Mason  : 1970  [facility]        Dear Lauren Mason : We are writing to inform you that your last completed medication management appointment with Erin Medrano PA-C was on 2021  Your health and follow-up care are important to us  We want to make you aware that you do not have another appointment with Erin Medrano PA-C scheduled  If you have already scheduled a follow-up appointment, please disregard  Please call our office at [phone] as soon as possible so we can schedule your appointment and prevent an interruption of your care          Sincerely,      [address] [facility] [address] Support Staff

## 2021-06-08 NOTE — TELEPHONE ENCOUNTER
----- Message from Jorge De León sent at 6/8/2021  5:15 PM EDT -----  Regarding: Patient No Show  Kavita Alford [[id number]]  No Showed 06/08/21  for Emanuel Hoover PA-C and did not call with proper notice to Cx appt   They are marked as a No Show for today's visit

## 2021-06-09 NOTE — TELEPHONE ENCOUNTER
NO-SHOW LETTER MAILED TO Shania Zarate    [facility] [address] [facility] [address]

## 2021-06-11 ENCOUNTER — HOSPITAL ENCOUNTER (OUTPATIENT)
Dept: RADIOLOGY | Facility: HOSPITAL | Age: 51
Discharge: HOME | End: 2021-06-11
Payer: COMMERCIAL

## 2021-06-11 DIAGNOSIS — D32.9 MENINGIOMA (HCC): ICD-10-CM

## 2021-06-11 PROCEDURE — G1004 CDSM NDSC: HCPCS

## 2021-06-11 PROCEDURE — A9585 GADOBUTROL INJECTION: HCPCS | Performed by: NURSE PRACTITIONER

## 2021-06-11 PROCEDURE — 70553 MRI BRAIN STEM W/O & W/DYE: CPT

## 2021-06-11 RX ADMIN — GADOBUTROL 7 ML: 604.72 INJECTION INTRAVENOUS at 16:36

## 2021-06-14 RX ORDER — OMEPRAZOLE MAGNESIUM, AMOXICILLIN AND RIFABUTIN 10; 250; 12.5 MG/1; MG/1; MG/1
250 CAPSULE, DELAYED RELEASE ORAL
Qty: 42 CAPSULE | Refills: 0 | Status: SHIPPED | OUTPATIENT
Start: 2021-06-14 | End: 2021-06-28

## 2021-06-21 ENCOUNTER — OFFICE VISIT (OUTPATIENT)
Dept: NEUROSURGERY | Facility: CLINIC | Age: 51
End: 2021-06-21
Payer: COMMERCIAL

## 2021-06-21 VITALS
TEMPERATURE: 98.9 F | BODY MASS INDEX: 30.48 KG/M2 | HEART RATE: 84 BPM | DIASTOLIC BLOOD PRESSURE: 84 MMHG | SYSTOLIC BLOOD PRESSURE: 112 MMHG | WEIGHT: 172 LBS | HEIGHT: 63 IN

## 2021-06-21 DIAGNOSIS — F32.A ANXIETY AND DEPRESSION: Chronic | ICD-10-CM

## 2021-06-21 DIAGNOSIS — D32.9 MENINGIOMA (HCC): Primary | ICD-10-CM

## 2021-06-21 DIAGNOSIS — F41.9 ANXIETY AND DEPRESSION: Chronic | ICD-10-CM

## 2021-06-21 PROCEDURE — 99213 OFFICE O/P EST LOW 20 MIN: CPT | Performed by: PHYSICIAN ASSISTANT

## 2021-06-21 NOTE — PROGRESS NOTES
Neurosurgery Office Note  Lauren Mason 44 y o  female MRN: [id number]      Assessment/Plan     Meningioma [facility])  · Patient seen in outpatient office today for 3 month follow up for small left parafalcine meningioma  · Lesion initially noted incidentally during workup for headache and neck pain  Imaging:    · MRI brain w wo 6/11/21: Stable small left parafalcine frontal meningioma  · MRI brain w/wo 2/20/21:No acute disease  Probable 7x 3 mm en plaque, anterior left paramedian falx meningioma  2-3 tiny nonspecific foci of high signal in the subcortical bifrontal white matter  Plan:  · Reviewed imaging with patient  · No nsx intervention anticipated at this time  Continued surveillance is recommended  · Per NCCN guidelines patient will follow-up in 6 months with MRI brain with and without contrast for surveillance of the meningioma  · Discussed with pt that should she experience new or worsening neurological changes to contact our office to be seen sooner  · Patient made aware to contact Neurosurgery with any further questions or concerns  Diagnoses and all orders for this visit:    Meningioma ([facility] [address] )  -     MRI brain with and without contrast; Future            CHIEF COMPLAINT    Chief Complaint   Patient presents with    Follow-up     Cervical Radiculopathy       HISTORY    History of Present Illness     44y o  year old female     Patient seen in outpatient office today for 3 month follow up for small left parafalcine meningioma  Lesion initially noted incidentally during workup for headache and neck pain  Pt reports she continues to have neck pain and headaches  Pt reports that her headaches have decreased in frequency and severity  Patient reports occasionally she will have vertigo and dizziness  Pt denies visual disturbance  Patient reports numbness in her fingers and left arm  Pt reports back pain and stiffness all over her body  Pt denies any new weakness in BUE/BLE   Pt denies changes in gait or balance  Pt denies nausea or vomiting  REVIEW OF SYSTEMS    Review of Systems   Constitutional: Negative  HENT: Negative  Eyes: Negative  Respiratory: Negative  Cardiovascular: Negative  Gastrointestinal: Positive for constipation  Endocrine: Negative  Genitourinary: Negative  Musculoskeletal: Positive for back pain (LBP radiating down the leg to the bottom of the foot), myalgias, neck pain (up into the head) and neck stiffness  Did not start PT   Skin: Negative  Allergic/Immunologic: Negative  Neurological: Positive for dizziness (intermittent vertigo), numbness (n/t b/l arms and hands) and headaches (coming from the neck to the front, less often)  Hematological: Negative  Psychiatric/Behavioral: Negative  All other systems reviewed and are negative          Meds/Allergies     Current Outpatient Medications   Medication Sig Dispense Refill    clobetasol (TEMOVATE) 0 05 % external solution APPLY TO SCALP TWICE A DAY      cloNIDine (CATAPRES) 0 1 mg tablet TAKE 1 TABLET (0 1 MG TOTAL) BY MOUTH EVERY 12 (TWELVE) HOURS 180 tablet 0    cyclobenzaprine (FLEXERIL) 10 mg tablet Take 10 mg by mouth as needed       DayVigo 10 MG TABS TAKE 1 TABLET BY MOUTH DAILY AT BEDTIME AS NEEDED (INSOMNIA) 30 tablet 5    diclofenac sodium (VOLTAREN) 1 % Apply topically as needed       gabapentin (NEURONTIN) 600 MG tablet Take 2 tablets (1,200 mg total) by mouth 2 (two) times a day 360 tablet 0    hydrOXYzine HCL (ATARAX) 25 mg tablet TAKE 1 TAB PO 3X DAILY AS NEEDED FOR ANXIETY OR INSOMNIA 270 tablet 0    mirtazapine (REMERON) 30 mg tablet Take 1 tablet (30 mg total) by mouth daily at bedtime 30 tablet 2    nortriptyline (PAMELOR) 50 mg capsule Take 2 caps at 8AM and 1 at bedtime 90 capsule 1    oxyCODONE (ROXICODONE) 30 MG immediate release tablet Take 30 mg by mouth 2 (two) times a day      QUEtiapine (SEROquel) 400 MG tablet Take 1 tablet (400 mg total) by mouth 2 (two) times a day 824 tablet 0    Talicia 850-87 4-54 MG CPDR TAKE 250 MG BY MOUTH 3 (THREE) TIMES A DAY WITH MEALS FOR 14 DAYS 42 capsule 0    ibuprofen (MOTRIN) 800 mg tablet Take 800 mg by mouth as needed  (Patient not taking: Reported on 6/21/2021)      Suvorexant 20 MG TABS One by mouth at bedtime 90 tablet 1     No current facility-administered medications for this visit         Allergies   Allergen Reactions    Aspirin GI Intolerance    Other Throat Swelling      raw onions- causes throat to feel like it wants to close       PAST HISTORY    Past Medical History:   Diagnosis Date    Abnormal thyroid blood test     last assessed 11/13/14    Anemia     last assessed  11/19/13    Chronic pain     back    Depression     Sleep disorder        Past Surgical History:   Procedure Laterality Date    ABSCESS DRAINAGE      incision - skin abscess    BACK SURGERY      2002    BACK SURGERY      lower    MAMMO STEREOTACTIC BREAST BIOPSY RIGHT (ALL INC) Right 6/21/2017    MAMMO STEREOTACTIC BREAST BIOPSY RIGHT (ALL INC) EACH ADD Right 6/21/2017    TUBAL LIGATION  approx 1998       Social History     Tobacco Use    Smoking status: Former Smoker     Types: Cigarettes    Smokeless tobacco: Never Used    Tobacco comment: pt "quit five years ago"--Pt clarified 2013 during a 4/12/2021 visit   Vaping Use    Vaping Use: Some days    Substances: Nicotine   Substance Use Topics    Alcohol use: No    Drug use: Not Currently     Types: Marijuana     Comment: Tried THC in grammar school and high school        Family History   Problem Relation Age of Onset    Kidney disease Mother         chronic    Diabetes Mother     Bone cancer Father 54    Heart attack Sister         Acute MI    Diabetes Sister     Hypertension Brother     Cancer Family     Diabetes Family     Heart disease Family     Hyperlipidemia Family     Hypertension Family     Colon cancer Paternal Grandfather 80    No Known Problems Maternal Grandmother     No Known Problems Maternal Grandfather     No Known Problems Paternal Grandmother     No Known Problems Sister     No Known Problems Sister     No Known Problems Sister     No Known Problems Brother     No Known Problems Brother     No Known Problems Brother     No Known Problems Daughter     No Known Problems Daughter     No Known Problems Son     No Known Problems Son     No Known Problems Son     No Known Problems Son          Above history personally reviewed  EXAM    Vitals:Blood pressure 112/84, pulse 84, temperature 98 9 °F (37 2 °C), temperature source Temporal, height 5' 3" (1 6 m), weight 78 kg (172 lb), last menstrual period 06/10/2021, not currently breastfeeding  ,Body mass index is 30 47 kg/m²  Physical Exam  Constitutional:       General: She is not in acute distress  Appearance: She is well-developed  HENT:      Head: Normocephalic and atraumatic  Eyes:      Pupils: Pupils are equal, round, and reactive to light  Neck:      Trachea: No tracheal deviation  Cardiovascular:      Rate and Rhythm: Normal rate  Pulmonary:      Effort: Pulmonary effort is normal    Abdominal:      Palpations: Abdomen is soft  Tenderness: There is no abdominal tenderness  There is no guarding  Musculoskeletal:      Cervical back: Neck supple  Skin:     General: Skin is warm and dry  Coloration: Skin is not pale  Findings: No rash  Neurological:      Mental Status: She is alert and oriented to person, place, and time  Gait: Gait is intact  Comments: GCS 15, Awake, Alert, Oriented x 3    Motor: R[place], strength 4+/5 BUE, 4/5 BLE 2/2 to pain  Sensation:  intact to LT X 4     Reflexes: 2+ and symmetric, no walker's or clonus     Coordination: no drift bilateral upper extremities, finger to nose normal bilaterally          Psychiatric:         Speech: Speech normal          Behavior: Behavior normal          Neurologic Exam     Mental Status   Oriented to person, place, and time  Attention: normal  Concentration: normal    Speech: speech is normal   Level of consciousness: alert  Knowledge: good  Normal comprehension  Cranial Nerves   Cranial nerves II through XII intact  CN III, IV, VI   Pupils are equal, round, and reactive to light  Motor Exam   Muscle bulk: normal  Overall muscle tone: normal    Gait, Coordination, and Reflexes     Gait  Gait: normal        MEDICAL DECISION MAKING    Imaging Studies:     EGD    Result Date: 5/27/2021  Narrative: [address] [place] [address] [phone] DATE OF SERVICE: 5/27/21 PHYSICIAN(S): Cha Green MD - Attending Physician INDICATION: H  pylori infection POST-OP DIAGNOSIS: See the impression below  PREPROCEDURE: Informed consent was obtained for the procedure, including sedation  Risks of perforation, hemorrhage, adverse drug reaction and aspiration were discussed  The patient was placed in the left lateral decubitus position  Patient was explained about the risks and benefits of the procedure  Risks including but not limited to bleeding, infection, and perforation were explained in detail  Also explained about less than 100% sensitivity with the exam and other alternatives  DETAILS OF PROCEDURE: Patient was taken to the procedure room where a time out was performed to confirm correct patient and correct procedure  The patient underwent monitored anesthesia care, which was administered by an anesthesia professional  The patient's blood pressure, heart rate, level of consciousness, respirations and oxygen were monitored throughout the procedure  The scope was advanced to the second part of the duodenum  Retroflexion was performed in the fundus  The patient experienced no blood loss  The procedure was not difficult  The patient tolerated the procedure well  There were no apparent complications   ANESTHESIA INFORMATION: ASA: II Anesthesia Type: Anesthesia type not filed in the log  FINDINGS: The esophagus appeared normal  Erythematous mucosa in the antrum; biopsied for h pylori gastritis Single cratered, round, benign-appearing ulcer in the duodenum  This appeared peptic in nature  SPECIMENS: ID Type Source Tests Collected by Time Destination 1 : cold bx; r/o H  Pylori  Tissue Stomach TISSUE EXAM Ijeoma Aj MD 5/27/2021  3:38 PM       Impression: Normal esophagus Mild gastritis  Biopsied for h pylori gastritis There was a cratered ulcer with peptic stricture  RECOMMENDATION: Follow up biopsy results  Ijeoma Aj MD    Colonoscopy    Result Date: 5/27/2021  Narrative: [address] [place] [address] Endoscopy [id number] [address]: 5/27/21 PHYSICIAN(S): Ijeoma Aj MD - Attending Physician INDICATION: Colon cancer screening Colonoscopy performed for a screening indication  POST-OP DIAGNOSIS: See the impression below  HISTORY: Prior colonoscopy: Less than 3 years ago  It is being repeated at an interval of less than 3 years because: This colonoscopy is being performed for a diagnostic indication BOWEL PREPARATION:  PREPROCEDURE: Informed consent was obtained for the procedure, including sedation  Risks including but not limited to bleeding, infection, perforation, adverse drug reaction and aspiration were explained in detail  Also explained about less than 100% sensitivity with the exam and other alternatives  The patient was placed in the left lateral decubitus position  DETAILS OF PROCEDURE: Patient was taken to the procedure room where a time out was performed to confirm correct patient and correct procedure  The patient underwent monitored anesthesia care, which was administered by an anesthesia professional  The patient's blood pressure, heart rate, level of consciousness, oxygen and respirations were monitored throughout the procedure  A digital rectal exam was performed   The scope was introduced through the anus  Photodocumentation was obtained at the retroflexed view of the rectum  Retroflexion was performed in the rectum  The quality of bowel preparation was evaluated using the [facility] Bowel Preparation Scale with scores of: right colon = not assessed, transverse colon = not assessed, left colon = 0  The total BBPS score was 0  Bowel prep was not adequate  The patient experienced no blood loss  The procedure was not difficult  The patient tolerated the procedure well  There were no apparent complications  ANESTHESIA INFORMATION: ASA: II Anesthesia Type: IV Sedation with Anesthesia FINDINGS: Poor preparation in the colon  Unable to visualize the wall of the colon so procedure aborted EVENTS: Procedure Events Event Event Time ENDO SCOPE OUT TIME 5/27/2021  3:38 PM ENDO SCOPE OUT TIME 5/27/2021  3:43 PM SPECIMENS: ID Type Source Tests Collected by Time Destination 1 : cold bx; r/o H  Pylori  Tissue Stomach TISSUE EXAM Melene Canavan, MD 5/27/2021  3:38 PM  EQUIPMENT:     Impression: Poor preparation  Procedure aborted RECOMMENDATION: Repeat in 6 months due to an inadequate bowel preparation Melene Canavan, MD     MRI brain with and without contrast    Result Date: 6/16/2021  Narrative: MRI BRAIN WITH AND WITHOUT CONTRAST INDICATION: Benign neoplasm of meninges  COMPARISON:  MRI of the brain from February 20, 2021  TECHNIQUE: Sagittal T1, axial T2, axial FLAIR, axial T1, axial [place], axial diffusion  Sagittal, axial T1 postcontrast   Axial bravo postcontrast with coronal reconstructions  Imaging performed on 1 5T MRI  IV Contrast:  7 mL of Gadobutrol injection (SINGLE-DOSE)  IMAGE QUALITY:   Diagnostic  FINDINGS: BRAIN PARENCHYMA:  Diffusion-weighted imaging is unremarkable  No parenchymal edema or mass effect  No pathologic intracranial parenchymal enhancement  Previously noted parafalcine nodular focus of left frontal dural enhancement is again noted on image 84 series 13    This focus measures approximately 7 x 3 x 5 m in the AP, transverse and craniocaudad dimensions  The AP dimension was measured on image 13 of series 11  The overall appearance is unchanged from the February 20, 2021 exam  VENTRICLES:  Normal for the patient's age  SELLA AND PITUITARY GLAND:  Normal  ORBITS:  Normal  PARANASAL SINUSES:  Partially obscured by the artifact from the dental amalgam   Visualized paranasal sinuses are clear as are the mastoid air cells  VASCULATURE:  Evaluation of the major intracranial vasculature demonstrates appropriate flow voids  CALVARIUM AND SKULL BASE:  Normal  EXTRACRANIAL SOFT TISSUES:  Normal      Impression: No parenchymal abnormality  Stable small left parafalcine frontal meningioma  Workstation performed: [id number]       I have personally reviewed pertinent reports     and I have personally reviewed pertinent films in PACS

## 2021-06-21 NOTE — ASSESSMENT & PLAN NOTE
· Patient seen in outpatient office today for 3 month follow up for small left parafalcine meningioma  · Lesion initially noted incidentally during workup for headache and neck pain  Imaging:    · MRI brain w wo 6/11/21: Stable small left parafalcine frontal meningioma  · MRI brain w/wo 2/20/21:No acute disease  Probable 7x 3 mm en plaque, anterior left paramedian falx meningioma  2-3 tiny nonspecific foci of high signal in the subcortical bifrontal white matter  Plan:  · Reviewed imaging with patient  · No nsx intervention anticipated at this time  Continued surveillance is recommended  · Per NCCN guidelines patient will follow-up in 6 months with MRI brain with and without contrast for surveillance of the meningioma  · Discussed with pt that should she experience new or worsening neurological changes to contact our office to be seen sooner  · Patient made aware to contact Neurosurgery with any further questions or concerns

## 2021-06-21 NOTE — PATIENT INSTRUCTIONS
· At this time, we do not anticipate any neurosurgical intervention  · Please follow up with our office in 6 months with an MRI of the Brain completed about a week prior to your appointment  (Snplx Dr Chris Arias)  · Continue to follow up with your primary care provider for general health and we recommend healthy choices  · Should you experience any new or worsening headache, dizziness, lightheadedness, visual disturbance, weakness, numbness, tingling, nausea or vomiting, change in mental status,  based on severity, please go to the Emergency room to get evaluated or contact our office immediately  · Please contact us with any questions or concerns

## 2021-06-22 RX ORDER — CLONIDINE HYDROCHLORIDE 0.1 MG/1
0.1 TABLET ORAL EVERY 12 HOURS SCHEDULED
Qty: 180 TABLET | Refills: 0 | Status: SHIPPED | OUTPATIENT
Start: 2021-06-22 | End: 2021-09-14

## 2021-06-23 ENCOUNTER — TELEPHONE (OUTPATIENT)
Dept: PSYCHIATRY | Facility: CLINIC | Age: 51
End: 2021-06-23

## 2021-06-23 NOTE — TELEPHONE ENCOUNTER
Called patient on 6/23/21  Unable to lm for patient because there is no mailbox set up to confirm if patient is still interested in care      L/s- 4/12/21  No show sent 5/6/21  Follow up no show sent-6/9/23

## 2021-06-23 NOTE — TELEPHONE ENCOUNTER
Unable to reach patient to reschedule appt scheduled for 6/24/21 due to provider having a New patient in that slot  Patient does not have a mailbox that is set up

## 2021-06-24 ENCOUNTER — TELEPHONE (OUTPATIENT)
Dept: PSYCHIATRY | Facility: CLINIC | Age: 51
End: 2021-06-24

## 2021-06-24 NOTE — TELEPHONE ENCOUNTER
Called patient to make sure she was aware of needing to reschedule appt from 6/24/21  Was unable to leave a message because mailbox is full

## 2021-06-25 ENCOUNTER — TELEMEDICINE (OUTPATIENT)
Dept: PSYCHIATRY | Facility: CLINIC | Age: 51
End: 2021-06-25
Payer: COMMERCIAL

## 2021-06-25 ENCOUNTER — TELEPHONE (OUTPATIENT)
Dept: PSYCHIATRY | Facility: CLINIC | Age: 51
End: 2021-06-25

## 2021-06-25 DIAGNOSIS — F51.04 PSYCHOPHYSIOLOGICAL INSOMNIA: ICD-10-CM

## 2021-06-25 DIAGNOSIS — F41.9 ANXIETY: ICD-10-CM

## 2021-06-25 DIAGNOSIS — G62.9 NEUROPATHY: ICD-10-CM

## 2021-06-25 DIAGNOSIS — F33.2 MODERATELY SEVERE RECURRENT MAJOR DEPRESSION (HCC): ICD-10-CM

## 2021-06-25 PROCEDURE — 99443 PR PHYS/QHP TELEPHONE EVALUATION 21-30 MIN: CPT | Performed by: PHYSICIAN ASSISTANT

## 2021-06-25 RX ORDER — HYDROXYZINE HYDROCHLORIDE 25 MG/1
TABLET, FILM COATED ORAL
Qty: 270 TABLET | Refills: 0 | Status: SHIPPED | OUTPATIENT
Start: 2021-06-25 | End: 2021-09-22 | Stop reason: SDUPTHER

## 2021-06-25 RX ORDER — NORTRIPTYLINE HYDROCHLORIDE 50 MG/1
CAPSULE ORAL
Qty: 180 CAPSULE | Refills: 0 | Status: SHIPPED | OUTPATIENT
Start: 2021-06-25 | End: 2021-12-21 | Stop reason: SDUPTHER

## 2021-06-25 RX ORDER — GABAPENTIN 600 MG/1
1200 TABLET ORAL 2 TIMES DAILY
Qty: 360 TABLET | Refills: 0 | Status: SHIPPED | OUTPATIENT
Start: 2021-06-25 | End: 2021-09-22 | Stop reason: SDUPTHER

## 2021-06-25 RX ORDER — MIRTAZAPINE 30 MG/1
30 TABLET, FILM COATED ORAL
Qty: 90 TABLET | Refills: 0 | Status: SHIPPED | OUTPATIENT
Start: 2021-06-25 | End: 2021-09-22 | Stop reason: SDUPTHER

## 2021-06-25 RX ORDER — QUETIAPINE FUMARATE 400 MG/1
400 TABLET, FILM COATED ORAL 2 TIMES DAILY
Qty: 180 TABLET | Refills: 0 | Status: SHIPPED | OUTPATIENT
Start: 2021-06-25 | End: 2021-09-22 | Stop reason: SDUPTHER

## 2021-06-25 NOTE — TELEPHONE ENCOUNTER
1st attempt (encounter routed to writer): Unable to leave message for patient and/or Parent/Guardian to schedule medication management appointment with Vandana Lawton PA-C because recipient's "mailbox is full"      Reason:   Schedule f/u     Last completed appointment with provider:   6/25/2021

## 2021-06-25 NOTE — PSYCH
It was my intent to perform this visit via video technology but the patient was not able to do a video connection so the visit was completed via audio telephone only  Pt could not see or hear me on the [employer]  Also she was driving and I asked her to pull over and she still could not always see and hear me  She started driving again I asked her to pull over and that we will do a phone visit  it took multiple attempts by myself and staff to make contact by phone      Virtual Brief Visit    Assessment/Plan:    Problem List Items Addressed This Visit        Other    Moderately severe recurrent major depression (HCC)    Relevant Medications    mirtazapine (REMERON) 30 mg tablet    QUEtiapine (SEROquel) 400 MG tablet    nortriptyline (PAMELOR) 50 mg capsule    hydrOXYzine HCL (ATARAX) 25 mg tablet    Anxiety    Relevant Medications    QUEtiapine (SEROquel) 400 MG tablet    nortriptyline (PAMELOR) 50 mg capsule    gabapentin (NEURONTIN) 600 MG tablet    hydrOXYzine HCL (ATARAX) 25 mg tablet      Other Visit Diagnoses     Psychophysiological insomnia        Relevant Medications    mirtazapine (REMERON) 30 mg tablet    hydrOXYzine HCL (ATARAX) 25 mg tablet    Neuropathy        Relevant Medications    gabapentin (NEURONTIN) 600 MG tablet                Reason for visit is   Chief Complaint   Patient presents with    Medication Management    Virtual Brief Visit     Phone Visit        Encounter provider Prince Regan [place]    Provider located at [address] [address] [phone]    Recent Visits  Date Type Provider Dept   06/24/21 Telephone Prince Shereen PA-C [address] [place] [address]   06/23/21 Telephone Prince Shereen PA-C [address] [place] [address]   06/23/21 Telephone Prince Regan[address] recent visits within past 7 days and meeting all other requirements  Today's Visits  Date Type Provider Dept   06/25/21 Telemedicine JESSICA Rodriguez 16 today's visits and meeting all other requirements  Future Appointments  No visits were found meeting these conditions  Showing future appointments within next 150 days and meeting all other requirements       After connecting through telephone, the patient was identified by name and date of birth  Gildardo Walker was informed that this is a telemedicine visit and that the visit is being conducted through telephone  My office door was closed  No one else was in the room  She acknowledged consent and understanding of privacy and security of the platform  The patient has agreed to participate and understands she can discontinue the visit at any time  Patient is aware this is a billable service  Subjective    Gildardo Walker is a 44 y o  female with primary c/o "Stressful--the grandchildren  I have a brain tumor, I have a stomach infection that hasn't gone away "   She is following up with specialists and has one more stool test to do  She reports the Hydroxyzine and restart of Quetiapine have helped  Appetite and sleep are "Fine "  She states her job is going well and she has no major plans for the Summer  Her primary support is her family  Pt presently denies any SI, HI, panic attacks, manic or psychotic Sx    Pt reports compliance to psychiatric medications without SE      HPI --as above    Past Medical History:   Diagnosis Date    Abnormal thyroid blood test     last assessed 11/13/14    Anemia     last assessed  11/19/13    Chronic pain     back    Depression     Sleep disorder        Past Surgical History:   Procedure Laterality Date    ABSCESS DRAINAGE      incision - skin abscess    BACK SURGERY      2002    BACK SURGERY      lower    MAMMO STEREOTACTIC BREAST BIOPSY RIGHT (ALL INC) Right 6/21/2017    MAMMO STEREOTACTIC BREAST BIOPSY RIGHT (ALL INC) EACH ADD Right 6/21/2017    TUBAL LIGATION  approx 1998       Current Outpatient Medications   Medication Sig Dispense Refill    clobetasol (TEMOVATE) 0 05 % external solution APPLY TO SCALP TWICE A DAY      cloNIDine (CATAPRES) 0 1 mg tablet TAKE 1 TABLET (0 1 MG TOTAL) BY MOUTH EVERY 12 (TWELVE) HOURS 180 tablet 0    cyclobenzaprine (FLEXERIL) 10 mg tablet Take 10 mg by mouth as needed       DayVigo 10 MG TABS TAKE 1 TABLET BY MOUTH DAILY AT BEDTIME AS NEEDED (INSOMNIA) 30 tablet 5    diclofenac sodium (VOLTAREN) 1 % Apply topically as needed       gabapentin (NEURONTIN) 600 MG tablet Take 2 tablets (1,200 mg total) by mouth 2 (two) times a day 360 tablet 0    hydrOXYzine HCL (ATARAX) 25 mg tablet TAKE 1 TAB PO 3X DAILY AS NEEDED FOR ANXIETY OR INSOMNIA 270 tablet 0    ibuprofen (MOTRIN) 800 mg tablet Take 800 mg by mouth as needed  (Patient not taking: Reported on 6/21/2021)      mirtazapine (REMERON) 30 mg tablet Take 1 tablet (30 mg total) by mouth daily at bedtime 90 tablet 0    nortriptyline (PAMELOR) 50 mg capsule Take 2 caps at 8AM and 1 at bedtime 180 capsule 0    oxyCODONE (ROXICODONE) 30 MG immediate release tablet Take 30 mg by mouth 2 (two) times a day      QUEtiapine (SEROquel) 400 MG tablet Take 1 tablet (400 mg total) by mouth 2 (two) times a day 180 tablet 0    Suvorexant 20 MG TABS One by mouth at bedtime 90 tablet 1    Talicia 625-71 2-03 MG CPDR TAKE 250 MG BY MOUTH 3 (THREE) TIMES A DAY WITH MEALS FOR 14 DAYS 42 capsule 0     No current facility-administered medications for this visit  Allergies   Allergen Reactions    Aspirin GI Intolerance    Other Throat Swelling      raw onions- causes throat to feel like it wants to close       Review of Systems --as per HPI (all neg)    There were no vitals filed for this visit  Laboratory Results: I have personally reviewed all pertinent laboratory/tests results    Imaging Studies: EGD    Result Date: 5/27/2021  Narrative: [address] Endoscopy [address] [facility] [address] DATE OF SERVICE: 5/27/21 PHYSICIAN(S): Brigid Gutiérrez MD - Attending Physician INDICATION: H  pylori infection POST-OP DIAGNOSIS: See the impression below  PREPROCEDURE: Informed consent was obtained for the procedure, including sedation  Risks of perforation, hemorrhage, adverse drug reaction and aspiration were discussed  The patient was placed in the left lateral decubitus position  Patient was explained about the risks and benefits of the procedure  Risks including but not limited to bleeding, infection, and perforation were explained in detail  Also explained about less than 100% sensitivity with the exam and other alternatives  DETAILS OF PROCEDURE: Patient was taken to the procedure room where a time out was performed to confirm correct patient and correct procedure  The patient underwent monitored anesthesia care, which was administered by an anesthesia professional  The patient's blood pressure, heart rate, level of consciousness, respirations and oxygen were monitored throughout the procedure  The scope was advanced to the second part of the duodenum  Retroflexion was performed in the fundus  The patient experienced no blood loss  The procedure was not difficult  The patient tolerated the procedure well  There were no apparent complications  ANESTHESIA INFORMATION: ASA: II Anesthesia Type: Anesthesia type not filed in the log  FINDINGS: The esophagus appeared normal  Erythematous mucosa in the antrum; biopsied for h pylori gastritis Single cratered, round, benign-appearing ulcer in the duodenum  This appeared peptic in nature  SPECIMENS: ID Type Source Tests Collected by Time Destination 1 : cold bx; r/o H  Pylori  Tissue Stomach TISSUE EXAM Brigid Gutiérrez MD 5/27/2021  3:38 PM       Impression: Normal esophagus Mild gastritis   Biopsied for h pylori gastritis There was a cratered ulcer with peptic stricture  RECOMMENDATION: Follow up biopsy results  Timi Spring MD    Colonoscopy    Result Date: 5/27/2021  Narrative: [facility] [address] Endoscopy [id number] [address]: 5/27/21 PHYSICIAN(S): Timi Spring MD - Attending Physician INDICATION: Colon cancer screening Colonoscopy performed for a screening indication  POST-OP DIAGNOSIS: See the impression below  HISTORY: Prior colonoscopy: Less than 3 years ago  It is being repeated at an interval of less than 3 years because: This colonoscopy is being performed for a diagnostic indication BOWEL PREPARATION:  PREPROCEDURE: Informed consent was obtained for the procedure, including sedation  Risks including but not limited to bleeding, infection, perforation, adverse drug reaction and aspiration were explained in detail  Also explained about less than 100% sensitivity with the exam and other alternatives  The patient was placed in the left lateral decubitus position  DETAILS OF PROCEDURE: Patient was taken to the procedure room where a time out was performed to confirm correct patient and correct procedure  The patient underwent monitored anesthesia care, which was administered by an anesthesia professional  The patient's blood pressure, heart rate, level of consciousness, oxygen and respirations were monitored throughout the procedure  A digital rectal exam was performed  The scope was introduced through the anus  Photodocumentation was obtained at the retroflexed view of the rectum  Retroflexion was performed in the rectum  The quality of bowel preparation was evaluated using the [facility] Bowel Preparation Scale with scores of: right colon = not assessed, transverse colon = not assessed, left colon = 0  The total BBPS score was 0  Bowel prep was not adequate  The patient experienced no blood loss  The procedure was not difficult  The patient tolerated the procedure well   There were no apparent complications  ANESTHESIA INFORMATION: ASA: II Anesthesia Type: IV Sedation with Anesthesia FINDINGS: Poor preparation in the colon  Unable to visualize the wall of the colon so procedure aborted EVENTS: Procedure Events Event Event Time ENDO SCOPE OUT TIME 5/27/2021  3:38 PM ENDO SCOPE OUT TIME 5/27/2021  3:43 PM SPECIMENS: ID Type Source Tests Collected by Time Destination 1 : cold bx; r/o H  Pylori  Tissue Stomach TISSUE EXAM Cha Green MD 5/27/2021  3:38 PM  EQUIPMENT:     Impression: Poor preparation  Procedure aborted RECOMMENDATION: Repeat in 6 months due to an inadequate bowel preparation Cha Green MD     MRI brain with and without contrast    Result Date: 6/16/2021  Narrative: MRI BRAIN WITH AND WITHOUT CONTRAST INDICATION: Benign neoplasm of meninges  COMPARISON:  MRI of the brain from February 20, 2021  TECHNIQUE: Sagittal T1, axial T2, axial FLAIR, axial T1, axial [place], axial diffusion  Sagittal, axial T1 postcontrast   Axial bravo postcontrast with coronal reconstructions  Imaging performed on 1 5T MRI  IV Contrast:  7 mL of Gadobutrol injection (SINGLE-DOSE)  IMAGE QUALITY:   Diagnostic  FINDINGS: BRAIN PARENCHYMA:  Diffusion-weighted imaging is unremarkable  No parenchymal edema or mass effect  No pathologic intracranial parenchymal enhancement  Previously noted parafalcine nodular focus of left frontal dural enhancement is again noted on image 84 series 13  This focus measures approximately 7 x 3 x 5 m in the AP, transverse and craniocaudad dimensions  The AP dimension was measured on image 13 of series 11  The overall appearance is unchanged from the February 20, 2021 exam  VENTRICLES:  Normal for the patient's age  SELLA AND PITUITARY GLAND:  Normal  ORBITS:  Normal  PARANASAL SINUSES:  Partially obscured by the artifact from the dental amalgam   Visualized paranasal sinuses are clear as are the mastoid air cells   VASCULATURE:  Evaluation of the major intracranial vasculature demonstrates appropriate flow voids  CALVARIUM AND SKULL BASE:  Normal  EXTRACRANIAL SOFT TISSUES:  Normal      Impression: No parenchymal abnormality  Stable small left parafalcine frontal meningioma  Workstation performed: [id number]         PLAN:  Pt is having moderately severe depression and anxiety without panic Sxs  Tx options discussed and in light of her life circumstances and the fact that she is on multiple medicines, Pt will remain on the same regimen at this time  Tx plan due next visit  Continue:  Quetiapine 400mg (1) tab po bid # 180  Hydroxyzine 25mg (1) tab po tid prn anxiety or insomnia # 270  Nortriptyline 50mg (2) caps po q 8:00AM and (1) qhs # 270  Mirtazapine 30mg (1) tab op qhs # 90  Gabapentin 600mg (2) tabs po bid # 360  DayVigo 10mg--per Sleep specialist  Clonidine 0 1mg (1) tab po q12 hrs--per PCP  Again requested labwork --rationale explained  F/U PCP and specialists as scheduled, for medical issues  Return 12 weeks, (availability is tight), call sooner prn    Risks/Benefits      Risks, Benefits And Possible Side Effects Of Medications:    Risks, benefits, and possible side effects of medications explained to [address] and she verbalizes understanding and agreement for treatment  Risks of medications in pregnancy explained to [address]  She verbalizes understanding and agrees to notify her doctor if she becomes pregnant  I spent 50--all total trying to contact Pt and do the visit minutes directly with the patient during this visit    VIRTUAL VISIT DISCLAIMER    Carlos Dyer acknowledges that she has consented to an online visit or consultation  She understands that the online visit is based solely on information provided by her, and that, in the absence of a face-to-face physical evaluation by the physician, the diagnosis she receives is both limited and provisional in terms of accuracy and completeness   This is not intended to replace a full medical face-to-face evaluation by the physician  Esthela Contreras understands and accepts these terms

## 2021-09-01 ENCOUNTER — TELEPHONE (OUTPATIENT)
Dept: INTERNAL MEDICINE CLINIC | Facility: CLINIC | Age: 51
End: 2021-09-01

## 2021-09-01 DIAGNOSIS — F51.04 PSYCHOPHYSIOLOGICAL INSOMNIA: ICD-10-CM

## 2021-09-01 DIAGNOSIS — L65.9 ALOPECIA: Primary | ICD-10-CM

## 2021-09-01 RX ORDER — CLOBETASOL PROPIONATE 0.46 MG/ML
SOLUTION TOPICAL
Qty: 50 ML | OUTPATIENT
Start: 2021-09-01

## 2021-09-01 NOTE — TELEPHONE ENCOUNTER
Patient needs a new order for Dermatology  She has a follow up scheduled with Dr Amador Reyes on 9/8/21 for follow up on her alopecia  Thank you

## 2021-09-01 NOTE — TELEPHONE ENCOUNTER
Patient was last seen by Dr Alo Hoffman on 6/1/2020  No future appointments scheduled  Patient will need an appointment before we can refill the medication  Will have clerical reach out to the patient to schedule  Clerical- please reach out to the patient to schedule an appointment with Dr Alo Hoffman  Last seen 6/1/2020- will need a f/u to refill med   Thank you

## 2021-09-01 NOTE — TELEPHONE ENCOUNTER
Next appointment 9/30/2021  Please refill to [facility], [address] [facility]

## 2021-09-01 NOTE — TELEPHONE ENCOUNTER
Requested Prescriptions     Pending Prescriptions Disp Refills    clobetasol (TEMOVATE) 0 05 % external solution 50 mL

## 2021-09-02 RX ORDER — LEMBOREXANT 10 MG/1
TABLET, FILM COATED ORAL
Qty: 30 TABLET | Refills: 5 | Status: SHIPPED | OUTPATIENT
Start: 2021-09-02 | End: 2021-09-02 | Stop reason: ALTCHOICE

## 2021-09-14 DIAGNOSIS — F41.9 ANXIETY AND DEPRESSION: Chronic | ICD-10-CM

## 2021-09-14 DIAGNOSIS — F32.A ANXIETY AND DEPRESSION: Chronic | ICD-10-CM

## 2021-09-14 RX ORDER — CLONIDINE HYDROCHLORIDE 0.1 MG/1
0.1 TABLET ORAL EVERY 12 HOURS SCHEDULED
Qty: 180 TABLET | Refills: 0 | Status: SHIPPED | OUTPATIENT
Start: 2021-09-14 | End: 2021-12-16

## 2021-09-21 ENCOUNTER — TELEPHONE (OUTPATIENT)
Dept: SLEEP CENTER | Facility: CLINIC | Age: 51
End: 2021-09-21

## 2021-09-21 NOTE — TELEPHONE ENCOUNTER
Patient left voice message  States she called a few weeks ago for a refill of Debbie Chimera but has not heard anything  She is requesting refill  Dr Cheryl Saldaña, a refill request was sent to you on 9/2/21 which you denied because of alternate therapy  Did you want the patient to discontinue this medication? Per note from 2/19/21 you stated you preferred she not take both Belsomra and Debbie Chimera but would allow here to continue for the time being  Patient has follow up with you next week on 9/30/21  Please advise

## 2021-09-22 DIAGNOSIS — F33.2 MODERATELY SEVERE RECURRENT MAJOR DEPRESSION (HCC): ICD-10-CM

## 2021-09-22 DIAGNOSIS — F51.04 PSYCHOPHYSIOLOGICAL INSOMNIA: ICD-10-CM

## 2021-09-22 DIAGNOSIS — G62.9 NEUROPATHY: ICD-10-CM

## 2021-09-22 DIAGNOSIS — F41.9 ANXIETY: ICD-10-CM

## 2021-09-22 RX ORDER — QUETIAPINE FUMARATE 400 MG/1
400 TABLET, FILM COATED ORAL 2 TIMES DAILY
Qty: 180 TABLET | Refills: 0 | Status: SHIPPED | OUTPATIENT
Start: 2021-09-22 | End: 2021-12-21 | Stop reason: SDUPTHER

## 2021-09-22 RX ORDER — HYDROXYZINE HYDROCHLORIDE 25 MG/1
TABLET, FILM COATED ORAL
Qty: 270 TABLET | Refills: 0 | Status: SHIPPED | OUTPATIENT
Start: 2021-09-22 | End: 2022-01-31

## 2021-09-22 RX ORDER — MIRTAZAPINE 30 MG/1
30 TABLET, FILM COATED ORAL
Qty: 90 TABLET | Refills: 0 | Status: SHIPPED | OUTPATIENT
Start: 2021-09-22 | End: 2021-12-21 | Stop reason: SDUPTHER

## 2021-09-22 RX ORDER — GABAPENTIN 600 MG/1
1200 TABLET ORAL 2 TIMES DAILY
Qty: 360 TABLET | Refills: 0 | Status: SHIPPED | OUTPATIENT
Start: 2021-09-22 | End: 2022-02-08

## 2021-09-28 ENCOUNTER — OFFICE VISIT (OUTPATIENT)
Dept: NEUROLOGY | Facility: CLINIC | Age: 51
End: 2021-09-28
Payer: COMMERCIAL

## 2021-09-28 VITALS
BODY MASS INDEX: 32.96 KG/M2 | TEMPERATURE: 96.9 F | SYSTOLIC BLOOD PRESSURE: 115 MMHG | WEIGHT: 186 LBS | DIASTOLIC BLOOD PRESSURE: 78 MMHG | HEART RATE: 79 BPM | HEIGHT: 63 IN

## 2021-09-28 DIAGNOSIS — M79.18 CERVICAL MYOFASCIAL PAIN SYNDROME: Primary | ICD-10-CM

## 2021-09-28 DIAGNOSIS — M54.81 BILATERAL OCCIPITAL NEURALGIA: ICD-10-CM

## 2021-09-28 PROCEDURE — 20552 NJX 1/MLT TRIGGER POINT 1/2: CPT | Performed by: PSYCHIATRY & NEUROLOGY

## 2021-09-28 PROCEDURE — 99214 OFFICE O/P EST MOD 30 MIN: CPT | Performed by: PSYCHIATRY & NEUROLOGY

## 2021-09-28 RX ORDER — LIDOCAINE HYDROCHLORIDE 10 MG/ML
4 INJECTION, SOLUTION INFILTRATION; PERINEURAL ONCE
Status: COMPLETED | OUTPATIENT
Start: 2021-09-28 | End: 2021-09-28

## 2021-09-28 RX ADMIN — LIDOCAINE HYDROCHLORIDE 4 ML: 10 INJECTION, SOLUTION INFILTRATION; PERINEURAL at 16:45

## 2021-09-28 NOTE — PROGRESS NOTES
Patient ID: Heber Bragg is a 44 y o  female  Assessment/Plan:    No problem-specific Assessment & Plan notes found for this encounter  Diagnoses and all orders for this visit:    Cervical myofascial pain syndrome  -     lidocaine (XYLOCAINE) 1 % injection 4 mL- this was helpful for her last time Did trigger point injections today has part of ongoing treatment plan  No adverse effects with this  She tolerated them well  She has failed PT in the past as this made her worse  We discussed again starting magnesium oxide 250-500 mg daily to further help  Bilateral occipital neuralgia  -     lidocaine (XYLOCAINE) 1 % injection 4 mL         Follow-up in 4 months time or sooner pending clinical course  Subjective:    HPI    Ms  Giuliano Frye is a pleasant 43 yo female seen in follow up for occipital neuralgia seen in follow up  Pt states she did better after lidocaine injections to some degree but headaches still return once every few weeks lasting for at least a day  She is unsure if stress is a trigger  She has significant neck pain that may trigger this  PT/ massage therapy can make her worse  Pain today: 6/10  Does have nausea, vomiting  No significant photophobia  Tylenol XR does not really help  No new focal deficits  Denies new vision changes, has contacts and upto date with her eye exams  She cannot take nsaids due to stomach ulcer and may have H Pylori    She also suffers from severe constipation  We discussed seeing GI  MRI brain 21 with no significant pathology  States twin sister  from cardiomyopathy - viral  Chronic low back pain no new weakness  Prefers limiting Rx medications  MRI brain  wnl other than incidental small meningioma      The following portions of the patient's history were reviewed and updated as appropriate: allergies, current medications, past family history, past medical history, past social history, past surgical history and problem list and ROS Objective:    Blood pressure 115/78, pulse 79, temperature (!) 96 9 °F (36 1 °C), height 5' 3" (1 6 m), weight 84 4 kg (186 lb), not currently breastfeeding  Physical Exam    Gen: NAD  HEENT: NCAT, EOMI, bl occipital notch tenderness  Resp: Symmetric chest rise bl  CVS: normal rate  Ext: no edema    Neurological Exam    AO X 4 no aphasia or dysarthria  CN 2-12 grossly intact  Motor: 5/5 ext x 4  Sensation: Intact to all modalities x 4 except mildly reduced sensation RLE  Reflexes: 2/4 bl biceps triceps patellar and 1/4 bl achilles no babinski or clonus bl  Cerebellar: No dysmetria b/l  Gait: normal range, antalgic at times    Trigger Point Injection  Procedure: Trigger Point Injection(s)  Risks, benefits, alternatives, infection, bleeding and allergic reaction were discussed with the patient  Verbal consent was obtained prior to the procedure and is detailed in the patient's record  4 ml of 1% lidocaine was distributed using a 25 gauge needle into two  trigger/ tender points with maximal pain at palpation: right and left suboccipitalis mm      Zero units were wasted  Patient tolerated the procedure well with immediate post procedural relief  ROS:    Review of Systems   Constitutional: Negative  Negative for appetite change and fever  HENT: Negative  Negative for hearing loss, tinnitus, trouble swallowing and voice change  Eyes: Negative  Negative for photophobia and pain  Respiratory: Negative  Negative for shortness of breath  Cardiovascular: Negative  Negative for palpitations  Gastrointestinal: Negative  Negative for nausea and vomiting  Endocrine: Negative  Negative for cold intolerance  Genitourinary: Negative  Negative for dysuria, frequency and urgency  Musculoskeletal: Positive for back pain  Negative for myalgias and neck pain  Skin: Negative  Negative for rash  Neurological: Positive for headaches   Negative for dizziness, tremors, seizures, syncope, facial asymmetry, speech difficulty, weakness, light-headedness and numbness  Been getting headaches every day for the past 3 weeks  Back with a vengence, taking xtra strength tylenol   Hematological: Negative  Does not bruise/bleed easily  Psychiatric/Behavioral: Negative  Negative for confusion, hallucinations and sleep disturbance

## 2021-09-30 ENCOUNTER — TELEPHONE (OUTPATIENT)
Dept: PSYCHIATRY | Facility: CLINIC | Age: 51
End: 2021-09-30

## 2021-09-30 NOTE — LETTER
21     Rissa Torres   : 1970   [address]       It is the policy of [facility] to monitor and manage appointments that have been no-showed or cancelled with less than 48-hour notice  This is necessary to ensure that we are able to provide timely access for all patients to our providers  Undue numbers of unutilized appointments delays necessary medical care for all patients  Dear Rissa Torres : We are sorry that you missed your appointment with Adriana Funez PA-C on 2021  Your health and follow-up care are important to us  We want to make you aware that you do not have another appointment with Adriana Funez PA-C scheduled  Please be aware that our office policy states that if you 'no show' two Medication Management  appointments in a row without prior notice of cancellation, or three or more in a calendar year, you may be discharged from Medication Management  treatment  We want to bring this to your attention now to prevent an interruption of your care  If you have any questions about this policy, please call us at the number above  If we do not hear from you within 10 business days to make a follow up appointment, we will assume you are no longer interested in care here  Thank you in advance for your cooperation and assistance         Sincerely,      [address] [facility]  E Support Staff

## 2021-12-08 ENCOUNTER — HOSPITAL ENCOUNTER (OUTPATIENT)
Dept: RADIOLOGY | Facility: HOSPITAL | Age: 51
Discharge: HOME | End: 2021-12-08
Payer: COMMERCIAL

## 2021-12-08 DIAGNOSIS — D32.9 MENINGIOMA (HCC): ICD-10-CM

## 2021-12-08 PROCEDURE — 70553 MRI BRAIN STEM W/O & W/DYE: CPT

## 2021-12-08 PROCEDURE — A9585 GADOBUTROL INJECTION: HCPCS | Performed by: PHYSICIAN ASSISTANT

## 2021-12-08 RX ADMIN — GADOBUTROL 8 ML: 604.72 INJECTION INTRAVENOUS at 16:31

## 2021-12-21 DIAGNOSIS — F33.2 MODERATELY SEVERE RECURRENT MAJOR DEPRESSION (HCC): ICD-10-CM

## 2021-12-21 DIAGNOSIS — F51.04 PSYCHOPHYSIOLOGICAL INSOMNIA: ICD-10-CM

## 2021-12-21 DIAGNOSIS — G62.9 NEUROPATHY: ICD-10-CM

## 2021-12-21 DIAGNOSIS — F41.9 ANXIETY: ICD-10-CM

## 2021-12-21 RX ORDER — MIRTAZAPINE 30 MG/1
30 TABLET, FILM COATED ORAL
Qty: 90 TABLET | Refills: 0 | Status: SHIPPED | OUTPATIENT
Start: 2021-12-21 | End: 2022-03-15 | Stop reason: SDUPTHER

## 2021-12-21 RX ORDER — QUETIAPINE FUMARATE 400 MG/1
400 TABLET, FILM COATED ORAL 2 TIMES DAILY
Qty: 180 TABLET | Refills: 0 | Status: SHIPPED | OUTPATIENT
Start: 2021-12-21 | End: 2022-03-15 | Stop reason: SDUPTHER

## 2021-12-21 RX ORDER — CYCLOBENZAPRINE HCL 10 MG
10 TABLET ORAL AS NEEDED
Status: CANCELLED | OUTPATIENT
Start: 2021-12-21

## 2021-12-21 RX ORDER — GABAPENTIN 600 MG/1
1200 TABLET ORAL 2 TIMES DAILY
Qty: 360 TABLET | Refills: 0 | Status: CANCELLED | OUTPATIENT
Start: 2021-12-21 | End: 2022-03-21

## 2021-12-21 RX ORDER — NORTRIPTYLINE HYDROCHLORIDE 50 MG/1
CAPSULE ORAL
Qty: 180 CAPSULE | Refills: 0 | Status: SHIPPED | OUTPATIENT
Start: 2021-12-21 | End: 2022-03-15 | Stop reason: SDUPTHER

## 2022-01-04 ENCOUNTER — TELEMEDICINE (OUTPATIENT)
Dept: NEUROSURGERY | Facility: CLINIC | Age: 52
End: 2022-01-04
Payer: COMMERCIAL

## 2022-01-04 VITALS — WEIGHT: 162 LBS | HEIGHT: 63 IN | BODY MASS INDEX: 28.7 KG/M2

## 2022-01-04 DIAGNOSIS — D32.9 MENINGIOMA (HCC): Primary | ICD-10-CM

## 2022-01-04 PROCEDURE — 99442 PR PHYS/QHP TELEPHONE EVALUATION 11-20 MIN: CPT | Performed by: NURSE PRACTITIONER

## 2022-01-04 NOTE — ASSESSMENT & PLAN NOTE
· Patient seen in outpatient office today for 6 month follow up for small left parafalcine meningioma  · Lesion initially noted incidentally during workup for headache and neck pain  Imaging:    · MRI brain w wo 12/8/21:Stable approximately 5 mm focus of enhancement along the anterior falx cerebri, suggestive of small meningioma  Plan:  · Reviewed imaging with patient  · No nsx intervention anticipated at this time  Continued surveillance is recommended  · Per NCCN guidelines patient will follow-up in 12 months with MRI brain with and without contrast for surveillance of the meningioma  · Discussed with pt that should she experience new or worsening neurological changes to contact our office to be seen sooner  · Patient made aware to contact Neurosurgery with any further questions or concerns

## 2022-01-04 NOTE — PROGRESS NOTES
DATE:  04/17/2020    CONSULTANT:  Jenni Waller M.D.    REASON FOR CONSULTATION:  Acute kidney injury on underlying chronic kidney  disease stage 3.     CHIEF COMPLAINT:  Apparently decreased p.o. intake.    HISTORY OF PRESENT ILLNESS:  The patient is an 83-year-old gentleman with a  past medical history significant for chronic kidney disease, stage 3, with a  baseline serum creatinine of 1.31 from November of 2017, 1.4 from March 21, 2019, from [facility] records.  In addition, he has a past medical history  significant for type 2 diabetes mellitus, hypertension, BPH, history of CVA.  He apparently presented to [facility] with complaints of decreased  p.o. intake.  He was diagnosed with having dehydration and CHLOE.  During the  course of his workup, he had CT scan of the abdomen showing non-ruptured 4.5 x  4.7 cm AAA.  At [facility], they were unable to place a Pichardo  catheter.  So they transferred him to [facility] for Urology evaluation.     We were asked to evaluate the patient in regard to his acute kidney injury.  Again, he does have a previous baseline serum creatinine of 1.4 from [facility] records from March 21, 2019.  He was admitted with a BUN of 61, his  serum creatinine of 4.44, sodium level was 152.  This morning, his sodium level  remains 152 with BUN still 61, and creatinine is improved to 4.17.  He was on  0.9 normal saline 100 mL/hour.     Denies shortness of breath or chest pain.  Positive for nausea.  Denies  vomiting or diarrhea.  Denies fevers or chills.     PAST MEDICAL HISTORY:    1. Chronic kidney disease, [employer] stage 3 with baseline  serum creatinine of 1.4 from March 21, 2019, and 1.31 from November of 2017.  2. Type 2 diabetes mellitus.  3. Hypertension.  4. BPH.  5. History of prior CVA.  6. Obesity.  7. Anemia/anemia of chronic disease.    PAST SURGICAL HISTORY:  Please see past medical history.    ALLERGIES:  No known drug  Virtual Regular Visit    Verification of patient location:    Patient is located in the following state in which I hold an active license [place]      Assessment/Plan:    Problem List Items Addressed This Visit        Nervous and Auditory    Meningioma ([address] ) - Primary     · Patient seen in outpatient office today for 6 month follow up for small left parafalcine meningioma  · Lesion initially noted incidentally during workup for headache and neck pain  Imaging:    · MRI brain w wo 12/8/21:Stable approximately 5 mm focus of enhancement along the anterior falx cerebri, suggestive of small meningioma  Plan:  · Reviewed imaging with patient  · No nsx intervention anticipated at this time  Continued surveillance is recommended  · Per NCCN guidelines patient will follow-up in 12 months with MRI brain with and without contrast for surveillance of the meningioma  · Discussed with pt that should she experience new or worsening neurological changes to contact our office to be seen sooner  · Patient made aware to contact Neurosurgery with any further questions or concerns  Relevant Orders    MRI brain with and without contrast               Reason for visit is   Chief Complaint   Patient presents with    Virtual Regular Visit     MENINGIOMA        Encounter provider LILIANA Villela    Provider located at [address] [place]  [address] [facility] [address] [phone]      Recent Visits  No visits were found meeting these conditions  Showing recent visits within past 7 days and meeting all other requirements  Today's Visits  Date Type Provider Dept   01/04/22 Eddie Finley [facility] today's visits and meeting all other requirements  Future Appointments  No visits were found meeting these conditions    Showing future appointments within next 150 days and meeting all other allergies.    SOCIAL HISTORY:  Denies any tobacco, alcohol, IV drug, or recreational drug use.    FAMILY HISTORY:  Negative for end-stage renal disease or chronic kidney disease.    CURRENT MEDICATIONS:    1. Aspirin 81 mg p.o. daily.  2. Atorvastatin 40 mg p.o. daily.  3. Cholecalciferol 1000 units p.o. daily.  4. Plavix 75 mg p.o. daily.  5. Vitamin B12 1000 mcg p.o. daily.  6. Colace 100 mg p.o. b.i.d.  7. Fluconazole 200 mg p.o. daily.  8. Insulin.  9. Metoprolol succinate 25 mg p.o. daily.  10. Mirtazepine 15 mg p.o. daily.  11. 0.45 normal saline at 100 mL/hour.    REVIEW OF SYSTEMS:  A 14-point review of systems was performed, which is  essentially negative other than for that mentioned in the HPI.     PHYSICAL EXAMINATION:  Vital Signs:  Temperature 36.4, heart rate of 97,  respiratory rate 16, oxygen saturation 93%, blood pressure 133/65.  Due to proposed guidelines by the medical staff administration to reduce risk  of transmission and to preserve the PPE, physical examination is limited to a  single provider per day.  Please refer to the primary physician note for  physical examination.     TEST RESULTS:  Showed a sodium of 150, potassium 3.9, chloride 123, CO2 19, BUN  61, serum creatinine 4.17, calcium 9.9, lactate 2.4, glucose 125, albumin of  2.8.  CBC showed white blood cell count of 16.4, hemoglobin 8.3, hematocrit  26.6, and platelets of 159.  Urinalysis is yellow, cloudy, negative glucose,  negative bilirubin, specific gravity 1.012, large blood, pH of 5.0, 30 of  protein, negative nitrite, large leukocyte esterase, 1-5 squamous epithelial  cells, greater than 100 red blood cells, greater than 100 white blood cells,  but large bacteria.     RADIOLOGICAL STUDIES:  Chest x-ray showed indistinct new right mid lung  densities are nonspecific, early infiltrate not excluded.  Mild left lung base  density is due at least in part to volume loss.  Early infiltrate not entirely  excluded.  Borderline  requirements       The patient was identified by name and date of birth  Carlos Dyer was informed that this is a telemedicine visit and that the visit is being conducted through Telephone  My office door was closed  No one else was in the room  She acknowledged consent and understanding of privacy and security of the video platform  The patient has agreed to participate and understands they can discontinue the visit at any time  Patient is aware this is a billable service  It was my intent to perform this visit via video technology but the patient was not able to do a video connection so the visit was completed via audio telephone only  Subjective  Carlos Dyer is a 46 y o  female with past medical history significant for GERD, dysphagia, prediabetes, cervical radiculopathy, chronic back pain, stage 2 chronic kidney disease, vertigo, H pylori infection, anxiety, and depression  Patient seen in outpatient office today for 6 month follow-up for small left parafalcine meningioma  Lesion was initially noted incidentally during a workup for headaches and neck pain  Patient continues to endorse headaches which start in her neck and radiate to her head  She reports this pain isn't as bad  She does report staying in bed for 1 day secondary to really bad neck pain which caused headaches and she had a vertigo spell which caused her to vomit once  Patient continues to endorse vertigo spells at times  She also reports numbness in her fingers as well as her right foot and toes at times  She also endorses right arm numbness  She denies any dizziness, blurry vision, chest pain, shortness of breath, abdominal pain, nausea, vomiting, diarrhea, no problems with bowel or bladder, no new weakness or numbness/tingling  Patient denies any recent falls or traumas or difficulty with her balance      Of note, patient continues to follow with Neurology and did receive trigger point injections in September HPI     Past Medical History:   Diagnosis Date    Abnormal thyroid blood test     last assessed 11/13/14    Anemia     last assessed  11/19/13    Chronic pain     back    Depression     Sleep disorder        Past Surgical History:   Procedure Laterality Date    ABSCESS DRAINAGE      incision - skin abscess    BACK SURGERY      2002    BACK SURGERY      lower    MAMMO STEREOTACTIC BREAST BIOPSY RIGHT (ALL INC) Right 6/21/2017    MAMMO STEREOTACTIC BREAST BIOPSY RIGHT (ALL INC) EACH ADD Right 6/21/2017    TUBAL LIGATION  approx 1998       Current Outpatient Medications   Medication Sig Dispense Refill    clobetasol (TEMOVATE) 0 05 % external solution APPLY TO SCALP TWICE A DAY      cloNIDine (CATAPRES) 0 1 mg tablet TAKE 1 TABLET (0 1 MG TOTAL) BY MOUTH EVERY 12 (TWELVE) HOURS 180 tablet 0    cyclobenzaprine (FLEXERIL) 10 mg tablet Take 10 mg by mouth as needed       diclofenac sodium (VOLTAREN) 1 % Apply topically as needed       gabapentin (NEURONTIN) 600 MG tablet Take 2 tablets (1,200 mg total) by mouth 2 (two) times a day 360 tablet 0    hydrOXYzine HCL (ATARAX) 25 mg tablet TAKE 1 TAB PO 3X DAILY AS NEEDED FOR ANXIETY OR INSOMNIA 270 tablet 0    Lemborexant 10 MG TABS Take 1 tablet by mouth daily at bedtime 30 tablet 5    mirtazapine (REMERON) 30 mg tablet Take 1 tablet (30 mg total) by mouth daily at bedtime 90 tablet 0    nortriptyline (PAMELOR) 50 mg capsule Take 2 caps at 8AM and 1 at bedtime 180 capsule 0    oxyCODONE (ROXICODONE) 30 MG immediate release tablet Take 30 mg by mouth 2 (two) times a day      QUEtiapine (SEROquel) 400 MG tablet Take 1 tablet (400 mg total) by mouth 2 (two) times a day 180 tablet 0    ibuprofen (MOTRIN) 800 mg tablet Take 800 mg by mouth as needed  (Patient not taking: Reported on 6/21/2021)       No current facility-administered medications for this visit          Allergies   Allergen Reactions    Aspirin GI Intolerance    Other Throat pulmonary vascular and interstitial prominence most  likely are technical.  Mild degree of volume plus without lizz pulmonary edema  is possible.     ASSESSMENT AND PLAN:  The patient is an 83-year-old  gentleman  with:   1. Acute kidney injury on underlying chronic kidney disease, stage 3 with  baseline serum creatinine of 1.31 from November of 2017, 1.4 from March 21, 2019, from [facility].  May have one of obstructive uropathy given  the inability to pass a Pichardo catheter.  In addition, may have a component of  volume depletion/prerenal azotemia.  At this time, we would recommend strict  I's and O's and daily weights.  We have changed his IV fluids to 0.45 normal  saline 100 mL/hour.  We have ordered a kidney ultrasound to assess for kidney  size and echogenicity to rule out obstructive uropathy.  We will check a CPK  level.  We will check urine electrolytes.  Renal function is improving.  Serum  creatinine is improved to 4.17 today from 4.44.  We would recommend continuing  supportive care and avoiding nephrotoxic agents if possible.   2. Inability to pass Pichardo catheter.  Kidney ultrasound is pending.  He is  awaiting Urology consultation.   3. 4.5 x 4.7 AAA.  Defer management to the primary team.  4. Type 2 diabetes mellitus.  5. Hypertension, fairly controlled.  We would recommend avoiding ACE inhibitors  and angiotensin receptor blockers given his acute kidney injury.   6. BPH.  7. History of cerebrovascular accident.  8. Hypernatremia, sodium is 152.  We have changed his IV fluids to 0.45 normal  saline at 100 mL/hour.   9. Anemia/anemia of chronic disease.  We will follow this closely as this may  be hemoconcentrated, may drop with IV hydration.   10. Dyslipidemia.     This plan was discussed with nursing staff.     Thank you for allowing me to see the patient in consultation.      _____________________ ____________________________________  DATE AND TIME Jenni Waller  CAREN CROW/VERONIQUE-#[id number]  DD:  04/17/2020 08:06:25      DT:  04/17/2020 08:47:21    cc:           SANA MOHSIN, DO        [facility]                                  MRN#: [id number]  CONSULTATION                  ROOM: 808S                                 ACCT#: [id number]                                                                            Swelling      raw onions- causes throat to feel like it wants to close       Review of Systems   Constitutional: Negative  HENT: Negative  Eyes: Negative  Respiratory: Negative  Cardiovascular: Negative  Gastrointestinal: Negative  Endocrine: Negative  Genitourinary: Negative  Musculoskeletal: Positive for back pain and neck pain  Skin: Negative  Allergic/Immunologic: Negative  Neurological: Positive for dizziness and headaches  Hematological: Negative  Psychiatric/Behavioral: Negative  ROS reviewed with patient and agree and changes were made as needed  Video Exam    Vitals:    01/04/22 1508   Weight: 73 5 kg (162 lb)   Height: 5' 3" (1 6 m)       Exam:    Patient is alert oriented x3, speech is clear, she denies any asymmetry when looking in the mirror  She states vision and hearing grossly intact  She states she is able to lift bilateral arms above her head without difficulty or discomfort  She reports she is able to walk up and down the steps without difficulty or discomfort  Per patient sensation to light touch is grossly intact in all extremities  Patient is able to perform simple math  I spent 20 minutes directly with the patient during this visit    VIRTUAL VISIT DISCLAIMER      Maci Johnston verbally agrees to participate in [employer]  Pt is aware that [employer] could be limited without vital signs or the ability to perform a full hands-on physical Alexis Oliva understands she or the provider may request at any time to terminate the video visit and request the patient to seek care or treatment in person

## 2022-01-29 DIAGNOSIS — F41.9 ANXIETY: ICD-10-CM

## 2022-01-29 DIAGNOSIS — F51.04 PSYCHOPHYSIOLOGICAL INSOMNIA: ICD-10-CM

## 2022-01-31 RX ORDER — HYDROXYZINE HYDROCHLORIDE 25 MG/1
TABLET, FILM COATED ORAL
Qty: 270 TABLET | Refills: 0 | Status: SHIPPED | OUTPATIENT
Start: 2022-01-31 | End: 2022-03-15 | Stop reason: SDUPTHER

## 2022-02-05 DIAGNOSIS — F41.9 ANXIETY: ICD-10-CM

## 2022-02-05 DIAGNOSIS — G62.9 NEUROPATHY: ICD-10-CM

## 2022-02-08 RX ORDER — GABAPENTIN 600 MG/1
1200 TABLET ORAL 2 TIMES DAILY
Qty: 360 TABLET | Refills: 0 | Status: SHIPPED | OUTPATIENT
Start: 2022-02-08 | End: 2022-05-27

## 2022-03-14 DIAGNOSIS — F41.9 ANXIETY: ICD-10-CM

## 2022-03-14 DIAGNOSIS — F51.04 PSYCHOPHYSIOLOGICAL INSOMNIA: ICD-10-CM

## 2022-03-14 DIAGNOSIS — F32.A ANXIETY AND DEPRESSION: Chronic | ICD-10-CM

## 2022-03-14 DIAGNOSIS — F33.2 MODERATELY SEVERE RECURRENT MAJOR DEPRESSION (HCC): ICD-10-CM

## 2022-03-14 DIAGNOSIS — F41.9 ANXIETY AND DEPRESSION: Chronic | ICD-10-CM

## 2022-03-14 RX ORDER — QUETIAPINE FUMARATE 400 MG/1
400 TABLET, FILM COATED ORAL 2 TIMES DAILY
Qty: 180 TABLET | Refills: 0 | OUTPATIENT
Start: 2022-03-14

## 2022-03-14 RX ORDER — MIRTAZAPINE 30 MG/1
30 TABLET, FILM COATED ORAL
Qty: 90 TABLET | Refills: 0 | OUTPATIENT
Start: 2022-03-14

## 2022-03-14 RX ORDER — CLONIDINE HYDROCHLORIDE 0.1 MG/1
0.1 TABLET ORAL EVERY 12 HOURS
Qty: 180 TABLET | Refills: 3 | Status: CANCELLED | OUTPATIENT
Start: 2022-03-14

## 2022-03-14 NOTE — TELEPHONE ENCOUNTER
Patient called and requested a medication refill  Patient was also scheduled with the provider for tomorrow

## 2022-03-15 ENCOUNTER — TELEMEDICINE (OUTPATIENT)
Dept: PSYCHIATRY | Facility: CLINIC | Age: 52
End: 2022-03-15
Payer: COMMERCIAL

## 2022-03-15 DIAGNOSIS — F33.2 MODERATELY SEVERE RECURRENT MAJOR DEPRESSION (HCC): Primary | ICD-10-CM

## 2022-03-15 DIAGNOSIS — F51.04 PSYCHOPHYSIOLOGICAL INSOMNIA: ICD-10-CM

## 2022-03-15 DIAGNOSIS — F41.9 ANXIETY: ICD-10-CM

## 2022-03-15 PROCEDURE — 99213 OFFICE O/P EST LOW 20 MIN: CPT | Performed by: PHYSICIAN ASSISTANT

## 2022-03-15 RX ORDER — QUETIAPINE FUMARATE 400 MG/1
400 TABLET, FILM COATED ORAL 2 TIMES DAILY
Qty: 180 TABLET | Refills: 0 | Status: SHIPPED | OUTPATIENT
Start: 2022-03-15 | End: 2022-06-06 | Stop reason: SDUPTHER

## 2022-03-15 RX ORDER — MIRTAZAPINE 30 MG/1
30 TABLET, FILM COATED ORAL
Qty: 90 TABLET | Refills: 0 | Status: SHIPPED | OUTPATIENT
Start: 2022-03-15 | End: 2022-05-31 | Stop reason: ALTCHOICE

## 2022-03-15 RX ORDER — NORTRIPTYLINE HYDROCHLORIDE 50 MG/1
CAPSULE ORAL
Qty: 180 CAPSULE | Refills: 0 | Status: SHIPPED | OUTPATIENT
Start: 2022-03-15 | End: 2022-05-31 | Stop reason: ALTCHOICE

## 2022-03-15 RX ORDER — HYDROXYZINE HYDROCHLORIDE 25 MG/1
TABLET, FILM COATED ORAL
Qty: 270 TABLET | Refills: 0 | Status: SHIPPED | OUTPATIENT
Start: 2022-03-15 | End: 2022-06-08 | Stop reason: SDUPTHER
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
## 2022-03-15 NOTE — BH TREATMENT PLAN
TREATMENT PLAN (Medication Management Only)        [facility]    Name and Date of Birth:  Alejandro Sutton 46 y o  1970  Date of Treatment Plan: March 15, 2022  Diagnosis/Diagnoses:    1  Moderately severe recurrent major depression ([address] )    2  Anxiety    3  Psychophysiological insomnia      Strengths/Personal Resources for Self-Care: "Laughing, Dancing, Being silly, Cooking, Eating sweets"  Area/Areas of need (in own words): "I'm doing good"  1  Long Term Goal: Maintain mood stability and control anxiety  Target Date:3-6 months  Person/Persons responsible for completion of goal: Newt Phoenix  2  Short Term Objective (s) - How will we reach this goal?:   A  Provider new recommended medication/dosage changes and/or continue medication(s): Pt is feeling well without c/o any Sxs  She appears stable and feels her medicines are helping  She does NOT feel ready for a trial reduction in any of her psychiatric medications at this time  Treatment plan done and Pt accepts the plan   Treatment Plan done but not signed at time of office visit due to:  Plan reviewed by phone or in person  and verbal consent given due to COVID social distancing  Quetiapine 400mg (1) tab po bid # 180  Nortriptyline 50mg (2) caps po q8:00AM and (1) qhs # 270  Gabapentin 600mg (2) tabs po bid --was renewed 2/8/2022 but Pt states she picked the Rx up last week  Hydroxyzine 25mg (1) tab po tid prn anxiety and insomnia # 270  Mirtazapine 30mg (1) tab po qhs # 90  Dayvigo/Lemborexant 10mg (1) tab po qhs--per sleep specialist  Clonidine 0 1mg (1) tab po q12 hrs--per PCP  F/U neurology as scheduled 1/2023  F/U sleep specialist as sched 5/2022  Return 12 weeks, call sooner prn    Target Date:3-6 months  Person/Persons Responsible for Completion of Goal: Newt Phoenix  Progress Towards Goals: stable, continuing treatment  Treatment Modality: 3-6 months  Review due 180 days from date of this plan: 6 months - 9/15/2022  Expected length of service: ongoing treatment  My Physician/PA/NP and I have developed this plan together and I agree to work on the goals and objectives  I understand the treatment goals that were developed for my treatment

## 2022-04-22 DIAGNOSIS — F51.01 PRIMARY INSOMNIA: ICD-10-CM

## 2022-04-24 RX ORDER — LEMBOREXANT 10 MG/1
TABLET, FILM COATED ORAL
Qty: 30 TABLET | Refills: 5 | Status: CANCELLED | OUTPATIENT
Start: 2022-04-24

## 2022-04-25 RX ORDER — SUVOREXANT 20 MG/1
20 TABLET, FILM COATED ORAL
Qty: 30 TABLET | Refills: 5 | Status: SHIPPED | OUTPATIENT
Start: 2022-04-25 | End: 2022-05-31 | Stop reason: ALTCHOICE

## 2022-04-28 ENCOUNTER — TELEPHONE (OUTPATIENT)
Dept: SLEEP CENTER | Facility: CLINIC | Age: 52
End: 2022-04-28

## 2022-04-29 DIAGNOSIS — F51.01 PRIMARY INSOMNIA: Primary | ICD-10-CM

## 2022-05-26 DIAGNOSIS — F51.04 PSYCHOPHYSIOLOGICAL INSOMNIA: ICD-10-CM

## 2022-05-26 DIAGNOSIS — G62.9 NEUROPATHY: ICD-10-CM

## 2022-05-26 DIAGNOSIS — F33.2 MODERATELY SEVERE RECURRENT MAJOR DEPRESSION (HCC): ICD-10-CM

## 2022-05-26 DIAGNOSIS — F41.9 ANXIETY: ICD-10-CM

## 2022-05-27 RX ORDER — HYDROXYZINE HYDROCHLORIDE 25 MG/1
TABLET, FILM COATED ORAL
Qty: 270 TABLET | Refills: 0 | OUTPATIENT
Start: 2022-05-27

## 2022-05-27 RX ORDER — MIRTAZAPINE 30 MG/1
30 TABLET, FILM COATED ORAL
Qty: 90 TABLET | Refills: 0 | OUTPATIENT
Start: 2022-05-27

## 2022-05-27 RX ORDER — QUETIAPINE FUMARATE 400 MG/1
400 TABLET, FILM COATED ORAL 2 TIMES DAILY
Qty: 180 TABLET | Refills: 0 | OUTPATIENT
Start: 2022-05-27

## 2022-05-27 RX ORDER — GABAPENTIN 600 MG/1
1200 TABLET ORAL 2 TIMES DAILY
Qty: 360 TABLET | Refills: 0 | OUTPATIENT
Start: 2022-05-27 | End: 2022-08-25

## 2022-05-27 NOTE — TELEPHONE ENCOUNTER
Pt stated the pharmacy has no refill so she doesn't know why they were denied on 5/27/22 can you please look into this thank you

## 2022-05-31 ENCOUNTER — OFFICE VISIT (OUTPATIENT)
Dept: SLEEP CENTER | Facility: CLINIC | Age: 52
End: 2022-05-31
Payer: COMMERCIAL

## 2022-05-31 VITALS
HEIGHT: 63 IN | BODY MASS INDEX: 32.5 KG/M2 | HEART RATE: 89 BPM | WEIGHT: 183.4 LBS | SYSTOLIC BLOOD PRESSURE: 110 MMHG | DIASTOLIC BLOOD PRESSURE: 60 MMHG | OXYGEN SATURATION: 96 %

## 2022-05-31 DIAGNOSIS — F51.04 PSYCHOPHYSIOLOGICAL INSOMNIA: Primary | ICD-10-CM

## 2022-05-31 PROCEDURE — 99213 OFFICE O/P EST LOW 20 MIN: CPT | Performed by: INTERNAL MEDICINE

## 2022-05-31 RX ORDER — NALOXONE HYDROCHLORIDE 4 MG/.1ML
SPRAY NASAL
Qty: 1 EACH | Refills: 1 | Status: SHIPPED | OUTPATIENT
Start: 2022-05-31

## 2022-05-31 RX ORDER — LORAZEPAM 0.5 MG/1
0.5 TABLET ORAL
Qty: 30 TABLET | Refills: 5 | Status: SHIPPED | OUTPATIENT
Start: 2022-05-31

## 2022-05-31 NOTE — TELEPHONE ENCOUNTER
Per Salima's message: [place], She has several days more than enough until next appt, and will be renewed at that time  Liz and AJIT Garza relaying Salima's message

## 2022-05-31 NOTE — PROGRESS NOTES
Progress Note - Sleep Center   Xena Magallanes :1970 MRN: [id number]      Reason for Visit:    46 y  o female with severe insomnia secondary to grief and PTSD    Assessment:  Dayvigo helps slightly  Plan:  Continue Isidore Harini  Add Ativan 0 5 mg at bedtime  The patient is struggling with grief and her current regimen is not sufficient  We discussed the possible risks for misuse, abuse and addiction of the controlled substance based on the patient's family and social history  We discussed the possible side effects and risks with taking this type of medication, including abuse, misuse and possible addiction  The PDMP was verified and appropriate for use of the medication      Follow up:  Six months    History of Present Illness:  Chronic insomnia    Review of Systems      Genitourinary none   Cardiology none   Gastrointestinal none   Neurology none   Constitutional none   Integumentary none   Psychiatry anxiety and depression   Musculoskeletal back pain and leg cramps   Pulmonary snoring   ENT none   Endocrine none   Hematological none         I have reviewed and updated the review of systems as necessary     Historical Information    Past Medical History:   Diagnosis Date    Abnormal thyroid blood test     last assessed 14    Anemia     last assessed  13    Chronic pain     back    Depression     Sleep disorder          Past Surgical History:   Procedure Laterality Date    ABSCESS DRAINAGE      incision - skin abscess    BACK SURGERY      2002    BACK SURGERY      lower    MAMMO STEREOTACTIC BREAST BIOPSY RIGHT (ALL INC) Right 2017    MAMMO STEREOTACTIC BREAST BIOPSY RIGHT (ALL INC) EACH ADD Right 2017    TUBAL LIGATION  approx          Social History     Socioeconomic History    Marital status: /Civil Union     Spouse name: Not on file    Number of children: Not on file    Years of education: Not on file    Highest education level: Not on file Occupational History    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   Tobacco Use    Smoking status: Former Smoker     Types: Cigarettes    Smokeless tobacco: Never Used    Tobacco comment: pt "quit five years ago"--Pt clarified 2013 during a 4/12/2021 visit   Vaping Use    Vaping Use: Some days    Substances: Nicotine   Substance and Sexual Activity    Alcohol use: No    Drug use: Not Currently     Types: Marijuana     Comment: Tried THC in grammar school and high school     Sexual activity: Yes     Partners: Male     Birth control/protection: None, Female Sterilization   Other Topics Concern    Not on file   Social History Narrative    Death in the family - sister    Lack of adequate sleep    Parentage    Sedentary lifestyle    Under stress        Home: Pt lives with  and 2 grandchildren and one stepson        Education:    Pt denies any h/o learning disabilities and reached childhood milestones on time as far as he knows       Graduated HS    Some college     Social Determinants of Health     Financial Resource Strain: Not on file   Food Insecurity: Not on file   Transportation Needs: Not on file   Physical Activity: Not on file   Stress: Not on file   Social Connections: Not on file   Intimate Partner Violence: Not on file   Housing Stability: Not on file           History   Alcohol use: Not on file       History   Smoking Status    Not on file   Smokeless Tobacco    Not on file       Family History:   Family History   Problem Relation Age of Onset    Kidney disease Mother         chronic    Diabetes Mother     Bone cancer Father 54    Heart attack Sister         Acute MI    Diabetes Sister     Hypertension Brother     Cancer Family     Diabetes Family     Heart disease Family     Hyperlipidemia Family     Hypertension Family     Colon cancer Paternal Grandfather 80    No Known Problems Maternal Grandmother     No Known Problems Maternal Grandfather     No Known Problems Paternal Grandmother     No Known Problems Sister     No Known Problems Sister     No Known Problems Sister     No Known Problems Brother     No Known Problems Brother     No Known Problems Brother     No Known Problems Daughter     No Known Problems Daughter     No Known Problems Son     No Known Problems Son     No Known Problems Son     No Known Problems Son        Medications/Allergies:      Current Outpatient Medications:     clobetasol (TEMOVATE) 0 05 % external solution, APPLY TO SCALP TWICE A DAY, Disp: , Rfl:     cloNIDine (CATAPRES) 0 1 mg tablet, TAKE 1 TABLET BY MOUTH EVERY 12 HOURS , Disp: 180 tablet, Rfl: 3    cyclobenzaprine (FLEXERIL) 10 mg tablet, Take 10 mg by mouth as needed , Disp: , Rfl:     diclofenac sodium (VOLTAREN) 1 %, Apply topically as needed , Disp: , Rfl:     gabapentin (NEURONTIN) 600 MG tablet, TAKE 2 TABLETS (1,200 MG TOTAL) BY MOUTH 2 (TWO) TIMES A DAY, Disp: 360 tablet, Rfl: 0    hydrOXYzine HCL (ATARAX) 25 mg tablet, TAKE 1 TABLET BY MOUTH THREE TIMES A DAY AS NEEDED FOR ANXIETY AND INSOMNIA, Disp: 270 tablet, Rfl: 0    Lemborexant 10 MG TABS, Take 10 mg by mouth daily at bedtime, Disp: 30 tablet, Rfl: 0    LORazepam (Ativan) 0 5 mg tablet, Take 1 tablet (0 5 mg total) by mouth daily at bedtime, Disp: 30 tablet, Rfl: 5    naloxone (NARCAN) 4 mg/0 1 mL nasal spray, Administer 1 spray into a nostril   If no response after 2-3 minutes, give another dose in the other nostril using a new spray , Disp: 1 each, Rfl: 1    oxyCODONE (ROXICODONE) 30 MG immediate release tablet, Take 30 mg by mouth 2 (two) times a day, Disp: , Rfl:     QUEtiapine (SEROquel) 400 MG tablet, Take 1 tablet (400 mg total) by mouth 2 (two) times a day, Disp: 180 tablet, Rfl: 0    ibuprofen (MOTRIN) 800 mg tablet, Take 800 mg by mouth as needed  (Patient not taking: No sig reported), Disp: , Rfl:       Objective    Vital Signs:   Vitals:    05/31/22 1300   BP: 110/60   Pulse: 89   SpO2: 96% Weight: 83 2 kg (183 lb 6 4 oz)   Height: 5' 3" (1 6 m)     [place] Sleepiness Scale: Total score: 4    Physical Exam:    General: Alert, appropriate, cooperative, overweight    Head: NC/AT    Skin: Warm, dry    Neuro: No motor abnormalities, cranial nerves appear intact    Psych: Normal affect            RICHARD Lazo    Board Certified Sleep Specialist

## 2022-05-31 NOTE — TELEPHONE ENCOUNTER
Aislinn Arevalo called and lm again stating she needs refills  Called and left an additional message that she should have enough medication until next appointment and refills will be reviewed at that time

## 2022-06-06 ENCOUNTER — TELEPHONE (OUTPATIENT)
Dept: SLEEP CENTER | Facility: CLINIC | Age: 52
End: 2022-06-06

## 2022-06-06 DIAGNOSIS — F41.9 ANXIETY: ICD-10-CM

## 2022-06-06 DIAGNOSIS — G62.9 NEUROPATHY: ICD-10-CM

## 2022-06-06 DIAGNOSIS — F51.01 PRIMARY INSOMNIA: ICD-10-CM

## 2022-06-06 DIAGNOSIS — F33.2 MODERATELY SEVERE RECURRENT MAJOR DEPRESSION (HCC): ICD-10-CM

## 2022-06-06 DIAGNOSIS — F51.04 PSYCHOPHYSIOLOGICAL INSOMNIA: ICD-10-CM

## 2022-06-06 DIAGNOSIS — F41.9 ANXIETY AND DEPRESSION: Chronic | ICD-10-CM

## 2022-06-06 DIAGNOSIS — F32.A ANXIETY AND DEPRESSION: Chronic | ICD-10-CM

## 2022-06-07 ENCOUNTER — OFFICE VISIT (OUTPATIENT)
Dept: INTERNAL MEDICINE CLINIC | Facility: CLINIC | Age: 52
End: 2022-06-07

## 2022-06-07 VITALS
OXYGEN SATURATION: 98 % | TEMPERATURE: 98.6 F | DIASTOLIC BLOOD PRESSURE: 72 MMHG | BODY MASS INDEX: 33.41 KG/M2 | SYSTOLIC BLOOD PRESSURE: 109 MMHG | HEART RATE: 75 BPM | WEIGHT: 188.6 LBS

## 2022-06-07 DIAGNOSIS — K59.09 OTHER CONSTIPATION: ICD-10-CM

## 2022-06-07 DIAGNOSIS — R73.03 PREDIABETES: ICD-10-CM

## 2022-06-07 DIAGNOSIS — A04.8 H. PYLORI INFECTION: Primary | ICD-10-CM

## 2022-06-07 DIAGNOSIS — R79.89 LOW T4: ICD-10-CM

## 2022-06-07 DIAGNOSIS — Z00.00 HEALTHCARE MAINTENANCE: ICD-10-CM

## 2022-06-07 DIAGNOSIS — G89.29 CHRONIC LOW BACK PAIN, UNSPECIFIED BACK PAIN LATERALITY, UNSPECIFIED WHETHER SCIATICA PRESENT: ICD-10-CM

## 2022-06-07 DIAGNOSIS — M54.50 CHRONIC LOW BACK PAIN, UNSPECIFIED BACK PAIN LATERALITY, UNSPECIFIED WHETHER SCIATICA PRESENT: ICD-10-CM

## 2022-06-07 PROCEDURE — 99213 OFFICE O/P EST LOW 20 MIN: CPT | Performed by: INTERNAL MEDICINE

## 2022-06-07 RX ORDER — DOCUSATE SODIUM 100 MG/1
100 CAPSULE, LIQUID FILLED ORAL DAILY
Qty: 90 CAPSULE | Refills: 1 | Status: SHIPPED | OUTPATIENT
Start: 2022-06-07

## 2022-06-07 NOTE — PROGRESS NOTES
[facility]  INTERNAL MEDICINE  [address] [place]      NAME: Jerry November  AGE: 46 y o  SEX: female    DATE OF ENCOUNTER: 6/7/2022    ASSESSMENT AND PLAN     1  H  pylori infection  History of H pylori infection treated 3 times separately  Patient has not been tested for eradication since last treatment  In 06/2021  Reports improvement in GERD symptoms  Not taking PPI currently and symptoms are diet controlled    - H  pylori antigen, stool; Future  - CBC and differential; Future    2  Low T4  2018 T4 0 70 (low) and TSH 3 2 (normal)  Reports day time fatigue which she contributes to pain and work  - Comprehensive metabolic panel; Future  - TSH, 3rd generation with Free T4 reflex; Future    3  Chronic low back pain, unspecified back pain laterality, unspecified whether sciatica present  - refilled Diclofenac Sodium (VOLTAREN) 1 %; Apply 2 g topically 4 (four) times a day  Dispense: 100 g; Refill: 1    4  Healthcare maintenance  - HEMOGLOBIN A1C W/ EAG ESTIMATION; Future  - Lipid Panel with Direct LDL reflex; Future  - follow-up with OBGYN on 6/9  - follow-up with GI for colonoscopy for CRC screening   - BMI Counseling: Body mass index is 33 41 kg/m²  The BMI is above normal  Nutrition recommendations include reducing portion sizes, 3-5 servings of fruits/vegetables daily, reducing fast food intake, consuming healthier snacks and decreasing soda and/or juice intake  Exercise recommendations include exercising 3-5 times per week  5  Other constipation  - docusate sodium (COLACE) 100 mg capsule; Take 1 capsule (100 mg total) by mouth in the morning  Dispense: 90 capsule; Refill: 1    6  Prediabetes  A1c 6 0 previously  - repeat A1c ordered    RTC in 3-6 months for medicare annual wellness visit       Orders Placed This Encounter   Procedures    H  pylori antigen, stool    CBC and differential    Comprehensive metabolic panel    HEMOGLOBIN A1C W/ EAG ESTIMATION    Lipid Panel with Direct LDL reflex    TSH, 3rd generation with Free T4 reflex       CHIEF COMPLAINT     Chief Complaint   Patient presents with    Diabetes     Pre diabetes         HISTORY OF PRESENT ILLNESS     Shruthi Aguero is a 46 Year old female past medical history of GERD, H pylori (previously treated 3 times - last treatment in 6/2021), pre diabetes, CKD stage 2, anxiety, depression who presents for general follow-up  Patient reports ongoing chronic back pain which improves with voltaren gel, requests refill  Has follow-up appointment with GYN on 6/9  Due for CRC screening - recommend follow-up with GI  No H pylori eradication testing done since last treatment in 6/2022  No other acute complaints today       The following portions of the patient's history were reviewed and updated as appropriate: allergies, current medications, past family history, past medical history, past social history, past surgical history and problem list     REVIEW OF SYSTEMS     ROS      All other ROS negative except per HPI    ACTIVE PROBLEM LIST     Patient Active Problem List   Diagnosis    GERD (gastroesophageal reflux disease)    Alopecia    History of prediabetes    Vertigo    Esophageal dysphagia    Latent tuberculosis by blood test    H  pylori infection    Stage 2 chronic kidney disease    Moderately severe recurrent major depression ([address] )    New persistent daily headache    Meningioma ([address] )    Radiculopathy, cervical    Anxiety    Encounter for long-term (current) use of other medications    Bilateral occipital neuralgia    Cardiopulmonary obesity syndrome ([address] )       Past Medical History:   Diagnosis Date    Abnormal thyroid blood test     last assessed 11/13/14    Anemia     last assessed  11/19/13    Chronic pain     back    Depression     Sleep disorder        CURRENT MEDICATIONS       Current Outpatient Medications:     clobetasol (TEMOVATE) 0 05 % external solution, APPLY TO SCALP TWICE A DAY, Disp: , Rfl:     cloNIDine (CATAPRES) 0 1 mg tablet, TAKE 1 TABLET BY MOUTH EVERY 12 HOURS , Disp: 180 tablet, Rfl: 3    Diclofenac Sodium (VOLTAREN) 1 %, Apply 2 g topically 4 (four) times a day, Disp: 100 g, Rfl: 1    docusate sodium (COLACE) 100 mg capsule, Take 1 capsule (100 mg total) by mouth in the morning, Disp: 90 capsule, Rfl: 1    gabapentin (NEURONTIN) 600 MG tablet, TAKE 2 TABLETS (1,200 MG TOTAL) BY MOUTH 2 (TWO) TIMES A DAY, Disp: 360 tablet, Rfl: 0    hydrOXYzine HCL (ATARAX) 25 mg tablet, TAKE 1 TABLET BY MOUTH THREE TIMES A DAY AS NEEDED FOR ANXIETY AND INSOMNIA, Disp: 270 tablet, Rfl: 0    Lemborexant 10 MG TABS, Take 10 mg by mouth daily at bedtime, Disp: 30 tablet, Rfl: 0    LORazepam (Ativan) 0 5 mg tablet, Take 1 tablet (0 5 mg total) by mouth daily at bedtime, Disp: 30 tablet, Rfl: 5    naloxone (NARCAN) 4 mg/0 1 mL nasal spray, Administer 1 spray into a nostril   If no response after 2-3 minutes, give another dose in the other nostril using a new spray , Disp: 1 each, Rfl: 1    oxyCODONE (ROXICODONE) 30 MG immediate release tablet, Take 30 mg by mouth 2 (two) times a day, Disp: , Rfl:     QUEtiapine (SEROquel) 400 MG tablet, Take 1 tablet (400 mg total) by mouth 2 (two) times a day, Disp: 180 tablet, Rfl: 0    cyclobenzaprine (FLEXERIL) 10 mg tablet, Take 10 mg by mouth as needed  (Patient not taking: Reported on 6/7/2022), Disp: , Rfl:     Allergies   Allergen Reactions    Aspirin GI Intolerance    Other Throat Swelling      raw onions- causes throat to feel like it wants to close       Social History   Past Surgical History:   Procedure Laterality Date    ABSCESS DRAINAGE      incision - skin abscess    BACK SURGERY      2002    BACK SURGERY      lower    MAMMO STEREOTACTIC BREAST BIOPSY RIGHT (ALL INC) Right 6/21/2017    MAMMO STEREOTACTIC BREAST BIOPSY RIGHT (ALL INC) EACH ADD Right 6/21/2017    TUBAL LIGATION  approx 1998 Family History   Problem Relation Age of Onset    Kidney disease Mother         chronic    Diabetes Mother     Bone cancer Father 54    Heart attack Sister         Acute MI    Diabetes Sister     Hypertension Brother     Cancer Family     Diabetes Family     Heart disease Family     Hyperlipidemia Family     Hypertension Family     Colon cancer Paternal Grandfather 80    No Known Problems Maternal Grandmother     No Known Problems Maternal Grandfather     No Known Problems Paternal Grandmother     No Known Problems Sister     No Known Problems Sister     No Known Problems Sister     No Known Problems Brother     No Known Problems Brother     No Known Problems Brother     No Known Problems Daughter     No Known Problems Daughter     No Known Problems Son     No Known Problems Son     No Known Problems Son     No Known Problems Son        OBJECTIVE     /72   Pulse 75   Temp 98 6 °F (37 °C) (Temporal)   Wt 85 5 kg (188 lb 9 6 oz)   SpO2 98%   BMI 33 41 kg/m²     Physical Exam  Vitals reviewed  Constitutional:       General: She is not in acute distress  Appearance: Normal appearance  She is not ill-appearing  HENT:      Head: Normocephalic and atraumatic  Eyes:      General: No scleral icterus  Extraocular Movements: Extraocular movements intact  Conjunctiva/sclera: Conjunctivae normal    Cardiovascular:      Rate and Rhythm: Normal rate and regular rhythm  Heart sounds: Normal heart sounds  No murmur heard  Pulmonary:      Effort: Pulmonary effort is normal  No respiratory distress  Breath sounds: Normal breath sounds  No wheezing or rales  Abdominal:      General: Bowel sounds are normal       Palpations: Abdomen is soft  Tenderness: There is no abdominal tenderness  Musculoskeletal:         General: Normal range of motion  Cervical back: Normal range of motion  Right lower leg: No edema  Left lower leg: No edema  Skin:     General: Skin is warm and dry  Neurological:      General: No focal deficit present  Mental Status: She is oriented to person, place, and time  Gait: Gait normal    Psychiatric:         Mood and Affect: Mood normal          Behavior: Behavior normal           Pertinent Laboratory/Diagnostic Studies:     MRI brain with and without contrast    Result Date: 12/9/2021  Impression: Stable approximately 5 mm focus of enhancement along the anterior falx cerebri, suggestive of small meningioma  Workstation performed: [id number]       Images and diagnostics reviewed     [address] Maintenance   Topic Date Due    Medicare Annual Wellness Visit (AWV)  Never done    COVID-19 Vaccine (1) Never done    Cervical Cancer Screening  03/09/2020    BMI: Followup Plan  07/31/2021    Colorectal Cancer Screening  11/27/2021    Depression Remission PHQ  02/10/2022    Breast Cancer Screening: Mammogram  05/11/2022    Influenza Vaccine (Season Ended) 09/01/2022    BMI: Adult  06/07/2023    DTaP,Tdap,and Td Vaccines (2 - Td or Tdap) 11/19/2023    HIV Screening  Completed    Hepatitis C Screening  Completed    Pneumococcal Vaccine: Pediatrics (0 to 5 Years) and At-Risk Patients (6 to 59 Years)  Aged Out    HIB Vaccine  Aged Out    Hepatitis B Vaccine  Aged Out    IPV Vaccine  Aged Out    Hepatitis A Vaccine  Aged Out    Meningococcal ACWY Vaccine  Aged Out    HPV Vaccine  Aged [employer] History   Administered Date(s) Administered    INFLUENZA 03/30/2017    Influenza, seasonal, injectable 11/19/2013, 11/13/2014, 11/17/2015    Influenza, seasonal, injectable, preservative free 03/30/2017    Tdap 11/19/2013       I globally spent 30 minutes face-to-face with the patient and with chart review       Lebron Chester, DO  Internal Medicine PGY-2

## 2022-06-07 NOTE — PATIENT INSTRUCTIONS
Heart Healthy Diet   AMBULATORY CARE:   A heart healthy diet  is an eating plan low in unhealthy fats and sodium (salt)  The plan is high in healthy fats and fiber  A heart healthy diet helps improve your cholesterol levels and lowers your risk for heart disease and stroke  A dietitian will teach you how to read and understand food labels  Heart healthy diet guidelines to follow:   Choose foods that contain healthy fats  Unsaturated fats  include monounsaturated and polyunsaturated fats  Unsaturated fat is found in foods such as soybean, canola, olive, corn, and safflower oils  It is also found in soft tub margarine that is made with liquid vegetable oil  Omega-3 fat  is found in certain fish, such as salmon, tuna, and trout, and in walnuts and flaxseed  Eat fish high in omega-3 fats at least 2 times a week  Get 20 to 30 grams of fiber each day  Fruits, vegetables, whole-grain foods, and legumes (cooked beans) are good sources of fiber  Limit or do not have unhealthy fats  Cholesterol  is found in animal foods, such as eggs and lobster, and in dairy products made from whole milk  Limit cholesterol to less than 200 mg each day  Saturated fat  is found in meats, such as urbano and hamburger  It is also found in chicken or turkey skin, whole milk, and butter  Limit saturated fat to less than 7% of your total daily calories  Trans fat  is found in packaged foods, such as potato chips and cookies  It is also in hard margarine, some fried foods, and shortening  Do not eat foods that contain trans fats  Limit sodium as directed  You may be told to limit sodium to 2,000 to 2,300 mg each day  Choose low-sodium or no-salt-added foods  Add little or no salt to food you prepare  Use herbs and spices in place of salt         Include the following in your heart healthy plan:  Ask your dietitian or healthcare provider how many servings to have from each of the following food groups:  Grains: Whole-wheat breads, cereals, and pastas, and brown rice    Low-fat, low-sodium crackers and chips    Vegetables:      Broccoli, green beans, green peas, and spinach    Collards, kale, and lima beans    Carrots, sweet potatoes, tomatoes, and peppers    Canned vegetables with no salt added    Fruits:      Bananas, peaches, pears, and pineapple    Grapes, raisins, and dates    Oranges, tangerines, grapefruit, orange juice, and grapefruit juice    Apricots, mangoes, melons, and papaya    Raspberries and strawberries    Canned fruit with no added sugar    Low-fat dairy:      Nonfat (skim) milk, 1% milk, and low-fat almond, cashew, or soy milks fortified with calcium    Low-fat cheese, regular or frozen yogurt, and cottage cheese    Meats and proteins:      Lean cuts of beef and pork (loin, leg, round), skinless chicken and turkey    Legumes, soy products, egg whites, or nuts    Limit or do not include the following in your heart healthy plan:   Unhealthy fats and oils:      Whole or 2% milk, cream cheese, sour cream, or cheese    High-fat cuts of beef (T-bone steaks, ribs), chicken or turkey with skin, and organ meats such as liver    Butter, stick margarine, shortening, and cooking oils such as coconut or palm oil    Foods and liquids high in sodium:      Packaged foods, such as frozen dinners, cookies, macaroni and cheese, and cereals with more than 300 mg of sodium per serving    Vegetables with added sodium, such as instant potatoes, vegetables with added sauces, or regular canned vegetables    Cured or smoked meats, such as hot dogs, urbano, and sausage    High-sodium ketchup, barbecue sauce, salad dressing, pickles, olives, soy sauce, or miso    Foods and liquids high in sugar:      Candy, cake, cookies, pies, or doughnuts    Soft drinks (soda), sports drinks, or sweetened tea    Canned or dry mixes for cakes, soups, sauces, or gravies    Other healthy heart guidelines:   Do not smoke    Nicotine and other chemicals in cigarettes and cigars can cause lung and heart damage  Ask your healthcare provider for information if you currently smoke and need help to quit  E-cigarettes or smokeless tobacco still contain nicotine  Talk to your healthcare provider before you use these products  Limit or do not drink alcohol as directed  Alcohol can damage your heart and raise your blood pressure  Your healthcare provider may give you specific daily and weekly limits  The general recommended limit is 1 drink a day for women 21 or older and for men 72 or older  Do not have more than 3 drinks in a day or 7 in a week  The recommended limit is 2 drinks a day for men 24to 59years of age  Do not have more than 4 drinks in a day or 14 in a week  A drink of alcohol is 12 ounces of beer, 5 ounces of wine, or 1½ ounces of liquor  Exercise regularly  Exercise can help you maintain a healthy weight and improve your blood pressure and cholesterol levels  Regular exercise can also decrease your risk for heart problems  Ask your healthcare provider about the best exercise plan for you  Do not start an exercise program without asking your healthcare provider  Follow up with your doctor or cardiologist as directed:  Write down your questions so you remember to ask them during your visits  © [employer] 2022 Information is for End User's use only and may not be sold, redistributed or otherwise used for commercial purposes  All illustrations and images included in CareNotes® are the copyrighted property of [employer]  or Tyler Davila   The above information is an  only  It is not intended as medical advice for individual conditions or treatments  Talk to your doctor, nurse or pharmacist before following any medical regimen to see if it is safe and effective for you

## 2022-06-08 RX ORDER — HYDROXYZINE HYDROCHLORIDE 25 MG/1
TABLET, FILM COATED ORAL
Qty: 270 TABLET | Refills: 0 | Status: SHIPPED | OUTPATIENT
Start: 2022-06-08

## 2022-06-08 RX ORDER — CLONIDINE HYDROCHLORIDE 0.1 MG/1
0.1 TABLET ORAL EVERY 12 HOURS
Qty: 180 TABLET | Refills: 0 | Status: SHIPPED | OUTPATIENT
Start: 2022-06-08

## 2022-06-08 RX ORDER — GABAPENTIN 600 MG/1
1200 TABLET ORAL 2 TIMES DAILY
Qty: 360 TABLET | Refills: 0 | Status: SHIPPED | OUTPATIENT
Start: 2022-06-08 | End: 2022-09-06

## 2022-06-08 RX ORDER — QUETIAPINE FUMARATE 400 MG/1
400 TABLET, FILM COATED ORAL 2 TIMES DAILY
Qty: 180 TABLET | Refills: 0 | Status: SHIPPED | OUTPATIENT
Start: 2022-06-08

## 2022-06-08 RX ORDER — LORAZEPAM 0.5 MG/1
0.5 TABLET ORAL
Qty: 30 TABLET | Refills: 5 | OUTPATIENT
Start: 2022-06-08

## 2022-06-08 NOTE — TELEPHONE ENCOUNTER
RAFAT reviewed, and Greg's sleep specialist Dr Harper Westbrook stopped the Mirtazapine and the Nortriptyline, and gave Lorazepam 0 5mg po qhs instead to aid in sleep  Today Pt was unable to have her visit due to technical issues with [facility]    I am e-scribing the following to her designated [facility] pharmacy:  Quetiapine 400mg (1) tab po bid # 180  Gabapentin 600mg (2) tabs po bid # 360  Clonidine 0 1mg (1) tab po q12 hrs # 180  Hydroxyzine 25mg (1) tab po tid prn anxiety and insomnia # 270

## 2022-06-09 ENCOUNTER — LAB (OUTPATIENT)
Dept: LAB | Facility: HOSPITAL | Age: 52
End: 2022-06-09
Payer: COMMERCIAL

## 2022-06-09 DIAGNOSIS — R73.03 PREDIABETES: ICD-10-CM

## 2022-06-09 DIAGNOSIS — A04.8 H. PYLORI INFECTION: ICD-10-CM

## 2022-06-09 DIAGNOSIS — R79.89 LOW T4: ICD-10-CM

## 2022-06-09 DIAGNOSIS — Z00.00 HEALTHCARE MAINTENANCE: ICD-10-CM

## 2022-06-09 LAB
ALBUMIN SERPL BCP-MCNC: 4 G/DL (ref 3.5–5)
ALP SERPL-CCNC: 86 U/L (ref 34–104)
ALT SERPL W P-5'-P-CCNC: 18 U/L (ref 7–52)
ANION GAP SERPL CALCULATED.3IONS-SCNC: 8 MMOL/L (ref 4–13)
AST SERPL W P-5'-P-CCNC: 17 U/L (ref 13–39)
BASOPHILS # BLD AUTO: 0.02 THOUSANDS/ΜL (ref 0–0.1)
BASOPHILS NFR BLD AUTO: 0 % (ref 0–1)
BILIRUB SERPL-MCNC: 0.26 MG/DL (ref 0.2–1)
BUN SERPL-MCNC: 16 MG/DL (ref 5–25)
CALCIUM SERPL-MCNC: 8.8 MG/DL (ref 8.4–10.2)
CHLORIDE SERPL-SCNC: 103 MMOL/L (ref 96–108)
CHOLEST SERPL-MCNC: 208 MG/DL
CO2 SERPL-SCNC: 27 MMOL/L (ref 21–32)
CREAT SERPL-MCNC: 1.11 MG/DL (ref 0.6–1.3)
EOSINOPHIL # BLD AUTO: 0.13 THOUSAND/ΜL (ref 0–0.61)
EOSINOPHIL NFR BLD AUTO: 2 % (ref 0–6)
ERYTHROCYTE [DISTWIDTH] IN BLOOD BY AUTOMATED COUNT: 14.4 % (ref 11.6–15.1)
EST. AVERAGE GLUCOSE BLD GHB EST-MCNC: 126 MG/DL
GFR SERPL CREATININE-BSD FRML MDRD: 56 ML/MIN/1.73SQ M
GLUCOSE P FAST SERPL-MCNC: 89 MG/DL (ref 65–99)
HBA1C MFR BLD: 6 %
HCT VFR BLD AUTO: 40.2 % (ref 34.8–46.1)
HDLC SERPL-MCNC: 60 MG/DL
HGB BLD-MCNC: 12.8 G/DL (ref 11.5–15.4)
IMM GRANULOCYTES # BLD AUTO: 0.01 THOUSAND/UL (ref 0–0.2)
IMM GRANULOCYTES NFR BLD AUTO: 0 % (ref 0–2)
LDLC SERPL CALC-MCNC: 129 MG/DL (ref 0–100)
LYMPHOCYTES # BLD AUTO: 3 THOUSANDS/ΜL (ref 0.6–4.47)
LYMPHOCYTES NFR BLD AUTO: 57 % (ref 14–44)
MCH RBC QN AUTO: 25.1 PG (ref 26.8–34.3)
MCHC RBC AUTO-ENTMCNC: 31.8 G/DL (ref 31.4–37.4)
MCV RBC AUTO: 79 FL (ref 82–98)
MONOCYTES # BLD AUTO: 0.45 THOUSAND/ΜL (ref 0.17–1.22)
MONOCYTES NFR BLD AUTO: 9 % (ref 4–12)
NEUTROPHILS # BLD AUTO: 1.71 THOUSANDS/ΜL (ref 1.85–7.62)
NEUTS SEG NFR BLD AUTO: 32 % (ref 43–75)
NRBC BLD AUTO-RTO: 0 /100 WBCS
PLATELET # BLD AUTO: 288 THOUSANDS/UL (ref 149–390)
PMV BLD AUTO: 11.4 FL (ref 8.9–12.7)
POTASSIUM SERPL-SCNC: 4.1 MMOL/L (ref 3.5–5.3)
PROT SERPL-MCNC: 7.2 G/DL (ref 6.4–8.4)
RBC # BLD AUTO: 5.09 MILLION/UL (ref 3.81–5.12)
SODIUM SERPL-SCNC: 138 MMOL/L (ref 135–147)
TRIGL SERPL-MCNC: 95 MG/DL
TSH SERPL DL<=0.05 MIU/L-ACNC: 2.4 UIU/ML (ref 0.45–4.5)
WBC # BLD AUTO: 5.32 THOUSAND/UL (ref 4.31–10.16)

## 2022-06-09 PROCEDURE — 87338 HPYLORI STOOL AG IA: CPT

## 2022-06-09 PROCEDURE — 84443 ASSAY THYROID STIM HORMONE: CPT

## 2022-06-09 PROCEDURE — 83036 HEMOGLOBIN GLYCOSYLATED A1C: CPT

## 2022-06-09 PROCEDURE — 80307 DRUG TEST PRSMV CHEM ANLYZR: CPT | Performed by: PHYSICIAN ASSISTANT

## 2022-06-09 PROCEDURE — 36415 COLL VENOUS BLD VENIPUNCTURE: CPT

## 2022-06-09 PROCEDURE — 80053 COMPREHEN METABOLIC PANEL: CPT

## 2022-06-09 PROCEDURE — 80061 LIPID PANEL: CPT

## 2022-06-09 PROCEDURE — 85025 COMPLETE CBC W/AUTO DIFF WBC: CPT

## 2022-06-10 ENCOUNTER — TELEPHONE (OUTPATIENT)
Dept: INTERNAL MEDICINE CLINIC | Facility: CLINIC | Age: 52
End: 2022-06-10

## 2022-06-10 LAB
AMPHETAMINES UR QL SCN: NEGATIVE NG/ML
BARBITURATES UR QL SCN: NEGATIVE NG/ML
BENZODIAZ UR QL: NEGATIVE NG/ML
BZE UR QL: NEGATIVE NG/ML
CANNABINOIDS UR QL SCN: NEGATIVE NG/ML
H PYLORI AG STL QL IA: POSITIVE
METHADONE UR QL SCN: NEGATIVE NG/ML
OPIATES UR QL: NEGATIVE NG/ML
PCP UR QL: NEGATIVE NG/ML
PROPOXYPH UR QL SCN: NEGATIVE NG/ML

## 2022-06-10 NOTE — TELEPHONE ENCOUNTER
Patient called to get lab results reviewed the response of Dr Rangel with Patient   Also wanting to know about the Stool Culture results

## 2022-06-13 DIAGNOSIS — A04.8 H. PYLORI INFECTION: Primary | ICD-10-CM

## 2022-06-13 NOTE — RESULT ENCOUNTER NOTE
Stool test shows active H  pylori infection  Recommend patient follows up with GI for discussion of different salvage therapy as she has failed at least 3 separate antibiotic therapies  GI referral placed

## 2022-06-13 NOTE — TELEPHONE ENCOUNTER
Patient received a call from our office and Thomas left a message for her to please give us a call back  Neel wanted to go over her Stool results

## 2022-06-14 ENCOUNTER — TELEPHONE (OUTPATIENT)
Dept: GASTROENTEROLOGY | Facility: CLINIC | Age: 52
End: 2022-06-14

## 2022-06-14 ENCOUNTER — NURSE TRIAGE (OUTPATIENT)
Dept: OTHER | Facility: OTHER | Age: 52
End: 2022-06-14

## 2022-06-14 ENCOUNTER — HOSPITAL ENCOUNTER (OUTPATIENT)
Dept: MAMMOGRAPHY | Facility: CLINIC | Age: 52
Discharge: HOME | End: 2022-06-14
Payer: COMMERCIAL

## 2022-06-14 ENCOUNTER — HOSPITAL ENCOUNTER (OUTPATIENT)
Dept: ULTRASOUND IMAGING | Facility: CLINIC | Age: 52
Discharge: HOME | End: 2022-06-14
Payer: COMMERCIAL

## 2022-06-14 DIAGNOSIS — R92.8 ABNORMAL FINDINGS ON DIAGNOSTIC IMAGING OF BREAST: ICD-10-CM

## 2022-06-14 PROCEDURE — 77066 DX MAMMO INCL CAD BI: CPT

## 2022-06-14 PROCEDURE — G0279 TOMOSYNTHESIS, MAMMO: HCPCS

## 2022-06-14 NOTE — TELEPHONE ENCOUNTER
Pt called in to discuss recent stool results  Explained to pt this order was submitted by her PCP and resulted by him as well  Pt began to tell me that the original order from over a year ago was submitted by us and she never did it so primary resubmitted it  I explained that I understood but she would have to call them to discuss results and pt hung up on me

## 2022-06-14 NOTE — TELEPHONE ENCOUNTER
Clinical team: please triage constipation further  In terms of her H pylori: So it looks like pt was yet again found to be positive on H pylori stool test ordered by PCP  It looks like we did do an EGD on her 5/2021 that again depicted H pylri infection (after she tried and failed triple and quadruple therapy prior to this), so she was given Cata  However, as her H pylori stool test is again positive, this shows that Cata has also failed  It was recommended that pt undergo H pylori culture on EGD, which I agree with, due to continued resistance, however this does not appear to have been done at the time of last EGD  Pt has not been seen by us in over a year, so I would also like pt to be seen in the office to further discuss this, as well  If we are able to culture and this still does not help, pt may arrant ID referral in the future  Clerical: pt already had scheduled appt with Melissa Loaiza in August however I would like pt to be seen earlier if possible  She was followed by the fellows for the most part, please try to put her in to be seen by the fellows earlier than the August appt if possible  Dr Brady Philip: as you scoped this pt last: would you like her scheduled for EGD at [facility] for H pylori culture or would you like her seen in the office prior to scheduling this? Please let us know

## 2022-06-14 NOTE — TELEPHONE ENCOUNTER
Reason for Disposition   Abdominal pain is a chronic symptom (recurrent or ongoing AND lasting > 4 weeks)    Answer Assessment - Initial Assessment Questions  1  LOCATION: "Where does it hurt?"       Stomach   2  RADIATION: "Does the pain shoot anywhere else?" (e g , chest, back)      Radiates to the back   3  ONSET: "When did the pain begin?" (e g , minutes, hours or days ago)       2 years ago   6  SEVERITY: "How bad is the pain?"  (e g , Scale 1-10; mild, moderate, or severe)     - MILD (1-3): doesn't interfere with normal activities, abdomen soft and not tender to touch      - MODERATE (4-7): interferes with normal activities or awakens from sleep, tender to touch      - SEVERE (8-10): excruciating pain, doubled over, unable to do any normal activities         Moderate to severe   9  CARDIAC SYMPTOMS: "Do you have any of the following symptoms: chest pain, difficulty breathing, sweating, nausea?"      No   10  OTHER SYMPTOMS: "Do you have any other symptoms?" (e g , fever, vomiting, diarrhea)        Constipations   11   PREGNANCY: "Is there any chance you are pregnant?" "When was your last menstrual period?"        No    Protocols used: ABDOMINAL PAIN - UPPER-ADULT-OH

## 2022-06-14 NOTE — TELEPHONE ENCOUNTER
Called pt to triage, pt stated she has an appt for tomorrow and will defer all questions/recs until then

## 2022-06-14 NOTE — TELEPHONE ENCOUNTER
Patient stated that her stomach pain persist after three courses of antibiotics, patient stated that she has been constipating  Patient would like to follow up on recent stool test result

## 2022-06-15 ENCOUNTER — OFFICE VISIT (OUTPATIENT)
Dept: GASTROENTEROLOGY | Facility: CLINIC | Age: 52
End: 2022-06-15
Payer: COMMERCIAL

## 2022-06-15 VITALS
WEIGHT: 187 LBS | HEIGHT: 62 IN | DIASTOLIC BLOOD PRESSURE: 87 MMHG | TEMPERATURE: 98.1 F | SYSTOLIC BLOOD PRESSURE: 124 MMHG | BODY MASS INDEX: 34.41 KG/M2

## 2022-06-15 DIAGNOSIS — A04.8 H. PYLORI INFECTION: ICD-10-CM

## 2022-06-15 DIAGNOSIS — Z12.11 COLON CANCER SCREENING: Primary | ICD-10-CM

## 2022-06-15 DIAGNOSIS — E66.2: ICD-10-CM

## 2022-06-15 PROCEDURE — 99214 OFFICE O/P EST MOD 30 MIN: CPT | Performed by: INTERNAL MEDICINE

## 2022-06-15 NOTE — PATIENT INSTRUCTIONS
Scheduled date of colonoscopy/egd  (as of today): 6/27/22  Physician performing colonoscopy: Dr Glez Standing  [facility] of colon[facility]   Bowel prep reviewed with patient:  2 day golytely/mag citrate  Instructions reviewed with patient by: Siomara JACOBSON  Clearances:   n/a

## 2022-06-15 NOTE — PROGRESS NOTES
[facility] - Outpatient Note  Raoul Osuna 46 y o  female MRN: [id number]  Encounter: [id number]      ASSESSMENT AND PLAN:    Raoul Osuna is a 46 y o  old female with PMH of H pylori infection who presents for H pylori infection    H pylori infection - patient has failed triple therapy, quadruple therapy, and Talicia  Repeat stool antigen is positive  Abdominal discomfort which I suspect is more due to constipation  · Plan for EGD with biopsies for H pylori  If biopsies show H pylori positive then can consider alternative treatment like levofloxacin triple therapy    Colon cancer screening - last 2 colonoscopies poor preparation in 2020 and 2021  · Repeat colonoscopy with 2 day bowel prep  · Constipation treatment as below    Constipation - patient has 1-2 bowel movements per day  Likely exacerbated by opioid use  · Start MiraLax b i d   If no Matt difficult improving bowel movements, patient states she will call and we can increase her regimen  · Counseled on fluid, fiber, ambulation      1  H  pylori infection    - Ambulatory Referral to Gastroenterology    ______________________________________________________________________    SUBJECTIVE:  Raoul Osuna is a 46 y o  old female with PMH of H pylori infection who presents for H pylori infection  Patient states she has tried multiple regimens include quadruple therapy, triple therapy and rifabutin based therapy without clearance in stool  She does report mild abdominal discomfort  Otherwise no nausea vomiting or overt bleeding  Does not use NSAIDs  Last colonoscopy was poor preparation 2021  REVIEW OF SYSTEMS IS OTHERWISE NEGATIVE        Historical Information   Past Medical History:   Diagnosis Date    Abnormal thyroid blood test     last assessed 11/13/14    Anemia     last assessed  11/19/13    Chronic pain     back    Depression     Sleep disorder      Past Surgical History:   Procedure Laterality Date    ABSCESS DRAINAGE      incision - skin abscess    BACK SURGERY      2002    BACK SURGERY      lower    MAMMO STEREOTACTIC BREAST BIOPSY RIGHT (ALL INC) Right 6/21/2017    MAMMO STEREOTACTIC BREAST BIOPSY RIGHT (ALL INC) EACH ADD Right 6/21/2017    TUBAL LIGATION  approx 1998     Social History   Social History     Substance and Sexual Activity   Alcohol Use No     Social History     Substance and Sexual Activity   Drug Use Not Currently    Types: Marijuana    Comment: Tried THC in grammar school and high school      Social History     Tobacco Use   Smoking Status Former Smoker    Types: Cigarettes   Smokeless Tobacco Never Used   Tobacco Comment    pt "quit five years ago"--Pt clarified 2013 during a 4/12/2021 visit     Family History   Problem Relation Age of Onset    Kidney disease Mother         chronic    Diabetes Mother     Bone cancer Father 54    Heart attack Sister         Acute MI    Diabetes Sister     Hypertension Brother     Cancer Family     Diabetes Family     Heart disease Family     Hyperlipidemia Family     Hypertension Family     Colon cancer Paternal Grandfather 80    No Known Problems Maternal Grandmother     No Known Problems Maternal Grandfather     No Known Problems Paternal Grandmother     No Known Problems Sister     No Known Problems Sister     No Known Problems Sister     No Known Problems Brother     No Known Problems Brother     No Known Problems Brother     No Known Problems Daughter     No Known Problems Daughter     No Known Problems Son     No Known Problems Son     No Known Problems Son     No Known Problems Son        Meds/Allergies       Current Outpatient Medications:     clobetasol (TEMOVATE) 0 05 % external solution    cloNIDine (CATAPRES) 0 1 mg tablet    Diclofenac Sodium (VOLTAREN) 1 %    docusate sodium (COLACE) 100 mg capsule    gabapentin (NEURONTIN) 600 MG tablet    hydrOXYzine HCL (ATARAX) 25 mg tablet    Lemborexant 10 MG TABS    LORazepam (Ativan) 0 5 mg tablet    naloxone (NARCAN) 4 mg/0 1 mL nasal spray    oxyCODONE (ROXICODONE) 30 MG immediate release tablet    QUEtiapine (SEROquel) 400 MG tablet    cyclobenzaprine (FLEXERIL) 10 mg tablet    Allergies   Allergen Reactions    Aspirin GI Intolerance    Other Throat Swelling      raw onions- causes throat to feel like it wants to close           Objective     not currently breastfeeding  There is no height or weight on file to calculate BMI  PHYSICAL EXAM:      General Appearance:   Alert, cooperative, no distress   HEENT:   Normocephalic, atraumatic, anicteric  Neck:  Supple, symmetrical, trachea midline   Lungs:   Clear to auscultation bilaterally; no rales, rhonchi or wheezing; respirations unlabored    Heart[de-identified]   Regular rate and rhythm; no murmur, rub, or gallop  Abdomen:   Soft, non-tender, non-distended; normal bowel sounds; no masses, no organomegaly    Genitalia:   Deferred    Rectal:   Deferred    Extremities:  No cyanosis, clubbing or edema    Pulses:  2+ and symmetric    Skin:  No jaundice, rashes, or lesions    Lymph nodes:  No palpable cervical lymphadenopathy        Lab Results:   No visits with results within 1 Day(s) from this visit     Latest known visit with results is:   Lab on 06/09/2022   Component Date Value    H pylori Ag, Stl 06/09/2022 Positive (A)    WBC 06/09/2022 5 32     RBC 06/09/2022 5 09     Hemoglobin 06/09/2022 12 8     Hematocrit 06/09/2022 40 2     MCV 06/09/2022 79 (A)    MCH 06/09/2022 25 1 (A)    MCHC 06/09/2022 31 8     RDW 06/09/2022 14 4     MPV 06/09/2022 11 4     Platelets 04/67/3553 288     nRBC 06/09/2022 0     Neutrophils Relative 06/09/2022 32 (A)    Immat GRANS % 06/09/2022 0     Lymphocytes Relative 06/09/2022 57 (A)    Monocytes Relative 06/09/2022 9     Eosinophils Relative 06/09/2022 2     Basophils Relative 06/09/2022 0     Neutrophils Absolute 06/09/2022 1 71 (A)    Immature Grans Absolute 06/09/2022 0 01     Lymphocytes Absolute 06/09/2022 3 00     Monocytes Absolute 06/09/2022 0 45     Eosinophils Absolute 06/09/2022 0 13     Basophils Absolute 06/09/2022 0 02     Sodium 06/09/2022 138     Potassium 06/09/2022 4 1     Chloride 06/09/2022 103     CO2 06/09/2022 27     ANION GAP 06/09/2022 8     BUN 06/09/2022 16     Creatinine 06/09/2022 1 11     Glucose, Fasting 06/09/2022 89     Calcium 06/09/2022 8 8     AST 06/09/2022 17     ALT 06/09/2022 18     Alkaline Phosphatase 06/09/2022 86     Total Protein 06/09/2022 7 2     Albumin 06/09/2022 4 0     Total Bilirubin 06/09/2022 0 26     eGFR 06/09/2022 56     Hemoglobin A1C 06/09/2022 6 0 (A)    EAG 06/09/2022 126     Cholesterol 06/09/2022 208 (A)    Triglycerides 06/09/2022 95     HDL, Direct 06/09/2022 60     LDL Calculated 06/09/2022 129 (A)    TSH 3RD GENERATON 06/09/2022 2 401          Radiology Results:   Mammo diagnostic bilateral w 3d & cad    Result Date: 6/14/2022  Narrative: DIAGNOSIS: Abnormal findings on diagnostic imaging of breast TECHNIQUE: Digital diagnostic mammography was performed  Computer Aided Detection (CAD) analyzed all applicable images  Computer Aided Detection (CAD) analyzed all applicable images  COMPARISONS: Prior breast imaging dated: 05/11/2021, 04/27/2021, 10/09/2018, 06/21/2017, 06/21/2017, 06/19/2017, 06/14/2017, 03/21/2016, 12/22/2014, and 01/24/2013 RELEVANT HISTORY: Family Breast Cancer History: No known family history of breast cancer  Family Medical History: Family medical history includes colon cancer in paternal grandfather  Personal History: Hormone history includes birth control  No known relevant surgical history  No known relevant medical history  RISK ASSESSMENT: 5 Year Tyrer-Cuzick: 0 83 % 10 Year Tyrer-Cuzick: 1 82 % Lifetime Tyrer-Cuzick: 6 82 % TISSUE DENSITY: There are scattered areas of fibroglandular density    INDICATION: Raymond Smallwood is a 46 y o  female presenting for late 6 mos FU & YRLY  Patient states she has not had prior surgeries  Follow-up bilateral asymmetries, no reported breast symptoms  FINDINGS: LEFT B) ASYMMETRY: There is an asymmetry seen in the outer region of the left breast in the posterior depth, CC view  This appears similar to the prior mammogram  RIGHT A) ASYMMETRY: There is an asymmetry seen in the inner region of the right breast in the posterior depth, CC view  This appears similar to the prior mammogram  Elsewhere in the left and right breast there are no suspicious masses, grouped microcalcifications or areas of unexplained architectural distortion  Impression:  Stable bilateral asymmetries  Twelve month follow-up mammogram is recommended  The patient is aware this recommendation  ASSESSMENT/BI-RADS CATEGORY: Left: 3 - Probably Benign Right: 3 - Probably Benign Overall: 3 - Probably Benign RECOMMENDATION:      - Diagnostic mammogram in 1 year for both breasts   Workstation ID: [id number]

## 2022-06-17 ENCOUNTER — TELEPHONE (OUTPATIENT)
Dept: PSYCHIATRY | Facility: CLINIC | Age: 52
End: 2022-06-17

## 2022-06-22 ENCOUNTER — ANNUAL EXAM (OUTPATIENT)
Dept: OBGYN CLINIC | Facility: CLINIC | Age: 52
End: 2022-06-22

## 2022-06-22 VITALS
SYSTOLIC BLOOD PRESSURE: 118 MMHG | BODY MASS INDEX: 35.11 KG/M2 | HEART RATE: 74 BPM | HEIGHT: 62 IN | DIASTOLIC BLOOD PRESSURE: 74 MMHG | WEIGHT: 190.8 LBS

## 2022-06-22 DIAGNOSIS — Z01.419 WOMEN'S ANNUAL ROUTINE GYNECOLOGICAL EXAMINATION: Primary | ICD-10-CM

## 2022-06-22 DIAGNOSIS — Z12.31 ENCOUNTER FOR SCREENING MAMMOGRAM FOR MALIGNANT NEOPLASM OF BREAST: ICD-10-CM

## 2022-06-22 DIAGNOSIS — Z12.4 CERVICAL CANCER SCREENING: ICD-10-CM

## 2022-06-22 DIAGNOSIS — Z11.3 SCREEN FOR STD (SEXUALLY TRANSMITTED DISEASE): ICD-10-CM

## 2022-06-22 DIAGNOSIS — Z20.2 POSSIBLE EXPOSURE TO STD: ICD-10-CM

## 2022-06-22 PROCEDURE — G0145 SCR C/V CYTO,THINLAYER,RESCR: HCPCS | Performed by: NURSE PRACTITIONER

## 2022-06-22 PROCEDURE — 87491 CHLMYD TRACH DNA AMP PROBE: CPT | Performed by: NURSE PRACTITIONER

## 2022-06-22 PROCEDURE — G0101 CA SCREEN;PELVIC/BREAST EXAM: HCPCS | Performed by: NURSE PRACTITIONER

## 2022-06-22 PROCEDURE — 87591 N.GONORRHOEAE DNA AMP PROB: CPT | Performed by: NURSE PRACTITIONER

## 2022-06-22 PROCEDURE — G0476 HPV COMBO ASSAY CA SCREEN: HCPCS | Performed by: NURSE PRACTITIONER

## 2022-06-22 NOTE — PROGRESS NOTES
ANNUAL GYNECOLOGICAL EXAMINATION    Chanell Naylor is a 46 y o  female who presents today for annual GYN exam   Her last pap smear was performed 3/2015 and result was NILM, HR-HPV negative  She reports no history of abnormal pap smears in her past  Her last mammogram was performed 2022 and result was BI-RADS 3 with recommendation to repeat in 1 year  She had colon cancer screening performed in 2019, her repeat colonoscopy this year needs to be repeated due to inadequate prep  She is following with GI  She reports menopause 1-2 years ago with no post menopausal bleeding  No LMP recorded    Her general medical history has been reviewed and she reports it as follows:    Past Medical History:   Diagnosis Date    Abnormal thyroid blood test     last assessed 14    Anemia     last assessed  13    Chronic pain     back    Depression     Sleep disorder      Past Surgical History:   Procedure Laterality Date    ABSCESS DRAINAGE      incision - skin abscess    BACK SURGERY          BACK SURGERY      lower    MAMMO STEREOTACTIC BREAST BIOPSY RIGHT (ALL INC) Right 2017    MAMMO STEREOTACTIC BREAST BIOPSY RIGHT (ALL INC) EACH ADD Right 2017    TUBAL LIGATION  approx 1998     OB History        6    Para   6    Term   6            AB        Living   6       SAB        IAB        Ectopic        Multiple        Live Births   6               Social History     Tobacco Use    Smoking status: Former Smoker     Types: Cigarettes    Smokeless tobacco: Never Used    Tobacco comment: pt "quit five years ago"--Pt clarified 2013 during a 2021 visit   Vaping Use    Vaping Use: Some days    Substances: Nicotine   Substance Use Topics    Alcohol use: No    Drug use: Not Currently     Types: Marijuana     Comment: Tried THC in grammar school and high school      Social History     Substance and Sexual Activity   Sexual Activity Yes    Partners: Male   [place] Birth control/protection: None, Female Sterilization     Cancer-related family history includes Bone cancer (age of onset: 54) in her father; Cancer in her family; Colon cancer (age of onset: 80) in her paternal grandfather  Current Outpatient Medications   Medication Instructions    clobetasol (TEMOVATE) 0 05 % external solution APPLY TO SCALP TWICE A DAY    cloNIDine (CATAPRES) 0 1 mg, Oral, Every 12 hours    cyclobenzaprine (FLEXERIL) 10 mg, As needed    Diclofenac Sodium (VOLTAREN) 2 g, Topical, 4 times daily    docusate sodium (COLACE) 100 mg, Oral, Daily    gabapentin (NEURONTIN) 1,200 mg, Oral, 2 times daily    hydrOXYzine HCL (ATARAX) 25 mg tablet TAKE 1 TABLET BY MOUTH THREE TIMES A DAY AS NEEDED FOR ANXIETY AND INSOMNIA    Lemborexant 10 mg, Oral, Daily at bedtime    LORazepam (ATIVAN) 0 5 mg, Oral, Daily at bedtime    naloxone (NARCAN) 4 mg/0 1 mL nasal spray Administer 1 spray into a nostril  If no response after 2-3 minutes, give another dose in the other nostril using a new spray   oxyCODONE (ROXICODONE) 30 mg, Oral, 2 times daily    QUEtiapine (SEROQUEL) 400 mg, Oral, 2 times daily       Review of Systems:  Review of Systems   Constitutional: Negative  Gastrointestinal: Negative  Genitourinary: Negative  Skin: Negative  Physical Exam:  /74   Pulse 74   Ht 5' 1 5" (1 562 m)   Wt 86 5 kg (190 lb 12 8 oz)   BMI 35 47 kg/m²   Physical Exam  Constitutional:       General: She is not in acute distress  Appearance: Normal appearance  Genitourinary:      Vulva and bladder normal       No lesions in the vagina  No vaginal erythema or ulceration  Right Adnexa: not tender and no mass present  Left Adnexa: not tender and no mass present  No cervical motion tenderness or lesion  Uterus is not enlarged or tender  No uterine mass detected  Breasts:      Right: No mass, nipple discharge or skin change        Left: No mass, nipple discharge or skin change  Cardiovascular:      Rate and Rhythm: Normal rate and regular rhythm  Pulmonary:      Effort: Pulmonary effort is normal       Breath sounds: Normal breath sounds  Abdominal:      General: Abdomen is flat  Palpations: Abdomen is soft  Musculoskeletal:      Cervical back: Neck supple  Neurological:      Mental Status: She is alert  Skin:     General: Skin is warm and dry  Psychiatric:         Mood and Affect: Mood normal          Behavior: Behavior normal    Vitals reviewed  Assessment/Plan:   1  Normal well-woman GYN exam   2  Cervical cancer screening:  Normal cervical exam   Pap smear done with HPV co-testing  3  STD screening:  Requests screening, states she is only sexually active with her  but she is unsure if he is honest with her  Orders placed for vaginal GC/CT cultures  Orders placed for serum anti-HIV, anti-HCV, HbsAg, RPR    4  Breast cancer screening:  Normal breast exam   Order placed for bilateral screening mammogram   Reviewed breast self-awareness  5  Colon cancer screening:  Due for repeat colonoscopy due to poor prep, patient is aware and is following with GI for H  Pylori and constipation  6  Depression Screening: Patient's depression screening was assessed with a PHQ-2 score of 0  Their PHQ-9 score was 1  Clinically patient does not have depression  No treatment is required  7  BMI Counseling: Body mass index is 35 47 kg/m²  Discussed the patient's BMI with her  The BMI is above normal  Nutrition recommendations include reducing portion sizes, decreasing overall calorie intake, 3-5 servings of fruits/vegetables daily, moderation in carbohydrate intake and increasing intake of lean protein  Exercise recommendations include moderate aerobic physical activity for 150 minutes/week and exercising 3-5 times per week  8  Return to office in one year for annual exam or sooner if needed      Reviewed with patient that test results are available in [address] immediately, but that they will not necessarily be reviewed by me immediately  Explained that I will review results at my earliest opportunity and contact patient appropriately

## 2022-06-22 NOTE — PATIENT INSTRUCTIONS
OBESITY     Obesity is defined as a body mass index (BMI) which is greater than 30  Your There is no height or weight on file to calculate BMI       The risks of obesity include  many health problems, such as injuries or physical disability  You may need tests to check for the following:  Diabetes     High blood pressure or high cholesterol     Heart disease     Gallbladder or liver disease     Cancer of the colon, breast, prostate, liver, or kidney     Sleep apnea     Arthritis or gout    Seek care immediately if:   You have a severe headache, confusion, or difficulty speaking  You have weakness on one side of your body  You have chest pain, sweating, or shortness of breath  Contact your healthcare provider if:   You have symptoms of gallbladder or liver disease, such as pain in your upper abdomen  You have knee or hip pain and discomfort while walking  You have symptoms of diabetes, such as intense hunger and thirst, and frequent urination  You have symptoms of sleep apnea, such as snoring or daytime sleepiness  You have questions or concerns about your condition or care  Treatment for obesity  focuses on helping you lose weight to improve your health  Even a small decrease in BMI can reduce the risk for many health problems  Your healthcare provider will help you set a weight-loss goal   Lifestyle changes  are the first step in treating obesity  These include making healthy food choices and getting regular physical activity  Your healthcare provider may suggest a weight-loss program that involves coaching, education, and therapy  Medicine  may help you lose weight when it is used with a healthy diet and physical activity  Surgery  can help you lose weight if you are very obese and have other health problems  There are several types of weight-loss surgery  Ask your healthcare provider for more information  Be successful losing weight:   Set small, realistic goals    An example of a small goal is to walk for 20 minutes 5 days a week  Arabella goal is to lose 5% of your body weight  Tell friends, family members, and coworkers about your goals  and ask for their support  Ask a friend to lose weight with you, or join a weight-loss support group  Identify foods or triggers that may cause you to overeat , and find ways to avoid them  Remove tempting high-calorie foods from your home and workplace  Place a bowl of fresh fruit on your kitchen counter  If stress causes you to eat, then find other ways to cope with stress  Keep a diary to track what you eat and drink  Also write down how many minutes of physical activity you do each day  Weigh yourself once a week and record it in your diary  Eating changes: You will need to eat 500 to 1,000 fewer calories each day than you currently eat to lose 1 to 2 pounds a week  The following changes will help you cut calories:  Eat smaller portions  Use small plates, no larger than 9 inches in diameter  Fill your plate half full of fruits and vegetables  Measure your food using measuring cups until you know what a serving size looks like  Eat 3 meals and 1 or 2 snacks each day  Plan your meals in advance  Mayi Rothman and eat at home most of the time  Eat slowly  Eat fruits and vegetables at every meal   They are low in calories and high in fiber, which makes you feel full  Do not add butter, margarine, or cream sauce to vegetables  Use herbs to season steamed vegetables  Eat less fat and fewer fried foods  Eat more baked or grilled chicken and fish  These protein sources are lower in calories and fat than red meat  Limit fast food  Dress your salads with olive oil and vinegar instead of bottled dressing  Limit the amount of sugar you eat  Do not drink sugary beverages  Limit alcohol  Activity changes:  Physical activity is good for your body in many ways  It helps you burn calories and build strong muscles   It decreases stress and depression, and improves your mood  It can also help you sleep better  Talk to your healthcare provider before you begin an exercise program   Exercise for at least 30 minutes 5 days a week  Start slowly  Set aside time each day for physical activity that you enjoy and that is convenient for you  It is best to do both weight training and an activity that increases your heart rate, such as walking, bicycling, or swimming  Find ways to be more active  Do yard work and housecleaning  Walk up the stairs instead of using elevators  Spend your leisure time going to events that require walking, such as outdoor festivals or fairs  This extra physical activity can help you lose weight and keep it off  Follow up with your primary healthcare provider as directed  You may need to meet with a dietitian  Write down your questions so you remember to ask them during your visits

## 2022-06-23 LAB
C TRACH DNA SPEC QL NAA+PROBE: NEGATIVE
HPV HR 12 DNA CVX QL NAA+PROBE: NEGATIVE
HPV16 DNA CVX QL NAA+PROBE: NEGATIVE
HPV18 DNA CVX QL NAA+PROBE: NEGATIVE
N GONORRHOEA DNA SPEC QL NAA+PROBE: NEGATIVE

## 2022-06-24 ENCOUNTER — TELEPHONE (OUTPATIENT)
Dept: GASTROENTEROLOGY | Facility: CLINIC | Age: 52
End: 2022-06-24

## 2022-06-24 DIAGNOSIS — Z12.11 COLON CANCER SCREENING: Primary | ICD-10-CM

## 2022-06-24 RX ORDER — MAGNESIUM CARB/ALUMINUM HYDROX 105-160MG
TABLET,CHEWABLE ORAL
Qty: 296 ML | Refills: 0 | Status: SHIPPED | OUTPATIENT
Start: 2022-06-24

## 2022-06-24 RX ORDER — BISACODYL 5 MG/1
TABLET, DELAYED RELEASE ORAL
Qty: 2 TABLET | Refills: 0 | Status: SHIPPED | OUTPATIENT
Start: 2022-06-24

## 2022-06-24 NOTE — TELEPHONE ENCOUNTER
Patients GI provider:  Dr Guzman Guillory    Number to return call: [phone]    Reason for call: Pt calling asking for her script for bowel prep to be sent over to pharmacy for her procedure  Pt also asking if medication was prescribed to her for her stomach infection      Scheduled procedure/appointment date if applicable: Procedure: 3/13/7017

## 2022-06-24 NOTE — TELEPHONE ENCOUNTER
Left detailed VM informing 2 day prep medications sent to [facility], instructions sent to email on file, and holding off on H Pylori medications until after EGD per OV note  Advised to call office if has further questions

## 2022-06-26 RX ORDER — SODIUM CHLORIDE, SODIUM LACTATE, POTASSIUM CHLORIDE, CALCIUM CHLORIDE 600; 310; 30; 20 MG/100ML; MG/100ML; MG/100ML; MG/100ML
125 INJECTION, SOLUTION INTRAVENOUS CONTINUOUS
Status: CANCELLED | OUTPATIENT
Start: 2022-06-26

## 2022-06-27 ENCOUNTER — TELEPHONE (OUTPATIENT)
Dept: GASTROENTEROLOGY | Facility: AMBULARY SURGERY CENTER | Age: 52
End: 2022-06-27

## 2022-06-27 LAB
LAB AP GYN PRIMARY INTERPRETATION: NORMAL
Lab: NORMAL

## 2022-06-27 NOTE — RESULT ENCOUNTER NOTE
Spoke with pt regarding neg sti and pap results  Pt had not seen results on MyChart but had no further questions

## 2022-06-27 NOTE — TELEPHONE ENCOUNTER
Patients GI provider:  Dr Nena Roa    Number to return call: (  [phone]    Reason for call: Pt calling to reschedule her procedure due to a death in the family    Scheduled procedure/appointment date if applicable: /procedure 8-15-63

## 2022-06-28 ENCOUNTER — APPOINTMENT (OUTPATIENT)
Dept: LAB | Facility: HOSPITAL | Age: 52
End: 2022-06-28
Payer: COMMERCIAL

## 2022-06-28 DIAGNOSIS — Z11.3 SCREEN FOR STD (SEXUALLY TRANSMITTED DISEASE): ICD-10-CM

## 2022-06-28 DIAGNOSIS — Z20.2 POSSIBLE EXPOSURE TO STD: ICD-10-CM

## 2022-06-28 DIAGNOSIS — Z01.419 WOMEN'S ANNUAL ROUTINE GYNECOLOGICAL EXAMINATION: ICD-10-CM

## 2022-06-28 LAB — HCV AB SER QL: NORMAL

## 2022-06-28 PROCEDURE — 86592 SYPHILIS TEST NON-TREP QUAL: CPT

## 2022-06-28 PROCEDURE — 87389 HIV-1 AG W/HIV-1&-2 AB AG IA: CPT

## 2022-06-28 PROCEDURE — 36415 COLL VENOUS BLD VENIPUNCTURE: CPT

## 2022-06-28 PROCEDURE — 86803 HEPATITIS C AB TEST: CPT

## 2022-06-29 LAB
HIV 1+2 AB+HIV1 P24 AG SERPL QL IA: NORMAL
RPR SER QL: NORMAL

## 2022-06-30 ENCOUNTER — TELEPHONE (OUTPATIENT)
Dept: OBGYN CLINIC | Facility: CLINIC | Age: 52
End: 2022-06-30

## 2022-06-30 NOTE — TELEPHONE ENCOUNTER
----- Message from Sunday Cade, [address] sent at 6/29/2022 12:01 PM EDT -----  Please let her know the sti blood work is negative  Thank you!

## 2022-07-01 ENCOUNTER — TELEPHONE (OUTPATIENT)
Dept: GASTROENTEROLOGY | Facility: CLINIC | Age: 52
End: 2022-07-01

## 2022-07-01 NOTE — TELEPHONE ENCOUNTER
Pt had misplaced instructions and called to review prep  She did not have Golytely  Her pharmacy was having a problem securing the prep  I called the pharmacist on the pt's behalf, she offered to substitute [place] Lytely which was approved by [facility]  Pharmacy also offered to provide a copy of the instructions to pt upon her arrival if we were to fax it to them  Two Day Golytely/Trilyte prep instructions were faxed to Marilyn Lopes at [address]

## 2022-07-05 ENCOUNTER — TELEPHONE (OUTPATIENT)
Dept: SLEEP CENTER | Facility: CLINIC | Age: 52
End: 2022-07-05

## 2022-07-05 ENCOUNTER — HOSPITAL ENCOUNTER (OUTPATIENT)
Dept: GASTROENTEROLOGY | Facility: AMBULARY SURGERY CENTER | Age: 52
Setting detail: OUTPATIENT SURGERY
Discharge: HOME | End: 2022-07-05
Admitting: INTERNAL MEDICINE
Payer: COMMERCIAL

## 2022-07-05 ENCOUNTER — ANESTHESIA (OUTPATIENT)
Dept: GASTROENTEROLOGY | Facility: AMBULARY SURGERY CENTER | Age: 52
End: 2022-07-05

## 2022-07-05 ENCOUNTER — ANESTHESIA EVENT (OUTPATIENT)
Dept: GASTROENTEROLOGY | Facility: AMBULARY SURGERY CENTER | Age: 52
End: 2022-07-05

## 2022-07-05 VITALS
WEIGHT: 186 LBS | TEMPERATURE: 97.5 F | RESPIRATION RATE: 18 BRPM | HEIGHT: 63 IN | DIASTOLIC BLOOD PRESSURE: 58 MMHG | BODY MASS INDEX: 32.96 KG/M2 | HEART RATE: 70 BPM | SYSTOLIC BLOOD PRESSURE: 98 MMHG | OXYGEN SATURATION: 97 %

## 2022-07-05 DIAGNOSIS — Z12.11 COLON CANCER SCREENING: ICD-10-CM

## 2022-07-05 DIAGNOSIS — A04.8 H. PYLORI INFECTION: ICD-10-CM

## 2022-07-05 PROCEDURE — 88341 IMHCHEM/IMCYTCHM EA ADD ANTB: CPT | Performed by: PATHOLOGY

## 2022-07-05 PROCEDURE — 88364 INSITU HYBRIDIZATION (FISH): CPT | Performed by: PATHOLOGY

## 2022-07-05 PROCEDURE — 88305 TISSUE EXAM BY PATHOLOGIST: CPT | Performed by: PATHOLOGY

## 2022-07-05 PROCEDURE — 88365 INSITU HYBRIDIZATION (FISH): CPT | Performed by: PATHOLOGY

## 2022-07-05 PROCEDURE — G0121 COLON CA SCRN NOT HI RSK IND: HCPCS | Performed by: INTERNAL MEDICINE

## 2022-07-05 PROCEDURE — 88342 IMHCHEM/IMCYTCHM 1ST ANTB: CPT | Performed by: PATHOLOGY

## 2022-07-05 PROCEDURE — 43239 EGD BIOPSY SINGLE/MULTIPLE: CPT | Performed by: INTERNAL MEDICINE

## 2022-07-05 RX ORDER — LIDOCAINE HYDROCHLORIDE 10 MG/ML
INJECTION, SOLUTION EPIDURAL; INFILTRATION; INTRACAUDAL; PERINEURAL AS NEEDED
Status: DISCONTINUED | OUTPATIENT
Start: 2022-07-05 | End: 2022-07-05

## 2022-07-05 RX ORDER — OMEGA-3S/DHA/EPA/FISH OIL/D3 300MG-1000
400 CAPSULE ORAL DAILY
COMMUNITY

## 2022-07-05 RX ORDER — MULTIVIT WITH MINERALS/LUTEIN
1000 TABLET ORAL DAILY
COMMUNITY

## 2022-07-05 RX ORDER — MULTIVIT-MIN/IRON FUM/FOLIC AC 7.5 MG-4
1 TABLET ORAL DAILY
COMMUNITY

## 2022-07-05 RX ORDER — PROPOFOL 10 MG/ML
INJECTION, EMULSION INTRAVENOUS CONTINUOUS PRN
Status: DISCONTINUED | OUTPATIENT
Start: 2022-07-05 | End: 2022-07-05

## 2022-07-05 RX ORDER — PROPOFOL 10 MG/ML
INJECTION, EMULSION INTRAVENOUS AS NEEDED
Status: DISCONTINUED | OUTPATIENT
Start: 2022-07-05 | End: 2022-07-05

## 2022-07-05 RX ORDER — SODIUM CHLORIDE, SODIUM LACTATE, POTASSIUM CHLORIDE, CALCIUM CHLORIDE 600; 310; 30; 20 MG/100ML; MG/100ML; MG/100ML; MG/100ML
125 INJECTION, SOLUTION INTRAVENOUS CONTINUOUS
Status: DISCONTINUED | OUTPATIENT
Start: 2022-07-05 | End: 2022-07-09 | Stop reason: HOSPADM

## 2022-07-05 RX ADMIN — SODIUM CHLORIDE, SODIUM LACTATE, POTASSIUM CHLORIDE, AND CALCIUM CHLORIDE 125 ML/HR: .6; .31; .03; .02 INJECTION, SOLUTION INTRAVENOUS at 11:40

## 2022-07-05 RX ADMIN — LIDOCAINE HYDROCHLORIDE 50 MG: 10 INJECTION, SOLUTION EPIDURAL; INFILTRATION; INTRACAUDAL; PERINEURAL at 11:50

## 2022-07-05 RX ADMIN — PROPOFOL 130 MCG/KG/MIN: 10 INJECTION, EMULSION INTRAVENOUS at 11:50

## 2022-07-05 RX ADMIN — PROPOFOL 70 MG: 10 INJECTION, EMULSION INTRAVENOUS at 11:50

## 2022-07-05 NOTE — ANESTHESIA POSTPROCEDURE EVALUATION
Post-Op Assessment Note    CV Status:  Stable  Pain Score: 0    Pain management: adequate     Mental Status:  Arousable   Hydration Status:  Stable   PONV Controlled:  None   Airway Patency:  Patent      Post Op Vitals Reviewed: Yes      Staff: CRNA         No complications documented      BP  83/47   Temp     Pulse  71   Resp   16   SpO2   97% RA

## 2022-07-05 NOTE — H&P
History and Physical -  [facility]  Shaila Whitten 46 y o  female MRN: [id number]                  HPI: Shaila Whitten is a 46y o  year old female who presents for EGD      REVIEW OF SYSTEMS: Per the HPI, and otherwise unremarkable      Historical Information   Past Medical History:   Diagnosis Date    Abnormal thyroid blood test     last assessed 11/13/14    Anemia     last assessed  11/19/13    Chronic pain     back    Depression     Sleep disorder      Past Surgical History:   Procedure Laterality Date    ABSCESS DRAINAGE      incision - skin abscess    BACK SURGERY      2002    BACK SURGERY      lower    MAMMO STEREOTACTIC BREAST BIOPSY RIGHT (ALL INC) Right 6/21/2017    MAMMO STEREOTACTIC BREAST BIOPSY RIGHT (ALL INC) EACH ADD Right 6/21/2017    TUBAL LIGATION  approx 1998     Social History   Social History     Substance and Sexual Activity   Alcohol Use No     Social History     Substance and Sexual Activity   Drug Use Not Currently    Types: Marijuana    Comment: Tried THC in grammar school and high school      Social History     Tobacco Use   Smoking Status Former Smoker    Types: Cigarettes   Smokeless Tobacco Never Used   Tobacco Comment    pt "quit five years ago"--Pt clarified 2013 during a 4/12/2021 visit     Family History   Problem Relation Age of Onset    Kidney disease Mother         chronic    Diabetes Mother     Bone cancer Father 54    Heart attack Sister         Acute MI    Diabetes Sister     Hypertension Brother     Cancer Family     Diabetes Family     Heart disease Family     Hyperlipidemia Family     Hypertension Family     Colon cancer Paternal Grandfather 80    No Known Problems Maternal Grandmother     No Known Problems Maternal Grandfather     No Known Problems Paternal Grandmother     No Known Problems Sister     No Known Problems Sister     No Known Problems Sister     No Known Problems Brother     No Known Problems Brother     No Known Problems Brother     No Known Problems Daughter     No Known Problems Daughter     No Known Problems Son     No Known Problems Son     No Known Problems Son     No Known Problems Son        Meds/Allergies       Current Outpatient Medications:     APPLE CIDER VINEGAR PO    bisacodyl (DULCOLAX) 5 mg EC tablet    cholecalciferol (VITAMIN D3) 400 units tablet    cloNIDine (CATAPRES) 0 1 mg tablet    Diclofenac Sodium (VOLTAREN) 1 %    gabapentin (NEURONTIN) 600 MG tablet    hydrOXYzine HCL (ATARAX) 25 mg tablet    Lemborexant 10 MG TABS    LORazepam (Ativan) 0 5 mg tablet    Multiple Vitamins-Minerals (multivitamin with minerals) tablet    oxyCODONE (ROXICODONE) 30 MG immediate release tablet    QUEtiapine (SEROquel) 400 MG tablet    vitamin E, tocopherol, 1,000 units capsule    clobetasol (TEMOVATE) 0 05 % external solution    cyclobenzaprine (FLEXERIL) 10 mg tablet    docusate sodium (COLACE) 100 mg capsule    magnesium citrate (CITROMA) 1 745 g/30 mL oral solution    naloxone (NARCAN) 4 mg/0 1 mL nasal spray    polyethylene glycol (GOLYTELY) 4000 mL solution    Current Facility-Administered Medications:     lactated ringers infusion, 125 mL/hr, Intravenous, Continuous    Allergies   Allergen Reactions    Aspirin GI Intolerance    Other Throat Swelling      raw onions- causes throat to feel like it wants to close       Objective     LMP 06/10/2021       PHYSICAL EXAM    Gen: NAD  Head: NCAT  CV: RRR  CHEST: Clear  ABD: soft, NT/ND  EXT: no edema      ASSESSMENT/PLAN:  This is a 46y o  year old female here for EGD, and she is stable and optimized for her procedure

## 2022-07-05 NOTE — ANESTHESIA PREPROCEDURE EVALUATION
Procedure:  COLONOSCOPY  EGD    Relevant Problems   GI/HEPATIC   (+) Esophageal dysphagia   (+) GERD (gastroesophageal reflux disease)      /RENAL   (+) Stage 2 chronic kidney disease      NEURO/PSYCH   (+) Anxiety   (+) History of prediabetes   (+) Moderately severe recurrent major depression (HCC)   (+) New persistent daily headache        Physical Exam    Airway    Mallampati score: II         Dental   No notable dental hx     Cardiovascular      Pulmonary      Other Findings        Anesthesia Plan  ASA Score- 2     Anesthesia Type- IV sedation with anesthesia with ASA Monitors  Additional Monitors:   Airway Plan:     Comment: I, Dr Dimitri Gonzalez, the attending physician, have personally seen and evaluated the patient prior to anesthetic care  I have reviewed the pre-anesthetic record, and other medical records if appropriate to the anesthetic care  If a CRNA is involved in the case, I have reviewed the CRNA assessment, if present, and agree  The patient is in a suitable condition to proceed with my formulated anesthetic plan          Plan Factors-    Chart reviewed  Induction- intravenous  Postoperative Plan-     Informed Consent- Anesthetic plan and risks discussed with patient  I personally reviewed this patient with the CRNA  Discussed and agreed on the Anesthesia Plan with the CRNA  Aranza Maldonado

## 2022-07-13 ENCOUNTER — TELEPHONE (OUTPATIENT)
Dept: SLEEP CENTER | Facility: CLINIC | Age: 52
End: 2022-07-13

## 2022-07-13 NOTE — TELEPHONE ENCOUNTER
Patient called and stated that she has called several times and left several messages for the nurses regarding a medication  Patient would like a phone call back at [phone]

## 2022-07-14 DIAGNOSIS — F51.01 PRIMARY INSOMNIA: ICD-10-CM

## 2022-07-20 ENCOUNTER — TELEPHONE (OUTPATIENT)
Dept: GASTROENTEROLOGY | Facility: CLINIC | Age: 52
End: 2022-07-20

## 2022-07-20 DIAGNOSIS — A04.8 H. PYLORI INFECTION: Primary | ICD-10-CM

## 2022-07-20 RX ORDER — OMEPRAZOLE 40 MG/1
40 CAPSULE, DELAYED RELEASE ORAL 2 TIMES DAILY
Qty: 28 CAPSULE | Refills: 0 | Status: SHIPPED | OUTPATIENT
Start: 2022-07-20 | End: 2022-08-03

## 2022-07-20 RX ORDER — AMOXICILLIN 500 MG/1
1000 CAPSULE ORAL EVERY 12 HOURS SCHEDULED
Qty: 56 CAPSULE | Refills: 0 | Status: SHIPPED | OUTPATIENT
Start: 2022-07-20 | End: 2022-08-03

## 2022-07-20 RX ORDER — METRONIDAZOLE 250 MG/1
500 TABLET ORAL EVERY 8 HOURS SCHEDULED
Qty: 84 TABLET | Refills: 0 | Status: SHIPPED | OUTPATIENT
Start: 2022-07-20 | End: 2022-07-20

## 2022-07-20 RX ORDER — BISMUTH SUBSALICYLATE 262 MG/1
524 TABLET, CHEWABLE ORAL 3 TIMES DAILY
Qty: 84 TABLET | Refills: 0 | Status: SHIPPED | OUTPATIENT
Start: 2022-07-20 | End: 2022-07-20

## 2022-07-20 RX ORDER — LEVOFLOXACIN 500 MG/1
500 TABLET, FILM COATED ORAL EVERY 24 HOURS
Qty: 14 TABLET | Refills: 0 | Status: SHIPPED | OUTPATIENT
Start: 2022-07-20 | End: 2022-08-03

## 2022-07-20 RX ORDER — TETRACYCLINE HYDROCHLORIDE 500 MG/1
500 CAPSULE ORAL 3 TIMES DAILY
Qty: 42 CAPSULE | Refills: 0 | Status: SHIPPED | OUTPATIENT
Start: 2022-07-20 | End: 2022-07-20

## 2022-07-20 NOTE — TELEPHONE ENCOUNTER
Pt has failed multiple therapies for h pylori  Per last OV note- Dr Janine Rivas advised if pos biopsy- levofloxacin triple therapy warranted  RX sent, Reviewed with pt how to take medications, ad explained pt can have stool test two weeks after finishing medications

## 2022-07-20 NOTE — TELEPHONE ENCOUNTER
----- Message from Bill Cuellar MD sent at 7/20/2022 10:50 AM EDT -----  Patient has Helicobacter pylori infection in the stomach causing inflammation  Will prescribe a 2 week course of antibiotics for this and send to the pharmacy

## 2022-07-21 ENCOUNTER — TELEPHONE (OUTPATIENT)
Dept: NEUROLOGY | Facility: CLINIC | Age: 52
End: 2022-07-21

## 2022-07-21 NOTE — TELEPHONE ENCOUNTER
Attempt x 1 to confirm patient's 7/26/2022 @ 4 pm appointment with Dr Reyes Soto unsuccessful due to patient's mailbox being full

## 2022-07-21 NOTE — TELEPHONE ENCOUNTER
Spoke to patient and confirmed her 7/26/2022 @ 4 pm appointment with Dr Antoinette Fong at the [facility]

## 2022-08-01 ENCOUNTER — TELEPHONE (OUTPATIENT)
Dept: PSYCHIATRY | Facility: CLINIC | Age: 52
End: 2022-08-01

## 2022-08-08 ENCOUNTER — TELEPHONE (OUTPATIENT)
Dept: PSYCHIATRY | Facility: CLINIC | Age: 52
End: 2022-08-08

## 2022-08-31 ENCOUNTER — HOSPITAL ENCOUNTER (OUTPATIENT)
Dept: MRI IMAGING | Facility: HOSPITAL | Age: 52
Discharge: HOME | End: 2022-08-31
Payer: COMMERCIAL

## 2022-08-31 DIAGNOSIS — F33.2 MODERATELY SEVERE RECURRENT MAJOR DEPRESSION (HCC): ICD-10-CM

## 2022-08-31 DIAGNOSIS — M54.16 RADICULOPATHY, LUMBAR REGION: ICD-10-CM

## 2022-08-31 DIAGNOSIS — F41.9 ANXIETY: ICD-10-CM

## 2022-08-31 PROCEDURE — 72158 MRI LUMBAR SPINE W/O & W/DYE: CPT

## 2022-08-31 PROCEDURE — A9585 GADOBUTROL INJECTION: HCPCS | Performed by: RADIOLOGY

## 2022-08-31 PROCEDURE — G1004 CDSM NDSC: HCPCS

## 2022-08-31 RX ADMIN — GADOBUTROL 8 ML: 604.72 INJECTION INTRAVENOUS at 12:27
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
## 2022-08-31 NOTE — TELEPHONE ENCOUNTER
Pt is scheduled for 9/1/22 in person because its been longer then 6 months  She stated she always does vv  Just let me know if you want me to make it vv  Or leave it in person   Medication Refill Request     Name of Medication Seroquel   Dose/Frequency 400 mg 2daily   Quantity   Verified pharmacy   [x]  Verified ordering Provider   [x]  Does patient have enough for the next 3 days? Yes [x] No []  Does patient have a follow-up appointment scheduled?  Yes [] No []   If so when is appointment: 9/1/22

## 2022-09-01 ENCOUNTER — OFFICE VISIT (OUTPATIENT)
Dept: PSYCHIATRY | Facility: CLINIC | Age: 52
End: 2022-09-01
Payer: COMMERCIAL

## 2022-09-01 VITALS — HEIGHT: 63 IN | BODY MASS INDEX: 32.89 KG/M2 | WEIGHT: 185.6 LBS

## 2022-09-01 DIAGNOSIS — F41.9 ANXIETY: ICD-10-CM

## 2022-09-01 DIAGNOSIS — Z63.4 BEREAVEMENT: ICD-10-CM

## 2022-09-01 DIAGNOSIS — G62.9 NEUROPATHY: ICD-10-CM

## 2022-09-01 DIAGNOSIS — F33.2 MODERATELY SEVERE RECURRENT MAJOR DEPRESSION (HCC): Primary | ICD-10-CM

## 2022-09-01 DIAGNOSIS — F51.04 PSYCHOPHYSIOLOGICAL INSOMNIA: ICD-10-CM

## 2022-09-01 PROCEDURE — 99214 OFFICE O/P EST MOD 30 MIN: CPT | Performed by: PHYSICIAN ASSISTANT

## 2022-09-01 RX ORDER — GABAPENTIN 600 MG/1
1200 TABLET ORAL 2 TIMES DAILY
Qty: 360 TABLET | Refills: 0 | Status: SHIPPED | OUTPATIENT
Start: 2022-09-01 | End: 2022-11-30

## 2022-09-01 RX ORDER — FLUOXETINE HYDROCHLORIDE 20 MG/1
20 CAPSULE ORAL DAILY
Qty: 90 CAPSULE | Refills: 0 | Status: SHIPPED | OUTPATIENT
Start: 2022-09-01

## 2022-09-01 RX ORDER — HYDROXYZINE HYDROCHLORIDE 25 MG/1
TABLET, FILM COATED ORAL
Qty: 270 TABLET | Refills: 0 | Status: SHIPPED | OUTPATIENT
Start: 2022-09-01

## 2022-09-01 RX ORDER — QUETIAPINE FUMARATE 400 MG/1
400 TABLET, FILM COATED ORAL 2 TIMES DAILY
Qty: 180 TABLET | Refills: 0 | Status: SHIPPED | OUTPATIENT
Start: 2022-09-01

## 2022-09-01 SDOH — SOCIAL STABILITY - SOCIAL INSECURITY: DISSAPEARANCE AND DEATH OF FAMILY MEMBER: Z63.4

## 2022-09-01 NOTE — BH TREATMENT PLAN
TREATMENT PLAN (Medication Management Only)        [facility]    Name/Date of Birth/MRN:  Alejandro Sutton 46 y o  1970 MRN: [id number]  Date of Treatment Plan: September 1, 2022  Diagnosis/Diagnoses:   1  Moderately severe recurrent major depression ([facility] [address] )    2  Anxiety    3  Bereavement    4  Psychophysiological insomnia    5  Neuropathy      Strengths/Personal Resources for Self-Care: "Kids, my grandkids, spending time with my family; Sign language; Jew"  Area/Areas of need (in own words):  "It was a rough day  I went to the hospital where my sister was a pronounced dead "  1  Long Term Goal:                Maintain mood stability and control of anxiety   Target Date: 3-6 months  Person/Persons responsible for completion of goal: Renita  2  Short Term Objective (s) - How will we reach this goal?:   A  Provider new recommended medication/dosage changes and/or continue medication(s): Pt is having severe depression and anxiety, without panic  Tx options discussed and Pt accepts to start Fluoxetine with intention for this to be temporary due to Sxs exacerbation with another recent death in the family  I recommended psychotherapy and accepted referral to the [facility] psychotherapy group without any special preferences  I also gave resources of [facility] to search for a therapist and [facility]  org website--ie for their Peer to Peer program   Treatment plan done and Pt accepts the plan      Start Fluoxetine 20mg (1) cap po qd # 90  Continue:  Quetiapine 400mg (1) tab po bid # 180  Gabapentin 600mg (2) tabs po bid # 360  Hydroxyzine 25mg (1) tab po tid prn anxiety and insomnia # 270  Lorazepam 0 5mg (1) tab po qhs --per Sleep specialist  Dayvigo/Lemborexant 10mg (1) tab po qhs--per sleep specialist  Oxycodone per outside provider--per PDMP  Clonidine 0 1mg (1) tab po q12 hrs--per PCP  F/U neurology as scheduled 1/2023  F/U sleep specialist as sched 5/2022  UDS  Return 10/10/2022 at 9:30AM, call sooner prn    Target Date: 3-6 months  Person/Persons Responsible for Completion of Goal: Kiana 61   Progress Towards Goals: stable, continuing treatment  Treatment Modality: Medication mgt  Review due 180 days from date of this plan: 6 months - 3/1/2023  Expected length of service: ongoing treatment unless revised  My Physician/PA/NP and I have developed this plan together and I agree to work on the goals and objectives  I understand the treatment goals that were developed for my treatment    Electronic Signatures: on file (unless signed below)    Visit start and stop times:    Start Time: 10:02 AM  Stop Time: 11:08 AM    Hardy Boeck, PA-C 09/01/22

## 2022-09-02 RX ORDER — QUETIAPINE FUMARATE 400 MG/1
400 TABLET, FILM COATED ORAL 2 TIMES DAILY
Qty: 180 TABLET | Refills: 0 | OUTPATIENT
Start: 2022-09-02

## 2022-09-09 ENCOUNTER — TELEPHONE (OUTPATIENT)
Dept: PSYCHIATRY | Facility: CLINIC | Age: 52
End: 2022-09-09

## 2022-09-14 ENCOUNTER — TELEPHONE (OUTPATIENT)
Dept: BEHAVIORAL/MENTAL HEALTH CLINIC | Facility: CLINIC | Age: 52
End: 2022-09-14

## 2022-09-15 ENCOUNTER — TELEMEDICINE (OUTPATIENT)
Dept: PSYCHIATRY | Facility: CLINIC | Age: 52
End: 2022-09-15
Payer: COMMERCIAL

## 2022-09-15 DIAGNOSIS — F32.A DEPRESSION, UNSPECIFIED DEPRESSION TYPE: ICD-10-CM

## 2022-09-15 DIAGNOSIS — F43.21 COMPLICATED GRIEF: Primary | ICD-10-CM

## 2022-09-15 PROCEDURE — 90791 PSYCH DIAGNOSTIC EVALUATION: CPT | Performed by: SOCIAL WORKER
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
## 2022-09-15 NOTE — BH TREATMENT PLAN
Zandra Mckeon  1970       Date of Initial Treatment Plan: 9/15/22   Date of Current Treatment Plan: 09/15/22    Treatment Plan Number 1     Strengths/Personal Resources for Self Care: motivated    Diagnosis:   1  Complicated grief     2  Depression, unspecified depression type         Area of Needs: to process grief, sadness       Long Term Goal 1: To manage sadness and grief more effectively, maintain low score on PHQ9    Target Date: 2/15/23  Completion Date: tbd     Short Term Objectives for Goal   1  Engage in discussion about current stressors contributing to depression/sadness, 3-4 healthy ways to manage and begin utilizing the coping tools  2  Begin discussion about the Cognitive Process  3  Engage in Cognitive Reframing  4  Engage in Problem Solving as appropriate  5  Provide Psycho-education as needed related to grief and other issues  6  Teach DBT skills as appropriate  Long Term Goal 2: N/A    Target Date: N/A  Completion Date: N/A    Short Term Objectives for Goal 2: N/A         Long Term Goal # 3: N/A     Target Date: N/A  Completion Date: N/A    Short Term Objectives for Goal 3: N/A    GOAL 1: Modality: Individual 2x per month   Completion Date tbd    GOAL 2: Modality : n/a     GOAL 3: Modality: n/a       Behavioral Health Treatment Plan [facility]: Diagnosis and Treatment Plan explained to Marcelo Sanjuana relates understanding diagnosis and is agreeable to Treatment Plan  Client Comments : Please share your thoughts, feelings, need and/or experiences regarding your treatment plan: Amada Valdez agreed to plan  Zandra Mckeon, 1970, actively participated in the creation of this treatment plan during a virtual session, using the [employer] platform  Zandra Mckeon  provided verbal consent on 9/15/2022 at 1145 AM  The treatment plan was transcribed into the Electronic Health Record at a later time

## 2022-09-29 ENCOUNTER — TELEMEDICINE (OUTPATIENT)
Dept: PSYCHIATRY | Facility: CLINIC | Age: 52
End: 2022-09-29
Payer: COMMERCIAL

## 2022-09-29 DIAGNOSIS — F32.A DEPRESSION, UNSPECIFIED DEPRESSION TYPE: Primary | ICD-10-CM

## 2022-09-29 DIAGNOSIS — F43.21 COMPLICATED GRIEF: ICD-10-CM

## 2022-09-29 PROCEDURE — 90832 PSYTX W PT 30 MINUTES: CPT | Performed by: SOCIAL WORKER
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
## 2022-10-06 ENCOUNTER — TELEPHONE (OUTPATIENT)
Dept: PSYCHIATRY | Facility: CLINIC | Age: 52
End: 2022-10-06

## 2022-10-06 NOTE — TELEPHONE ENCOUNTER
Called patient and lvm to inform appt on 10/10 was cancelled due to provider being out of the office  Please schedule for next available

## 2022-10-13 ENCOUNTER — TELEMEDICINE (OUTPATIENT)
Dept: PSYCHIATRY | Facility: CLINIC | Age: 52
End: 2022-10-13
Payer: COMMERCIAL

## 2022-10-13 DIAGNOSIS — F32.A DEPRESSION, UNSPECIFIED DEPRESSION TYPE: Primary | ICD-10-CM

## 2022-10-13 DIAGNOSIS — F43.21 COMPLICATED GRIEF: ICD-10-CM

## 2022-10-13 PROCEDURE — 90834 PSYTX W PT 45 MINUTES: CPT | Performed by: SOCIAL WORKER

## 2022-10-13 NOTE — PSYCH
INDIVIDUAL SESSION NOTE     Psychotherapy Provided: Individual      Diagnosis ICD-10-CM Associated Orders   1  Depression, unspecified depression type  F32  A    2  Complicated grief  Y85 13           Goals addressed in session: Goal 1     Current suicide risk:  low    Data: Met with Abdelrahman Gomez for a scheduled individual session  Topics discussed included marital stress, family stressors and grief and loss  Kranthi Rodriguez is feeling good, states she is 'taking it day by day '  Overall, she reports doing well  Darlene Rist spent time talking about stress and frustration related to her marriage  She feels unappreciated and her  can be belittling  Further, she said he can be critical, especially of her cooking  Utilized active listening skills throughout session  She also mentioned disrespect and lack of consideration shown to her by her 's family that lives in their home  During this session, this clinician used the following therapeutic modalities supportive psychotherapy, client-centered therapy and psychoeducation  Provided psycho-education on healthy relationships  Encouraged Kranthi Rodriguez to start thinking about what boundaries she is ready/willing to set with her  to decrease disrespectful behaviors  Assessment:  Kranthi Rodriguez presents with a normal mood  her affect is normal range and intensity, appropriate  [place] was oriented x3 and was focused and engaged  Kranthi Rodriguez exhibits growing therapeutic rapport with this clinician  she continues to exhibit willingness to work on treatment goals and objectives  Kranthi Rdoriguez presents with a low risk of suicide, low risk of self-harm and low of harm to others  Plan:  Kranthi Rodriguez will return in 2 weeks for the next scheduled session  Between sessions, she  will continue to use healthy coping tools and will report back during the next session re: success and barriers    At the next session, this clinician will use supportive psychotherapy, psychoeducation and CBT techniques to address depression, in an effort to assist Robb Paredes with meeting treatment goals  Behavioral Health Treatment Plan [facility]: Diagnosis and Treatment Plan explained to Arnoldo Medley relates understanding diagnosis and is agreeable to Treatment Plan  Yes     Virtual Regular Visit    Verification of patient location:    Patient is located in the following state in which I hold an active license [place]      Assessment/Plan:    Problem List Items Addressed This Visit    None     Visit Diagnoses     Depression, unspecified depression type    -  Primary    Complicated grief              Goals addressed in session: Goal 1          Reason for visit is   Chief Complaint   Patient presents with   • Virtual Regular Visit        Encounter provider Rush Lopez LCSW    Provider located at [address] [place] [id number] [phone]      Recent Visits  No visits were found meeting these conditions  Showing recent visits within past 7 days and meeting all other requirements  Future Appointments  No visits were found meeting these conditions  Showing future appointments within next 150 days and meeting all other requirements       The patient was identified by name and date of birth  Dalia Rea was informed that this is a telemedicine visit and that [facility] and patient was informed that this is a secure, HIPAA-compliant platform  She agrees to proceed     My office door was closed  No one else was in the room  She acknowledged consent and understanding of privacy and security of the video platform  The patient has agreed to participate and understands they can discontinue the visit at any time  Patient is aware this is a billable service  Subjective  Dalia Rea is a 46 y o  female         HPI     Past Medical History:   Diagnosis Date   • Abnormal thyroid blood test     last assessed 11/13/14   • Anemia     last assessed  11/19/13   • Chronic pain     back   • Depression    • Sleep disorder        Past Surgical History:   Procedure Laterality Date   • ABSCESS DRAINAGE      incision - skin abscess   • BACK SURGERY      2002   • BACK SURGERY      lower   • MAMMO STEREOTACTIC BREAST BIOPSY RIGHT (ALL INC) Right 6/21/2017   • MAMMO STEREOTACTIC BREAST BIOPSY RIGHT (ALL INC) EACH ADD Right 6/21/2017   • TUBAL LIGATION  approx 1998       Current Outpatient Medications   Medication Sig Dispense Refill   • APPLE CIDER VINEGAR PO Take by mouth     • bisacodyl (DULCOLAX) 5 mg EC tablet Take as directed by the office  2 tablet 0   • cholecalciferol (VITAMIN D3) 400 units tablet Take 400 Units by mouth daily     • clobetasol (TEMOVATE) 0 05 % external solution APPLY TO SCALP TWICE A DAY     • cloNIDine (CATAPRES) 0 1 mg tablet Take 1 tablet (0 1 mg total) by mouth every 12 (twelve) hours 180 tablet 0   • cyclobenzaprine (FLEXERIL) 10 mg tablet Take 10 mg by mouth as needed  (Patient not taking: No sig reported)     • Diclofenac Sodium (VOLTAREN) 1 % Apply 2 g topically 4 (four) times a day 100 g 1   • docusate sodium (COLACE) 100 mg capsule Take 1 capsule (100 mg total) by mouth in the morning 90 capsule 1   • FLUoxetine (PROzac) 20 mg capsule Take 1 capsule (20 mg total) by mouth daily 90 capsule 0   • gabapentin (NEURONTIN) 600 MG tablet Take 2 tablets (1,200 mg total) by mouth 2 (two) times a day 360 tablet 0   • hydrOXYzine HCL (ATARAX) 25 mg tablet TAKE 1 TABLET BY MOUTH THREE TIMES A DAY AS NEEDED FOR ANXIETY AND INSOMNIA 270 tablet 0   • Lemborexant 10 MG TABS Take 10 mg by mouth daily at bedtime 30 tablet 4   • LORazepam (Ativan) 0 5 mg tablet Take 1 tablet (0 5 mg total) by mouth daily at bedtime 30 tablet 5   • magnesium citrate (CITROMA) 1 745 g/30 mL oral solution Take as directed by the office   296 mL 0   • Multiple Vitamins-Minerals (multivitamin with minerals) tablet Take 1 tablet by mouth daily     • naloxone (NARCAN) 4 mg/0 1 mL nasal spray Administer 1 spray into a nostril  If no response after 2-3 minutes, give another dose in the other nostril using a new spray  1 each 1   • omeprazole (PriLOSEC) 40 MG capsule Take 1 capsule (40 mg total) by mouth 2 (two) times a day for 14 days 28 capsule 0   • oxyCODONE (ROXICODONE) 30 MG immediate release tablet Take 30 mg by mouth 2 (two) times a day     • polyethylene glycol (GOLYTELY) 4000 mL solution Take as directed by the office  4000 mL 0   • QUEtiapine (SEROquel) 400 MG tablet Take 1 tablet (400 mg total) by mouth 2 (two) times a day 180 tablet 0   • vitamin E, tocopherol, 1,000 units capsule Take 1,000 Units by mouth daily       No current facility-administered medications for this visit  Allergies   Allergen Reactions   • Aspirin GI Intolerance   • Other Throat Swelling      raw onions- causes throat to feel like it wants to close       Review of Systems    Video Exam    There were no vitals filed for this visit      Physical Exam     I spent 44 minutes directly with the patient during this visit     Visit Time    Visit Start Time: 11:08 AM  Visit Stop Time: 11:52 AM  Total Visit Duration: 44 minutes

## 2022-10-28 ENCOUNTER — TELEMEDICINE (OUTPATIENT)
Dept: PSYCHIATRY | Facility: CLINIC | Age: 52
End: 2022-10-28
Payer: COMMERCIAL

## 2022-10-28 DIAGNOSIS — F32.A DEPRESSION, UNSPECIFIED DEPRESSION TYPE: Primary | ICD-10-CM

## 2022-10-28 DIAGNOSIS — F43.21 COMPLICATED GRIEF: ICD-10-CM

## 2022-10-28 PROCEDURE — 90834 PSYTX W PT 45 MINUTES: CPT | Performed by: SOCIAL WORKER

## 2022-10-28 NOTE — PSYCH
INDIVIDUAL SESSION NOTE     Psychotherapy Provided: Individual      Diagnosis ICD-10-CM Associated Orders   1  Depression, unspecified depression type  F32  A    2  Complicated grief  S82 09           Goals addressed in session: Goal 1     Current suicide risk:  low    Data: Met with Felicia Ashraf for a scheduled individual session  Topics discussed included physical health concerns  Marcia Sandoval is feeling 'ok '  She is having back pain and headaches  Marcia aSndoval vented frustrations related to her grand daughter, 12 y/o, that lives in her home  Her grand daughter has had some trouble at school with bullying, etc  and mentioned she will get 'in trouble' at home, this prompted a call to [facility] and [facility]  Marcia Sandoval is aggravated about this because they do not mistreat this teen in any way but she does discipline her  Utilized active listening skills and validated patient's thoughts, feelings  Marcia Sandoval reports increased anxiety because her birthday is next weekend  This is also the day her twin sister   She identified emotions of fear, worry because she does not know if "this will be my last year "  She is tearful when discussing  During this session, this clinician used the following therapeutic modalities supportive psychotherapy and problem solving skills  Discussed ways she is managing her grief  She will write her father a card on his birthday and Father's Day, then rip or burn it  She said it is helpful for expressing her thoughts and emotions about him  Marcia Sandoval also leans on her july and prayer  Provided additional psycho-education on grief, the importance of forgiveness, being aware of blame, etc  Assessment:  Marcia Sandoval presents with a normal mood  her affect is normal range and intensity, appropriate  [place] was oriented x3 and was focused and engaged  Marcia Sandoval exhibits growing therapeutic rapport with this clinician    she continues to exhibit willingness to work on treatment goals and objectives  Marisela Koehler presents with a low risk of suicide, low risk of self-harm and low of harm to others  Plan:  Marisela Koehler will return in 2 weeks for the next scheduled session  Between sessions, she  will manage grief in a healthy way and will report back during the next session re: success and barriers  At the next session, this clinician will use supportive psychotherapy and psychoeducation to address grief/depression/anxiety, in an effort to assist Marisela Koehler with meeting treatment goals  Behavioral Health Treatment Plan [facility]: Diagnosis and Treatment Plan explained to Amanda Moreno relates understanding diagnosis and is agreeable to Treatment Plan  Yes     Virtual Regular Visit    Verification of patient location:    Patient is located in the following state in which I hold an active license [place]      Assessment/Plan:    Problem List Items Addressed This Visit        Other    Complicated grief    Depression - Primary          Goals addressed in session: Goal 1          Reason for visit is   Chief Complaint   Patient presents with   • Virtual Regular Visit        Encounter provider Neda Treviño LCSW    Provider located at [address] [place] [id number] [phone]      Recent Visits  No visits were found meeting these conditions  Showing recent visits within past 7 days and meeting all other requirements  Future Appointments  No visits were found meeting these conditions  Showing future appointments within next 150 days and meeting all other requirements       The patient was identified by name and date of birth  Hema Grajeda was informed that this is a telemedicine visit and that the visit is being conducted throughthe [employer] platform  She agrees to proceed     My office door was closed  No one else was in the room    She acknowledged consent and understanding of privacy and security of the video platform  The patient has agreed to participate and understands they can discontinue the visit at any time  Patient is aware this is a billable service  Subjective  Og Plunkett is a 46 y o  female   HPI     Past Medical History:   Diagnosis Date   • Abnormal thyroid blood test     last assessed 11/13/14   • Anemia     last assessed  11/19/13   • Chronic pain     back   • Depression    • Sleep disorder        Past Surgical History:   Procedure Laterality Date   • ABSCESS DRAINAGE      incision - skin abscess   • BACK SURGERY      2002   • BACK SURGERY      lower   • MAMMO STEREOTACTIC BREAST BIOPSY RIGHT (ALL INC) Right 6/21/2017   • MAMMO STEREOTACTIC BREAST BIOPSY RIGHT (ALL INC) EACH ADD Right 6/21/2017   • TUBAL LIGATION  approx 1998       Current Outpatient Medications   Medication Sig Dispense Refill   • APPLE CIDER VINEGAR PO Take by mouth     • bisacodyl (DULCOLAX) 5 mg EC tablet Take as directed by the office   2 tablet 0   • cholecalciferol (VITAMIN D3) 400 units tablet Take 400 Units by mouth daily     • clobetasol (TEMOVATE) 0 05 % external solution APPLY TO SCALP TWICE A DAY     • cloNIDine (CATAPRES) 0 1 mg tablet Take 1 tablet (0 1 mg total) by mouth every 12 (twelve) hours 180 tablet 0   • cyclobenzaprine (FLEXERIL) 10 mg tablet Take 10 mg by mouth as needed  (Patient not taking: No sig reported)     • Diclofenac Sodium (VOLTAREN) 1 % Apply 2 g topically 4 (four) times a day 100 g 1   • docusate sodium (COLACE) 100 mg capsule Take 1 capsule (100 mg total) by mouth in the morning 90 capsule 1   • FLUoxetine (PROzac) 20 mg capsule Take 1 capsule (20 mg total) by mouth daily 90 capsule 0   • gabapentin (NEURONTIN) 600 MG tablet Take 2 tablets (1,200 mg total) by mouth 2 (two) times a day 360 tablet 0   • hydrOXYzine HCL (ATARAX) 25 mg tablet TAKE 1 TABLET BY MOUTH THREE TIMES A DAY AS NEEDED FOR ANXIETY AND INSOMNIA 270 tablet 0   • Lemborexant 10 MG TABS Take 10 mg by mouth daily at bedtime 30 tablet 4   • LORazepam (Ativan) 0 5 mg tablet Take 1 tablet (0 5 mg total) by mouth daily at bedtime 30 tablet 5   • magnesium citrate (CITROMA) 1 745 g/30 mL oral solution Take as directed by the office  296 mL 0   • Multiple Vitamins-Minerals (multivitamin with minerals) tablet Take 1 tablet by mouth daily     • naloxone (NARCAN) 4 mg/0 1 mL nasal spray Administer 1 spray into a nostril  If no response after 2-3 minutes, give another dose in the other nostril using a new spray  1 each 1   • omeprazole (PriLOSEC) 40 MG capsule Take 1 capsule (40 mg total) by mouth 2 (two) times a day for 14 days 28 capsule 0   • oxyCODONE (ROXICODONE) 30 MG immediate release tablet Take 30 mg by mouth 2 (two) times a day     • polyethylene glycol (GOLYTELY) 4000 mL solution Take as directed by the office  4000 mL 0   • QUEtiapine (SEROquel) 400 MG tablet Take 1 tablet (400 mg total) by mouth 2 (two) times a day 180 tablet 0   • vitamin E, tocopherol, 1,000 units capsule Take 1,000 Units by mouth daily       No current facility-administered medications for this visit  Allergies   Allergen Reactions   • Aspirin GI Intolerance   • Other Throat Swelling      raw onions- causes throat to feel like it wants to close       Review of Systems    Video Exam    There were no vitals filed for this visit      Physical Exam     Visit Time    Visit Start Time: 11:01 AM  Visit Stop Time: 11:44 AM  Total Visit Duration: 43 minutes

## 2022-10-31 ENCOUNTER — OFFICE VISIT (OUTPATIENT)
Dept: INTERNAL MEDICINE CLINIC | Facility: CLINIC | Age: 52
End: 2022-10-31

## 2022-10-31 VITALS
BODY MASS INDEX: 35.15 KG/M2 | TEMPERATURE: 97.2 F | HEIGHT: 62 IN | OXYGEN SATURATION: 97 % | HEART RATE: 91 BPM | SYSTOLIC BLOOD PRESSURE: 136 MMHG | WEIGHT: 191 LBS | DIASTOLIC BLOOD PRESSURE: 86 MMHG

## 2022-10-31 DIAGNOSIS — Z00.00 MEDICARE ANNUAL WELLNESS VISIT, INITIAL: Primary | ICD-10-CM

## 2022-10-31 DIAGNOSIS — K59.03 DRUG-INDUCED CONSTIPATION: ICD-10-CM

## 2022-10-31 DIAGNOSIS — Z91.89 AT RISK FOR POLYPHARMACY: ICD-10-CM

## 2022-10-31 RX ORDER — OXYCODONE HYDROCHLORIDE 15 MG/1
TABLET ORAL
COMMUNITY
Start: 2022-10-25

## 2022-10-31 RX ORDER — POLYETHYLENE GLYCOL 3350 17 G/17G
17 POWDER, FOR SOLUTION ORAL DAILY
Qty: 765 G | Refills: 1 | Status: SHIPPED | OUTPATIENT
Start: 2022-10-31

## 2022-10-31 RX ORDER — DOCUSATE SODIUM 100 MG/1
100 CAPSULE, LIQUID FILLED ORAL DAILY
Qty: 90 CAPSULE | Refills: 1 | Status: SHIPPED | OUTPATIENT
Start: 2022-10-31

## 2022-10-31 NOTE — PROGRESS NOTES
Assessment and Plan:     Problem List Items Addressed This Visit    None     Visit Diagnoses     Medicare annual wellness visit, initial    -  Primary    Drug-induced constipation        Relevant Medications    polyethylene glycol (GLYCOLAX) 17 GM/SCOOP powder    docusate sodium (COLACE) 100 mg capsule    At risk for polypharmacy            1  Medicare annual wellness visit  2  Constipation - likely drug-induced given use of opioids  · MiraLax and Colace ordered  · Follow-up with GI   3  History of H pylori - has failed multiple treatments in the past  · Recommend follow-up with GI after obtaining repeat stool testing for confirmation of eradication  4  At risk for polypharmacy - Patient presenting with falls, although it did appear to be mechanical in nature, patient is on multiple sedating medications including clonidine, Prozac, Atarax, gabapentin, Ativan, Seroquel, lemborexant that can certainly put patient at risk for falls  Patient currently reports her symptoms are not secondary to medication use  Will need close monitoring  5  Poor eyesight  · Recommend follow-up with optometrist/ophthalmology    Healthcare maintenance:  1  Mammogram up-to-date 06/2022, recommend repeat in 1 year  2  Colonoscopy inadequate prep, recommend follow-up with GI for repeat in 1 year   3  Pap smear up-to-date 06/2022 recommend repeat in 5 years    Return to clinic in 3 months for follow-up  Preventive health issues were discussed with patient, and age appropriate screening tests were ordered as noted in patient's After Visit Summary  Personalized health advice and appropriate referrals for health education or preventive services given if needed, as noted in patient's After Visit Summary       History of Present Illness:     Patient presents for a Medicare Wellness Visit    Chris Faye is a 20-year-old female past medical history of GERD, history of H pylori, depression, chronic opioid use, prediabetes, anxiety, insomnia presenting for Medicare wellness visit  Acutely complains of neck and headache present for the past year  Reports use of Tylenol, Flexeril, gabapentin, and oxycodone for pain  Patient also reports multiple falls in the past year, mechanical in nature however patient is also on multiple sedating medications including clonidine, gabapentin, Prozac, Atarax, Ativan, Seroquel  Complains of constipation, bowel movement 1-2 times weekly  Patient had an adequate bowel prep during last colonoscopy  Patient Care Team:  Carrie Luna DO as PCP - General (Internal Medicine)  Georgina Contreras PA-C as PCP - [address] [place] [address] (RTE)  Camden Taveras MD (Internal Medicine)  Saint Easterly, MD (Pain Medicine)     Review of Systems:     Review of Systems   Constitutional: Negative for chills and fever  HENT: Negative for congestion and sore throat  Eyes: Positive for visual disturbance  Negative for pain  Respiratory: Negative for cough and shortness of breath  Cardiovascular: Negative for chest pain and leg swelling  Gastrointestinal: Positive for abdominal pain and constipation  Negative for diarrhea, nausea and vomiting  Genitourinary: Negative for dysuria and flank pain  Musculoskeletal: Positive for arthralgias, back pain and neck pain  Negative for gait problem  Skin: Negative for rash and wound  Neurological: Positive for headaches  Negative for light-headedness  Psychiatric/Behavioral: Negative for agitation and confusion          Problem List:     Patient Active Problem List   Diagnosis   • GERD (gastroesophageal reflux disease)   • Alopecia   • History of prediabetes   • Vertigo   • Esophageal dysphagia   • Latent tuberculosis by blood test   • H  pylori infection   • Stage 2 chronic kidney disease   • Moderately severe recurrent major depression (HCC)   • New persistent daily headache   • Meningioma (HCC)   • Radiculopathy, cervical   • Anxiety   • Encounter for long-term (current) use of other medications   • Bilateral occipital neuralgia   • Cardiopulmonary obesity syndrome (HCC)   • Complicated grief   • Depression      Past Medical and Surgical History:     Past Medical History:   Diagnosis Date   • Abnormal thyroid blood test     last assessed 11/13/14   • Anemia     last assessed  11/19/13   • Chronic pain     back   • Depression    • Sleep disorder      Past Surgical History:   Procedure Laterality Date   • ABSCESS DRAINAGE      incision - skin abscess   • BACK SURGERY      2002   • BACK SURGERY      lower   • MAMMO STEREOTACTIC BREAST BIOPSY RIGHT (ALL INC) Right 6/21/2017   • MAMMO STEREOTACTIC BREAST BIOPSY RIGHT (ALL INC) EACH ADD Right 6/21/2017   • TUBAL LIGATION  approx 1998      Family History:     Family History   Problem Relation Age of Onset   • Kidney disease Mother         chronic   • Diabetes Mother    • Bone cancer Father 54   • Heart attack Sister         Acute MI   • Diabetes Sister    • Hypertension Brother    • Cancer Family    • Diabetes Family    • Heart disease Family    • Hyperlipidemia Family    • Hypertension Family    • Colon cancer Paternal Grandfather 80   • No Known Problems Maternal Grandmother    • No Known Problems Maternal Grandfather    • No Known Problems Paternal Grandmother    • No Known Problems Sister    • No Known Problems Sister    • No Known Problems Sister    • No Known Problems Brother    • No Known Problems Brother    • No Known Problems Brother    • No Known Problems Daughter    • No Known Problems Daughter    • No Known Problems Son    • No Known Problems Son    • No Known Problems Son    • No Known Problems Son       Social History:     Social History     Socioeconomic History   • Marital status: /Civil Union     Spouse name: None   • Number of children: None   • Years of education: None   • Highest education level: None   Occupational History   • 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   Tobacco Use   • Smoking status: Former Smoker     Types: Cigarettes   • Smokeless tobacco: Never Used   • Tobacco comment: pt "quit five years ago"--Pt clarified 2013 during a 4/12/2021 visit   Vaping Use   • Vaping Use: Some days   • Substances: Nicotine   Substance and Sexual Activity   • Alcohol use: No   • Drug use: Not Currently     Types: Marijuana     Comment: Tried THC in grammar school and high school    • Sexual activity: Yes     Partners: Male     Birth control/protection: None, Female Sterilization   Other Topics Concern   • None   Social History Narrative    Death in the family - sister    Lack of adequate sleep    Parentage    Sedentary lifestyle    Under stress        Home: Pt lives with  and 2 grandchildren and one stepson  's mom and sister moved in approx 5/2022        Education:    Pt denies any h/o learning disabilities and reached childhood milestones on time as far as he knows  Graduated HS    Some college     Social Determinants of Health     Financial Resource Strain: High Risk   • Difficulty of Paying Living Expenses: Very hard   Food Insecurity: Food Insecurity Present   • Worried About [address] in the Last Year: Often true   • Ran Out of Food in the Last Year: Often true   Transportation Needs: No Transportation Needs   • Lack of Transportation (Medical): No   • Lack of Transportation (Non-Medical): No   Physical Activity: Not on file   Stress: Not on file   Social Connections: Not on file   Intimate Partner Violence: Not on file   Housing Stability: Low Risk    • Unable to Pay for Housing in the Last Year: No   • Number of Places Lived in the Last Year: 1   • Unstable Housing in the Last Year: No      Medications and Allergies:     Current Outpatient Medications   Medication Sig Dispense Refill   • APPLE CIDER VINEGAR PO Take by mouth     • bisacodyl (DULCOLAX) 5 mg EC tablet Take as directed by the office   2 tablet 0   • cholecalciferol (VITAMIN D3) 400 units tablet Take 400 Units by mouth daily     • clobetasol (TEMOVATE) 0 05 % external solution APPLY TO SCALP TWICE A DAY     • cloNIDine (CATAPRES) 0 1 mg tablet Take 1 tablet (0 1 mg total) by mouth every 12 (twelve) hours 180 tablet 0   • Diclofenac Sodium (VOLTAREN) 1 % Apply 2 g topically 4 (four) times a day 100 g 1   • docusate sodium (COLACE) 100 mg capsule Take 1 capsule (100 mg total) by mouth in the morning 90 capsule 1   • FLUoxetine (PROzac) 20 mg capsule Take 1 capsule (20 mg total) by mouth daily 90 capsule 0   • gabapentin (NEURONTIN) 600 MG tablet Take 2 tablets (1,200 mg total) by mouth 2 (two) times a day 360 tablet 0   • hydrOXYzine HCL (ATARAX) 25 mg tablet TAKE 1 TABLET BY MOUTH THREE TIMES A DAY AS NEEDED FOR ANXIETY AND INSOMNIA 270 tablet 0   • Lemborexant 10 MG TABS Take 10 mg by mouth daily at bedtime 30 tablet 4   • LORazepam (Ativan) 0 5 mg tablet Take 1 tablet (0 5 mg total) by mouth daily at bedtime 30 tablet 5   • magnesium citrate (CITROMA) 1 745 g/30 mL oral solution Take as directed by the office  296 mL 0   • Multiple Vitamins-Minerals (multivitamin with minerals) tablet Take 1 tablet by mouth daily     • naloxone (NARCAN) 4 mg/0 1 mL nasal spray Administer 1 spray into a nostril  If no response after 2-3 minutes, give another dose in the other nostril using a new spray  1 each 1   • oxyCODONE (ROXICODONE) 15 mg immediate release tablet TAKE 1 TABLET BY MOUTH THREE TIMES A DAY AS NEEDED FOR 30 DAYS     • polyethylene glycol (GLYCOLAX) 17 GM/SCOOP powder Take 17 g by mouth daily 765 g 1   • polyethylene glycol (GOLYTELY) 4000 mL solution Take as directed by the office   4000 mL 0   • QUEtiapine (SEROquel) 400 MG tablet Take 1 tablet (400 mg total) by mouth 2 (two) times a day 180 tablet 0   • vitamin E, tocopherol, 1,000 units capsule Take 1,000 Units by mouth daily     • cyclobenzaprine (FLEXERIL) 10 mg tablet Take 10 mg by mouth as needed  (Patient not taking: No sig reported)     • omeprazole (PriLOSEC) 40 MG capsule Take 1 capsule (40 mg total) by mouth 2 (two) times a day for 14 days 28 capsule 0   • oxyCODONE (ROXICODONE) 30 MG immediate release tablet Take 30 mg by mouth 2 (two) times a day (Patient not taking: Reported on 10/31/2022)       No current facility-administered medications for this visit  Allergies   Allergen Reactions   • Aspirin GI Intolerance   • Other Throat Swelling      raw onions- causes throat to feel like it wants to close      Immunizations:     Immunization History   Administered Date(s) Administered   • INFLUENZA 03/30/2017   • Influenza, seasonal, injectable 11/19/2013, 11/13/2014, 11/17/2015   • Influenza, seasonal, injectable, preservative free 03/30/2017   • Tdap 11/19/2013      Health Maintenance:         Topic Date Due   • Breast Cancer Screening: Mammogram  06/14/2023   • Colorectal Cancer Screening  07/05/2023   • Cervical Cancer Screening  06/22/2027   • HIV Screening  Completed   • Hepatitis C Screening  Completed     There are no preventive care reminders to display for this patient  Medicare Screening Tests and Risk Assessments:     Lara [place] is here for her Initial Wellness visit  Health Risk Assessment:   Patient rates overall health as very good  Patient feels that their physical health rating is slightly worse  Patient is very satisfied with their life  Eyesight was rated as much worse  Hearing was rated as same  Patient feels that their emotional and mental health rating is same  Patients states they are sometimes angry  Patient states they are sometimes unusually tired/fatigued  Pain experienced in the last 7 days has been some  Patient's pain rating has been 8/10  Patient states that she has experienced no weight loss or gain in last 6 months  Depression Screening:   PHQ-9 Score: 0      Fall Risk Screening:    In the past year, patient has experienced: history of falling in past year    Number of falls: 2 or more  Injured during fall?: No    Feels unsteady when standing or walking?: No    Worried about falling?: No      Urinary Incontinence Screening:   Patient has not leaked urine accidently in the last six months  Home Safety:  Patient does not have trouble with stairs inside or outside of their home  Patient has working smoke alarms and has working carbon monoxide detector  Home safety hazards include: none  Nutrition:   Current diet is Regular  Medications:   Patient is currently taking over-the-counter supplements  OTC medications include: see medication list  Patient is able to manage medications  Activities of Daily Living (ADLs)/Instrumental Activities of Daily Living (IADLs):   Walk and transfer into and out of bed and chair?: Yes  Dress and groom yourself?: Yes    Bathe or shower yourself?: Yes    Feed yourself? Yes  Do your laundry/housekeeping?: Yes  Manage your money, pay your bills and track your expenses?: Yes  Make your own meals?: Yes    Do your own shopping?: Yes    Previous Hospitalizations:   Any hospitalizations or ED visits within the last 12 months?: No      Advance Care Planning:   Living will: Yes    Advanced directive: Yes      Cognitive Screening:   Provider or family/friend/caregiver concerned regarding cognition?: No    PREVENTIVE SCREENINGS      Cardiovascular Screening:    General: Screening Current      Diabetes Screening:     General: Screening Current      Colorectal Cancer Screening:     General: Screening Current      Breast Cancer Screening:     General: Screening Current      Cervical Cancer Screening:    General: Screening Current      Lung Cancer Screening:     General: Screening Not Indicated      Hepatitis C Screening:    General: Screening Current    Screening, Brief Intervention, and Referral to Treatment (SBIRT)    Screening  Typical number of drinks in a day: 0  Typical number of drinks in a week: 0  Interpretation: Low risk drinking behavior      Single Item Drug Screening:  How often have you used an illegal drug (including marijuana) or a prescription medication for non-medical reasons in the past year? never    Single Item Drug Screen Score: 0  Interpretation: Negative screen for possible drug use disorder    Review of Current Opioid Use    Opioid Risk Tool (ORT) Interpretation: Score 0-3: Low risk for opioid misuse    Other Counseling Topics:   Car/seat belt/driving safety, skin self-exam, sunscreen and calcium and vitamin D intake and regular weightbearing exercise  No exam data present     Physical Exam:     /86 (BP Location: Left arm, Patient Position: Sitting, Cuff Size: Large)   Pulse 91   Temp (!) 97 2 °F (36 2 °C) (Temporal)   Ht 5' 2" (1 575 m)   Wt 86 6 kg (191 lb)   LMP 06/10/2021   SpO2 97%   BMI 34 93 kg/m²     Physical Exam  Vitals reviewed  Constitutional:       General: She is not in acute distress  Appearance: Normal appearance  She is not ill-appearing  HENT:      Head: Normocephalic and atraumatic  Mouth/Throat:      Mouth: Mucous membranes are moist       Pharynx: Oropharynx is clear  No oropharyngeal exudate or posterior oropharyngeal erythema  Eyes:      General: No scleral icterus  Extraocular Movements: Extraocular movements intact  Conjunctiva/sclera: Conjunctivae normal    Cardiovascular:      Rate and Rhythm: Normal rate and regular rhythm  Heart sounds: Normal heart sounds  No murmur heard  Pulmonary:      Effort: Pulmonary effort is normal  No respiratory distress  Breath sounds: Normal breath sounds  No wheezing or rales  Abdominal:      General: Bowel sounds are normal       Palpations: Abdomen is soft  Tenderness: There is no abdominal tenderness  Musculoskeletal:      Right lower leg: No edema  Left lower leg: No edema  Skin:     General: Skin is warm and dry  Neurological:      Mental Status: She is alert and oriented to person, place, and time  Mental status is at baseline        Gait: Gait normal    Psychiatric: Mood and Affect: Mood normal          Behavior: Behavior normal           Maldonado Price DO

## 2022-10-31 NOTE — PATIENT INSTRUCTIONS
Polyethylene  gylcol - use twice a daily until you start having regular bowel movement daily and then transition to once daily  Stool study  Follow-up with GI

## 2022-11-10 ENCOUNTER — TELEMEDICINE (OUTPATIENT)
Dept: PSYCHIATRY | Facility: CLINIC | Age: 52
End: 2022-11-10

## 2022-11-10 DIAGNOSIS — F32.A DEPRESSION, UNSPECIFIED DEPRESSION TYPE: Primary | ICD-10-CM

## 2022-11-10 DIAGNOSIS — F43.21 COMPLICATED GRIEF: ICD-10-CM

## 2022-11-10 NOTE — PSYCH
No Call  No Show  No Charge    Raoul Osuna no showed 11/10/22 appointment , staff called and left message to reschedule appointment     Treatment Plan not due at this session

## 2022-11-11 ENCOUNTER — TELEPHONE (OUTPATIENT)
Dept: BEHAVIORAL/MENTAL HEALTH CLINIC | Facility: CLINIC | Age: 52
End: 2022-11-11

## 2022-11-11 DIAGNOSIS — F41.9 ANXIETY AND DEPRESSION: Chronic | ICD-10-CM

## 2022-11-11 DIAGNOSIS — F32.A ANXIETY AND DEPRESSION: Chronic | ICD-10-CM

## 2022-11-14 RX ORDER — CLONIDINE HYDROCHLORIDE 0.1 MG/1
TABLET ORAL
Qty: 180 TABLET | Refills: 0 | Status: SHIPPED | OUTPATIENT
Start: 2022-11-14 | End: 2022-11-25 | Stop reason: SDUPTHER

## 2022-11-21 DIAGNOSIS — F33.2 MODERATELY SEVERE RECURRENT MAJOR DEPRESSION (HCC): ICD-10-CM

## 2022-11-21 DIAGNOSIS — F41.9 ANXIETY: ICD-10-CM

## 2022-11-21 DIAGNOSIS — G62.9 NEUROPATHY: ICD-10-CM

## 2022-11-21 DIAGNOSIS — F51.04 PSYCHOPHYSIOLOGICAL INSOMNIA: ICD-10-CM

## 2022-11-21 RX ORDER — QUETIAPINE FUMARATE 400 MG/1
400 TABLET, FILM COATED ORAL 2 TIMES DAILY
Qty: 180 TABLET | Refills: 0 | Status: SHIPPED | OUTPATIENT
Start: 2022-11-21 | End: 2022-11-25 | Stop reason: SDUPTHER

## 2022-11-21 RX ORDER — FLUOXETINE HYDROCHLORIDE 20 MG/1
20 CAPSULE ORAL DAILY
Qty: 90 CAPSULE | Refills: 0 | Status: SHIPPED | OUTPATIENT
Start: 2022-11-21 | End: 2022-11-25 | Stop reason: SDUPTHER

## 2022-11-21 RX ORDER — HYDROXYZINE HYDROCHLORIDE 25 MG/1
TABLET, FILM COATED ORAL
Qty: 270 TABLET | Refills: 0 | Status: SHIPPED | OUTPATIENT
Start: 2022-11-21 | End: 2022-11-25 | Stop reason: SDUPTHER

## 2022-11-21 RX ORDER — GABAPENTIN 600 MG/1
1200 TABLET ORAL 2 TIMES DAILY
Qty: 360 TABLET | Refills: 0 | Status: SHIPPED | OUTPATIENT
Start: 2022-11-21 | End: 2022-11-25 | Stop reason: SDUPTHER

## 2022-11-21 NOTE — TELEPHONE ENCOUNTER
Medication Refill Request     Name of Medication Fluoxetine  Dose/Frequency 20mg once a day   Quantity 90 capsule  Verified pharmacy   [x]  Verified ordering Provider   [x]  Does patient have enough for the next 3 days? Yes [] No [x]  Does patient have a follow-up appointment scheduled? Yes [x] No []   If so when is appointment: 11/25    Medication Refill Request     Name of Medication Hydroxyzine   Dose/Frequency 25mg three times a day   Quantity 270 tablet  Verified pharmacy   [x]  Verified ordering Provider   [x]  Does patient have enough for the next 3 days? Yes [] No [x]  Does patient have a follow-up appointment scheduled? Yes [x] No []   If so when is appointment: 11/25    Medication Refill Request     Name of Medication Seroquel  Dose/Frequency 400mg two times a day   Quantity 180 tablet  Verified pharmacy   [x]  Verified ordering Provider   [x]  Does patient have enough for the next 3 days? Yes [] No [x]  Does patient have a follow-up appointment scheduled?  Yes [x] No []   If so when is appointment: 11/25

## 2022-11-21 NOTE — TELEPHONE ENCOUNTER
Patient called to check on her medication  She said she have a rough weekend and needs her medication  Please review

## 2022-11-22 NOTE — TELEPHONE ENCOUNTER
EMR reviewed, and Pt should have more than enough to last until her next appt on 11/25/2022  Would last at least until 11/30/2022 for all Rxs  I contacted Tina Ordaz on her cell phone # of record and she could not check her bottles because she is currently at work, but she is not sure why she only has a couple of more days left  She denies losing pills or overuse    I advised pill counting in the future and e-scribed the following to her designated [facility] pharmacy, also including Gabapentin which is normally refilled the same date as the SGA, SSRI and antihistamine:  Fluoxetine 20mg (1) cap po qd # 90  Quetiapine 400mg (1) tab po bid # 180  Gabapentin 600mg (2) tabs po bid # 360  Hydroxyzine 25mg (1) tab po tid prn anxiety and insomnia # 270
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
## 2022-11-25 ENCOUNTER — TELEMEDICINE (OUTPATIENT)
Dept: PSYCHIATRY | Facility: CLINIC | Age: 52
End: 2022-11-25

## 2022-11-25 DIAGNOSIS — F51.04 PSYCHOPHYSIOLOGICAL INSOMNIA: ICD-10-CM

## 2022-11-25 DIAGNOSIS — F33.2 MODERATELY SEVERE RECURRENT MAJOR DEPRESSION (HCC): Primary | ICD-10-CM

## 2022-11-25 DIAGNOSIS — F41.9 ANXIETY: ICD-10-CM

## 2022-11-25 DIAGNOSIS — G62.9 NEUROPATHY: ICD-10-CM

## 2022-11-25 DIAGNOSIS — F43.21 COMPLICATED GRIEF: ICD-10-CM

## 2022-11-25 RX ORDER — CLONIDINE HYDROCHLORIDE 0.1 MG/1
0.1 TABLET ORAL EVERY 12 HOURS
Qty: 180 TABLET | Refills: 0 | Status: SHIPPED | OUTPATIENT
Start: 2022-11-25

## 2022-11-25 RX ORDER — FLUOXETINE HYDROCHLORIDE 20 MG/1
20 CAPSULE ORAL DAILY
Qty: 90 CAPSULE | Refills: 0 | Status: SHIPPED | OUTPATIENT
Start: 2022-11-25

## 2022-11-25 RX ORDER — GABAPENTIN 600 MG/1
1200 TABLET ORAL 2 TIMES DAILY
Qty: 360 TABLET | Refills: 0 | Status: SHIPPED | OUTPATIENT
Start: 2022-11-25 | End: 2023-02-23

## 2022-11-25 RX ORDER — QUETIAPINE FUMARATE 400 MG/1
400 TABLET, FILM COATED ORAL 2 TIMES DAILY
Qty: 180 TABLET | Refills: 0 | Status: SHIPPED | OUTPATIENT
Start: 2022-11-25

## 2022-11-25 RX ORDER — HYDROXYZINE HYDROCHLORIDE 25 MG/1
TABLET, FILM COATED ORAL
Qty: 270 TABLET | Refills: 0 | Status: SHIPPED | OUTPATIENT
Start: 2022-11-25

## 2022-11-30 ENCOUNTER — OFFICE VISIT (OUTPATIENT)
Dept: SLEEP CENTER | Facility: CLINIC | Age: 52
End: 2022-11-30

## 2022-11-30 VITALS
DIASTOLIC BLOOD PRESSURE: 76 MMHG | BODY MASS INDEX: 35.7 KG/M2 | HEIGHT: 62 IN | SYSTOLIC BLOOD PRESSURE: 118 MMHG | WEIGHT: 194 LBS

## 2022-11-30 DIAGNOSIS — F51.04 PSYCHOPHYSIOLOGICAL INSOMNIA: ICD-10-CM

## 2022-11-30 DIAGNOSIS — F51.01 PRIMARY INSOMNIA: ICD-10-CM

## 2022-11-30 RX ORDER — LORAZEPAM 0.5 MG/1
0.5 TABLET ORAL
Qty: 30 TABLET | Refills: 5 | Status: SHIPPED | OUTPATIENT
Start: 2022-11-30

## 2022-11-30 NOTE — PROGRESS NOTES
Progress Note - Sleep Center   Markus Post :1970 MRN: [id number]      Reason for Visit:  45 y  o female here for annual follow-up    Assessment:  Doing well on current therapy of lorazepam and Dayvigo for chronic insomnia  Plan:  Continue same    Follow up:   One year    History of Present Illness:  History of chronic insomnia      Review of Systems      Genitourinary none  Cardiology none  Gastrointestinal none  Neurology balance problems  Constitutional none  Integumentary none  Psychiatry anxiety and depression  Musculoskeletal back pain  Pulmonary snoring  ENT none  Endocrine none  Hematological none    I have reviewed and updated the review of systems as necessary      Historical Information    Past Medical History:   Past Medical History:   Diagnosis Date   • Abnormal thyroid blood test     last assessed 14   • Anemia     last assessed  13   • Chronic pain     back   • Depression    • Sleep disorder          Past Surgical History:   Past Surgical History:   Procedure Laterality Date   • ABSCESS DRAINAGE      incision - skin abscess   • BACK SURGERY         • BACK SURGERY      lower   • MAMMO STEREOTACTIC BREAST BIOPSY RIGHT (ALL INC) Right 2017   • MAMMO STEREOTACTIC BREAST BIOPSY RIGHT (ALL INC) EACH ADD Right 2017   • TUBAL LIGATION  approx        Social History:   Social History     Socioeconomic History   • Marital status: /Civil Union     Spouse name: Not on file   • Number of children: Not on file   • Years of education: Not on file   • Highest education level: Not on file   Occupational History   • 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   Tobacco Use   • Smoking status: Former     Types: Cigarettes   • Smokeless tobacco: Never   • Tobacco comments:     pt "quit five years ago"--Pt clarified  during a 2021 visit   Vaping Use   • Vaping Use: Some days   • Substances: Nicotine   Substance and Sexual Activity   • Alcohol use: No   • Drug use: Not Currently     Types: Marijuana     Comment: Tried THC in grammar school and high school    • Sexual activity: Yes     Partners: Male     Birth control/protection: None, Female Sterilization   Other Topics Concern   • Not on file   Social History Narrative    Death in the family - sister    Lack of adequate sleep    Parentage    Sedentary lifestyle    Under stress        Home: Pt lives with  and 2 grandchildren and one stepson  's mom and sister moved in approx 5/2022        Education:    Pt denies any h/o learning disabilities and reached childhood milestones on time as far as he knows  Graduated HS    Some college     Social Determinants of Health     Financial Resource Strain: High Risk   • Difficulty of Paying Living Expenses: Very hard   Food Insecurity: Food Insecurity Present   • Worried About [address] in the Last Year: Often true   • Ran Out of Food in the Last Year: Often true   Transportation Needs: No Transportation Needs   • Lack of Transportation (Medical): No   • Lack of Transportation (Non-Medical):  No   Physical Activity: Not on file   Stress: Not on file   Social Connections: Not on file   Intimate Partner Violence: Not on file   Housing Stability: Low Risk    • Unable to Pay for Housing in the Last Year: No   • Number of Places Lived in the Last Year: 1   • Unstable Housing in the Last Year: No       Family History:   Family History   Problem Relation Age of Onset   • Kidney disease Mother         chronic   • Diabetes Mother    • Bone cancer Father 54   • Heart attack Sister         Acute MI   • Diabetes Sister    • Hypertension Brother    • Cancer Family    • Diabetes Family    • Heart disease Family    • Hyperlipidemia Family    • Hypertension Family    • Colon cancer Paternal Grandfather 80   • No Known Problems Maternal Grandmother    • No Known Problems Maternal Grandfather    • No Known Problems Paternal Grandmother    • No Known Problems Sister    • No Known Problems Sister    • No Known Problems Sister    • No Known Problems Brother    • No Known Problems Brother    • No Known Problems Brother    • No Known Problems Daughter    • No Known Problems Daughter    • No Known Problems Son    • No Known Problems Son    • No Known Problems Son    • No Known Problems Son        Medications/Allergies:      Current Outpatient Medications:   •  APPLE CIDER VINEGAR PO, Take by mouth, Disp: , Rfl:   •  bisacodyl (DULCOLAX) 5 mg EC tablet, Take as directed by the office  , Disp: 2 tablet, Rfl: 0  •  cholecalciferol (VITAMIN D3) 400 units tablet, Take 400 Units by mouth daily, Disp: , Rfl:   •  clobetasol (TEMOVATE) 0 05 % external solution, APPLY TO SCALP TWICE A DAY, Disp: , Rfl:   •  cloNIDine (CATAPRES) 0 1 mg tablet, Take 1 tablet (0 1 mg total) by mouth every 12 (twelve) hours, Disp: 180 tablet, Rfl: 0  •  Diclofenac Sodium (VOLTAREN) 1 %, Apply 2 g topically 4 (four) times a day, Disp: 100 g, Rfl: 1  •  docusate sodium (COLACE) 100 mg capsule, Take 1 capsule (100 mg total) by mouth in the morning, Disp: 90 capsule, Rfl: 1  •  FLUoxetine (PROzac) 20 mg capsule, Take 1 capsule (20 mg total) by mouth daily, Disp: 90 capsule, Rfl: 0  •  gabapentin (NEURONTIN) 600 MG tablet, Take 2 tablets (1,200 mg total) by mouth 2 (two) times a day, Disp: 360 tablet, Rfl: 0  •  hydrOXYzine HCL (ATARAX) 25 mg tablet, TAKE 1 TABLET BY MOUTH THREE TIMES A DAY AS NEEDED FOR ANXIETY AND INSOMNIA, Disp: 270 tablet, Rfl: 0  •  Lemborexant 10 MG TABS, Take 10 mg by mouth daily at bedtime, Disp: 30 tablet, Rfl: 4  •  LORazepam (Ativan) 0 5 mg tablet, Take 1 tablet (0 5 mg total) by mouth daily at bedtime, Disp: 30 tablet, Rfl: 5  •  magnesium citrate (CITROMA) 1 745 g/30 mL oral solution, Take as directed by the office  , Disp: 296 mL, Rfl: 0  •  Multiple Vitamins-Minerals (multivitamin with minerals) tablet, Take 1 tablet by mouth daily, Disp: , Rfl:   •  naloxone (NARCAN) 4 mg/0 1 mL nasal spray, Administer 1 spray into a nostril  If no response after 2-3 minutes, give another dose in the other nostril using a new spray , Disp: 1 each, Rfl: 1  •  oxyCODONE (ROXICODONE) 15 mg immediate release tablet, TAKE 1 TABLET BY MOUTH THREE TIMES A DAY AS NEEDED FOR 30 DAYS, Disp: , Rfl:   •  polyethylene glycol (GLYCOLAX) 17 GM/SCOOP powder, Take 17 g by mouth daily, Disp: 765 g, Rfl: 1  •  polyethylene glycol (GOLYTELY) 4000 mL solution, Take as directed by the office  , Disp: 4000 mL, Rfl: 0  •  QUEtiapine (SEROquel) 400 MG tablet, Take 1 tablet (400 mg total) by mouth 2 (two) times a day, Disp: 180 tablet, Rfl: 0  •  vitamin E, tocopherol, 1,000 units capsule, Take 1,000 Units by mouth daily, Disp: , Rfl:   •  cyclobenzaprine (FLEXERIL) 10 mg tablet, Take 10 mg by mouth as needed  (Patient not taking: Reported on 6/7/2022), Disp: , Rfl:   •  omeprazole (PriLOSEC) 40 MG capsule, Take 1 capsule (40 mg total) by mouth 2 (two) times a day for 14 days, Disp: 28 capsule, Rfl: 0  •  oxyCODONE (ROXICODONE) 30 MG immediate release tablet, Take 30 mg by mouth 2 (two) times a day (Patient not taking: Reported on 10/31/2022), Disp: , Rfl:           Objective      Vital Signs:   Vitals:    11/30/22 1438   BP: 118/76     [place] Sleepiness Scale: Total score: 2        Physical Exam:    General: Alert, appropriate, cooperative, overweight    Head: NC/AT    Skin: Warm, dry    Neuro: No motor abnormalities, cranial nerves appear intact    Extremity: No clubbing, cyanosis            Counseling / Coordination of Care   I have spent 10 minutes with the patient today in which greater than 50% of this time was spent in counseling/coordination of care regarding: equipment and compliance  Board Certified Sleep Specialist    Portions of the record may have been created with voice recognition software    Occasional wrong word or "sound a like" substitutions may have occurred due to the inherent limitations of voice recognition software  Read the chart carefully and recognize, using context, where substitutions have occurred

## 2022-12-08 ENCOUNTER — TELEPHONE (OUTPATIENT)
Dept: INTERNAL MEDICINE CLINIC | Facility: CLINIC | Age: 52
End: 2022-12-08

## 2022-12-08 DIAGNOSIS — R76.11 POSITIVE PPD: Primary | ICD-10-CM

## 2022-12-08 NOTE — TELEPHONE ENCOUNTER
patient called to state that she had a TB test done 2/3 weeks ago and tested positive  Patient wants to know whether or not she has tuberculosis -- patient states she was given treatment but does not remember the name of the treatment    patient is requesting a chest xray

## 2022-12-08 NOTE — TELEPHONE ENCOUNTER
Called patient and questioned where she got the TB test completed because I have no records  Per patient she stated her job was requiring a TB test an she went to [facility]  I did advise patient she had a positive TB test her back in February and she no longer needs TB test done because she will always test positive  Informed patient she will need a chest xray from here on out  Patient understood  Are we able to place order for chest xray so patient can obtain for work       Please complete as doc of day

## 2022-12-16 ENCOUNTER — HOSPITAL ENCOUNTER (OUTPATIENT)
Dept: RADIOLOGY | Facility: HOSPITAL | Age: 52
End: 2022-12-16

## 2022-12-16 DIAGNOSIS — R76.11 POSITIVE PPD: ICD-10-CM

## 2022-12-20 ENCOUNTER — DOCUMENTATION (OUTPATIENT)
Dept: PSYCHIATRY | Facility: CLINIC | Age: 52
End: 2022-12-20

## 2022-12-20 ENCOUNTER — TELEPHONE (OUTPATIENT)
Dept: INTERNAL MEDICINE CLINIC | Facility: CLINIC | Age: 52
End: 2022-12-20

## 2022-12-20 DIAGNOSIS — F43.21 COMPLICATED GRIEF: ICD-10-CM

## 2022-12-20 DIAGNOSIS — F32.A DEPRESSION, UNSPECIFIED DEPRESSION TYPE: Primary | ICD-10-CM

## 2022-12-20 NOTE — PROGRESS NOTES
Assessment/Plan:      Diagnoses and all orders for this visit:    Depression, unspecified depression type    Complicated grief          Subjective: Completed four sessions with Greg, including assessment  Patient ID: Lou Jules is a 45 y o  female  Outpatient Discharge Summary: Completed four sessions with Alfreda Ugarte, during this short course of therapy this therapist was able to provide education about grief  Alfreda Ugarte did not keep her f/u appt on 11/10  Letter sent, no further appointments cheduled  Admission Date: 9/15/22  Alfreda Ugarte was referred by self  Discharge Date: 10/28/22    Discharge Diagnosis:    1  Depression, unspecified depression type        2  Complicated grief            Treating Physician: none  Treatment Complications: inconsistent with therapy, two no show appointments, was late for a few sessions  Presenting Problem: grief, depression  Course of treatment includes:    individual therapy   Treatment Progress: fair  Criteria for Discharge: did not schedule any further appointments after no show on 11/10/22  Aftercare recommendations include outpatient counseling in the future if needed, possibly in person as Alfreda Ugarte struggled with tech required to conduct virtual therapy  Discharge Medications include:  Current Outpatient Medications:   •  APPLE CIDER VINEGAR PO, Take by mouth, Disp: , Rfl:   •  bisacodyl (DULCOLAX) 5 mg EC tablet, Take as directed by the office  , Disp: 2 tablet, Rfl: 0  •  cholecalciferol (VITAMIN D3) 400 units tablet, Take 400 Units by mouth daily, Disp: , Rfl:   •  clobetasol (TEMOVATE) 0 05 % external solution, APPLY TO SCALP TWICE A DAY, Disp: , Rfl:   •  cloNIDine (CATAPRES) 0 1 mg tablet, Take 1 tablet (0 1 mg total) by mouth every 12 (twelve) hours, Disp: 180 tablet, Rfl: 0  •  cyclobenzaprine (FLEXERIL) 10 mg tablet, Take 10 mg by mouth as needed  (Patient not taking: Reported on 6/7/2022), Disp: , Rfl:   •  Diclofenac Sodium (VOLTAREN) 1 %, Apply 2 g topically 4 (four) times a day, Disp: 100 g, Rfl: 1  •  docusate sodium (COLACE) 100 mg capsule, Take 1 capsule (100 mg total) by mouth in the morning, Disp: 90 capsule, Rfl: 1  •  FLUoxetine (PROzac) 20 mg capsule, Take 1 capsule (20 mg total) by mouth daily, Disp: 90 capsule, Rfl: 0  •  gabapentin (NEURONTIN) 600 MG tablet, Take 2 tablets (1,200 mg total) by mouth 2 (two) times a day, Disp: 360 tablet, Rfl: 0  •  hydrOXYzine HCL (ATARAX) 25 mg tablet, TAKE 1 TABLET BY MOUTH THREE TIMES A DAY AS NEEDED FOR ANXIETY AND INSOMNIA, Disp: 270 tablet, Rfl: 0  •  Lemborexant 10 MG TABS, Take 10 mg by mouth daily at bedtime, Disp: 30 tablet, Rfl: 4  •  LORazepam (Ativan) 0 5 mg tablet, Take 1 tablet (0 5 mg total) by mouth daily at bedtime, Disp: 30 tablet, Rfl: 5  •  magnesium citrate (CITROMA) 1 745 g/30 mL oral solution, Take as directed by the office  , Disp: 296 mL, Rfl: 0  •  Multiple Vitamins-Minerals (multivitamin with minerals) tablet, Take 1 tablet by mouth daily, Disp: , Rfl:   •  naloxone (NARCAN) 4 mg/0 1 mL nasal spray, Administer 1 spray into a nostril  If no response after 2-3 minutes, give another dose in the other nostril using a new spray , Disp: 1 each, Rfl: 1  •  omeprazole (PriLOSEC) 40 MG capsule, Take 1 capsule (40 mg total) by mouth 2 (two) times a day for 14 days, Disp: 28 capsule, Rfl: 0  •  oxyCODONE (ROXICODONE) 15 mg immediate release tablet, TAKE 1 TABLET BY MOUTH THREE TIMES A DAY AS NEEDED FOR 30 DAYS, Disp: , Rfl:   •  oxyCODONE (ROXICODONE) 30 MG immediate release tablet, Take 30 mg by mouth 2 (two) times a day (Patient not taking: Reported on 10/31/2022), Disp: , Rfl:   •  polyethylene glycol (GLYCOLAX) 17 GM/SCOOP powder, Take 17 g by mouth daily, Disp: 765 g, Rfl: 1  •  polyethylene glycol (GOLYTELY) 4000 mL solution, Take as directed by the office  , Disp: 4000 mL, Rfl: 0  •  QUEtiapine (SEROquel) 400 MG tablet, Take 1 tablet (400 mg total) by mouth 2 (two) times a day, Disp: 180 tablet, Rfl: 0  •  vitamin E, tocopherol, 1,000 units capsule, Take 1,000 Units by mouth daily, Disp: , Rfl:     Prognosis: fair

## 2022-12-20 NOTE — TELEPHONE ENCOUNTER
Patient called to request that her X-ray result be fax[facility] to [phone]    Received confirmation fax went through okay

## 2022-12-22 ENCOUNTER — TELEPHONE (OUTPATIENT)
Dept: NEUROSURGERY | Facility: CLINIC | Age: 52
End: 2022-12-22

## 2022-12-22 DIAGNOSIS — Z01.812 PRE-PROCEDURAL LABORATORY EXAMINATIONS: Primary | ICD-10-CM

## 2022-12-22 NOTE — TELEPHONE ENCOUNTER
Call received from [facility] requesting lab order be placed in preparation for upcoming MRI with contrast      Placed orders as requested and contacted the patient to advise  She stated an understanding

## 2023-01-06 ENCOUNTER — APPOINTMENT (OUTPATIENT)
Dept: LAB | Facility: HOSPITAL | Age: 53
End: 2023-01-06

## 2023-01-06 DIAGNOSIS — Z01.812 PRE-PROCEDURAL LABORATORY EXAMINATIONS: ICD-10-CM

## 2023-01-06 LAB
BUN SERPL-MCNC: 10 MG/DL (ref 5–25)
CREAT SERPL-MCNC: 1.17 MG/DL (ref 0.6–1.3)
GFR SERPL CREATININE-BSD FRML MDRD: 52 ML/MIN/1.73SQ M

## 2023-01-18 ENCOUNTER — TELEPHONE (OUTPATIENT)
Dept: PSYCHIATRY | Facility: CLINIC | Age: 53
End: 2023-01-18

## 2023-01-18 NOTE — TELEPHONE ENCOUNTER
Patient contacted the office to schedule a follow up visit with provider  Patient is now scheduled for 1/23/2023 at 4 pm virtually  (1) obesity (BMI greater than 25)
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
## 2023-01-19 ENCOUNTER — TELEPHONE (OUTPATIENT)
Dept: NEUROSURGERY | Facility: CLINIC | Age: 53
End: 2023-01-19

## 2023-01-23 ENCOUNTER — TELEMEDICINE (OUTPATIENT)
Dept: PSYCHIATRY | Facility: CLINIC | Age: 53
End: 2023-01-23

## 2023-01-23 DIAGNOSIS — F41.9 ANXIETY: ICD-10-CM

## 2023-01-23 DIAGNOSIS — F33.2 MODERATELY SEVERE RECURRENT MAJOR DEPRESSION (HCC): Primary | ICD-10-CM

## 2023-01-23 DIAGNOSIS — F43.21 COMPLICATED GRIEF: ICD-10-CM

## 2023-01-23 DIAGNOSIS — F51.04 PSYCHOPHYSIOLOGICAL INSOMNIA: ICD-10-CM

## 2023-01-23 DIAGNOSIS — G62.9 NEUROPATHY: ICD-10-CM

## 2023-01-23 RX ORDER — HYDROXYZINE HYDROCHLORIDE 25 MG/1
TABLET, FILM COATED ORAL
Qty: 270 TABLET | Refills: 0 | Status: SHIPPED | OUTPATIENT
Start: 2023-01-23

## 2023-01-23 RX ORDER — FLUOXETINE HYDROCHLORIDE 20 MG/1
20 CAPSULE ORAL DAILY
Qty: 90 CAPSULE | Refills: 0 | Status: SHIPPED | OUTPATIENT
Start: 2023-01-23

## 2023-01-23 RX ORDER — QUETIAPINE FUMARATE 400 MG/1
400 TABLET, FILM COATED ORAL 2 TIMES DAILY
Qty: 180 TABLET | Refills: 0 | Status: SHIPPED | OUTPATIENT
Start: 2023-01-23

## 2023-01-23 RX ORDER — GABAPENTIN 600 MG/1
1200 TABLET ORAL 2 TIMES DAILY
Qty: 360 TABLET | Refills: 0 | Status: SHIPPED | OUTPATIENT
Start: 2023-01-23 | End: 2023-04-23

## 2023-01-23 RX ORDER — CLONIDINE HYDROCHLORIDE 0.1 MG/1
TABLET ORAL
Qty: 1 TABLET | Refills: 0
Start: 2023-01-23

## 2023-02-11 NOTE — PATIENT INSTRUCTIONS
Magnesium oxide available OTC for 250-500 mg daily for headache  Other instructions for headache:  Trial of: Magnesium oxide 250-500 mg daily with food  Caution: diarrhea  Trial of: B complex daily or Vitamin B2 / Riboflavin- 400 mg daily  CoQ10 1000-300 mg daily (can help with vision health and heart health)  Vitamin D 6527-1809 units daily (available over the counter)  Drink at least 64 oz of water daily-- 8 cups   No more than 8 oz of caffeinated products including green tea iced tea black tea coffee energy drinks soda, in 24 hour period  No artificial sweeteners   No MSG/chinese food  Other potential triggers:   Cheese  Chocolate  Peanut butter  Nuts  BBQ foods  Pizza  Nitrates- hot dogs, lunch meats like salami  Phosphates-- look in back of processed foods   sulfates  alcohol  Recommend headache diary such as migraine antonette shante to help identify triggers 
Left arm;

## 2023-02-15 ENCOUNTER — RA CDI HCC (OUTPATIENT)
Dept: OTHER | Facility: HOSPITAL | Age: 53
End: 2023-02-15

## 2023-02-15 NOTE — PROGRESS NOTES
[facility] [address]  coding opportunities     [address] 22     Chart Reviewed number of suggestions sent to Provider: 1     Patients Insurance     Medicare Insurance: [address] [place] [address]

## 2023-03-13 ENCOUNTER — HOSPITAL ENCOUNTER (OUTPATIENT)
Dept: RADIOLOGY | Facility: HOSPITAL | Age: 53
Discharge: HOME | End: 2023-03-13

## 2023-03-13 DIAGNOSIS — D32.9 MENINGIOMA (HCC): ICD-10-CM

## 2023-03-13 RX ADMIN — GADOBUTROL 8 ML: 604.72 INJECTION INTRAVENOUS at 18:38

## 2023-03-14 ENCOUNTER — TELEPHONE (OUTPATIENT)
Dept: NEUROLOGY | Facility: CLINIC | Age: 53
End: 2023-03-14

## 2023-03-14 NOTE — TELEPHONE ENCOUNTER
Rahul Riley  Pt called wants an appt with Dr Samson Tinajero or Dr David Francis  She saw both of them  She wants to go over her MRI results  Does she have to stay with those two residents because I couldn't find any availability  Is there something I can schedule this patient with one of the residents?  Thank you
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
## 2023-03-20 ENCOUNTER — OFFICE VISIT (OUTPATIENT)
Dept: PSYCHIATRY | Facility: CLINIC | Age: 53
End: 2023-03-20

## 2023-03-20 VITALS — WEIGHT: 204.8 LBS | HEIGHT: 62 IN | BODY MASS INDEX: 37.69 KG/M2

## 2023-03-20 DIAGNOSIS — F33.2 MODERATELY SEVERE RECURRENT MAJOR DEPRESSION (HCC): Primary | ICD-10-CM

## 2023-03-20 DIAGNOSIS — F51.04 PSYCHOPHYSIOLOGICAL INSOMNIA: ICD-10-CM

## 2023-03-20 DIAGNOSIS — G62.9 NEUROPATHY: ICD-10-CM

## 2023-03-20 DIAGNOSIS — F43.21 COMPLICATED GRIEF: ICD-10-CM

## 2023-03-20 DIAGNOSIS — F41.9 ANXIETY: ICD-10-CM

## 2023-03-20 RX ORDER — GABAPENTIN 600 MG/1
1200 TABLET ORAL 2 TIMES DAILY
Qty: 360 TABLET | Refills: 0 | Status: SHIPPED | OUTPATIENT
Start: 2023-03-20 | End: 2023-06-18

## 2023-03-20 RX ORDER — HYDROXYZINE HYDROCHLORIDE 25 MG/1
TABLET, FILM COATED ORAL
Qty: 270 TABLET | Refills: 0 | Status: SHIPPED | OUTPATIENT
Start: 2023-03-20

## 2023-03-20 RX ORDER — QUETIAPINE FUMARATE 400 MG/1
400 TABLET, FILM COATED ORAL 2 TIMES DAILY
Qty: 180 TABLET | Refills: 0 | Status: SHIPPED | OUTPATIENT
Start: 2023-03-20

## 2023-03-20 RX ORDER — DULOXETIN HYDROCHLORIDE 30 MG/1
30 CAPSULE, DELAYED RELEASE ORAL DAILY
Qty: 90 CAPSULE | Refills: 0 | Status: SHIPPED | OUTPATIENT
Start: 2023-03-20 | End: 2023-04-19

## 2023-03-20 NOTE — BH TREATMENT PLAN
TREATMENT PLAN (Medication Management Only)        [facility]    Name/Date of Birth/MRN:  Destinee Mendez 45 y o  1970 MRN: [id number]  Date of Treatment Plan: March 20, 2023  Diagnosis/Diagnoses:   1  Moderately severe recurrent major depression ([facility] [address] )    2  Complicated grief    3  Anxiety    4  Psychophysiological insomnia    5  Neuropathy      Strengths/Personal Resources for Self-Care: "Love, hate  "  Area/Areas of need (in own words): "Still not being able to go to sleep and stay asleep"  1  Long Term Goal:   Maintain mood stability and control of anxiety  Target Date: 3-6 months  Person/Persons responsible for completion of goal: Greg and Salima  2  Short Term Objective (s) - How will we reach this goal?:   A  Provider new recommended medication/dosage changes and/or continue medication(s): Pt is having moderately severe depression, bereavement (another recent familial loss), severe anxiety and panic attacks  She stopped Fluoxetine on her own due to SE of insomnia  I advised against starting/stopping any medicine without the guidance of a provider and she verbalized understanding  Tx options discussed and Pt accepted to start Duloxetine for all present Sxs  Control of anxiety and mood will likely help facilitate sleep  I also advised that she can utilize her Hydroxyzine by taking 25mg once in the day and 50mg at night rather than 25mg tid if this is more helpful  Sleep hygiene discussed  Treatment plan done and Pt accepts the plan     D/C Fluoxetine 20mg   Start Duloxetine 30mg (1) cap po qd # 90  Quetiapine 400mg (1) tab po bid # 180  Gabapentin 600mg (2) tabs po bid # 360  Hydroxyzine 25mg (1) tab po tid prn anxiety and insomnia # 270  Lorazepam 0 5mg (1) tab po qhs --per Sleep specialist  Dayvigo/Lemborexant 10mg (1) tab po qhs--per sleep specialist  F/U neurology as scheduled 1/2023  F/U sleep specialist   Return 5/15/2023 at 4:30PM , call sooner prn   Target Date: 3-6 months  Person/Persons Responsible for Completion of Goal: [place] and [address]   Progress Towards Goals: stable, continuing treatment  Treatment Modality: Medication mgt  Review due 180 days from date of this plan: 6 months - 9/20/2023  Expected length of service: ongoing treatment unless revised  My Physician/PA/NP and I have developed this plan together and I agree to work on the goals and objectives  I understand the treatment goals that were developed for my treatment    Electronic Signatures: on file (unless signed below)    Torin Chaudhari PA-C 03/20/23

## 2023-03-29 ENCOUNTER — APPOINTMENT (OUTPATIENT)
Dept: LAB | Facility: CLINIC | Age: 53
End: 2023-03-29

## 2023-03-29 DIAGNOSIS — A04.8 H. PYLORI INFECTION: ICD-10-CM

## 2023-03-31 LAB — H PYLORI AG STL QL IA: POSITIVE

## 2023-04-04 ENCOUNTER — TELEPHONE (OUTPATIENT)
Dept: GASTROENTEROLOGY | Facility: CLINIC | Age: 53
End: 2023-04-04

## 2023-04-04 DIAGNOSIS — A04.8 H. PYLORI INFECTION: Primary | ICD-10-CM

## 2023-04-04 NOTE — TELEPHONE ENCOUNTER
----- Message from Muna Arriaga MD sent at 4/4/2023 12:11 PM EDT -----  Patient has persistently positive h pylori stool ag despite several attempts at treatment  Will need to work on finding salvage treatment that she can tolerate  May consider ID consult as well  She has appointment with Dr Susan Castro in May that I would like her to keep

## 2023-04-13 NOTE — TELEPHONE ENCOUNTER
Patient is calling in to notify the office that ID is requesting a call from GI to discuss the referral  ID is in need of additional information required from the GI and need to speak with the doctor  Please advise

## 2023-04-20 ENCOUNTER — TELEPHONE (OUTPATIENT)
Dept: GASTROENTEROLOGY | Facility: CLINIC | Age: 53
End: 2023-04-20

## 2023-04-27 ENCOUNTER — TELEPHONE (OUTPATIENT)
Dept: GASTROENTEROLOGY | Facility: CLINIC | Age: 53
End: 2023-04-27

## 2023-04-27 DIAGNOSIS — A04.8 H. PYLORI INFECTION: Primary | ICD-10-CM

## 2023-04-27 NOTE — TELEPHONE ENCOUNTER
"Placed order for miscellanous lab test: HPFRP (H  Pylori with clarithromycin resistance prediction PCR)  Per ID instructions :    This is the message I received from ID:   I believe you can order a \"miscellaneous test\" and request stool be sent for HPFRP (H  Pylori with clarithromycin resistance prediction PCR) and the lab will have to send it out  If positive, then would need endoscopic tissue sampling to send for culture and notify micro at that time to send out for antimicrobial susceptibility testing, [place] (H  Pylori culture with antimicrobial susceptibilities, varies)     "

## 2023-04-27 NOTE — TELEPHONE ENCOUNTER
Patients GI provider:  Dr Barrett Nguyen    Number to return call: [phone]    Reason for call: Pt calling stating she is supposed to have more H Pylori testing per ID and Dr Barrett Nguyen  No orders I chart found  See messages on appt  Lines  Please return her call when orders are placed      Scheduled procedure/appointment date if applicable: Apt/procedure 5/3/23

## 2023-05-03 ENCOUNTER — OFFICE VISIT (OUTPATIENT)
Dept: GASTROENTEROLOGY | Facility: CLINIC | Age: 53
End: 2023-05-03

## 2023-05-03 VITALS
WEIGHT: 207.2 LBS | HEIGHT: 62 IN | TEMPERATURE: 97.8 F | DIASTOLIC BLOOD PRESSURE: 74 MMHG | BODY MASS INDEX: 38.13 KG/M2 | SYSTOLIC BLOOD PRESSURE: 124 MMHG

## 2023-05-03 DIAGNOSIS — A04.8 H. PYLORI INFECTION: Primary | ICD-10-CM

## 2023-05-03 NOTE — H&P (VIEW-ONLY)
[facility] - Outpatient Follow-up Note  Lavelle Cartagena 45 y o  female MRN: [id number]  Encounter: [id number]          ASSESSMENT AND PLAN:    Lavelle Cartagena is a 45 y o  female with hx of resistant H  Pylori infection presenting for follow up     1  H  pylori infection    - H pylori stool clarithromycin resistance assay (already ordered)  - Will plan for EGD with repeat gastric biopsies to send for H  Pylori culture & sensitivities  - F/u in clinic after procedures  - F/u with ID as well    Orders Placed This Encounter   Procedures   • EGD     ______________________________________________________________________    SUBJECTIVE:    Lavelle Cartagena is a 45 y o  female with hx of resistant H  Pylori infection presenting for follow up  EGD 2/17/20 with Grade A esophagitis, gastric biopsies positive for H  Pylori infection  Treated with quadruple therapy (tetracycline, metronidazole, omeprazole, bismuth), still with sx of abdominal pain and nausea  Positive stool antigen subsequently, treated with triple therapy (levofloxacin, omeprazole, amoxicillin)  Still with sx, repeat EGD 5/27/21 with gastric erythema, duodenal ulcer  Repeat biopsies again positive for infection, treated again w Taliciawith persistant sx  Stool antigen positive EGD 7/2022 positive again, treated with levofloxacin, amoxicillin, omeprazole  Pt still with persistently positive stool antigen, abdominal pain, nausea  Has some symptomatic relief on PPI  Dr Kenny Taveras discussed with ID and pt was recommended for stool assay for H  Pylori clarithromycin resistance (HPFRP)  Pt has f/u appt with ID tomorrow  REVIEW OF SYSTEMS IS OTHERWISE NEGATIVE        Historical Information   Past Medical History:   Diagnosis Date   • Abnormal thyroid blood test     last assessed 11/13/14   • Anemia     last assessed  11/19/13   • Chronic pain     back   • Depression    • Sleep disorder      Past Surgical History:   Procedure "Laterality Date   • ABSCESS DRAINAGE      incision - skin abscess   • BACK SURGERY      2002   • BACK SURGERY      lower   • MAMMO STEREOTACTIC BREAST BIOPSY RIGHT (ALL INC) Right 2017   • MAMMO STEREOTACTIC BREAST BIOPSY RIGHT (ALL INC) EACH ADD Right 2017   • TUBAL LIGATION  approx      Social History   Social History     Substance and Sexual Activity   Alcohol Use No     Social History     Substance and Sexual Activity   Drug Use Not Currently   • Types: Marijuana    Comment: Tried THC in grammar school and high school      Social History     Tobacco Use   Smoking Status Former   • Types: Cigarettes   • Quit date: 2013   • Years since quittin 9   Smokeless Tobacco Never   Tobacco Comments    pt \"quit five years ago\"--Pt clarified  during a 2021 visit     Family History   Problem Relation Age of Onset   • Kidney disease Mother         chronic   • Diabetes Mother    • Bone cancer Father 54   • Heart attack Sister         Acute MI   • Diabetes Sister    • Hypertension Brother    • Cancer Family    • Diabetes Family    • Heart disease Family    • Hyperlipidemia Family    • Hypertension Family    • Colon cancer Paternal Grandfather 80   • No Known Problems Maternal Grandmother    • No Known Problems Maternal Grandfather    • No Known Problems Paternal Grandmother    • No Known Problems Sister    • No Known Problems Sister    • No Known Problems Sister    • No Known Problems Brother    • No Known Problems Brother    • No Known Problems Brother    • No Known Problems Daughter    • No Known Problems Daughter    • No Known Problems Son    • No Known Problems Son    • No Known Problems Son    • No Known Problems Son        Meds/Allergies       Current Outpatient Medications:   •  APPLE CIDER VINEGAR PO  •  bisacodyl (DULCOLAX) 5 mg EC tablet  •  cholecalciferol (VITAMIN D3) 400 units tablet  •  clobetasol (TEMOVATE) 0 05 % external solution  •  cyclobenzaprine (FLEXERIL) 10 mg tablet  •  " "Diclofenac Sodium (VOLTAREN) 1 %  •  docusate sodium (COLACE) 100 mg capsule  •  gabapentin (NEURONTIN) 600 MG tablet  •  hydrOXYzine HCL (ATARAX) 25 mg tablet  •  Lemborexant 10 MG TABS  •  LORazepam (Ativan) 0 5 mg tablet  •  magnesium citrate (CITROMA) 1 745 g/30 mL oral solution  •  Multiple Vitamins-Minerals (multivitamin with minerals) tablet  •  naloxone (NARCAN) 4 mg/0 1 mL nasal spray  •  omeprazole (PriLOSEC) 40 MG capsule  •  oxyCODONE (ROXICODONE) 15 mg immediate release tablet  •  oxyCODONE (ROXICODONE) 30 MG immediate release tablet  •  polyethylene glycol (GLYCOLAX) 17 GM/SCOOP powder  •  polyethylene glycol (GOLYTELY) 4000 mL solution  •  QUEtiapine (SEROquel) 400 MG tablet  •  vitamin E, tocopherol, 1,000 units capsule  •  DULoxetine (CYMBALTA) 30 mg delayed release capsule    Allergies   Allergen Reactions   • Aspirin GI Intolerance   • Other Throat Swelling      raw onions- causes throat to feel like it wants to close           Objective     Blood pressure 124/74, temperature 97 8 °F (36 6 °C), temperature source Tympanic, height 5' 2\" (1 575 m), weight 94 kg (207 lb 3 2 oz), last menstrual period 06/10/2021, not currently breastfeeding  Body mass index is 37 9 kg/m²  Wt Readings from Last 3 Encounters:   05/03/23 94 kg (207 lb 3 2 oz)   11/30/22 88 kg (194 lb)   10/31/22 86 6 kg (191 lb)        PHYSICAL EXAM:      General Appearance:   Alert, cooperative, no distress   HEENT:   Normocephalic, atraumatic, anicteric       Neck:  Supple, symmetrical, trachea midline   Lungs:   No respiratory distress   Heart[de-identified]   Regular rate and rhythm   Abdomen:   Soft, TTP, non-distended; no masses, no organomegaly    Genitalia:   Deferred    Rectal:   Deferred    Extremities:  No cyanosis, clubbing or edema    Pulses:  2+ and symmetric    Skin:  No jaundice, rashes, or lesions    Lymph nodes:  No palpable cervical lymphadenopathy        Lab Results:       Lab Units 01/06/23  1008 06/09/22  1008   SODIUM mmol/L  " --  138   POTASSIUM mmol/L  --  4 1   CHLORIDE mmol/L  --  103   CO2 mmol/L  --  27   BUN mg/dL 10 16   CREATININE mg/dL 1 17 1 11   CALCIUM mg/dL  --  8 8            Lab Units 06/09/22  1008   TOTAL PROTEIN g/dL 7 2   ALBUMIN g/dL 4 0   TOTAL BILIRUBIN mg/dL 0 26   AST U/L 17   ALT U/L 18   ALK PHOS U/L 86           Lab Units 06/09/22  1008   WBC Thousand/uL 5 32   HEMOGLOBIN g/dL 12 8   HEMATOCRIT % 40 2   PLATELETS Thousands/uL 288   MCV fL 79*   MCH pg 25 1*   MCHC g/dL 31 8   RDW % 14 4   MPV fL 11 4   NRBC AUTO /100 WBCs 0   NEUTROS PCT % 32*   IMMATURE GRANULOCYTES % % 0   LYMPHS PCT % 57*   MONOS PCT % 9   EOS PCT % 2   BASOS PCT % 0   NEUTROS ABS Thousands/µL 1 71*   IMMATURE GRANULOCYES ABS Thousand/uL 0 01   LYMPHS ABS Thousands/µL 3 00   MONOS ABS Thousand/µL 0 45   EOS ABS Thousand/µL 0 13   BASOS ABS Thousands/µL 0 02       No results for input(s): IRON, TRANSFERRIN, TRANSFERSAT, FERRITIN, RETIC in the last 43650 hours  No results found for: CRP    Lab Results   Component Value Date    [id number] 2 401 06/09/2022       Radiology Results:   No results found      Malorie Nguyen MD  PGY-4 Gastroenterology Fellow  5/3/2023 3:58 PM

## 2023-05-03 NOTE — PROGRESS NOTES
[facility] - Outpatient Follow-up Note  Carley Breaux 45 y o  female MRN: [id number]  Encounter: [id number]          ASSESSMENT AND PLAN:    Carley Breaux is a 45 y o  female with hx of resistant H  Pylori infection presenting for follow up     1  H  pylori infection    - H pylori stool clarithromycin resistance assay (already ordered)  - Will plan for EGD with repeat gastric biopsies to send for H  Pylori culture & sensitivities  - F/u in clinic after procedures  - F/u with ID as well    Orders Placed This Encounter   Procedures    EGD     ______________________________________________________________________    SUBJECTIVE:    Carley Breaux is a 45 y o  female with hx of resistant H  Pylori infection presenting for follow up  EGD 2/17/20 with Grade A esophagitis, gastric biopsies positive for H  Pylori infection  Treated with quadruple therapy (tetracycline, metronidazole, omeprazole, bismuth), still with sx of abdominal pain and nausea  Positive stool antigen subsequently, treated with triple therapy (levofloxacin, omeprazole, amoxicillin)  Still with sx, repeat EGD 5/27/21 with gastric erythema, duodenal ulcer  Repeat biopsies again positive for infection, treated again w Taliciawith persistant sx  Stool antigen positive EGD 7/2022 positive again, treated with levofloxacin, amoxicillin, omeprazole  Pt still with persistently positive stool antigen, abdominal pain, nausea  Has some symptomatic relief on PPI  Dr Manuelito Flores discussed with ID and pt was recommended for stool assay for H  Pylori clarithromycin resistance (HPFRP)  Pt has f/u appt with ID tomorrow  REVIEW OF SYSTEMS IS OTHERWISE NEGATIVE        Historical Information   Past Medical History:   Diagnosis Date    Abnormal thyroid blood test     last assessed 11/13/14    Anemia     last assessed  11/19/13    Chronic pain     back    Depression     Sleep disorder      Past Surgical History:   Procedure "Laterality Date    ABSCESS DRAINAGE      incision - skin abscess    BACK SURGERY      2002    BACK SURGERY      lower    MAMMO STEREOTACTIC BREAST BIOPSY RIGHT (ALL INC) Right 2017    MAMMO STEREOTACTIC BREAST BIOPSY RIGHT (ALL INC) EACH ADD Right 2017    TUBAL LIGATION  approx 1998     Social History   Social History     Substance and Sexual Activity   Alcohol Use No     Social History     Substance and Sexual Activity   Drug Use Not Currently    Types: Marijuana    Comment: Tried THC in grammar school and high school      Social History     Tobacco Use   Smoking Status Former    Types: Cigarettes    Quit date: 2013    Years since quittin 9   Smokeless Tobacco Never   Tobacco Comments    pt \"quit five years ago\"--Pt clarified 2013 during a 2021 visit     Family History   Problem Relation Age of Onset    Kidney disease Mother         chronic    Diabetes Mother     Bone cancer Father 54    Heart attack Sister         Acute MI    Diabetes Sister     Hypertension Brother     Cancer Family     Diabetes Family     Heart disease Family     Hyperlipidemia Family     Hypertension Family     Colon cancer Paternal Grandfather 80    No Known Problems Maternal Grandmother     No Known Problems Maternal Grandfather     No Known Problems Paternal Grandmother     No Known Problems Sister     No Known Problems Sister     No Known Problems Sister     No Known Problems Brother     No Known Problems Brother     No Known Problems Brother     No Known Problems Daughter     No Known Problems Daughter     No Known Problems Son     No Known Problems Son     No Known Problems Son     No Known Problems Son        Meds/Allergies       Current Outpatient Medications:     APPLE CIDER VINEGAR PO    bisacodyl (DULCOLAX) 5 mg EC tablet    cholecalciferol (VITAMIN D3) 400 units tablet    clobetasol (TEMOVATE) 0 05 % external solution    cyclobenzaprine (FLEXERIL) 10 mg tablet    " "Diclofenac Sodium (VOLTAREN) 1 %    docusate sodium (COLACE) 100 mg capsule    gabapentin (NEURONTIN) 600 MG tablet    hydrOXYzine HCL (ATARAX) 25 mg tablet    Lemborexant 10 MG TABS    LORazepam (Ativan) 0 5 mg tablet    magnesium citrate (CITROMA) 1 745 g/30 mL oral solution    Multiple Vitamins-Minerals (multivitamin with minerals) tablet    naloxone (NARCAN) 4 mg/0 1 mL nasal spray    omeprazole (PriLOSEC) 40 MG capsule    oxyCODONE (ROXICODONE) 15 mg immediate release tablet    oxyCODONE (ROXICODONE) 30 MG immediate release tablet    polyethylene glycol (GLYCOLAX) 17 GM/SCOOP powder    polyethylene glycol (GOLYTELY) 4000 mL solution    QUEtiapine (SEROquel) 400 MG tablet    vitamin E, tocopherol, 1,000 units capsule    DULoxetine (CYMBALTA) 30 mg delayed release capsule    Allergies   Allergen Reactions    Aspirin GI Intolerance    Other Throat Swelling      raw onions- causes throat to feel like it wants to close           Objective     Blood pressure 124/74, temperature 97 8 °F (36 6 °C), temperature source Tympanic, height 5' 2\" (1 575 m), weight 94 kg (207 lb 3 2 oz), last menstrual period 06/10/2021, not currently breastfeeding  Body mass index is 37 9 kg/m²  Wt Readings from Last 3 Encounters:   05/03/23 94 kg (207 lb 3 2 oz)   11/30/22 88 kg (194 lb)   10/31/22 86 6 kg (191 lb)        PHYSICAL EXAM:      General Appearance:   Alert, cooperative, no distress   HEENT:   Normocephalic, atraumatic, anicteric       Neck:  Supple, symmetrical, trachea midline   Lungs:   No respiratory distress   Heart[de-identified]   Regular rate and rhythm   Abdomen:   Soft, TTP, non-distended; no masses, no organomegaly    Genitalia:   Deferred    Rectal:   Deferred    Extremities:  No cyanosis, clubbing or edema    Pulses:  2+ and symmetric    Skin:  No jaundice, rashes, or lesions    Lymph nodes:  No palpable cervical lymphadenopathy        Lab Results:       Lab Units 01/06/23  1008 06/09/22  1008   SODIUM mmol/L  " --  138   POTASSIUM mmol/L  --  4 1   CHLORIDE mmol/L  --  103   CO2 mmol/L  --  27   BUN mg/dL 10 16   CREATININE mg/dL 1 17 1 11   CALCIUM mg/dL  --  8 8            Lab Units 06/09/22  1008   TOTAL PROTEIN g/dL 7 2   ALBUMIN g/dL 4 0   TOTAL BILIRUBIN mg/dL 0 26   AST U/L 17   ALT U/L 18   ALK PHOS U/L 86           Lab Units 06/09/22  1008   WBC Thousand/uL 5 32   HEMOGLOBIN g/dL 12 8   HEMATOCRIT % 40 2   PLATELETS Thousands/uL 288   MCV fL 79*   MCH pg 25 1*   MCHC g/dL 31 8   RDW % 14 4   MPV fL 11 4   NRBC AUTO /100 WBCs 0   NEUTROS PCT % 32*   IMMATURE GRANULOCYTES % % 0   LYMPHS PCT % 57*   MONOS PCT % 9   EOS PCT % 2   BASOS PCT % 0   NEUTROS ABS Thousands/µL 1 71*   IMMATURE GRANULOCYES ABS Thousand/uL 0 01   LYMPHS ABS Thousands/µL 3 00   MONOS ABS Thousand/µL 0 45   EOS ABS Thousand/µL 0 13   BASOS ABS Thousands/µL 0 02       No results for input(s): IRON, TRANSFERRIN, TRANSFERSAT, FERRITIN, RETIC in the last 54121 hours  No results found for: CRP    Lab Results   Component Value Date    [id number] 2 401 06/09/2022       Radiology Results:   No results found      Иван Hedrick MD  PGY-4 Gastroenterology Fellow  5/3/2023 3:58 PM

## 2023-05-04 ENCOUNTER — CONSULT (OUTPATIENT)
Dept: INFECTIOUS DISEASES | Facility: CLINIC | Age: 53
End: 2023-05-04

## 2023-05-04 VITALS
TEMPERATURE: 95.7 F | BODY MASS INDEX: 37.86 KG/M2 | WEIGHT: 207 LBS | OXYGEN SATURATION: 97 % | DIASTOLIC BLOOD PRESSURE: 80 MMHG | HEART RATE: 99 BPM | SYSTOLIC BLOOD PRESSURE: 130 MMHG

## 2023-05-04 DIAGNOSIS — A04.8 H. PYLORI INFECTION: ICD-10-CM

## 2023-05-04 NOTE — PROGRESS NOTES
Consultation - Infectious Disease   Jono Cuellar 45 y o  female MRN: [id number]  Unit/Bed#:  Encounter: [id number]      IMPRESSION & RECOMMENDATIONS:     1  Refractory H  Pylori:  - Patient first diagnosed on EGD 2/2020 and has failed quadruple therapy with tetracycline, Levofloxacin/Amoxicillin based regimen twice and Talicia (Rifabutin/Amoxicillin/Omeprazole)  Most recent stool antigen from 3/29/23 still positive  Resistance rates to amoxicillin, tetracycline, and rifabutin are relatively low (<5%), but still possible; I wonder if the treatment failure to quadruple therapy and [facility] may have been due to issues with medication compliance though she denies this  As far as I can tell, patient has also never tried a Clarithromycin based regimen    -Recommend sending have the H  pylori clarithromycin resistance prediction stool study; this has already been ordered by GI and I counseled patient to have test collected  - Once this returns, if there is no predicted Clarithromycin resistance, will attempt a Clarithromycin-based regimen  - If resistance predicted then I would recommend patient have a repeat EGD with biopsies and H  Pylori cultures send for sensitivities so that we can best select a salvage regimen to attempt; I would prefer this method rather than attempting yet another empiric course   - Return to ID office once HPFRP test if negative, OR if positive then after EGD with cultures and sensitivities return    2  History of Depression and Anxiety:  - follows with Psychiatry  - currently on Duloxetine and Quetapine    Return to ID clinic once HPFRP testing returns      I have performed an extensive review of the medical records in Epic including review of the notes, radiographs, and laboratory results     HISTORY OF PRESENT ILLNESS:  Reason for Consult: Refractory H  pylori infection    HPI: Jono Cuellar is a 45y o  year old female past medical history of H  pylori infection was referred to ID clinic for evaluation of refractory H  Pylori  She follows with GI clinic and has failed four prior treatment attempts  From chart review it appears that she was first found to have H  pylori infection on an EGD performed in February 2020  She was prescribed quadruple therapy with bismuth, tetracycline 500 mg 4 times daily, metronidazole 250 mg 4 times daily and omeprazole on February 20  She had follow-up H  pylori stool testing in August 2020 which was still positive  Saw GI again on August 26, 2020 and was prescribed a levofloxacin based regiment with levofloxacin 500 mg daily, omeprazole, and amoxicillin 1000 mg twice daily for 14 days  She followed up with GI on May 12, 2021 and had an EGD at that time which noted mild gastritis with biopsy which again showed active H  pylori organisms  She was then prescribed Talicia (Omeprazole/Amoxicillin/Rifabutin) in June 2021  Follow-up stool antigen testing was performed a year later in June 2022 was still positive  Another EGD was performed in July 2022 with pathology again showing severe chronic active gastritis and positive H  pylori organisms by stain  On 7/20/2022 she was again prescribed a levofloxacin based regimen with levofloxacin, amoxicillin, and omeprazole for 2 weeks  Patient had H  pylori stool antigen tested again on March 29, 2023 which is still positive  Reports that she has had ongoing mid abdominal pains for several years now that she attributes to her H  pylori infection  No fever or chills  She does report completing all her prior regimens without missed doses  REVIEW OF SYSTEMS:  A complete review of systems is negative other than that noted in the HPI      PAST MEDICAL HISTORY:  Past Medical History:   Diagnosis Date   • Abnormal thyroid blood test     last assessed 11/13/14   • Anemia     last assessed  11/19/13   • Chronic pain     back   • Depression    • Sleep disorder      Past Surgical History:   Procedure Laterality Date   • "ABSCESS DRAINAGE      incision - skin abscess   • BACK SURGERY      2002   • BACK SURGERY      lower   • MAMMO STEREOTACTIC BREAST BIOPSY RIGHT (ALL INC) Right 2017   • MAMMO STEREOTACTIC BREAST BIOPSY RIGHT (ALL INC) EACH ADD Right 2017   • TUBAL LIGATION  approx 1998       FAMILY HISTORY:  Non-contributory    SOCIAL HISTORY:  Social History   Social History     Substance and Sexual Activity   Alcohol Use No     Social History     Substance and Sexual Activity   Drug Use Not Currently   • Types: Marijuana    Comment: Tried THC in grammar school and high school      Social History     Tobacco Use   Smoking Status Former   • Types: Cigarettes   • Quit date: 2013   • Years since quittin 9   Smokeless Tobacco Never   Tobacco Comments    pt \"quit five years ago\"--Pt clarified 2013 during a 2021 visit       ALLERGIES:  Allergies   Allergen Reactions   • Aspirin GI Intolerance   • Other Throat Swelling      raw onions- causes throat to feel like it wants to close       MEDICATIONS:  All current active medications have been reviewed  PHYSICAL EXAM:  Vitals:    23 1501   BP: 130/80   Pulse: 99   Temp: (!) 95 7 °F (35 4 °C)   SpO2: 97%         General Appearance:  Appearing well, nontoxic, and in no distress   Head:  Normocephalic, without obvious abnormality, atraumatic   Eyes:  Conjunctiva pink and sclera anicteric, both eyes   Nose: Nares normal, mucosa normal   Throat: Oropharynx moist    Neck: Supple   Back:   Symmetric   Lungs:   Clear to auscultation bilaterally, respirations unlabored   Chest Wall:  No tenderness or deformity   Heart:  RRR   Abdomen:   Soft, non-tender   Extremities: No cyanosis   Skin: No rashes or lesions of exposed skin  Lymph nodes: Cervical, supraclavicular nodes normal   Neurologic: Alert and oriented       LABS, IMAGING, & OTHER STUDIES:  Lab Results:  I have personally reviewed pertinent labs      Imaging Studies:   I have personally reviewed pertinent " imaging study reports and images in PACS  Other Studies:   I have personally reviewed pertinent reports        Shannan Augilar MD  [facility]

## 2023-05-13 DIAGNOSIS — K21.9 GASTROESOPHAGEAL REFLUX DISEASE WITHOUT ESOPHAGITIS: ICD-10-CM

## 2023-05-13 RX ORDER — OMEPRAZOLE 40 MG/1
40 CAPSULE, DELAYED RELEASE ORAL DAILY
Qty: 90 CAPSULE | Refills: 1 | Status: SHIPPED | OUTPATIENT
Start: 2023-05-13 | End: 2023-06-12

## 2023-05-16 ENCOUNTER — TELEPHONE (OUTPATIENT)
Dept: PSYCHIATRY | Facility: CLINIC | Age: 53
End: 2023-05-16

## 2023-05-21 RX ORDER — SODIUM CHLORIDE 9 MG/ML
125 INJECTION, SOLUTION INTRAVENOUS CONTINUOUS
Status: CANCELLED | OUTPATIENT
Start: 2023-05-21

## 2023-05-23 ENCOUNTER — ANESTHESIA (OUTPATIENT)
Dept: GASTROENTEROLOGY | Facility: MEDICAL CENTER | Age: 53
End: 2023-05-23

## 2023-05-23 ENCOUNTER — ANESTHESIA EVENT (OUTPATIENT)
Dept: GASTROENTEROLOGY | Facility: MEDICAL CENTER | Age: 53
End: 2023-05-23

## 2023-05-23 ENCOUNTER — HOSPITAL ENCOUNTER (OUTPATIENT)
Dept: GASTROENTEROLOGY | Facility: MEDICAL CENTER | Age: 53
Setting detail: OUTPATIENT SURGERY
Discharge: HOME | End: 2023-05-23
Admitting: INTERNAL MEDICINE

## 2023-05-23 VITALS
BODY MASS INDEX: 38.09 KG/M2 | DIASTOLIC BLOOD PRESSURE: 55 MMHG | SYSTOLIC BLOOD PRESSURE: 98 MMHG | RESPIRATION RATE: 17 BRPM | HEIGHT: 62 IN | WEIGHT: 207 LBS | HEART RATE: 76 BPM | TEMPERATURE: 97.7 F | OXYGEN SATURATION: 100 %

## 2023-05-23 DIAGNOSIS — A04.8 H. PYLORI INFECTION: ICD-10-CM

## 2023-05-23 RX ORDER — PROPOFOL 10 MG/ML
INJECTION, EMULSION INTRAVENOUS CONTINUOUS PRN
Status: DISCONTINUED | OUTPATIENT
Start: 2023-05-23 | End: 2023-05-23

## 2023-05-23 RX ORDER — SODIUM CHLORIDE 9 MG/ML
125 INJECTION, SOLUTION INTRAVENOUS CONTINUOUS
Status: DISCONTINUED | OUTPATIENT
Start: 2023-05-23 | End: 2023-05-27 | Stop reason: HOSPADM

## 2023-05-23 RX ORDER — PROPOFOL 10 MG/ML
INJECTION, EMULSION INTRAVENOUS AS NEEDED
Status: DISCONTINUED | OUTPATIENT
Start: 2023-05-23 | End: 2023-05-23

## 2023-05-23 RX ORDER — LIDOCAINE HYDROCHLORIDE 20 MG/ML
INJECTION, SOLUTION EPIDURAL; INFILTRATION; INTRACAUDAL; PERINEURAL AS NEEDED
Status: DISCONTINUED | OUTPATIENT
Start: 2023-05-23 | End: 2023-05-23

## 2023-05-23 RX ADMIN — LIDOCAINE HYDROCHLORIDE 100 MG: 20 INJECTION, SOLUTION EPIDURAL; INFILTRATION; INTRACAUDAL; PERINEURAL at 13:51

## 2023-05-23 RX ADMIN — PROPOFOL 150 MG: 10 INJECTION, EMULSION INTRAVENOUS at 13:52

## 2023-05-23 RX ADMIN — SODIUM CHLORIDE 125 ML/HR: 0.9 INJECTION, SOLUTION INTRAVENOUS at 13:46

## 2023-05-23 RX ADMIN — PROPOFOL 130 MCG/KG/MIN: 10 INJECTION, EMULSION INTRAVENOUS at 13:51

## 2023-05-23 NOTE — ANESTHESIA POSTPROCEDURE EVALUATION
"Post-Op Assessment Note    CV Status:  Stable    Pain management: adequate     Mental Status:  Alert and awake   Hydration Status:  Euvolemic   PONV Controlled:  Controlled   Airway Patency:  Patent      Post Op Vitals Reviewed: Yes      Staff: Anesthesiologist         No notable events documented      BP      Temp      Pulse     Resp      SpO2      BP 98/55   Pulse 76   Temp 97 7 °F (36 5 °C) (Temporal)   Resp 17   Ht 5' 2\" (1 575 m)   Wt 93 9 kg (207 lb)   LMP 06/10/2021   SpO2 100%   BMI 37 86 kg/m²     "

## 2023-05-23 NOTE — ANESTHESIA PREPROCEDURE EVALUATION
Procedure:  EGD    Relevant Problems   GI/HEPATIC   (+) Esophageal dysphagia   (+) GERD (gastroesophageal reflux disease)      /RENAL   (+) Stage 2 chronic kidney disease      NEURO/PSYCH   (+) Anxiety   (+) Depression   (+) History of prediabetes   (+) Moderately severe recurrent major depression (HCC)   (+) New persistent daily headache        Physical Exam    Airway    Mallampati score: III  TM Distance: >3 FB  Neck ROM: full     Dental   No notable dental hx     Cardiovascular  Rhythm: regular, Rate: normal, Cardiovascular exam normal    Pulmonary  Pulmonary exam normal Breath sounds clear to auscultation,     Other Findings        Anesthesia Plan  ASA Score- 2     Anesthesia Type- IV sedation with anesthesia with ASA Monitors  Additional Monitors:   Airway Plan:           Plan Factors-    Chart reviewed  Patient summary reviewed  Induction-     Postoperative Plan-     Informed Consent- Anesthetic plan and risks discussed with patient

## 2023-06-06 DIAGNOSIS — F51.01 PRIMARY INSOMNIA: ICD-10-CM

## 2023-06-07 RX ORDER — LEMBOREXANT 10 MG/1
10 TABLET, FILM COATED ORAL
Qty: 30 TABLET | Refills: 5 | Status: SHIPPED | OUTPATIENT
Start: 2023-06-07
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
## 2023-06-08 ENCOUNTER — TELEMEDICINE (OUTPATIENT)
Dept: PSYCHIATRY | Facility: CLINIC | Age: 53
End: 2023-06-08

## 2023-06-08 DIAGNOSIS — F51.04 PSYCHOPHYSIOLOGICAL INSOMNIA: ICD-10-CM

## 2023-06-08 DIAGNOSIS — F33.2 MODERATELY SEVERE RECURRENT MAJOR DEPRESSION (HCC): Primary | ICD-10-CM

## 2023-06-08 DIAGNOSIS — G62.9 NEUROPATHY: ICD-10-CM

## 2023-06-08 DIAGNOSIS — F41.9 ANXIETY: ICD-10-CM

## 2023-06-08 RX ORDER — QUETIAPINE FUMARATE 400 MG/1
400 TABLET, FILM COATED ORAL 2 TIMES DAILY
Qty: 180 TABLET | Refills: 1 | Status: SHIPPED | OUTPATIENT
Start: 2023-06-08

## 2023-06-08 RX ORDER — GABAPENTIN 600 MG/1
900 TABLET ORAL 2 TIMES DAILY
Qty: 270 TABLET | Refills: 1 | Status: SHIPPED | OUTPATIENT
Start: 2023-06-08

## 2023-06-08 RX ORDER — DULOXETIN HYDROCHLORIDE 60 MG/1
60 CAPSULE, DELAYED RELEASE ORAL DAILY
Qty: 90 CAPSULE | Refills: 1 | Status: SHIPPED | OUTPATIENT
Start: 2023-06-08

## 2023-06-08 RX ORDER — HYDROXYZINE HYDROCHLORIDE 25 MG/1
TABLET, FILM COATED ORAL
Qty: 270 TABLET | Refills: 1 | Status: SHIPPED | OUTPATIENT
Start: 2023-06-08

## 2023-06-15 ENCOUNTER — TELEPHONE (OUTPATIENT)
Dept: NEUROLOGY | Facility: CLINIC | Age: 53
End: 2023-06-15

## 2023-06-21 ENCOUNTER — TELEPHONE (OUTPATIENT)
Dept: SLEEP CENTER | Facility: CLINIC | Age: 53
End: 2023-06-21

## 2023-06-21 LAB — MISCELLANEOUS LAB TEST RESULT: NORMAL

## 2023-06-21 NOTE — TELEPHONE ENCOUNTER
Patient left message on the nurse line requesting a refill Rx for LORazepam (Ativan) 0 5 mg tablet    Last office visit 11/30/2022 with Dr Alix Hoffman  Patient does not have an appointment with a new provider or Dr Alix Hoffman at [facility]  Call placed to patient  Left message to call the office to schedule with a new provider  Also provided telephone number for [facility] if patient would prefer to remain with Dr Alix Hoffman

## 2023-06-23 ENCOUNTER — TELEPHONE (OUTPATIENT)
Dept: SLEEP CENTER | Facility: CLINIC | Age: 53
End: 2023-06-23

## 2023-06-23 DIAGNOSIS — F51.04 PSYCHOPHYSIOLOGICAL INSOMNIA: ICD-10-CM

## 2023-06-23 NOTE — TELEPHONE ENCOUNTER
Patient is calling about medication refill for Lorazepam  Patient stated she should not have to see a provider because she was seen in under a year  She said that she was told sleep medicine should be able to still handle medication refill  Patient would like a call back  Stated that this was her second time calling about refill  Good phone number is [phone]  Call placed to patient  Scheduled patient for follow-up with LILIANA Negrete 1/30/2024 in the [place] office  Rx request sent in another encounter

## 2023-06-23 NOTE — TELEPHONE ENCOUNTER
Patient left message on the nurse line requesting a refill Rx for LORazepam (Ativan) 0 5 mg tablet     Last office visit 11/30/2022 with Dr Bruce Sood  Next office visit 1/30/2024 with LILIANA Garcia in the [place] office  Abdiel Lundberg, please review RX and sign if agreeable  Thank you

## 2023-06-24 RX ORDER — LORAZEPAM 0.5 MG/1
0.5 TABLET ORAL
Qty: 30 TABLET | Refills: 4 | Status: SHIPPED | OUTPATIENT
Start: 2023-06-24

## 2023-06-24 NOTE — TELEPHONE ENCOUNTER
PAPDMP verified and appropriate for refill  Per review of Dr Yordy Stephen notes, patient has been stable with use of ativan

## 2023-07-07 ENCOUNTER — TELEPHONE (OUTPATIENT)
Age: 53
End: 2023-07-07

## 2023-07-07 NOTE — TELEPHONE ENCOUNTER
Patients GI provider:  Dr. Vitaliy Santos    Number to return call: [phone]    Reason for call: Pt calling for lab results from 5/23.     Scheduled procedure/appointment date if applicable: N/A Seen patient on bed, awake. Alert, disoriented to time. With ongoing IVF at 75ml/hr infusing well on left AC. With  dressing wrapped over right elbow, noted skin tear. With thao catheter draining to urine bag, mellissa in color. Noted redness on both upper extremities and bruise on right and left leg. Turned every 2 hours.  Denies any pain Placed call bell within reach, insstructed to call for assistance

## 2023-07-10 ENCOUNTER — NURSE TRIAGE (OUTPATIENT)
Age: 53
End: 2023-07-10

## 2023-07-10 NOTE — TELEPHONE ENCOUNTER
----- Message from Ramo Rock sent at 7/10/2023  8:43 AM EDT -----  Pt calling to get results from the EGD done  5/23/23

## 2023-07-10 NOTE — TELEPHONE ENCOUNTER
H Pylori Culture resulted in Epic. I do not see that it has been reviewed. Could you address please? I will continue to try to reach out to patient.

## 2023-07-11 ENCOUNTER — NURSE TRIAGE (OUTPATIENT)
Age: 53
End: 2023-07-11

## 2023-07-11 NOTE — TELEPHONE ENCOUNTER
----- Message from [facility] sent at 7/11/2023  2:11 PM EDT -----  Pt called back for her results. Advised her mailbox was full so a message cannot be left.

## 2023-07-12 ENCOUNTER — NURSE TRIAGE (OUTPATIENT)
Age: 53
End: 2023-07-12

## 2023-07-12 NOTE — TELEPHONE ENCOUNTER
I made another attempt to call patient regarding test results and again her mailbox is full and cannot leave a message. If patient does call in she can speak with any GI nurse or ask her if she can review her MyChart since results were sent there.

## 2023-07-12 NOTE — TELEPHONE ENCOUNTER
Patient called in and call transferred via [facility]. I spoke with patient and reviewed [facility] scanned result H Pylori Culture - No Helicobacter Pylori isolated.

## 2023-08-07 ENCOUNTER — TELEPHONE (OUTPATIENT)
Dept: PSYCHIATRY | Facility: CLINIC | Age: 53
End: 2023-08-07

## 2023-08-09 ENCOUNTER — TELEPHONE (OUTPATIENT)
Dept: PSYCHIATRY | Facility: CLINIC | Age: 53
End: 2023-08-09

## 2023-08-09 NOTE — LETTER
23     Hyun Mancilla   : 1970   [id number] [address]       It is the policy of [facility] to monitor and manage appointments that have been no-showed or cancelled with less than 48-hour notice. This is necessary to ensure that we are able to provide timely access for all patients to our providers. Undue numbers of unutilized appointments delays necessary medical care for all patients. Dear Hyun Mancilla : We are sorry that you missed your appointment with Jayme Guillen PA-C on 2023. It has been 2 months  since your last appointment. Your health and follow-up care are important to us. We want to make you aware that you do not have another appointment with Jayme Guillen PA-C scheduled. If you have already rescheduled this appointment, please disregard. Please be aware that our office policy states that if you 'no show' two Medication Management  appointments in a row without prior notice of cancellation, or three or more in a calendar year, you may be discharged from Medication Management  treatment. We want to bring this to your attention now to prevent an interruption of your care. If you have any questions about this policy, please call us at the number above. If we do not hear from you within 10 business days to make a follow up appointment, we will assume you are no longer interested in care here. Thank you in advance for your cooperation and assistance.        Sincerely,      [facility] Support Staff

## 2023-09-11 ENCOUNTER — TELEPHONE (OUTPATIENT)
Dept: SLEEP CENTER | Facility: CLINIC | Age: 53
End: 2023-09-11

## 2023-09-11 NOTE — TELEPHONE ENCOUNTER
Patient left message stating Mortimer Breen is not covered under her isurance and is requesting an alternatve. States her insurance provider reached out to the office to provided "preferred" medications. No communication of any kind from the insurance carrier. Last office visit 11/30/2022 with Dr. Tammy Rodriguez. Next office visit 1/30/2024 with Yuliet Morgan, [address] [place] [address]. Leida Dasilva, please review and advise. Thank you.

## 2023-09-11 NOTE — TELEPHONE ENCOUNTER
Call placed to patient. Advised she will need to be seen by a [facility] first, before any recommendations can be made regarding alternate medications.       Provided telephone number for the [facility] to call and schedule a sooner appointment with a new provider and also provided telephone number for  Dr. Luisa Espinoza at [facility]

## 2023-09-14 ENCOUNTER — HOSPITAL ENCOUNTER (OUTPATIENT)
Dept: MRI IMAGING | Facility: HOSPITAL | Age: 53
Discharge: HOME | End: 2023-09-14
Payer: COMMERCIAL

## 2023-09-14 DIAGNOSIS — M47.816 SPONDYLOSIS OF LUMBAR REGION WITHOUT MYELOPATHY OR RADICULOPATHY: ICD-10-CM

## 2023-09-14 DIAGNOSIS — M47.812 SPONDYLOSIS OF CERVICAL REGION WITHOUT MYELOPATHY OR RADICULOPATHY: ICD-10-CM

## 2023-09-14 PROCEDURE — 72141 MRI NECK SPINE W/O DYE: CPT

## 2023-09-14 PROCEDURE — G1004 CDSM NDSC: HCPCS

## 2023-09-14 PROCEDURE — 72148 MRI LUMBAR SPINE W/O DYE: CPT

## 2023-10-16 ENCOUNTER — TELEPHONE (OUTPATIENT)
Dept: PSYCHIATRY | Facility: CLINIC | Age: 53
End: 2023-10-16

## 2023-10-16 NOTE — TELEPHONE ENCOUNTER
Patient contacted the office to schedule a follow up visit with provider. Patient is now scheduled for 10/19  at 1:30 virtually.
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
## 2023-10-19 ENCOUNTER — TELEPHONE (OUTPATIENT)
Dept: PSYCHIATRY | Facility: CLINIC | Age: 53
End: 2023-10-19

## 2023-10-19 ENCOUNTER — TELEMEDICINE (OUTPATIENT)
Dept: PSYCHIATRY | Facility: CLINIC | Age: 53
End: 2023-10-19

## 2023-10-19 DIAGNOSIS — F41.9 ANXIETY: ICD-10-CM

## 2023-10-19 DIAGNOSIS — F33.2 MODERATELY SEVERE RECURRENT MAJOR DEPRESSION (HCC): ICD-10-CM

## 2023-10-19 DIAGNOSIS — F51.04 PSYCHOPHYSIOLOGICAL INSOMNIA: ICD-10-CM

## 2023-10-19 DIAGNOSIS — G62.9 NEUROPATHY: ICD-10-CM

## 2023-10-19 RX ORDER — HYDROXYZINE HYDROCHLORIDE 25 MG/1
TABLET, FILM COATED ORAL
Qty: 270 TABLET | Refills: 0 | Status: SHIPPED | OUTPATIENT
Start: 2023-10-19

## 2023-10-19 RX ORDER — DULOXETIN HYDROCHLORIDE 60 MG/1
60 CAPSULE, DELAYED RELEASE ORAL DAILY
Qty: 90 CAPSULE | Refills: 0 | Status: SHIPPED | OUTPATIENT
Start: 2023-10-19

## 2023-10-19 RX ORDER — GABAPENTIN 600 MG/1
900 TABLET ORAL 2 TIMES DAILY
Qty: 270 TABLET | Refills: 0 | Status: SHIPPED | OUTPATIENT
Start: 2023-10-19

## 2023-10-19 RX ORDER — QUETIAPINE FUMARATE 400 MG/1
800 TABLET, FILM COATED ORAL
Qty: 180 TABLET | Refills: 0 | Status: SHIPPED | OUTPATIENT
Start: 2023-10-19

## 2023-12-14 ENCOUNTER — TELEPHONE (OUTPATIENT)
Dept: GASTROENTEROLOGY | Facility: CLINIC | Age: 53
End: 2023-12-14

## 2023-12-15 ENCOUNTER — TELEPHONE (OUTPATIENT)
Dept: PSYCHIATRY | Facility: CLINIC | Age: 53
End: 2023-12-15

## 2024-01-02 ENCOUNTER — OFFICE VISIT (OUTPATIENT)
Dept: INTERNAL MEDICINE CLINIC | Facility: CLINIC | Age: 54
End: 2024-01-02

## 2024-01-02 VITALS
TEMPERATURE: 97.5 F | WEIGHT: 214 LBS | DIASTOLIC BLOOD PRESSURE: 80 MMHG | BODY MASS INDEX: 39.14 KG/M2 | HEART RATE: 80 BPM | SYSTOLIC BLOOD PRESSURE: 118 MMHG

## 2024-01-02 DIAGNOSIS — G62.9 NEUROPATHY: ICD-10-CM

## 2024-01-02 DIAGNOSIS — F41.9 ANXIETY: ICD-10-CM

## 2024-01-02 DIAGNOSIS — E11.9 TYPE 2 DIABETES MELLITUS WITHOUT COMPLICATION, WITHOUT LONG-TERM CURRENT USE OF INSULIN (HCC): ICD-10-CM

## 2024-01-02 DIAGNOSIS — E66.2: ICD-10-CM

## 2024-01-02 DIAGNOSIS — M54.50 CHRONIC LOW BACK PAIN, UNSPECIFIED BACK PAIN LATERALITY, UNSPECIFIED WHETHER SCIATICA PRESENT: Primary | ICD-10-CM

## 2024-01-02 DIAGNOSIS — M54.12 RADICULOPATHY, CERVICAL: ICD-10-CM

## 2024-01-02 DIAGNOSIS — G89.29 CHRONIC LOW BACK PAIN, UNSPECIFIED BACK PAIN LATERALITY, UNSPECIFIED WHETHER SCIATICA PRESENT: Primary | ICD-10-CM

## 2024-01-02 RX ORDER — OXYCODONE HYDROCHLORIDE 15 MG/1
15 TABLET ORAL EVERY 8 HOURS PRN
Qty: 90 TABLET | Refills: 0 | Status: SHIPPED | OUTPATIENT
Start: 2024-01-02 | End: 2024-02-01

## 2024-01-02 RX ORDER — GABAPENTIN 600 MG/1
900 TABLET ORAL 3 TIMES DAILY
Qty: 270 TABLET | Refills: 0 | Status: SHIPPED | OUTPATIENT
Start: 2024-01-02

## 2024-01-02 RX ORDER — SENNOSIDES 8.6 MG
650 CAPSULE ORAL EVERY 8 HOURS PRN
Qty: 30 TABLET | Refills: 0 | Status: SHIPPED | OUTPATIENT
Start: 2024-01-02

## 2024-01-02 RX ORDER — OXYCODONE HYDROCHLORIDE 15 MG/1
15 TABLET ORAL EVERY 8 HOURS PRN
Qty: 90 TABLET | Refills: 0 | Status: CANCELLED | OUTPATIENT
Start: 2024-01-02 | End: 2024-02-01

## 2024-01-02 NOTE — PATIENT INSTRUCTIONS
Take Tylenol 650 mg every 8 hours   Apply Voltaren gel every day  Check your gabapentin dose at home - if it is 900 mg pills, take 900 mg every 8 hours (3 times daily). If the dose of gabapentin is lower, take that dose every 8 hours (3 times daily) and call our office so we can update the med list  We will prescribe oxycodone 15 mg every 8 hours as needed for severe pain for 1 month.   Call to schedule appointment with the comprehensive spine program to schedule  Follow-up with our office in 3 months
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
## 2024-01-05 ENCOUNTER — TELEPHONE (OUTPATIENT)
Dept: PHYSICAL THERAPY | Facility: OTHER | Age: 54
End: 2024-01-05

## 2024-01-05 NOTE — TELEPHONE ENCOUNTER
Call placed to the patient per Comprehensive Spine Program referral.    Voice message left for patient to call back. Phone number and hours of business provided.     This is the 1st attempt to reach the patient.  Will defer per protocol.    NOTE: Pt was established with PM at [facility]. Referral for [facility] was entered 12/19/23. Per referral notes, intake was started. No appt's made    H.I??

## 2024-01-12 NOTE — TELEPHONE ENCOUNTER
Call placed to the patient per [facility] referral.     V/M left for patient to call back. Phone number and hours of business provided.      This is the 2nd attempt to reach the patient. Will defer per protocol.     NOTE: Pt was established with PM at [facility]. Referral for [facility] was entered 12/19/23. Per referral notes, intake was started. No appt's made     H.I??

## 2024-01-13 DIAGNOSIS — F41.9 ANXIETY: ICD-10-CM

## 2024-01-13 DIAGNOSIS — F51.04 PSYCHOPHYSIOLOGICAL INSOMNIA: ICD-10-CM

## 2024-01-13 DIAGNOSIS — F33.2 MODERATELY SEVERE RECURRENT MAJOR DEPRESSION (HCC): ICD-10-CM

## 2024-01-15 ENCOUNTER — TELEPHONE (OUTPATIENT)
Dept: PSYCHIATRY | Facility: CLINIC | Age: 54
End: 2024-01-15

## 2024-01-15 RX ORDER — QUETIAPINE FUMARATE 400 MG/1
800 TABLET, FILM COATED ORAL
Qty: 180 TABLET | Refills: 0 | Status: SHIPPED | OUTPATIENT
Start: 2024-01-15

## 2024-01-15 RX ORDER — HYDROXYZINE HYDROCHLORIDE 25 MG/1
TABLET, FILM COATED ORAL
Qty: 270 TABLET | Refills: 0 | Status: SHIPPED | OUTPATIENT
Start: 2024-01-15

## 2024-01-15 RX ORDER — CLONIDINE HYDROCHLORIDE 0.1 MG/1
0.1 TABLET ORAL EVERY 12 HOURS
Qty: 180 TABLET | Refills: 0 | OUTPATIENT
Start: 2024-01-15
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
## 2024-01-15 NOTE — TELEPHONE ENCOUNTER
Attempted to call patients contact to which call went to voice message multiple times and writer was unable to leave message. Called and spoke with spouse requesting him to pass message for patient to call the office. Please schedule for next follow up in an end of day slot per provider. Thank you.

## 2024-01-15 NOTE — TELEPHONE ENCOUNTER
Patient contacted the office to schedule a follow up visit with provider. Patient is now scheduled for 1/16/2024  at 4pm virtually.

## 2024-01-16 ENCOUNTER — TELEPHONE (OUTPATIENT)
Dept: NEUROLOGY | Facility: CLINIC | Age: 54
End: 2024-01-16

## 2024-01-16 ENCOUNTER — TELEMEDICINE (OUTPATIENT)
Dept: PSYCHIATRY | Facility: CLINIC | Age: 54
End: 2024-01-16

## 2024-01-16 DIAGNOSIS — F41.9 ANXIETY: ICD-10-CM

## 2024-01-16 DIAGNOSIS — F51.04 PSYCHOPHYSIOLOGICAL INSOMNIA: ICD-10-CM

## 2024-01-16 DIAGNOSIS — M54.81 BILATERAL OCCIPITAL NEURALGIA: ICD-10-CM

## 2024-01-16 DIAGNOSIS — F33.2 MODERATELY SEVERE RECURRENT MAJOR DEPRESSION (HCC): Primary | ICD-10-CM

## 2024-01-16 PROBLEM — F32.A DEPRESSION: Status: RESOLVED | Noted: 2022-10-28 | Resolved: 2024-01-16

## 2024-01-16 PROBLEM — G47.00 INSOMNIA: Status: ACTIVE | Noted: 2024-01-16

## 2024-01-16 PROCEDURE — 99213 OFFICE O/P EST LOW 20 MIN: CPT | Performed by: PHYSICIAN ASSISTANT

## 2024-01-16 RX ORDER — DULOXETIN HYDROCHLORIDE 60 MG/1
60 CAPSULE, DELAYED RELEASE ORAL DAILY
Qty: 90 CAPSULE | Refills: 0 | Status: SHIPPED | OUTPATIENT
Start: 2024-01-16

## 2024-01-18 ENCOUNTER — OFFICE VISIT (OUTPATIENT)
Dept: NEUROLOGY | Facility: CLINIC | Age: 54
End: 2024-01-18
Payer: COMMERCIAL

## 2024-01-18 VITALS
BODY MASS INDEX: 39.56 KG/M2 | HEIGHT: 62 IN | SYSTOLIC BLOOD PRESSURE: 110 MMHG | HEART RATE: 100 BPM | DIASTOLIC BLOOD PRESSURE: 60 MMHG | WEIGHT: 215 LBS

## 2024-01-18 DIAGNOSIS — D32.9 MENINGIOMA (HCC): ICD-10-CM

## 2024-01-18 DIAGNOSIS — M79.18 CERVICAL MYOFASCIAL PAIN SYNDROME: ICD-10-CM

## 2024-01-18 DIAGNOSIS — M54.81 BILATERAL OCCIPITAL NEURALGIA: Primary | ICD-10-CM

## 2024-01-18 PROCEDURE — 20553 NJX 1/MLT TRIGGER POINTS 3/>: CPT | Performed by: PSYCHIATRY & NEUROLOGY

## 2024-01-18 PROCEDURE — 99215 OFFICE O/P EST HI 40 MIN: CPT | Performed by: PSYCHIATRY & NEUROLOGY

## 2024-01-18 RX ORDER — TRIAMCINOLONE ACETONIDE 40 MG/ML
40 INJECTION, SUSPENSION INTRA-ARTICULAR; INTRAMUSCULAR ONCE
Status: COMPLETED | OUTPATIENT
Start: 2024-01-18 | End: 2024-01-18

## 2024-01-18 RX ORDER — METHOCARBAMOL 750 MG/1
750 TABLET, FILM COATED ORAL EVERY 8 HOURS
COMMUNITY
Start: 2023-11-22

## 2024-01-18 RX ORDER — BUPIVACAINE HYDROCHLORIDE 5 MG/ML
4 INJECTION, SOLUTION PERINEURAL ONCE
Status: COMPLETED | OUTPATIENT
Start: 2024-01-18 | End: 2024-01-18

## 2024-01-18 RX ADMIN — BUPIVACAINE HYDROCHLORIDE 4 ML: 5 INJECTION, SOLUTION PERINEURAL at 17:46

## 2024-01-18 RX ADMIN — TRIAMCINOLONE ACETONIDE 40 MG: 40 INJECTION, SUSPENSION INTRA-ARTICULAR; INTRAMUSCULAR at 17:50
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
## 2024-01-18 NOTE — BH TREATMENT PLAN
"TREATMENT PLAN (Medication Management Only)        [facility]    Name/Date of Birth/MRN:  Greg Pringle 53 y.o. 1970 MRN: [id number]  Date of Treatment Plan: January 17, 2024  Diagnosis/Diagnoses:   1. Moderately severe recurrent major depression (HCC)    2. Anxiety    3. Bilateral occipital neuralgia    4. Psychophysiological insomnia      Strengths/Personal Resources for Self-Care: \"I am a good person.\"  Area/Areas of need (in own words): \"In a nutshell, the both of them because I'm just frustrated \".  1. Long Term Goal:   Maintain mood stability and control of anxiety  Target Date:  3-6 months  Person/Persons responsible for completion of goal: Renita  2.  Short Term Objective (s) - How will we reach this goal?:   A.  Provider new recommended medication/dosage changes and/or continue medication(s): Pt is having depression, anxiety and panic attacks.  PHQ 9 score: 7,  but Pt rates the intensity of her depression and anxiety at 8/10, but states she feels stable given her circumstances and does NOT want any changes to her regimen at this time.  Gabapentin was increased to 900mg tid per PCP for chronic pain. Treatment plan done virtually per [facility]'s administrative directive since Pt and I are not in my usual office for either of us to sign in person, due to the fact that I am working at another office because of the weather and Pt is at home.   Pt accepts the current plan.   Continue:   Duloxetine 60mg (1) cap po qd # 90  Continue the following of which Pt was given 90 day supplies yesterday via e-scribe  Quetiapine 400mg (2) tabs po qhs   Hydroxyzine 25mg (1) tab po tid prn anxiety and insomnia   Continue:  Gabapentin 600mg (1  1/2) tabs po tid -- per PCP  Lorazepam 0.5mg (1) tab po qhs --per Sleep specialist  Trazodone 50mg (1-3) tabs po qhs 1hr before hs-- per sleep specialist (replaces Suvorexant)  Oxycodone 15mg tid prn -- per " [facility] and PCP  F/U sleep specialist as sched  Pt to get CBC with PLT and BMP -- per PCP  Return 3/12/2024 at 6:30PM, call sooner prn             Target Date:  3-6 months  Person/Persons Responsible for Completion of Goal: Renita   Progress Towards Goals: stable, continuing treatment  Treatment Modality:  Medication mgt  Review due 180 days from date of this plan: 6 months - 7/17/2024  Expected length of service: ongoing treatment unless revised  My Physician/PA/NP and I have developed this plan together and I agree to work on the goals and objectives. I understand the treatment goals that were developed for my treatment.  Electronic Signatures: on file (unless signed below)    Salima Bay PA-C 01/17/24

## 2024-01-25 ENCOUNTER — RA CDI HCC (OUTPATIENT)
Dept: OTHER | Facility: HOSPITAL | Age: 54
End: 2024-01-25

## 2024-01-25 NOTE — PROGRESS NOTES
E11.22  HCC coding opportunities          Chart Reviewed number of suggestions sent to Provider: 1     Patients Insurance     Medicare Insurance: [employer] Advantage

## 2024-02-01 ENCOUNTER — OFFICE VISIT (OUTPATIENT)
Dept: INTERNAL MEDICINE CLINIC | Facility: CLINIC | Age: 54
End: 2024-02-01

## 2024-02-01 VITALS
DIASTOLIC BLOOD PRESSURE: 65 MMHG | HEART RATE: 90 BPM | WEIGHT: 218 LBS | BODY MASS INDEX: 39.87 KG/M2 | OXYGEN SATURATION: 98 % | SYSTOLIC BLOOD PRESSURE: 110 MMHG | TEMPERATURE: 98.6 F

## 2024-02-01 DIAGNOSIS — N18.30 STAGE 3 CHRONIC KIDNEY DISEASE, UNSPECIFIED WHETHER STAGE 3A OR 3B CKD (HCC): ICD-10-CM

## 2024-02-01 DIAGNOSIS — F11.20 CONTINUOUS OPIOID DEPENDENCE (HCC): ICD-10-CM

## 2024-02-01 DIAGNOSIS — G89.29 CHRONIC LOW BACK PAIN, UNSPECIFIED BACK PAIN LATERALITY, UNSPECIFIED WHETHER SCIATICA PRESENT: ICD-10-CM

## 2024-02-01 DIAGNOSIS — F33.9 DEPRESSION, RECURRENT (HCC): ICD-10-CM

## 2024-02-01 DIAGNOSIS — E11.69 TYPE 2 DIABETES MELLITUS WITH OTHER SPECIFIED COMPLICATION, WITHOUT LONG-TERM CURRENT USE OF INSULIN (HCC): Primary | ICD-10-CM

## 2024-02-01 DIAGNOSIS — M54.50 CHRONIC LOW BACK PAIN, UNSPECIFIED BACK PAIN LATERALITY, UNSPECIFIED WHETHER SCIATICA PRESENT: ICD-10-CM

## 2024-02-01 PROCEDURE — 99214 OFFICE O/P EST MOD 30 MIN: CPT | Performed by: INTERNAL MEDICINE

## 2024-02-01 RX ORDER — OXYCODONE HYDROCHLORIDE 15 MG/1
15 TABLET ORAL EVERY 8 HOURS PRN
Qty: 90 TABLET | Refills: 0 | Status: SHIPPED | OUTPATIENT
Start: 2024-02-01 | End: 2024-03-02
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
## 2024-02-15 ENCOUNTER — TELEPHONE (OUTPATIENT)
Dept: INTERNAL MEDICINE CLINIC | Facility: CLINIC | Age: 54
End: 2024-02-15

## 2024-02-15 NOTE — TELEPHONE ENCOUNTER
oxyCODONE (ROXICODONE) 15 mg immediate release tablet     Patient called to state that [employer] called 2/13/24 with the patient on the other line.  Patient states that the insurance was not able to get an appointment with Pain Management and is requesting that PCP continue to prescribe the medication until an appointment can be made.   Patient states that [employer] will continue to help her find a location that they can pay for.    Please check into this and call the patient to advise whether or not the medication can be prescribed for the month of March, 2024.

## 2024-02-19 NOTE — TELEPHONE ENCOUNTER
Patient called in with Cindy BARBOUR From Cindy. Cindy said that insurance will cover oxyCODONE they just need pcp to send script to pharmacy.    In the meantime, Cindy said that she will continue to help patient find a pain management specialist that accepts [employer]

## 2024-02-25 DIAGNOSIS — M54.50 CHRONIC LOW BACK PAIN, UNSPECIFIED BACK PAIN LATERALITY, UNSPECIFIED WHETHER SCIATICA PRESENT: ICD-10-CM

## 2024-02-25 DIAGNOSIS — G89.29 CHRONIC LOW BACK PAIN, UNSPECIFIED BACK PAIN LATERALITY, UNSPECIFIED WHETHER SCIATICA PRESENT: ICD-10-CM

## 2024-02-25 RX ORDER — OXYCODONE HYDROCHLORIDE 15 MG/1
15 TABLET ORAL EVERY 8 HOURS PRN
Qty: 90 TABLET | Refills: 0 | Status: SHIPPED | OUTPATIENT
Start: 2024-02-25 | End: 2024-03-26

## 2024-02-25 NOTE — TELEPHONE ENCOUNTER
An attending will need to send for the oxycodone. I am fine with a prescription for the month of March as long as she is able to get an appointment with pain management. Dr. Rangel, you last precepted for this patient in clinic. Would you mind sending for script to her pharmacy? Thanks.

## 2024-02-27 ENCOUNTER — LAB (OUTPATIENT)
Dept: LAB | Facility: CLINIC | Age: 54
End: 2024-02-27
Payer: COMMERCIAL

## 2024-02-27 ENCOUNTER — OFFICE VISIT (OUTPATIENT)
Dept: INTERNAL MEDICINE CLINIC | Facility: CLINIC | Age: 54
End: 2024-02-27

## 2024-02-27 VITALS
BODY MASS INDEX: 38.31 KG/M2 | SYSTOLIC BLOOD PRESSURE: 115 MMHG | TEMPERATURE: 97.6 F | WEIGHT: 208.2 LBS | HEIGHT: 62 IN | OXYGEN SATURATION: 98 % | RESPIRATION RATE: 12 BRPM | HEART RATE: 70 BPM | DIASTOLIC BLOOD PRESSURE: 82 MMHG

## 2024-02-27 DIAGNOSIS — M54.50 CHRONIC LOW BACK PAIN, UNSPECIFIED BACK PAIN LATERALITY, UNSPECIFIED WHETHER SCIATICA PRESENT: Primary | ICD-10-CM

## 2024-02-27 DIAGNOSIS — E11.69 TYPE 2 DIABETES MELLITUS WITH OTHER SPECIFIED COMPLICATION, WITHOUT LONG-TERM CURRENT USE OF INSULIN (HCC): ICD-10-CM

## 2024-02-27 DIAGNOSIS — K21.9 GASTROESOPHAGEAL REFLUX DISEASE WITHOUT ESOPHAGITIS: ICD-10-CM

## 2024-02-27 DIAGNOSIS — G89.29 CHRONIC LOW BACK PAIN, UNSPECIFIED BACK PAIN LATERALITY, UNSPECIFIED WHETHER SCIATICA PRESENT: Primary | ICD-10-CM

## 2024-02-27 DIAGNOSIS — G62.9 NEUROPATHY: ICD-10-CM

## 2024-02-27 LAB
ALBUMIN SERPL BCP-MCNC: 4.2 G/DL (ref 3.5–5)
ALP SERPL-CCNC: 92 U/L (ref 34–104)
ALT SERPL W P-5'-P-CCNC: 12 U/L (ref 7–52)
ANION GAP SERPL CALCULATED.3IONS-SCNC: 10 MMOL/L
AST SERPL W P-5'-P-CCNC: 13 U/L (ref 13–39)
BASOPHILS # BLD AUTO: 0.02 THOUSANDS/ÂΜL (ref 0–0.1)
BASOPHILS NFR BLD AUTO: 0 % (ref 0–1)
BILIRUB SERPL-MCNC: 0.32 MG/DL (ref 0.2–1)
BUN SERPL-MCNC: 12 MG/DL (ref 5–25)
CALCIUM SERPL-MCNC: 8.9 MG/DL (ref 8.4–10.2)
CHLORIDE SERPL-SCNC: 104 MMOL/L (ref 96–108)
CHOLEST SERPL-MCNC: 247 MG/DL
CO2 SERPL-SCNC: 23 MMOL/L (ref 21–32)
CREAT SERPL-MCNC: 1.25 MG/DL (ref 0.6–1.3)
EOSINOPHIL # BLD AUTO: 0.17 THOUSAND/ÂΜL (ref 0–0.61)
EOSINOPHIL NFR BLD AUTO: 3 % (ref 0–6)
ERYTHROCYTE [DISTWIDTH] IN BLOOD BY AUTOMATED COUNT: 15.3 % (ref 11.6–15.1)
EST. AVERAGE GLUCOSE BLD GHB EST-MCNC: 134 MG/DL
GFR SERPL CREATININE-BSD FRML MDRD: 48 ML/MIN/1.73SQ M
GLUCOSE P FAST SERPL-MCNC: 109 MG/DL (ref 65–99)
HBA1C MFR BLD: 6.3 %
HCT VFR BLD AUTO: 41.3 % (ref 34.8–46.1)
HDLC SERPL-MCNC: 67 MG/DL
HGB BLD-MCNC: 12.8 G/DL (ref 11.5–15.4)
IMM GRANULOCYTES # BLD AUTO: 0.01 THOUSAND/UL (ref 0–0.2)
IMM GRANULOCYTES NFR BLD AUTO: 0 % (ref 0–2)
LDLC SERPL CALC-MCNC: 158 MG/DL (ref 0–100)
LYMPHOCYTES # BLD AUTO: 2.62 THOUSANDS/ÂΜL (ref 0.6–4.47)
LYMPHOCYTES NFR BLD AUTO: 54 % (ref 14–44)
MCH RBC QN AUTO: 24.6 PG (ref 26.8–34.3)
MCHC RBC AUTO-ENTMCNC: 31 G/DL (ref 31.4–37.4)
MCV RBC AUTO: 79 FL (ref 82–98)
MONOCYTES # BLD AUTO: 0.44 THOUSAND/ÂΜL (ref 0.17–1.22)
MONOCYTES NFR BLD AUTO: 9 % (ref 4–12)
NEUTROPHILS # BLD AUTO: 1.67 THOUSANDS/ÂΜL (ref 1.85–7.62)
NEUTS SEG NFR BLD AUTO: 34 % (ref 43–75)
NONHDLC SERPL-MCNC: 180 MG/DL
NRBC BLD AUTO-RTO: 0 /100 WBCS
PLATELET # BLD AUTO: 302 THOUSANDS/UL (ref 149–390)
PMV BLD AUTO: 10.6 FL (ref 8.9–12.7)
POTASSIUM SERPL-SCNC: 3.7 MMOL/L (ref 3.5–5.3)
PROT SERPL-MCNC: 7.3 G/DL (ref 6.4–8.4)
RBC # BLD AUTO: 5.2 MILLION/UL (ref 3.81–5.12)
SODIUM SERPL-SCNC: 137 MMOL/L (ref 135–147)
TRIGL SERPL-MCNC: 111 MG/DL
WBC # BLD AUTO: 4.93 THOUSAND/UL (ref 4.31–10.16)

## 2024-02-27 PROCEDURE — 85025 COMPLETE CBC W/AUTO DIFF WBC: CPT

## 2024-02-27 PROCEDURE — 36415 COLL VENOUS BLD VENIPUNCTURE: CPT

## 2024-02-27 PROCEDURE — 80053 COMPREHEN METABOLIC PANEL: CPT

## 2024-02-27 PROCEDURE — 83036 HEMOGLOBIN GLYCOSYLATED A1C: CPT

## 2024-02-27 PROCEDURE — 99213 OFFICE O/P EST LOW 20 MIN: CPT | Performed by: INTERNAL MEDICINE

## 2024-02-27 PROCEDURE — 80061 LIPID PANEL: CPT

## 2024-02-27 RX ORDER — OMEPRAZOLE 40 MG/1
40 CAPSULE, DELAYED RELEASE ORAL DAILY
Qty: 90 CAPSULE | Refills: 1 | Status: SHIPPED | OUTPATIENT
Start: 2024-02-27 | End: 2024-08-25
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
## 2024-02-28 ENCOUNTER — TELEPHONE (OUTPATIENT)
Dept: PHYSICAL THERAPY | Facility: OTHER | Age: 54
End: 2024-02-28

## 2024-02-28 NOTE — TELEPHONE ENCOUNTER
"Per PCP request I called the patient. She did not answer. I LM on her VM. Comp spine is a nurse triage program, and would send the patient for an eval in PT with advanced spine therapist. Unsure if the PCP wants her to see PM? PCP notes state: \"She was requesting a refill on oxycodone. I placed referral to comprehensive spine program and refilled oxycodone for 1 month supply until she can establish with pain management \"    Pt also had a referral to PM, an intake was started but no appointments made.     Will assist patient when she returns our call  "

## 2024-02-28 NOTE — TELEPHONE ENCOUNTER
Patient called into [facility] after receiving a call from [facility] .    Patient had a referral for PM which is now closed because patient declined injections.    I briefly explained comprehensive Spine Program to the patient. But she insisted she has a referral from her PCP (which at the time  of the call I did not realize she declined ).    I called patient back and left v/m explaining she declined PM but f she is interested in having an eval with Advanced PT, she can call back to proceed with the triage.    [facility] phone number and hours of business provided.
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
## 2024-03-12 ENCOUNTER — TELEMEDICINE (OUTPATIENT)
Dept: PSYCHIATRY | Facility: CLINIC | Age: 54
End: 2024-03-12
Payer: COMMERCIAL

## 2024-03-12 DIAGNOSIS — F51.04 PSYCHOPHYSIOLOGICAL INSOMNIA: ICD-10-CM

## 2024-03-12 DIAGNOSIS — G62.9 NEUROPATHY: ICD-10-CM

## 2024-03-12 DIAGNOSIS — F33.2 MODERATELY SEVERE RECURRENT MAJOR DEPRESSION (HCC): Primary | ICD-10-CM

## 2024-03-12 DIAGNOSIS — F41.9 ANXIETY: ICD-10-CM

## 2024-03-12 PROCEDURE — G2211 COMPLEX E/M VISIT ADD ON: HCPCS | Performed by: PHYSICIAN ASSISTANT

## 2024-03-12 PROCEDURE — 99213 OFFICE O/P EST LOW 20 MIN: CPT | Performed by: PHYSICIAN ASSISTANT

## 2024-03-12 RX ORDER — HYDROXYZINE HYDROCHLORIDE 25 MG/1
TABLET, FILM COATED ORAL
Qty: 270 TABLET | Refills: 0 | Status: SHIPPED | OUTPATIENT
Start: 2024-03-12

## 2024-03-12 RX ORDER — DULOXETIN HYDROCHLORIDE 60 MG/1
60 CAPSULE, DELAYED RELEASE ORAL DAILY
Qty: 90 CAPSULE | Refills: 0 | Status: SHIPPED | OUTPATIENT
Start: 2024-03-12

## 2024-03-12 RX ORDER — QUETIAPINE FUMARATE 400 MG/1
800 TABLET, FILM COATED ORAL
Qty: 180 TABLET | Refills: 0 | Status: SHIPPED | OUTPATIENT
Start: 2024-03-12

## 2024-03-13 NOTE — BH CRISIS PLAN
"Client Name: Greg Pringle       Client YOB: 1970    Sivan Safety Plan      Creation Date: 3/12/24    Created By: Salima Bay PA-C       Step 1: Warning Signs:   Warning Signs   \"Irritable-- a lot\"            Step 2: Internal Coping Strategies:   Internal Coping Strategies   \"Meditation\"   \"Count\"   \"Music always soothes me\"            Step 3: People and social settings that provide distraction:   Name Contact Information   \"All my kids\" Telephone   \"My grandkids\" Telephone    Places   \"Holiness\"   \"Work\"   \"Be with girlfriends\"           Step 4: People whom I can ask for help during a crisis:      Name Contact Information    Same as step 3 Telephone      Step 5: Professionals or agencies I can contact during a crisis:      Clinican/Agency Name Phone Emergency Contact         [facility] [phone]     [facility] [phone]       Local Emergency Department Emergency Department Phone Emergency Department Address    [facility]          Crisis Phone Numbers:   Suicide Prevention Lifeline: Call or Text  473 Crisis Text Line: Text HOME to 140-418   Please note: Some [facility] do not have a separate number for Child/Adolescent specific crisis. If your county is not listed under Child/Adolescent, please call the adult number for your county      Adult Crisis Numbers: Child/Adolescent Crisis Numbers   [facility]: [phone] [facility]: [phone]   [facility]: [phone] [facility]: [phone]   [facility]: [phone] [place]: [phone]   [facility]: [phone] [place]: [phone]   [place] [facility]: [phone]   [facility]: [phone]   [facility]: [phone]   [facility]: [phone] (daytime) [phone] (after hours, weekends, holidays)      Step 6: Making the environment safer (plan for lethal means " "safety):   Patient did not identify any lethal methods: Yes     Optional: What is most important to me and worth living for?   \"Me, my kids, my grandkids, my family\"     Sivan Safety Plan. Jania Garcia and Jasiel Xiao. Used with permission of the authors.           "

## 2024-03-20 DIAGNOSIS — G89.29 CHRONIC LOW BACK PAIN, UNSPECIFIED BACK PAIN LATERALITY, UNSPECIFIED WHETHER SCIATICA PRESENT: ICD-10-CM

## 2024-03-20 DIAGNOSIS — M54.50 CHRONIC LOW BACK PAIN, UNSPECIFIED BACK PAIN LATERALITY, UNSPECIFIED WHETHER SCIATICA PRESENT: ICD-10-CM

## 2024-03-29 ENCOUNTER — TELEPHONE (OUTPATIENT)
Dept: INTERNAL MEDICINE CLINIC | Facility: CLINIC | Age: 54
End: 2024-03-29

## 2024-03-29 DIAGNOSIS — G89.29 CHRONIC LOW BACK PAIN, UNSPECIFIED BACK PAIN LATERALITY, UNSPECIFIED WHETHER SCIATICA PRESENT: Primary | ICD-10-CM

## 2024-03-29 DIAGNOSIS — M54.50 CHRONIC LOW BACK PAIN, UNSPECIFIED BACK PAIN LATERALITY, UNSPECIFIED WHETHER SCIATICA PRESENT: Primary | ICD-10-CM

## 2024-03-29 RX ORDER — OXYCODONE HYDROCHLORIDE 15 MG/1
15 TABLET ORAL EVERY 8 HOURS PRN
Qty: 90 TABLET | Refills: 0 | OUTPATIENT
Start: 2024-03-29 | End: 2024-04-28

## 2024-03-29 RX ORDER — OXYCODONE HYDROCHLORIDE 15 MG/1
15 TABLET ORAL 2 TIMES DAILY PRN
Qty: 60 TABLET | Refills: 0 | Status: SHIPPED | OUTPATIENT
Start: 2024-03-29 | End: 2024-04-28

## 2024-03-29 NOTE — TELEPHONE ENCOUNTER
Per physician review, patient's Oxycodone not appropriate for refill. Last documentation 2/27/24, physician spoke with patient and advised her to follow up with [facility]. Patient refill of oxycodone for 1 month supply until she can establish with pain management. If patient once again does not pursue establishment with the spine program, we will not refill her oxycodone anymore. Plan was explained with patient.     [facility] attempted to reach patient back in January 2024, three times.     Nurse contacted patient at . Introduced self and role. Reviewed with patient that physician did review oxycodone script. Script not appropriate to refill. Reviewed with patient last office visit on 2/27/24. Patient stated she called [facility] number that was provided and they stated she is a not a good candidate. Patient unable to remember who she spoke with. Patient stating she called in February. Patient very upset on the phone. Patient stating she called the office a few days ago and told the girls her insurance company stated they would pay for the medication. Patient stating not able to survive without them. She won't have any this weekend. Patient aware of her conversation with physician last PCP visit but continues to state what am I supposed to do if I'm not a candidate. Patient requesting nurse update doctor and hung up the phone. Patient stating she will call office back in one hour.     Contacted [facility] at [phone]. Spoke with Gordo. Patient contacted on 2/28/24 pain management intake. Pain management discussed injections and patient declined. [facility] reached out and left a voice message for patient to discuss program. [facility] is for advanced PT for back/spine/neck. [facility] does not manage pain medications. If interested in advanced PT, patient can reach back to them.     If  patient is looking for pain management, new referral for pain management would need to be placed. Contact number is .

## 2024-03-29 NOTE — TELEPHONE ENCOUNTER
Patient wanted a call from a clinical member to explain the whole PDMP thing when it comes to her medication refill

## 2024-03-29 NOTE — TELEPHONE ENCOUNTER
Contacted patient to update her prescription was sent to physician to review/sign. Patient expressed understanding on phone. No other questions/concerns noted.

## 2024-03-29 NOTE — TELEPHONE ENCOUNTER
I reviewed chart because sent to me for oxy refill. Upon review of last few office notes, patient was to establish with pain management and or spine program. In past she was at [facility] but left that practice , unclear if dismissed or insurance change or both. PDMP reviewed as well, appears we have been giving narcotics since Fall 2023 if not longer. She was given tid oxy IR 15mg for back/neck pain.     See note from Jocelynn Kee today with patient. Per Dr Palafox note, it was discussed with her at last visit that we would not continue oxy as per last month. However due to TID dosing and being on this med for several months to potentially years, will wean her to bid dosing this month until seen by Dr Palafox on 4/22 to discuss medication further. She also may need narcotic visit/contract done at that time .     Discussed with Jocelynn Kee RN

## 2024-04-01 ENCOUNTER — TELEPHONE (OUTPATIENT)
Dept: INTERNAL MEDICINE CLINIC | Facility: CLINIC | Age: 54
End: 2024-04-01

## 2024-04-01 NOTE — TELEPHONE ENCOUNTER
Patient called in asking why they sent 60 count of the below medication instead of 90, advise patient someone will call her back with an answer

## 2024-04-01 NOTE — TELEPHONE ENCOUNTER
"Contacted patient at . Introduced self and role. Nurse reviewed with patient plan of care. Patient updated Oxycodone script was ordered for  1 tablet by mouth 2 (two) times a day as needed for moderate pain. Patient weaned from TID to BID dosing for this month until being seen for her next appointment with Dr. Palafox on 4/22/24. Patient agitated on the phone. Patient stated \"you can't just change my medications.\" Nurse reviewed with patient the goal was for her to follow up with outpatient pain management. Patient updated nurse did reach out to pain management. Pain management was recommending injections. Patient expressed no interest in receiving injections. Patient stated she had injections in the past and just gained a lot of weight causing back pain. Nurse discussed [facility] is for advanced therapy. No interest at this time.     Patient stated she is taking her medication three times a day, not as prescribed.     Patient requesting to come in sooner than 4/22/24. Nurse reviewed Dr. Palafox availability. Dr. Palafox earliest appointment is on 4/22/2024. That is when physician will be in office next. Nurse discussed establishing care with physician for continuity. Patient quiet on phone, then hung up.   "

## 2024-04-09 NOTE — TELEPHONE ENCOUNTER
I have discussed with the patient on multiple office visits the plan of care regarding her pain regimen. She was instructed on at least 2 different occasions that she needs to establish care with pain management to continue receiving narcotics, if they see appropriate. I agree with Dr. Mchugh, we should continue with oxycodone taper and I am happy to follow-up with patient in the office 4/22 to discuss this in further detail.

## 2024-04-15 ENCOUNTER — RA CDI HCC (OUTPATIENT)
Dept: OTHER | Facility: HOSPITAL | Age: 54
End: 2024-04-15

## 2024-04-15 DIAGNOSIS — G62.9 NEUROPATHY: ICD-10-CM

## 2024-04-15 DIAGNOSIS — F41.9 ANXIETY: ICD-10-CM

## 2024-04-15 RX ORDER — GABAPENTIN 600 MG/1
900 TABLET ORAL 3 TIMES DAILY
Qty: 270 TABLET | Refills: 0 | Status: SHIPPED | OUTPATIENT
Start: 2024-04-15

## 2024-04-15 NOTE — TELEPHONE ENCOUNTER
Medication Refill Request     Name of Medication gabapentin  Dose/Frequency 600mg take 1.5 tablets by mouth 3 times a day  Quantity 270  Verified pharmacy   [x]  Verified ordering Provider   [x]  Does patient have enough for the next 3 days? Yes [] No [x]  Does patient have a follow-up appointment scheduled? Yes [x] No []   If so when is appointment: 5/21/2024 6:30pm

## 2024-04-15 NOTE — PROGRESS NOTES
E11.22    HCC coding opportunities          Chart Reviewed number of suggestions sent to Provider: 1     Patients Insurance     Medicare Insurance: [employer]

## 2024-04-22 ENCOUNTER — OFFICE VISIT (OUTPATIENT)
Dept: INTERNAL MEDICINE CLINIC | Facility: CLINIC | Age: 54
End: 2024-04-22

## 2024-04-22 VITALS
BODY MASS INDEX: 36.94 KG/M2 | TEMPERATURE: 97.9 F | SYSTOLIC BLOOD PRESSURE: 141 MMHG | WEIGHT: 208.5 LBS | RESPIRATION RATE: 18 BRPM | DIASTOLIC BLOOD PRESSURE: 83 MMHG | HEIGHT: 63 IN | OXYGEN SATURATION: 97 % | HEART RATE: 86 BPM

## 2024-04-22 DIAGNOSIS — G89.29 CHRONIC LOW BACK PAIN, UNSPECIFIED BACK PAIN LATERALITY, UNSPECIFIED WHETHER SCIATICA PRESENT: ICD-10-CM

## 2024-04-22 DIAGNOSIS — M54.50 CHRONIC LOW BACK PAIN, UNSPECIFIED BACK PAIN LATERALITY, UNSPECIFIED WHETHER SCIATICA PRESENT: ICD-10-CM

## 2024-04-22 PROCEDURE — 99214 OFFICE O/P EST MOD 30 MIN: CPT | Performed by: INTERNAL MEDICINE

## 2024-04-22 NOTE — PROGRESS NOTES
"  Counts include 234 beds at the [facility]  INTERNAL MEDICINE RESIDENCY    Clinic Visit Note  Greg Pringle 53 y.o. female   MRN: [id number]    Assessment and Plan      1. Chronic low back pain, unspecified back pain laterality, unspecified whether sciatica present: Patient continues to be noncompliant with her analgesia regimen. Throughout the entire office visit, she was verbally aggressive and shouting profanities. Her anger is centered around our current plan to taper her oxycodone. I attempted to review her medications with her and she is unsure of the gabapentin dose she is taking and she is not taking the Cymbalta, although these were previously discussed at her prior office visits in detail. She is taking the oxycodone TID as opposed to the currently prescribed BID regimen, so she ran out of her oxycodone 4 days ago. We previously discussed at her last office visit that she needs to establish care with a new pain management clinic and we will taper her oxycodone in the meantime. She has not been able to establish care with pain management. Pain management office called the patient and would not offer her opioid medications. Please see referral note from Dr. Hola Mcpherson DO. [facility] spine program also offered her injections and she refused. She is now attempting to establish care with another pain clinic out of the network but they are requesting a letter of good standing from her previous pain management clinic, however she states \"they don't do that.\" Upon further chart review, it appears she was dismissed from the previous pain management clinic due to NONCOMPLIANCE. Please see below:    Per her previous pain management physician  (Chiara Kennedy MD at the [facility]), he noted on 11/22/2023: \"Pt has been treated by us with pain medications as well as we have given her multiple options of getting her better treatment understanding the pain generator and then following help control the pain better. " "Pt has been very noncompliant and not following appropriate plan of treatment. I had a long discussion with her about her being noncompliant. At this point I will provide her 1 month of prescription and have her seek help from another pain doctor.\"    When the above documentation was discussed with the patient, she dismissed it and says \"that's not true\" and says she was dismissed due to insurance issues instead. She was initially resistant to the idea of steroid injections because she is fearful of weight gain but is now agreeable after further discussion.     Plan:  Our staff will attempt to call her new pain management clinic that she is attempting to establish care with and see how else we can assist  When patient is due for her next refill, Dr. Mas has agreed to resupply 1 month supply of the oxycodone 15 mg BID and then afterwards will continue taper to 15 mg once daily while she attempts to establish with another pain management clinic  We once again reinforced to the patient that we will NOT be prescribing her opioids chronically and that this will be the plan of care going forward. She once again became verbally aggressive and shouting profanities, but ultimately she did verbalize understanding of the plan.   Continue current analgesia regimen:  Tylenol 650 mg every 8 hours   Voltaren gel  Gabapentin 900 mg every 8 hours   Avoid NSAID's given history of duodenal ulcer   Oxycodone 15 mg every BID   Refilled:  -     Diclofenac Sodium (VOLTAREN) 1 %; Apply 2 g topically 4 (four) times a day        Follow up in our clinic in 3 month(s) for  follow-up back pain .    Subjective   HISTORY OF PRESENT ILLNESS:  Greg Pringle is a 53 y.o. female with a past medical history of  insomnia following with sleep medicine on trazodone, anxiety and depression following with psych on Seroquel and Duloxetine, resistant H. Pylori following with GI, grade A esophagitis, chronic opioid use who presents to clinic today for " "pain follow-up.    Patient presents again to discuss her chronic back pain which she reports is uncontrolled at this time.  Throughout the entire office visit, she was verbally aggressive and shouting profanities. I attempted to review her medications with her and she is unsure of the gabapentin dose she is taking and she is not taking the Cymbalta. She is taking the oxycodone TID as opposed to the currently prescribed BID regimen and ran out of her oxycodone 4 days ago. She has not been able to establish care with pain management. Pain management office called the patient and would not offer her opioid medications and instead offered more conservative management such as injections and patient declined. She is now attempting to establish care with another pain clinic out of the network but they are requesting a letter of good standing from her previous pain management clinic however she states \"they don't do that.\" Upon further review, it appears she was dismissed from the previous pain management clinic due to NONCOMPLIANCE. When this was discussed with the patient, she dismissed that and says it is not correct and it was due to insurance issues. She was initially resistant to the idea of steroid injections because she is fearful of weight gain but is now agreeable after further discussion.     Review of Systems - Negative other than stated above    Objective     Vitals:    04/22/24 1526   BP: 141/83   Pulse: 86   Resp: 18   Temp: 97.9 °F (36.6 °C)   TempSrc: Temporal   SpO2: 97%   Weight: 94.6 kg (208 lb 8 oz)   Height: 5' 3\" (1.6 m)       Physical Exam:  General: Obese. No apparent distress, resting comfortably   Head: Normocephalic, atraumatic  Eyes: Anicteric, no conjunctival erythema  ENT: External ear normal, no nasal discharge  Neck: Trachea midline, no visible lymphadenopathy or goiter  Respiratory:  Non-labored respirations, symmetric thorax expansion  Cardiovascular:  Extremities appear " well-perfused  Abdomen: Non-distended  Extremities: Moves extremities spontaneously, no peripheral edema  Skin: No visible rashes, wounds, or jaundice  Neuro: No gross focal deficits, no aphasia    History     Current Outpatient Medications:     Diclofenac Sodium (VOLTAREN) 1 %, Apply 2 g topically 4 (four) times a day, Disp: 100 g, Rfl: 1    acetaminophen (TYLENOL) 650 mg CR tablet, Take 1 tablet (650 mg total) by mouth every 8 (eight) hours as needed for mild pain, Disp: 30 tablet, Rfl: 0    cholecalciferol (VITAMIN D3) 400 units tablet, Take 400 Units by mouth daily, Disp: , Rfl:     docusate sodium (COLACE) 100 mg capsule, Take 1 capsule (100 mg total) by mouth in the morning, Disp: 90 capsule, Rfl: 1    DULoxetine (CYMBALTA) 60 mg delayed release capsule, Take 1 capsule (60 mg total) by mouth daily, Disp: 90 capsule, Rfl: 0    gabapentin (NEURONTIN) 600 MG tablet, Take 1.5 tablets (900 mg total) by mouth 3 (three) times a day, Disp: 270 tablet, Rfl: 0    hydrOXYzine HCL (ATARAX) 25 mg tablet, TAKE 1 TABLET BY MOUTH THREE TIMES A DAY AS NEEDED FOR ANXIETY AND INSOMNIA, Disp: 270 tablet, Rfl: 0    LORazepam (Ativan) 0.5 mg tablet, Take 1 tablet (0.5 mg total) by mouth daily at bedtime, Disp: 30 tablet, Rfl: 4    methocarbamol (ROBAXIN) 750 mg tablet, Take 750 mg by mouth every 8 (eight) hours, Disp: , Rfl:     Multiple Vitamins-Minerals (multivitamin with minerals) tablet, Take 1 tablet by mouth daily, Disp: , Rfl:     naloxone (NARCAN) 4 mg/0.1 mL nasal spray, Administer 1 spray into a nostril. If no response after 2-3 minutes, give another dose in the other nostril using a new spray., Disp: 1 each, Rfl: 1    omeprazole (PriLOSEC) 40 MG capsule, Take 1 capsule (40 mg total) by mouth daily, Disp: 90 capsule, Rfl: 1    oxyCODONE (ROXICODONE) 15 mg immediate release tablet, Take 1 tablet (15 mg total) by mouth 2 (two) times a day as needed for moderate pain Max Daily Amount: 30 mg, Disp: 60 tablet, Rfl: 0     "QUEtiapine (SEROquel) 400 MG tablet, Take 2 tablets (800 mg total) by mouth daily at bedtime, Disp: 180 tablet, Rfl: 0    traZODone (DESYREL) 50 mg tablet, 1, 2 OR 3 TABLETS ONE HOUR BEFORE BED, Disp: 270 tablet, Rfl: 2  Allergies   Allergen Reactions    Aspirin GI Intolerance    Other Throat Swelling      raw onions- causes throat to feel like it wants to close      Past Medical History:   Diagnosis Date    Abnormal thyroid blood test     last assessed 11/13/14    Anemia     last assessed  11/19/13    Chronic pain     back    Depression     Sleep disorder      Past Surgical History:   Procedure Laterality Date    ABSCESS DRAINAGE      incision - skin abscess    BACK SURGERY      2002    BACK SURGERY      lower    MAMMO STEREOTACTIC BREAST BIOPSY RIGHT (ALL INC) Right 6/21/2017    MAMMO STEREOTACTIC BREAST BIOPSY RIGHT (ALL INC) EACH ADD Right 6/21/2017    TUBAL LIGATION  approx 1998     Social History     Socioeconomic History    Marital status: /Civil Union     Spouse name: Not on file    Number of children: Not on file    Years of education: Not on file    Highest education level: Not on file   Occupational History    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   Tobacco Use    Smoking status: Former     Current packs/day: 0.00     Types: Cigarettes     Quit date: 5/13/2013     Years since quitting: 10.9    Smokeless tobacco: Never    Tobacco comments:     pt \"quit five years ago\"--Pt clarified 2013 during a 4/12/2021 visit   Vaping Use    Vaping status: Some Days    Substances: Nicotine   Substance and Sexual Activity    Alcohol use: No    Drug use: Not Currently     Types: Marijuana     Comment: Tried THC in grammar school and high school     Sexual activity: Yes     Partners: Male     Birth control/protection: None, Female Sterilization   Other Topics Concern    Not on file   Social History Narrative    Death in the family - sister    Lack of adequate sleep    Parentage    Sedentary lifestyle    " Under stress        Home: Pt lives with  and 2 grandchildren and one stepson.  's mom and sister moved in approx 5/2022        Education:    Pt denies any h/o learning disabilities and reached childhood milestones on time as far as he knows.     Graduated HS    Some college     Social Determinants of Health     Financial Resource Strain: Low Risk  (1/2/2024)    Overall Financial Resource Strain (CARDIA)     Difficulty of Paying Living Expenses: Not hard at all   Food Insecurity: Food Insecurity Present (1/2/2024)    Hunger Vital Sign     Worried About Running Out of Food in the Last Year: Sometimes true     Ran Out of Food in the Last Year: Sometimes true   Transportation Needs: No Transportation Needs (1/2/2024)    PRAPARE - Transportation     Lack of Transportation (Medical): No     Lack of Transportation (Non-Medical): No   Physical Activity: Inactive (2/27/2024)    Exercise Vital Sign     Days of Exercise per Week: 0 days     Minutes of Exercise per Session: 0 min   Stress: No Stress Concern Present (2/27/2024)    [employer] - Occupational Stress Questionnaire     Feeling of Stress : Only a little   Social Connections: Socially Integrated (2/27/2024)    Social Connection and Isolation Panel [NHANES]     Frequency of Communication with Friends and Family: More than three times a week     Frequency of Social Gatherings with Friends and Family: More than three times a week     Attends Mu-ism Services: More than 4 times per year     Active Member of Clubs or Organizations: Yes     Attends Club or Organization Meetings: Never     Marital Status:    Intimate Partner Violence: Not At Risk (2/27/2024)    Humiliation, Afraid, Rape, and Kick questionnaire     Fear of Current or Ex-Partner: No     Emotionally Abused: No     Physically Abused: No     Sexually Abused: No   Housing Stability: Low Risk  (2/27/2024)    Housing Stability Vital Sign     Unable to Pay for  Housing in the Last Year: No     Number of Places Lived in the Last Year: 1     Unstable Housing in the Last Year: No     Family History   Problem Relation Age of Onset    Kidney disease Mother         chronic    Diabetes Mother     Bone cancer Father 55    Heart attack Sister         Acute MI    Diabetes Sister     Hypertension Brother     Cancer Family     Diabetes Family     Heart disease Family     Hyperlipidemia Family     Hypertension Family     Colon cancer Paternal Grandfather 90    No Known Problems Maternal Grandmother     No Known Problems Maternal Grandfather     No Known Problems Paternal Grandmother     No Known Problems Sister     No Known Problems Sister     No Known Problems Sister     No Known Problems Brother     No Known Problems Brother     No Known Problems Brother     No Known Problems Daughter     No Known Problems Daughter     No Known Problems Son     No Known Problems Son     No Known Problems Son     No Known Problems Son        Marlon Palafox DO  [facility] Internal Medicine PGY-3  [place] [address]  [address]  [place] [address]    PLEASE NOTE:  This encounter was completed utilizing the [employer] Direct Speech Voice Recognition Software. Grammatical errors, random word insertions, pronoun errors and incomplete sentences are occasional consequences of the system due to software limitations, ambient noise and hardware issues.These may be missed by proof reading prior to affixing electronic signature. Any questions or concerns about the content, text or information contained within the body of this dictation should be directly addressed to the physician for clarification. Please do not hesitate to call me directly if you have any any questions or concerns.

## 2024-04-26 ENCOUNTER — TELEPHONE (OUTPATIENT)
Dept: INTERNAL MEDICINE CLINIC | Facility: CLINIC | Age: 54
End: 2024-04-26

## 2024-04-26 NOTE — TELEPHONE ENCOUNTER
Patient called the office to request a good standing letter from pcp. Patient said that she is trying to get an appointment with Pain Management and the letter is required before scheduling the appointment.     I did ask patient what office she was scheduling with so that the letter can be faxed over. Patient said that the number to the office should be on file.

## 2024-04-26 NOTE — TELEPHONE ENCOUNTER
Patient called in to request a refill for oxyCODONE (ROXICODONE) 15 mg immediate release tablet . Patient said that she was only given 60 tablets for this month. Patient is requesting 90 tabs to get her through until she sees a pain management specialist.

## 2024-04-29 DIAGNOSIS — M54.50 CHRONIC LOW BACK PAIN, UNSPECIFIED BACK PAIN LATERALITY, UNSPECIFIED WHETHER SCIATICA PRESENT: ICD-10-CM

## 2024-04-29 DIAGNOSIS — G89.29 CHRONIC LOW BACK PAIN, UNSPECIFIED BACK PAIN LATERALITY, UNSPECIFIED WHETHER SCIATICA PRESENT: ICD-10-CM

## 2024-04-29 RX ORDER — OXYCODONE HYDROCHLORIDE 15 MG/1
15 TABLET ORAL 2 TIMES DAILY PRN
Qty: 60 TABLET | Refills: 0 | Status: CANCELLED | OUTPATIENT
Start: 2024-04-29 | End: 2024-05-29

## 2024-04-29 NOTE — TELEPHONE ENCOUNTER
Patient called for the status   please check into this and call the patient when script sent to the pharmacy

## 2024-04-30 ENCOUNTER — TELEPHONE (OUTPATIENT)
Dept: INTERNAL MEDICINE CLINIC | Facility: CLINIC | Age: 54
End: 2024-04-30

## 2024-04-30 DIAGNOSIS — M54.50 CHRONIC LOW BACK PAIN, UNSPECIFIED BACK PAIN LATERALITY, UNSPECIFIED WHETHER SCIATICA PRESENT: Primary | ICD-10-CM

## 2024-04-30 DIAGNOSIS — F51.04 PSYCHOPHYSIOLOGICAL INSOMNIA: ICD-10-CM

## 2024-04-30 DIAGNOSIS — G89.29 CHRONIC LOW BACK PAIN, UNSPECIFIED BACK PAIN LATERALITY, UNSPECIFIED WHETHER SCIATICA PRESENT: Primary | ICD-10-CM

## 2024-04-30 PROCEDURE — 99441 PR PHYS/QHP TELEPHONE EVALUATION 5-10 MIN: CPT | Performed by: INTERNAL MEDICINE

## 2024-04-30 RX ORDER — NALOXONE HYDROCHLORIDE 4 MG/.1ML
SPRAY NASAL
Qty: 1 EACH | Refills: 1 | Status: SHIPPED | OUTPATIENT
Start: 2024-04-30

## 2024-04-30 RX ORDER — OXYCODONE HYDROCHLORIDE 15 MG/1
15 TABLET ORAL 2 TIMES DAILY PRN
Qty: 60 TABLET | Refills: 0 | Status: SHIPPED | OUTPATIENT
Start: 2024-04-30

## 2024-04-30 NOTE — TELEPHONE ENCOUNTER
Will include Dr. Mas on this as she will be prescribing the oxycodone. As documented at her last visit, we explained to her in detail that we will prescribe her current BID dose for one additional month until continuing taper to once daily while she establishes care with her another pain management clinic.

## 2024-04-30 NOTE — TELEPHONE ENCOUNTER
"I called the patient via Teams to discuss her inquire as she called the office this morning requesting oxycodone and a \"letter.\"     Regarding her oxycodone, I informed the patient that Dr. Mas (who saw the patient with me in clinic last office visit) will be refilling her oxycodone for an additional 1 month supply of BID dosing. I reminded her that this is what we discussed at her last office visit 4/22/24 while she attempts to establish care with a new pain medicine clinic. She verbalized understanding.     Patient stated \"did you sign the letter.\" I asked what letter she is inquiring about and she said \"the letter of good intent.\" Patient expressed previously that this letter of good intent had to be provided by her previous pain medicine clinic who would not provide a letter considering she was dismissed from their practice for noncompliance. She then stated, \"My new pain clinic said you guys can write it.\" I explained that we do not have details on what they require and that if it is regarding compliance with the current plan of care, I would not write this because she has not been compliant with her current analgesic regimen (as documented in my last office note). Patient has been using oxycodone TID instead of BID as currently prescribed, and ran out of her prescription early. When I explained this to the patient, she said \"are you serious? Just because I ran out of the medication early?\" She explained that she is getting \"frustrated\" and began talking over me. She then said she would call the new pain medicine clinic and would call us back with what they are specifically requesting and she hung up the phone.   " Yes

## 2024-04-30 NOTE — TELEPHONE ENCOUNTER
"Patient called in to check status of good standing letter.  After reading Dr. Palafox's office note; I inquired about her getting letter from previous pain management facility. Patient said that she have not been seen by that facility since 11/2023. Patient said that the Pain management facility that she is trying to schedule with said that it is ok for pcp to give letter since he is prescribing the opoid.    I explained per note it seemed as if the letter was not supposed to be from pcp. Patient then said \"This is some bullshit. He was willing to give me the letter in the visit but now it is a problem. I then told her that I was going to place her on hold to speak with the coordinator. Patient said \"They need to do something because I been calling for days.\"    I came back on the line and I explained to patient that Dr. Palafox would be in the office this afternoon. He will give her a call to discuss the note.   "

## 2024-05-02 NOTE — TELEPHONE ENCOUNTER
Patient called again asking if the letter was ready. I read recent encounters to figure out what she was referring to. She stated a Stephani called from pain management and left a vm for the clinical team. I informed her I don't see any updates from 4/30 but I would send a message back to see if anyone from clinical sees anything in the vm or if they are already working a on a letter.

## 2024-05-03 NOTE — TELEPHONE ENCOUNTER
"Voicemail that was left for the provider    \"Hi there, my name is Ghazala. I'm calling from [facility]. We have a patient that is wanting to come see us and she has asked a couple of times that you emilyys have a letter of good standing that she said there's some confusion on what that is. So basically we just well let me tell you the patient is the patient is Garrett Pringle, her date of birth is [birth year] and we just need a letter from her physician stating that she was compliant with you guys's opioids and narcotics contract. She took her medication as prescribed and it was in good standing with the practice. So basically we just need something stating that she wasn't taking more than she was prescribed, taking less, that she just took her medications as she was supposed to, there was nothing fishy going on and that she was in good standing with you rehan. If you have any questions about that, give me a call. My number is [phone] and my extension is [phone], if that makes sense and you guys feel good about that. Feel free to just go ahead and fax that letter to [phone]. It doesn't need to be to anyone's attention, it'll just come over and go into our chart and then if you could call me and let me know that was sent. I just don't get notified when things go into her chart. So hopefully get hear from you guys soon. Let me know if you have any questions. Thanks so much. apollo Vences.\"      Notified Sharon PARIKH, following up with PCP.  "

## 2024-05-03 NOTE — TELEPHONE ENCOUNTER
I called Ghazala and left a voicemail stating that I will not be able to write a letter stating that Greg has been compliant with her opioids as stated in previous office notes. I informed her that I do believe Greg would benefit from establishment at their pain medicine office and would try to assist in any way possible.

## 2024-05-08 NOTE — TELEPHONE ENCOUNTER
Please review and place referral for pain management if appropriate as you are covering for Dr. Palafox

## 2024-05-09 DIAGNOSIS — G89.29 CHRONIC LOW BACK PAIN, UNSPECIFIED BACK PAIN LATERALITY, UNSPECIFIED WHETHER SCIATICA PRESENT: Primary | ICD-10-CM

## 2024-05-09 DIAGNOSIS — M54.50 CHRONIC LOW BACK PAIN, UNSPECIFIED BACK PAIN LATERALITY, UNSPECIFIED WHETHER SCIATICA PRESENT: Primary | ICD-10-CM

## 2024-05-14 ENCOUNTER — TELEPHONE (OUTPATIENT)
Age: 54
End: 2024-05-14

## 2024-05-14 NOTE — TELEPHONE ENCOUNTER
Caller: Patient     Doctor: PRANAV     Reason for call: Calling about referral , per referral in chart from 12/19/2023 after record review there is nothing else our doctors can offer at this time .     Patient understood

## 2024-05-22 ENCOUNTER — TELEPHONE (OUTPATIENT)
Dept: PSYCHIATRY | Facility: CLINIC | Age: 54
End: 2024-05-22

## 2024-05-29 ENCOUNTER — TELEPHONE (OUTPATIENT)
Dept: INTERNAL MEDICINE CLINIC | Facility: CLINIC | Age: 54
End: 2024-05-29

## 2024-05-29 NOTE — TELEPHONE ENCOUNTER
Dr. Malave requesting for me to call pain management location to confirm appointment and to find out exactly where she dona be going. I called patient and she stated all she knows it in [facility] an hour and 17 minutes away. I did inform patient to call me once she's home to give me the phone number to the pain management location so I could confirm. Informed her no meds will be sent until I speak to the office. Patient verbalized understanding.

## 2024-05-29 NOTE — TELEPHONE ENCOUNTER
Patient called asking if we could refill her Oxycodone because her appt with the new Pain and  is not until June 19th and she doesn't have any medication until then.

## 2024-05-30 DIAGNOSIS — M54.50 CHRONIC LOW BACK PAIN, UNSPECIFIED BACK PAIN LATERALITY, UNSPECIFIED WHETHER SCIATICA PRESENT: ICD-10-CM

## 2024-05-30 DIAGNOSIS — G89.29 CHRONIC LOW BACK PAIN, UNSPECIFIED BACK PAIN LATERALITY, UNSPECIFIED WHETHER SCIATICA PRESENT: ICD-10-CM

## 2024-05-30 NOTE — TELEPHONE ENCOUNTER
Patient called to request a refill of her Oxycodone(which states she is completely out). Patient [facility] appt  has been moved up from 6/19/24 to 06/07/24. There contact number is [phone]

## 2024-05-31 DIAGNOSIS — G62.9 NEUROPATHY: ICD-10-CM

## 2024-05-31 DIAGNOSIS — F51.04 PSYCHOPHYSIOLOGICAL INSOMNIA: ICD-10-CM

## 2024-05-31 DIAGNOSIS — F33.2 MODERATELY SEVERE RECURRENT MAJOR DEPRESSION (HCC): ICD-10-CM

## 2024-05-31 DIAGNOSIS — F41.9 ANXIETY: ICD-10-CM

## 2024-05-31 RX ORDER — OXYCODONE HYDROCHLORIDE 15 MG/1
15 TABLET ORAL 2 TIMES DAILY PRN
Qty: 60 TABLET | Refills: 0 | OUTPATIENT
Start: 2024-05-31

## 2024-05-31 RX ORDER — GABAPENTIN 600 MG/1
900 TABLET ORAL 3 TIMES DAILY
Qty: 270 TABLET | Refills: 0 | Status: SHIPPED | OUTPATIENT
Start: 2024-05-31

## 2024-05-31 RX ORDER — DULOXETIN HYDROCHLORIDE 60 MG/1
60 CAPSULE, DELAYED RELEASE ORAL DAILY
Qty: 90 CAPSULE | Refills: 0 | Status: SHIPPED | OUTPATIENT
Start: 2024-05-31

## 2024-05-31 RX ORDER — QUETIAPINE FUMARATE 400 MG/1
800 TABLET, FILM COATED ORAL
Qty: 180 TABLET | Refills: 0 | Status: SHIPPED | OUTPATIENT
Start: 2024-05-31

## 2024-05-31 RX ORDER — HYDROXYZINE HYDROCHLORIDE 25 MG/1
TABLET, FILM COATED ORAL
Qty: 270 TABLET | Refills: 0 | Status: SHIPPED | OUTPATIENT
Start: 2024-05-31

## 2024-05-31 NOTE — TELEPHONE ENCOUNTER
Patient called for an update. I explained to patient that Dr. Mcpherson called pain management and we are now waiting for a call back.    Patient said that she was going to call them herself.

## 2024-05-31 NOTE — TELEPHONE ENCOUNTER
Patient verbally abusive and dismissed from several pain practices. She has irregular pill counts/missing pills on several occasions and I advise NO PROVIDER fill her opioids from here on out from this practice

## 2024-05-31 NOTE — TELEPHONE ENCOUNTER
Medication Refill Request     Name of Medication Cymbalta  Dose/Frequency 60mg  Quantity 90  Verified pharmacy   [x]  Verified ordering Provider   [x]  Does patient have enough for the next 3 days? Yes [] No [x]  Does patient have a follow-up appointment scheduled? Yes [x] No []   If so when is appointment: 6/25/24 at 6pm    Medication Refill Request     Name of Medication Gabapentin  Dose/Frequency 60mg  Quantity 90  Verified pharmacy   [x]  Verified ordering Provider   [x]  Does patient have enough for the next 3 days? Yes [] No [x]  Does patient have a follow-up appointment scheduled? Yes [x] No []   If so when is appointment: 6/25/24 at 6pm    Medication Refill Request     Name of Medication Atarax   Dose/Frequency 25mg  Quantity 90  Verified pharmacy   [x]  Verified ordering Provider   [x]  Does patient have enough for the next 3 days? Yes [] No [x]  Does patient have a follow-up appointment scheduled? Yes [x] No []   If so when is appointment: 6/25/24 at 6pm    Medication Refill Request     Name of Medication Seroquel  Dose/Frequency 400mg  Quantity 180  Verified pharmacy   [x]  Verified ordering Provider   [x]  Does patient have enough for the next 3 days? Yes [] No [x]  Does patient have a follow-up appointment scheduled? Yes [x] No []   If so when is appointment: 6/25/24 at 6pm

## 2024-05-31 NOTE — TELEPHONE ENCOUNTER
"Attempted to contact pain management office again [phone] and had to leave a message. I called patient to let her know that I have attempted to reach them and I left a message. She said \"keep trying because they told me they dont call people back\" I advised her that I can try again later but I have left them a message in the meantime and if they dont call people back then all I can do is try again later. I made her aware that no scripts will be given until we speak to them and then the provider will determine if its appropriate to refill. Patient hung up the phone  "

## 2024-05-31 NOTE — TELEPHONE ENCOUNTER
Called number provided in chart [phone]. Option 2 to speak with provider option 2 for pain management. No answer. Left voicemail detailing mutual patient and need to confirm appointment. Until call back to confirm appointment and follow up, will not be refilling medication. Please have attending on Monday follow up with this task

## 2024-05-31 NOTE — TELEPHONE ENCOUNTER
I reached out again to Pain Management office at [phone] and spoke to Delfina. I advised her that I was calling to see if patient has an upcoming appt with them and she confirmed that she is scheduled on 6/7/24. She said that she spoke to the patient this morning and she was being verbally abusive to the staff as she was not happy with what they were telling her. Delfina advised that patient is scheduled for an evaluation of lumbar spine with the ortho doctor there. I advised that patient is under the impression that she is seeing pain management so they can take over her care and treatment with Oxycodone. Delfina stated that they are an Orthopaedic office. She said that they have a pain management department there but only for their Ortho patients. She stated they do not take over pain management services and medications from other providers. I advised her that I believe the patient is under the impression that she will be continuing with her Oxycodone script. Delfina stated that is most likely not going to happen. She asked if we could fax something to their office at [phone] stating why we are not continuing her Oxycodone. I advised her I would speak to the doctor.   I called patient back and made her aware that I spoke with Delfina and what she had advised. I made it clear to her that they are an Ortho office and are only doing an evaluation of her lumbar spine and have no intention on taking over her pain management and medication. She disagreed and said that they told her they will evaluate her on her first visit and then start her Oxycodone after that. I again made her aware that is not what I was being told and that I would recommend she call them again to clarify if she feels that is necessary. She said it was fine and she is going to the appointment. I made her aware that there are no providers in the office at this time and I will discuss this with Dr. Palafox and the attending on Monday 6/3/24. She said  ok and disconnected the call.

## 2024-06-03 NOTE — TELEPHONE ENCOUNTER
Patient called in requesting status on her refill for the oxyCODONE , transfer call to Sharon for further assistance

## 2024-06-03 NOTE — TELEPHONE ENCOUNTER
"Patient called in, I advised her that I spoke with the attending in charge and she advised we will no longer be giving her any scripts for narcotics. She stated \"the doctor there told me they would until I saw my new pain management doctor.\" I advised her I am not sure who told her that but per the attending now who made the decision, she will no longer be getting the medication from this office. She stated \"ok\" and hung up the phone.  "
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
## 2024-06-25 ENCOUNTER — OFFICE VISIT (OUTPATIENT)
Dept: PSYCHIATRY | Facility: CLINIC | Age: 54
End: 2024-06-25
Payer: COMMERCIAL

## 2024-06-25 VITALS — WEIGHT: 199.6 LBS | HEIGHT: 63 IN | BODY MASS INDEX: 35.37 KG/M2

## 2024-06-25 DIAGNOSIS — F33.2 MODERATELY SEVERE RECURRENT MAJOR DEPRESSION (HCC): Primary | ICD-10-CM

## 2024-06-25 DIAGNOSIS — F51.04 PSYCHOPHYSIOLOGICAL INSOMNIA: ICD-10-CM

## 2024-06-25 DIAGNOSIS — F43.21 COMPLICATED GRIEF: ICD-10-CM

## 2024-06-25 DIAGNOSIS — F41.9 ANXIETY: ICD-10-CM

## 2024-06-25 PROCEDURE — 99214 OFFICE O/P EST MOD 30 MIN: CPT | Performed by: PHYSICIAN ASSISTANT

## 2024-06-25 RX ORDER — QUETIAPINE FUMARATE 400 MG/1
800 TABLET, FILM COATED ORAL
Qty: 180 TABLET | Refills: 0 | Status: SHIPPED | OUTPATIENT
Start: 2024-06-25

## 2024-06-25 RX ORDER — HYDROXYZINE HYDROCHLORIDE 25 MG/1
TABLET, FILM COATED ORAL
Qty: 270 TABLET | Refills: 0 | Status: SHIPPED | OUTPATIENT
Start: 2024-06-25

## 2024-06-25 RX ORDER — DULOXETIN HYDROCHLORIDE 30 MG/1
CAPSULE, DELAYED RELEASE ORAL
Qty: 180 CAPSULE | Refills: 0 | Status: SHIPPED | OUTPATIENT
Start: 2024-06-25

## 2024-06-25 NOTE — BH TREATMENT PLAN
"TREATMENT PLAN (Medication Management Only)        [facility] - [facility]    Name/Date of Birth/MRN:  Greg Pringle 53 y.o. 1970 MRN: [id number]  Date of Treatment Plan: June 25, 2024  Diagnosis/Diagnoses:   1. Moderately severe recurrent major depression (HCC)    2. Anxiety    3. Complicated grief    4. Psychophysiological insomnia      Strengths/Personal Resources for Self-Care: \"I'm a good caregiver; I'm a good mother; When I love, I love hard\"  Area/Areas of need (in own words): \"I'm tired of them, I just can't do it \".  1. Long Term Goal:   Maintain mood stability and control of anxiety  Target Date:  3-6 months  Person/Persons responsible for completion of goal: Renita  2.  Short Term Objective (s) - How will we reach this goal?:   A.  Provider new recommended medication/dosage changes and/or continue medication(s):  Pt is having moderately severe depression and anxiety with recent panic attacks.  Home/family related stressors and stress involving finding a new pain specialist have been the driving factors.   She had also stopped her Duloxetine due to the feeling that it was not helping.  Pt appears stable and I discussed Tx options.  Pt accepts to restart the Duloxetine and titrate.  No change in other medications.  I advised against starting/stopping/adjusting any medication without guidance from a provider-- Pt verbalized understanding.  Treatment plan done and Pt accepts the plan.  Safety Plan was done 3/12/2024 but Epic flags it as being due.  Restart Duloxetine 30mg (1) cap po qd x 1 week, then (2) caps qd # 180  Continue:  Quetiapine 400mg (2) tabs po qhs # 180  (Pt takes the entire dose at night)  Hydroxyzine 25mg (1) tab po tid prn anxiety and insomnia # 270  Continue:  Gabapentin 900mg tid -- per PCP for chronic pain  Lorazepam 0.5mg (1) tab po qhs --per Sleep specialist  Trazodone 50mg (1-3) tabs po qhs 1hr before hs-- per sleep " specialist  Oxycodone 15mg tid prn -- per [facility] and PCP  F/U PCP, and specialists for medical issues   Return 7/23/2024 at 5:30PM,  call sooner prn    Target Date:  3-6 months  Person/Persons Responsible for Completion of Goal: Renita   Progress Towards Goals: stable, continuing treatment  Treatment Modality:  Medication mgt  Review due 180 days from date of this plan: 6 months - 12/25/2024  Expected length of service: ongoing treatment unless revised  My Physician/PA/NP and I have developed this plan together and I agree to work on the goals and objectives. I understand the treatment goals that were developed for my treatment.  Electronic Signatures: on file (unless signed below)    Salima Bay PA-C 06/25/24

## 2024-07-08 ENCOUNTER — APPOINTMENT (OUTPATIENT)
Dept: LAB | Facility: CLINIC | Age: 54
End: 2024-07-08
Payer: COMMERCIAL

## 2024-07-08 DIAGNOSIS — G89.4 CHRONIC PAIN SYNDROME: ICD-10-CM

## 2024-07-08 PROCEDURE — 80361 OPIATES 1 OR MORE: CPT

## 2024-07-10 LAB — OPIATES UR QL SCN: NEGATIVE NG/ML

## 2024-07-23 DIAGNOSIS — F41.9 ANXIETY: ICD-10-CM

## 2024-07-23 DIAGNOSIS — K21.9 GASTROESOPHAGEAL REFLUX DISEASE WITHOUT ESOPHAGITIS: ICD-10-CM

## 2024-07-23 DIAGNOSIS — F33.2 MODERATELY SEVERE RECURRENT MAJOR DEPRESSION (HCC): ICD-10-CM

## 2024-07-23 DIAGNOSIS — G62.9 NEUROPATHY: ICD-10-CM

## 2024-07-23 RX ORDER — DULOXETIN HYDROCHLORIDE 60 MG/1
60 CAPSULE, DELAYED RELEASE ORAL DAILY
Qty: 90 CAPSULE | Refills: 0 | Status: SHIPPED | OUTPATIENT
Start: 2024-07-23

## 2024-07-23 RX ORDER — GABAPENTIN 600 MG/1
TABLET ORAL
Qty: 270 TABLET | Refills: 0 | Status: SHIPPED | OUTPATIENT
Start: 2024-07-23

## 2024-07-24 ENCOUNTER — TELEPHONE (OUTPATIENT)
Dept: PSYCHIATRY | Facility: CLINIC | Age: 54
End: 2024-07-24

## 2024-07-24 NOTE — LETTER
24     Greg Pringle   : 1970   [address]   [place] [address]       It is the policy of [facility] to monitor and manage appointments that have been no-showed or cancelled with less than 48-hour notice. This is necessary to ensure that we are able to provide timely access for all patients to our providers. Undue numbers of unutilized appointments delays necessary medical care for all patients.      Dear Greg Pringle:      We are sorry that you missed your appointment with Salima Bay PA-C on 2024. It has been 1 month  since your last appointment. Your health and follow-up care are important to us. We want to make you aware that you do not have another appointment with Salima Bay PA-C scheduled.    Please be aware that our office policy states that if you 'no show' two or more Medication Management  appointments without prior notice of cancellation within in a calendar year, you may be discharged from Medication Management  treatment.  We want to bring this to your attention now to prevent an interruption of your care.  If you have any questions about this policy, please call us at the number above.     If we do not hear from you within 10 business days to make a follow up appointment, we will assume you are no longer interested in care here.    Thank you in advance for your cooperation and assistance.       Sincerely,      [facility] Support Staff

## 2024-07-25 RX ORDER — OMEPRAZOLE 40 MG/1
40 CAPSULE, DELAYED RELEASE ORAL DAILY
Qty: 90 CAPSULE | Refills: 1 | Status: SHIPPED | OUTPATIENT
Start: 2024-07-25 | End: 2025-01-21

## 2024-08-06 ENCOUNTER — TELEPHONE (OUTPATIENT)
Age: 54
End: 2024-08-06

## 2024-08-06 NOTE — TELEPHONE ENCOUNTER
Pt called to Schedule 6 month f/u .With DR Artemio Arriaga. LOV ; 1/18/24     ADD ON   8/8/24 with DR. Arriaga at 2pm.     PT also had some questions on RAMONITA. PT will be discussing that with DR. Arriaga on 8/8/24.     Thank you

## 2024-08-07 ENCOUNTER — TELEPHONE (OUTPATIENT)
Dept: NEUROLOGY | Facility: CLINIC | Age: 54
End: 2024-08-07

## 2024-08-08 ENCOUNTER — OFFICE VISIT (OUTPATIENT)
Dept: NEUROLOGY | Facility: CLINIC | Age: 54
End: 2024-08-08
Payer: COMMERCIAL

## 2024-08-08 VITALS
DIASTOLIC BLOOD PRESSURE: 80 MMHG | BODY MASS INDEX: 35.44 KG/M2 | WEIGHT: 200 LBS | HEART RATE: 122 BPM | HEIGHT: 63 IN | SYSTOLIC BLOOD PRESSURE: 120 MMHG

## 2024-08-08 DIAGNOSIS — G43.709 CHRONIC MIGRAINE WITHOUT AURA: Primary | ICD-10-CM

## 2024-08-08 PROCEDURE — 99215 OFFICE O/P EST HI 40 MIN: CPT | Performed by: PSYCHIATRY & NEUROLOGY

## 2024-08-08 PROCEDURE — G2211 COMPLEX E/M VISIT ADD ON: HCPCS | Performed by: PSYCHIATRY & NEUROLOGY

## 2024-08-08 RX ORDER — TRAMADOL HYDROCHLORIDE 50 MG/1
TABLET ORAL EVERY 12 HOURS
COMMUNITY
Start: 2024-07-22

## 2024-08-08 RX ORDER — PROPRANOLOL HYDROCHLORIDE 40 MG/1
40 TABLET ORAL
Qty: 90 TABLET | Refills: 2 | Status: SHIPPED | OUTPATIENT
Start: 2024-08-08 | End: 2025-05-05
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
## 2024-10-09 ENCOUNTER — TELEPHONE (OUTPATIENT)
Age: 54
End: 2024-10-09

## 2024-11-04 DIAGNOSIS — F51.04 PSYCHOPHYSIOLOGICAL INSOMNIA: ICD-10-CM

## 2024-11-04 DIAGNOSIS — F33.2 MODERATELY SEVERE RECURRENT MAJOR DEPRESSION (HCC): ICD-10-CM

## 2024-11-04 DIAGNOSIS — F41.9 ANXIETY: ICD-10-CM

## 2024-11-04 RX ORDER — DULOXETIN HYDROCHLORIDE 60 MG/1
60 CAPSULE, DELAYED RELEASE ORAL DAILY
Qty: 90 CAPSULE | Refills: 0 | Status: SHIPPED | OUTPATIENT
Start: 2024-11-04 | End: 2024-11-13 | Stop reason: SDUPTHER

## 2024-11-04 RX ORDER — QUETIAPINE FUMARATE 400 MG/1
800 TABLET, FILM COATED ORAL
Qty: 180 TABLET | Refills: 0 | Status: SHIPPED | OUTPATIENT
Start: 2024-11-04 | End: 2024-11-13 | Stop reason: SDUPTHER

## 2024-11-04 RX ORDER — HYDROXYZINE HYDROCHLORIDE 25 MG/1
TABLET, FILM COATED ORAL
Qty: 270 TABLET | Refills: 0 | Status: SHIPPED | OUTPATIENT
Start: 2024-11-04 | End: 2024-11-13 | Stop reason: SDUPTHER

## 2024-11-04 NOTE — TELEPHONE ENCOUNTER
EMR reviewed and Greg will need all Rxs by the end of this month, hence I will refill all of them now to keep all Rxs on the same fill schedule.  I e-scribed the following to her pharmacy:  Duloxetine 60mg (1) cap po qd # 90  Quetiapine 400mg (2) tabs po qhs # 180   Hydroxyzine 25mg (1) tab po tid prn anxiety and insomnia # 270
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
## 2024-11-13 ENCOUNTER — TELEPHONE (OUTPATIENT)
Age: 54
End: 2024-11-13

## 2024-11-13 ENCOUNTER — TELEMEDICINE (OUTPATIENT)
Dept: PSYCHIATRY | Facility: CLINIC | Age: 54
End: 2024-11-13
Payer: COMMERCIAL

## 2024-11-13 DIAGNOSIS — F33.2 MODERATELY SEVERE RECURRENT MAJOR DEPRESSION (HCC): Primary | ICD-10-CM

## 2024-11-13 DIAGNOSIS — F41.9 ANXIETY: ICD-10-CM

## 2024-11-13 DIAGNOSIS — F51.04 PSYCHOPHYSIOLOGICAL INSOMNIA: ICD-10-CM

## 2024-11-13 DIAGNOSIS — F43.21 COMPLICATED GRIEF: ICD-10-CM

## 2024-11-13 DIAGNOSIS — G62.9 NEUROPATHY: ICD-10-CM

## 2024-11-13 PROCEDURE — 99214 OFFICE O/P EST MOD 30 MIN: CPT | Performed by: PHYSICIAN ASSISTANT

## 2024-11-13 RX ORDER — QUETIAPINE FUMARATE 400 MG/1
800 TABLET, FILM COATED ORAL
Qty: 180 TABLET | Refills: 0 | Status: SHIPPED | OUTPATIENT
Start: 2024-11-13

## 2024-11-13 RX ORDER — HYDROXYZINE HYDROCHLORIDE 25 MG/1
TABLET, FILM COATED ORAL
Qty: 270 TABLET | Refills: 0 | Status: SHIPPED | OUTPATIENT
Start: 2024-11-13

## 2024-11-13 RX ORDER — DULOXETIN HYDROCHLORIDE 60 MG/1
60 CAPSULE, DELAYED RELEASE ORAL DAILY
Qty: 90 CAPSULE | Refills: 0 | Status: SHIPPED | OUTPATIENT
Start: 2024-11-13

## 2024-11-13 NOTE — BH TREATMENT PLAN
"TREATMENT PLAN (Medication Management Only)        [facility]    Name/Date of Birth/MRN:  Greg Pringle 54 y.o. 1970 MRN: [id number]  Date of Treatment Plan: November 13, 2024  Diagnosis/Diagnoses:   1. Moderately severe recurrent major depression (HCC)    2. Complicated grief    3. Anxiety    4. Psychophysiological insomnia    5. Neuropathy      Strengths/Personal Resources for Self-Care: \"[place]; [place]; [employer]\"  Area/Areas of need (in own words): \"I will take 3 instead of 2 Seroquel \"  1. Long Term Goal:   Maintain mood stability and control of anxiety  Target Date:  3-6 months  Person/Persons responsible for completion of goal: Greg and Salima  2.  Short Term Objective (s) - How will we reach this goal?:   A.  Provider new recommended medication/dosage changes and/or continue medication(s):  Pt is having increased depression and anxiety intensity with panic attacks, due to her chronic pain as well as the time of year which is very rough for her.  Tx options discussed and Pt declines an adjustment of her Duloxetine.  She is not sleeping well and reports having increased Quetapine to 1200mg hs on her own, and also the Trazodone to 150mg qhs-- (this prescribed by sleep medicine).  I advised against increasing any medications without the guidance of a provider and discussed the SE risks -- individual and additive, which may occur and Pt verbalized understanding.  I advised she call her sleep specialist for adjustment.  I recommended psychotherapy and gave the PsychologyToday.com website and [email or web address].  She declines any referral to support groups, stating, \"I'm not good with a bunch of people.\"   Treatment plan done virtually as per [facility]'s administrative directive, since Pt cannot be here to sign in person, and Pt accepts the plan.  Safety Plan was done 3/12/2024 but Epic flags it as being due.  Duloxetine 60mg (1) cap po qd # 90  Quetiapine 400mg (2) " tabs po qhs # 180  Hydroxyzine 25mg (1) tab po tid prn anxiety and insomnia # 270  Gabapentin 900mg tid -- per PCP for chronic pain  Lorazepam 0.5mg (1) tab po qhs --per sleep specialist  Trazodone 50mg (1-3) tabs po qhs 1hr before hs-- per sleep specialist  Propanolol 40mg qd- for migraines per neurologist  F/U PCP, and specialists for medical issues   Return 8 weeks, call sooner prn      Target Date:  3-6 months  Person/Persons Responsible for Completion of Goal: Renita   Progress Towards Goals: stable, continuing treatment  Treatment Modality:  Medication mgt  Review due 180 days from date of this plan: 6 months - 5/13/2025  Expected length of service: ongoing treatment unless revised  My Physician/PA/NP and I have developed this plan together and I agree to work on the goals and objectives. I understand the treatment goals that were developed for my treatment.  Electronic Signatures: on file (unless signed below)    aSlima Bay PA-C 11/13/24

## 2024-11-13 NOTE — ASSESSMENT & PLAN NOTE
Orders:    hydrOXYzine HCL (ATARAX) 25 mg tablet; TAKE 1 TABLET BY MOUTH THREE TIMES A DAY AS NEEDED FOR ANXIETY AND INSOMNIA

## 2024-11-13 NOTE — ASSESSMENT & PLAN NOTE
Orders:    DULoxetine (CYMBALTA) 60 mg delayed release capsule; Take 1 capsule (60 mg total) by mouth daily    hydrOXYzine HCL (ATARAX) 25 mg tablet; TAKE 1 TABLET BY MOUTH THREE TIMES A DAY AS NEEDED FOR ANXIETY AND INSOMNIA    QUEtiapine (SEROquel) 400 MG tablet; Take 2 tablets (800 mg total) by mouth daily at bedtime

## 2024-11-13 NOTE — TELEPHONE ENCOUNTER
Patient called in regard to today's virtual appt 11/13/24 she is having trouble getting into [employer]. Writer gave [employer] help desk phone number. Patient asked if provider can send text that would be good. Writer informed provider.

## 2024-12-03 DIAGNOSIS — G62.9 NEUROPATHY: ICD-10-CM

## 2024-12-03 DIAGNOSIS — F41.9 ANXIETY: ICD-10-CM

## 2024-12-04 RX ORDER — GABAPENTIN 600 MG/1
TABLET ORAL
Qty: 270 TABLET | Refills: 0 | Status: SHIPPED | OUTPATIENT
Start: 2024-12-04

## 2024-12-06 DIAGNOSIS — K21.9 GASTROESOPHAGEAL REFLUX DISEASE WITHOUT ESOPHAGITIS: ICD-10-CM

## 2024-12-09 RX ORDER — OMEPRAZOLE 40 MG/1
40 CAPSULE, DELAYED RELEASE ORAL DAILY
Qty: 90 CAPSULE | Refills: 1 | Status: SHIPPED | OUTPATIENT
Start: 2024-12-09 | End: 2025-06-07

## 2024-12-09 NOTE — TELEPHONE ENCOUNTER
Patient is due for a MAW visit    Subjective   Biju Verduzco is a 57 y.o. male.  Presents with chief complaint of early onset memory loss verified by a neuropsychiatric test, attention deficit disorder, hyperlipidemia, and mild erectile dysfunction.  We discussed the patient's options regarding his memory loss and I recommended taking Namenda 10 mg twice a day and asked the patient to report back to me within 4 to 6 weeks as to whether or not he thought his memory was improving.    History of Present Illness doing well overall except for the fact that he continues to have difficulty with memory loss    The following portions of the patient's history were reviewed and updated as appropriate: allergies, current medications, past family history, past medical history, past social history, past surgical history and problem list.    Review of Systems   Constitutional: Negative.    HENT: Negative.    Eyes: Negative.    Respiratory: Negative.    Cardiovascular: Negative.    Gastrointestinal: Negative.    Endocrine: Negative.    Genitourinary: Positive for erectile dysfunction.   Musculoskeletal: Negative.    Skin: Negative.    Allergic/Immunologic: Negative.    Neurological: Positive for memory problem.   Hematological: Negative.    Psychiatric/Behavioral: Positive for decreased concentration.       Objective   Physical Exam   Constitutional: He appears well-developed and well-nourished.   HENT:   Head: Normocephalic and atraumatic.   Eyes: Pupils are equal, round, and reactive to light.   Neck: Normal range of motion. Neck supple.   Cardiovascular: Normal rate, regular rhythm and normal heart sounds.   Pulmonary/Chest: Effort normal and breath sounds normal.   Abdominal: Soft. Bowel sounds are normal.   Musculoskeletal: Normal range of motion.   Neurological: He is alert.   Skin: Skin is warm.   Psychiatric: He has a normal mood and affect.   Nursing note and vitals reviewed.        Assessment/Plan   Biju was seen today for hyperlipidemia.    Diagnoses  and all orders for this visit:    Hypercholesteremia  Comments:  continue diet and exercise    Erectile dysfunction, unspecified erectile dysfunction type  Comments:  well compensated    Attention deficit hyperactivity disorder (ADHD), unspecified ADHD type  Comments:  continue methyphenidate    Short-term memory loss  Comments:  doing better

## 2024-12-11 ENCOUNTER — OFFICE VISIT (OUTPATIENT)
Dept: GASTROENTEROLOGY | Facility: CLINIC | Age: 54
End: 2024-12-11
Payer: COMMERCIAL

## 2024-12-11 VITALS
TEMPERATURE: 98.1 F | SYSTOLIC BLOOD PRESSURE: 122 MMHG | WEIGHT: 207 LBS | DIASTOLIC BLOOD PRESSURE: 78 MMHG | HEIGHT: 63 IN | BODY MASS INDEX: 36.68 KG/M2

## 2024-12-11 DIAGNOSIS — Z12.11 SCREENING FOR COLON CANCER: Primary | ICD-10-CM

## 2024-12-11 DIAGNOSIS — A04.8 H. PYLORI INFECTION: ICD-10-CM

## 2024-12-11 DIAGNOSIS — R11.2 NAUSEA AND VOMITING, UNSPECIFIED VOMITING TYPE: ICD-10-CM

## 2024-12-11 DIAGNOSIS — F11.20 CONTINUOUS OPIOID DEPENDENCE (HCC): ICD-10-CM

## 2024-12-11 DIAGNOSIS — K59.00 CONSTIPATION, UNSPECIFIED CONSTIPATION TYPE: ICD-10-CM

## 2024-12-11 PROCEDURE — G2211 COMPLEX E/M VISIT ADD ON: HCPCS | Performed by: INTERNAL MEDICINE

## 2024-12-11 PROCEDURE — 99214 OFFICE O/P EST MOD 30 MIN: CPT | Performed by: INTERNAL MEDICINE

## 2024-12-11 RX ORDER — BISACODYL 5 MG/1
10 TABLET, DELAYED RELEASE ORAL ONCE
Qty: 2 TABLET | Refills: 0 | Status: SHIPPED | OUTPATIENT
Start: 2024-12-11 | End: 2024-12-11

## 2024-12-11 RX ORDER — SODIUM CHLORIDE, SODIUM LACTATE, POTASSIUM CHLORIDE, CALCIUM CHLORIDE 600; 310; 30; 20 MG/100ML; MG/100ML; MG/100ML; MG/100ML
125 INJECTION, SOLUTION INTRAVENOUS CONTINUOUS
OUTPATIENT
Start: 2024-12-11

## 2024-12-11 NOTE — PROGRESS NOTES
Name: Greg Pringle      : 1970      MRN: [id number]  Encounter Provider: Kareen Simmons DO  Encounter Date: 2024   Encounter department: [facility] [place]  :  Assessment & Plan  Screening for colon cancer  Due for colon cancer screening. Lipoma on first colonoscopy, inadequate prep on subsequent studies. No FH colon cancer. Will do 2 day prep. Also instructed to take capful of miralax daily 1 week prior to colonoscopy and follow low fiber diet. Can also take miralax for constipation every day to every other day as needed.   Orders:    Colonoscopy; Future    bisacodyl (DULCOLAX) 5 mg EC tablet; Take 2 tablets (10 mg total) by mouth once for 1 dose For colonoscopy    polyethylene glycol (GOLYTELY) 4000 mL solution; Take 4,000 mL by mouth once for 1 dose    Constipation, unspecified constipation type  Ddx includes IBS-C, dyssynergic defecation, medication side effect. Discussed adequate water intake, fiber once gastroparesis r/o, miralax prn        Nausea and vomiting, unspecified vomiting type  Ddx includes gastroparesis, CHS (though does not report use), medication side effect, gastric slowing from constipation. Will investigate with GES and work on constipation in interim. Advised small, more frequent meals   Orders:    NM gastric emptying; Future    H. pylori infection  Eradicated per last EGD biopsy        Continuous opioid dependence (HCC)  May be contributing to constipation as above            History of Present Illness   HPI  Greg Pringle is a 54 y.o. female who presents for follow up.     PMH includes H pylori which was eradicated per review of last EGD biopsies, anxiety, opioid use, and chronic low back pain.     She is due for repeat colonoscopy. First in , inadequate prep, small hemorrhoids, lipoma at ascending colon. Repeat in 2021 with poor prep. Repeated again 2022 with poor prep. She reports she suffers from constipation. Taking  "  probiotic which helps somewhat.     3-4 bottles of water per day. Not a lot of fiber.     Sometimes doesn't have BM for week. Occasionally getting abd pain.      Continues to have vomiting often. Last few weeks 2-3x/week. Bloating. No early satiety, no dysphagia, weight loss, blood in stool.     Former tobacco use, no etoh or drug use.     FH- father bone cancer, grandfather prostate, no colon or stomach cancer     6 children, labor always <2 hours. No forceps or assisted delivery. No tears. 5-6lb babies.     History obtained from: patient    Review of Systems  ROS negative unless noted above.       Objective   /78 (BP Location: Left arm, Patient Position: Sitting, Cuff Size: Adult)   Temp 98.1 °F (36.7 °C) (Tympanic)   Ht 5' 3\" (1.6 m)   Wt 93.9 kg (207 lb)   LMP 11/12/2020 (Approximate)   BMI 36.67 kg/m²      Physical Exam  Constitutional:       General: She is not in acute distress.  HENT:      Head: Normocephalic and atraumatic.      Mouth/Throat:      Mouth: Mucous membranes are moist.   Cardiovascular:      Rate and Rhythm: Normal rate and regular rhythm.   Pulmonary:      Effort: Pulmonary effort is normal. No respiratory distress.   Abdominal:      General: There is no distension.      Palpations: Abdomen is soft.      Tenderness: There is no abdominal tenderness.   Musculoskeletal:         General: No swelling.      Cervical back: Neck supple.   Lymphadenopathy:      Cervical: No cervical adenopathy.   Skin:     General: Skin is warm and dry.      Capillary Refill: Capillary refill takes less than 2 seconds.   Neurological:      Mental Status: She is alert.   Psychiatric:         Mood and Affect: Mood normal.       "

## 2024-12-12 NOTE — PATIENT INSTRUCTIONS
Scheduled date of colonoscopy (as of today):01/16/2025  Physician performing colonoscopy:GEORGIA  Location of colonoscopy:[facility]  Bowel prep reviewed with patient:Two DAY PREP  Instructions reviewed with patient by:SHITAL  Clearances:  NONE    Patient was scheduled for Gastric Emptying  02/18/2025 At [facility]     Follow up will be 06/18/2025  at 3:30 pm

## 2025-01-02 ENCOUNTER — ANESTHESIA EVENT (OUTPATIENT)
Dept: ANESTHESIOLOGY | Facility: HOSPITAL | Age: 55
End: 2025-01-02

## 2025-01-02 ENCOUNTER — ANESTHESIA (OUTPATIENT)
Dept: ANESTHESIOLOGY | Facility: HOSPITAL | Age: 55
End: 2025-01-02

## 2025-01-10 ENCOUNTER — PATIENT MESSAGE (OUTPATIENT)
Dept: GASTROENTEROLOGY | Facility: CLINIC | Age: 55
End: 2025-01-10

## 2025-01-13 NOTE — PATIENT COMMUNICATION
Called to confirm procedure.  Pt rescheduled, he insurance is not currently active but will be in Feb.  Rescheduled for 02/05/2025

## 2025-01-15 ENCOUNTER — TELEPHONE (OUTPATIENT)
Dept: PSYCHIATRY | Facility: CLINIC | Age: 55
End: 2025-01-15

## 2025-01-15 NOTE — TELEPHONE ENCOUNTER
Spoke with PT as insurance was rejected. PT informed she has new insurance that will be in effect on 2/1/25. Writer R/S appt to after the first and PT will call back when insurance is active

## 2025-01-23 ENCOUNTER — ANESTHESIA EVENT (OUTPATIENT)
Dept: ANESTHESIOLOGY | Facility: HOSPITAL | Age: 55
End: 2025-01-23

## 2025-01-23 ENCOUNTER — ANESTHESIA (OUTPATIENT)
Dept: ANESTHESIOLOGY | Facility: HOSPITAL | Age: 55
End: 2025-01-23

## 2025-01-29 ENCOUNTER — PATIENT MESSAGE (OUTPATIENT)
Dept: GASTROENTEROLOGY | Facility: CLINIC | Age: 55
End: 2025-01-29

## 2025-02-03 ENCOUNTER — TELEPHONE (OUTPATIENT)
Dept: GASTROENTEROLOGY | Facility: CLINIC | Age: 55
End: 2025-02-03

## 2025-02-03 DIAGNOSIS — Z12.11 SCREENING FOR COLON CANCER: Primary | ICD-10-CM

## 2025-02-03 DIAGNOSIS — Z12.11 SCREEN FOR COLON CANCER: ICD-10-CM

## 2025-02-03 NOTE — TELEPHONE ENCOUNTER
Called pt to confirm procedure.  Pt has 2 day prep.  Pt stopped by pharmacy and prep was not available.  Ordered prep as priority - pt will have begin tomorrow.

## 2025-02-03 NOTE — PATIENT COMMUNICATION
Called to follow up pt aware of procedure, has not received her prep.  It was not ordered.  Ordered prep as priority.

## 2025-02-04 RX ORDER — POLYETHYLENE GLYCOL 3350 17 G/17G
238 POWDER, FOR SOLUTION ORAL ONCE
Status: DISCONTINUED | OUTPATIENT
Start: 2025-02-04 | End: 2025-02-06

## 2025-02-04 RX ORDER — BISACODYL 5 MG/1
10 TABLET, DELAYED RELEASE ORAL ONCE
Qty: 2 TABLET | Refills: 0 | Status: SHIPPED | OUTPATIENT
Start: 2025-02-04 | End: 2025-02-06
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
## 2025-02-05 ENCOUNTER — TELEPHONE (OUTPATIENT)
Age: 55
End: 2025-02-05

## 2025-02-05 ENCOUNTER — TELEPHONE (OUTPATIENT)
Dept: SURGERY | Facility: AMBULARY SURGERY CENTER | Age: 55
End: 2025-02-05

## 2025-02-05 NOTE — TELEPHONE ENCOUNTER
Pt says if we get an earlier appt thru [facility] she would like to come in and we can call on her husbands phone to let her know-[phone]

## 2025-02-05 NOTE — TELEPHONE ENCOUNTER
Pt called in requesting the time of procedure tomorrow called [facility] advised by Neha 11 am arrival time and follow the instructions. Pt couldn't hear me on the ph so called her back and left the message arrival time is 11 am for her colonoscopy tomorrow.

## 2025-02-06 ENCOUNTER — ANESTHESIA (OUTPATIENT)
Dept: GASTROENTEROLOGY | Facility: AMBULARY SURGERY CENTER | Age: 55
End: 2025-02-06
Payer: COMMERCIAL

## 2025-02-06 ENCOUNTER — ANESTHESIA EVENT (OUTPATIENT)
Dept: ANESTHESIOLOGY | Facility: HOSPITAL | Age: 55
End: 2025-02-06

## 2025-02-06 ENCOUNTER — HOSPITAL ENCOUNTER (OUTPATIENT)
Dept: GASTROENTEROLOGY | Facility: AMBULARY SURGERY CENTER | Age: 55
Setting detail: OUTPATIENT SURGERY
End: 2025-02-06
Payer: COMMERCIAL

## 2025-02-06 ENCOUNTER — ANESTHESIA (OUTPATIENT)
Dept: ANESTHESIOLOGY | Facility: HOSPITAL | Age: 55
End: 2025-02-06

## 2025-02-06 ENCOUNTER — TELEPHONE (OUTPATIENT)
Dept: GASTROENTEROLOGY | Facility: CLINIC | Age: 55
End: 2025-02-06

## 2025-02-06 VITALS
WEIGHT: 212 LBS | HEIGHT: 63 IN | TEMPERATURE: 97.6 F | SYSTOLIC BLOOD PRESSURE: 106 MMHG | DIASTOLIC BLOOD PRESSURE: 63 MMHG | RESPIRATION RATE: 18 BRPM | HEART RATE: 76 BPM | OXYGEN SATURATION: 100 % | BODY MASS INDEX: 37.56 KG/M2

## 2025-02-06 DIAGNOSIS — Z12.11 SCREENING FOR COLON CANCER: ICD-10-CM

## 2025-02-06 RX ORDER — SODIUM CHLORIDE, SODIUM LACTATE, POTASSIUM CHLORIDE, CALCIUM CHLORIDE 600; 310; 30; 20 MG/100ML; MG/100ML; MG/100ML; MG/100ML
125 INJECTION, SOLUTION INTRAVENOUS CONTINUOUS
Status: DISCONTINUED | OUTPATIENT
Start: 2025-02-06 | End: 2025-02-10 | Stop reason: HOSPADM

## 2025-02-06 RX ORDER — SODIUM CHLORIDE, SODIUM LACTATE, POTASSIUM CHLORIDE, CALCIUM CHLORIDE 600; 310; 30; 20 MG/100ML; MG/100ML; MG/100ML; MG/100ML
INJECTION, SOLUTION INTRAVENOUS CONTINUOUS PRN
Status: DISCONTINUED | OUTPATIENT
Start: 2025-02-06 | End: 2025-02-06

## 2025-02-06 RX ORDER — BISACODYL 5 MG/1
10 TABLET, DELAYED RELEASE ORAL ONCE
Qty: 2 TABLET | Refills: 0 | Status: SHIPPED | OUTPATIENT
Start: 2025-02-06 | End: 2025-02-07

## 2025-02-06 RX ORDER — PROPOFOL 10 MG/ML
INJECTION, EMULSION INTRAVENOUS AS NEEDED
Status: DISCONTINUED | OUTPATIENT
Start: 2025-02-06 | End: 2025-02-06

## 2025-02-06 RX ADMIN — SODIUM CHLORIDE, SODIUM LACTATE, POTASSIUM CHLORIDE, AND CALCIUM CHLORIDE: .6; .31; .03; .02 INJECTION, SOLUTION INTRAVENOUS at 12:25

## 2025-02-06 RX ADMIN — PROPOFOL 150 MG: 10 INJECTION, EMULSION INTRAVENOUS at 12:32

## 2025-02-06 RX ADMIN — PROPOFOL 50 MG: 10 INJECTION, EMULSION INTRAVENOUS at 12:37

## 2025-02-06 NOTE — ANESTHESIA PREPROCEDURE EVALUATION
Procedure:  COLONOSCOPY    Relevant Problems   ANESTHESIA (within normal limits)      CARDIO (within normal limits)      ENDO (within normal limits)      GI/HEPATIC   (+) Esophageal dysphagia   (+) GERD (gastroesophageal reflux disease)      /RENAL   (+) Stage 3 chronic kidney disease, unspecified whether stage 3a or 3b CKD (HCC)      GYN (within normal limits)      HEMATOLOGY (within normal limits)      MUSCULOSKELETAL   (+) Chronic low back pain      NEURO/PSYCH   (+) Anxiety   (+) Chronic low back pain   (+) Continuous opioid dependence (HCC)   (+) Depression, recurrent (HCC)      PULMONARY (within normal limits)     Lab Results   Component Value Date    WBC 4.93 02/27/2024    HGB 12.8 02/27/2024    HCT 41.3 02/27/2024    MCV 79 (L) 02/27/2024     02/27/2024     Lab Results   Component Value Date     10/09/2015    SODIUM 137 02/27/2024    K 3.7 02/27/2024     02/27/2024    CO2 23 02/27/2024    ANIONGAP 7 10/09/2015    AGAP 10 02/27/2024    BUN 12 02/27/2024    CREATININE 1.25 02/27/2024    GLUC 64 (L) 05/23/2020    GLUF 109 (H) 02/27/2024    CALCIUM 8.9 02/27/2024    AST 13 02/27/2024    ALT 12 02/27/2024    ALKPHOS 92 02/27/2024    PROT 7.8 10/09/2015    TP 7.3 02/27/2024    BILITOT 0.13 (L) 10/09/2015    TBILI 0.32 02/27/2024    EGFR 48 02/27/2024     Lab Results   Component Value Date    PTT 28 04/16/2014     Lab Results   Component Value Date    INR 0.95 04/16/2014    PROTIME 12.2 04/16/2014          Physical Exam    Airway    Mallampati score: III  TM Distance: >3 FB  Neck ROM: full     Dental       Cardiovascular  Cardiovascular exam normal    Pulmonary  Pulmonary exam normal     Other Findings  post-pubertal.      Anesthesia Plan  ASA Score- 2     Anesthesia Type- IV sedation with anesthesia with ASA Monitors.         Additional Monitors:     Airway Plan:            Plan Factors-Exercise tolerance (METS): >4 METS.    Chart reviewed. EKG reviewed.  Existing labs reviewed. Patient  summary reviewed.          Obstructive sleep apnea risk education given perioperatively.        Induction- intravenous.    Postoperative Plan-         Informed Consent- Anesthetic plan and risks discussed with patient.  I personally reviewed this patient with the CRNA. Discussed and agreed on the Anesthesia Plan with the CRNA..      NPO Status:  Vitals Value Taken Time   Date of last liquid 02/06/25 02/06/25 1115   Time of last liquid 0900 02/06/25 1115   Date of last solid 02/04/25 02/06/25 1115   Time of last solid

## 2025-02-06 NOTE — H&P
"History and Physical -  [facility]  Greg Pringle 54 y.o. female MRN: [id number]                  HPI: Greg Pringle is a 54 y.o. year old female who presents for colonoscopy evaluation.  She has had multiple history of poor preparation.      REVIEW OF SYSTEMS: Per the HPI, and otherwise unremarkable.    Historical Information   Past Medical History:   Diagnosis Date    Abnormal thyroid blood test     last assessed 14    Anemia     last assessed  13    Chronic pain     back    Depression     H. pylori infection 03/10/2020    Migraine     Sleep disorder      Past Surgical History:   Procedure Laterality Date    ABSCESS DRAINAGE      incision - skin abscess    BACK SURGERY      2002    BACK SURGERY      lower    MAMMO STEREOTACTIC BREAST BIOPSY RIGHT (ALL INC) Right 2017    MAMMO STEREOTACTIC BREAST BIOPSY RIGHT (ALL INC) EACH ADD Right 2017    TUBAL LIGATION  approx 1998     Social History   Social History     Substance and Sexual Activity   Alcohol Use No     Social History     Substance and Sexual Activity   Drug Use Not Currently    Types: Marijuana    Comment: Tried THC in grammar school and high school      Social History     Tobacco Use   Smoking Status Former    Current packs/day: 0.00    Types: Cigarettes    Quit date: 2013    Years since quittin.7   Smokeless Tobacco Never   Tobacco Comments    pt \"quit five years ago\"--Pt clarified 2013 during a 2021 visit     Family History   Problem Relation Age of Onset    Kidney disease Mother         chronic    Diabetes Mother     Cancer Father     Bone cancer Father 55    Heart attack Sister         Acute MI    Diabetes Sister     No Known Problems Sister     No Known Problems Sister     No Known Problems Sister     Hypertension Brother     No Known Problems Brother     No Known Problems Brother     No Known Problems Brother     No Known Problems Daughter     No Known Problems Daughter     No Known Problems " "Son     No Known Problems Son     No Known Problems Son     No Known Problems Son     No Known Problems Maternal Grandmother     No Known Problems Maternal Grandfather     Cancer Paternal Grandfather     Colon cancer Paternal Grandfather 90    Cancer Family     Diabetes Family     Heart disease Family     Hyperlipidemia Family     Hypertension Family        Meds/Allergies       Current Outpatient Medications:     cholecalciferol (VITAMIN D3) 400 units tablet    DULoxetine (CYMBALTA) 60 mg delayed release capsule    gabapentin (NEURONTIN) 600 MG tablet    hydrOXYzine HCL (ATARAX) 25 mg tablet    LORazepam (ATIVAN) 0.5 mg tablet    mirtazapine (Remeron) 30 mg tablet    omeprazole (PriLOSEC) 40 MG capsule    propranolol (INDERAL) 40 mg tablet    QUEtiapine (SEROquel) 400 MG tablet    traMADol (ULTRAM) 50 mg tablet    traZODone (DESYREL) 50 mg tablet    acetaminophen (TYLENOL) 650 mg CR tablet    Diclofenac Sodium (VOLTAREN) 1 %    docusate sodium (COLACE) 100 mg capsule    methocarbamol (ROBAXIN) 750 mg tablet    Multiple Vitamins-Minerals (multivitamin with minerals) tablet    naloxone (NARCAN) 4 mg/0.1 mL nasal spray    oxyCODONE (ROXICODONE) 15 mg immediate release tablet  No current facility-administered medications for this encounter.    Allergies   Allergen Reactions    Aspirin GI Intolerance    Other Throat Swelling      raw onions- causes throat to feel like it wants to close       Objective     /67   Pulse 78   Temp 97.8 °F (36.6 °C) (Temporal)   Resp 20   Ht 5' 3\" (1.6 m)   Wt 96.2 kg (212 lb)   LMP 11/12/2020 (Approximate)   SpO2 94%   BMI 37.55 kg/m²       PHYSICAL EXAM    Gen: NAD  Head: NCAT  CV: RRR  CHEST: Clear  ABD: soft, NT/ND  EXT: no edema      ASSESSMENT/PLAN:  This is a 54 y.o. year old female here for colonoscopy evaluation, and she is stable and optimized for her procedure.       "

## 2025-02-06 NOTE — TELEPHONE ENCOUNTER
----- Message from Siomara JACOBSON sent at 2/6/2025  1:30 PM EST -----    ----- Message -----  From: Miles Guerrero MD  Sent: 2/6/2025   1:17 PM EST  To: #    Can we have the patient do a GoLytely prep with a split preparation and Dulcolax.  We can put her on schedule for Dr. Lemus tomorrow at 130 with arrival time of 1230 they will be great.  Patient and Dr. Lemus aware.

## 2025-02-06 NOTE — TELEPHONE ENCOUNTER
Called and spoke to the patient and confirmed procedure for tomorrow and gave arrival time. Informed pt to  prep

## 2025-02-06 NOTE — ANESTHESIA POSTPROCEDURE EVALUATION
Post-Op Assessment Note    CV Status:  Stable  Pain Score: 0    Pain management: adequate       Mental Status:  Sleepy and arousable   Hydration Status:  Stable   PONV Controlled:  Controlled   Airway Patency:  Patent     Post Op Vitals Reviewed: Yes    No anethesia notable event occurred.    Staff: CRNA           Last Filed PACU Vitals:  Vitals Value Taken Time   Temp     Pulse 72    /62    Resp 10    SpO2 98

## 2025-02-07 ENCOUNTER — ANESTHESIA EVENT (OUTPATIENT)
Dept: GASTROENTEROLOGY | Facility: AMBULARY SURGERY CENTER | Age: 55
End: 2025-02-07
Payer: COMMERCIAL

## 2025-02-07 ENCOUNTER — HOSPITAL ENCOUNTER (OUTPATIENT)
Dept: GASTROENTEROLOGY | Facility: AMBULARY SURGERY CENTER | Age: 55
Setting detail: OUTPATIENT SURGERY
End: 2025-02-07
Attending: INTERNAL MEDICINE
Payer: COMMERCIAL

## 2025-02-07 VITALS
OXYGEN SATURATION: 100 % | DIASTOLIC BLOOD PRESSURE: 74 MMHG | SYSTOLIC BLOOD PRESSURE: 119 MMHG | RESPIRATION RATE: 22 BRPM | HEART RATE: 70 BPM | TEMPERATURE: 97.4 F

## 2025-02-07 DIAGNOSIS — Z12.11 SCREENING FOR COLON CANCER: ICD-10-CM

## 2025-02-07 PROCEDURE — 45385 COLONOSCOPY W/LESION REMOVAL: CPT | Performed by: INTERNAL MEDICINE

## 2025-02-07 PROCEDURE — 88305 TISSUE EXAM BY PATHOLOGIST: CPT | Performed by: STUDENT IN AN ORGANIZED HEALTH CARE EDUCATION/TRAINING PROGRAM

## 2025-02-07 RX ORDER — PROPOFOL 10 MG/ML
INJECTION, EMULSION INTRAVENOUS AS NEEDED
Status: DISCONTINUED | OUTPATIENT
Start: 2025-02-07 | End: 2025-02-07

## 2025-02-07 RX ORDER — SODIUM CHLORIDE, SODIUM LACTATE, POTASSIUM CHLORIDE, CALCIUM CHLORIDE 600; 310; 30; 20 MG/100ML; MG/100ML; MG/100ML; MG/100ML
INJECTION, SOLUTION INTRAVENOUS CONTINUOUS PRN
Status: DISCONTINUED | OUTPATIENT
Start: 2025-02-07 | End: 2025-02-07

## 2025-02-07 RX ORDER — LIDOCAINE HYDROCHLORIDE 10 MG/ML
INJECTION, SOLUTION EPIDURAL; INFILTRATION; INTRACAUDAL; PERINEURAL AS NEEDED
Status: DISCONTINUED | OUTPATIENT
Start: 2025-02-07 | End: 2025-02-07

## 2025-02-07 RX ORDER — PROPOFOL 10 MG/ML
INJECTION, EMULSION INTRAVENOUS CONTINUOUS PRN
Status: DISCONTINUED | OUTPATIENT
Start: 2025-02-07 | End: 2025-02-07

## 2025-02-07 RX ADMIN — PROPOFOL 100 MG: 10 INJECTION, EMULSION INTRAVENOUS at 14:11

## 2025-02-07 RX ADMIN — PROPOFOL 120 MCG/KG/MIN: 10 INJECTION, EMULSION INTRAVENOUS at 14:11

## 2025-02-07 RX ADMIN — SODIUM CHLORIDE, SODIUM LACTATE, POTASSIUM CHLORIDE, AND CALCIUM CHLORIDE: .6; .31; .03; .02 INJECTION, SOLUTION INTRAVENOUS at 13:31

## 2025-02-07 RX ADMIN — LIDOCAINE HYDROCHLORIDE 50 MG: 10 INJECTION, SOLUTION EPIDURAL; INFILTRATION; INTRACAUDAL at 14:11

## 2025-02-07 NOTE — ANESTHESIA PREPROCEDURE EVALUATION
Procedure:  COLONOSCOPY    Relevant Problems   GI/HEPATIC   (+) Esophageal dysphagia   (+) GERD (gastroesophageal reflux disease)      /RENAL   (+) Stage 2 chronic kidney disease   (+) Stage 3 chronic kidney disease, unspecified whether stage 3a or 3b CKD (HCC)      MUSCULOSKELETAL   (+) Chronic low back pain      NEURO/PSYCH   (+) Anxiety   (+) Bilateral occipital neuralgia   (+) Chronic low back pain   (+) Continuous opioid dependence (HCC)   (+) Depression, recurrent (HCC)   (+) New persistent daily headache        Physical Exam    Airway    Mallampati score: II  TM Distance: >3 FB  Neck ROM: full     Dental       Cardiovascular  Cardiovascular exam normal    Pulmonary  Pulmonary exam normal     Other Findings  post-pubertal.      Anesthesia Plan  ASA Score- 2     Anesthesia Type- IV sedation with anesthesia with ASA Monitors.         Additional Monitors:     Airway Plan:            Plan Factors-    Chart reviewed. EKG reviewed.  Existing labs reviewed. Patient summary reviewed.                  Induction- intravenous.    Postoperative Plan-         Informed Consent- Anesthetic plan and risks discussed with patient.  I personally reviewed this patient with the CRNA. Discussed and agreed on the Anesthesia Plan with the CRNA..      NPO Status:  Vitals Value Taken Time   Date of last liquid 02/07/25 02/07/25 1249   Time of last liquid 0600 02/07/25 1249   Date of last solid 02/04/25 02/07/25 1249   Time of last solid

## 2025-02-07 NOTE — H&P
"History and Physical -  [facility]  Greg Pringle 54 y.o. female MRN: [id number]    HPI: Greg Pringle is a 54 y.o. year old female who presents for colon cancer screening evaluation.      Review of Systems    Historical Information   Past Medical History:   Diagnosis Date    Abnormal thyroid blood test     last assessed 14    Anemia     last assessed  13    Chronic pain     back    Depression     H. pylori infection 03/10/2020    Migraine     Sleep disorder      Past Surgical History:   Procedure Laterality Date    ABSCESS DRAINAGE      incision - skin abscess    BACK SURGERY      2002    BACK SURGERY      lower    MAMMO STEREOTACTIC BREAST BIOPSY RIGHT (ALL INC) Right 2017    MAMMO STEREOTACTIC BREAST BIOPSY RIGHT (ALL INC) EACH ADD Right 2017    TUBAL LIGATION  approx 1998     Social History   Social History     Substance and Sexual Activity   Alcohol Use No     Social History     Substance and Sexual Activity   Drug Use Not Currently    Types: Marijuana    Comment: Tried THC in grammar school and high school      Social History     Tobacco Use   Smoking Status Former    Current packs/day: 0.00    Types: Cigarettes    Quit date: 2013    Years since quittin.7   Smokeless Tobacco Never   Tobacco Comments    pt \"quit five years ago\"--Pt clarified 2013 during a 2021 visit     Family History   Problem Relation Age of Onset    Kidney disease Mother         chronic    Diabetes Mother     Cancer Father     Bone cancer Father 55    Heart attack Sister         Acute MI    Diabetes Sister     No Known Problems Sister     No Known Problems Sister     No Known Problems Sister     Hypertension Brother     No Known Problems Brother     No Known Problems Brother     No Known Problems Brother     No Known Problems Daughter     No Known Problems Daughter     No Known Problems Son     No Known Problems Son     No Known Problems Son     No Known Problems Son     No Known " Problems Maternal Grandmother     No Known Problems Maternal Grandfather     Cancer Paternal Grandfather     Colon cancer Paternal Grandfather 90    Cancer Family     Diabetes Family     Heart disease Family     Hyperlipidemia Family     Hypertension Family        Meds/Allergies     Not in a hospital admission.    Allergies   Allergen Reactions    Aspirin GI Intolerance    Other Throat Swelling      raw onions- causes throat to feel like it wants to close       Objective     /73   Pulse 72   Temp 97.5 °F (36.4 °C) (Temporal)   Resp 18   LMP 11/12/2020 (Approximate)   SpO2 96%       PHYSICAL EXAM    Gen: NAD  CV: RRR  CHEST: Clear  ABD: soft, NT/ND  EXT: no edema  Neuro: AAO      ASSESSMENT/PLAN:  This is a 54 y.o. year old female here for colon cancer screening evaluation.  Patient was explained about the risks and benefits of the procedure. Risks including but not limited to bleeding, infection, perforation were explained in detail.    PLAN:   Procedure: Colonoscopy.

## 2025-02-07 NOTE — ANESTHESIA POSTPROCEDURE EVALUATION
Post-Op Assessment Note    CV Status:  Stable  Pain Score: 0    Pain management: adequate       Mental Status:  Alert and awake   Hydration Status:  Stable   PONV Controlled:  None   Airway Patency:  Patent     Post Op Vitals Reviewed: Yes    No anethesia notable event occurred.    Staff: CRNA   Comments: AAOx3, SV Nonobstructed, VSS          Last Filed PACU Vitals:  Vitals Value Taken Time   Temp     Pulse     BP     Resp     SpO2         Modified Jarad:     Vitals Value Taken Time   Activity 2 02/07/25 1452   Respiration 2 02/07/25 1452   Circulation 2 02/07/25 1452   Consciousness 1 02/07/25 1452   Oxygen Saturation 2 02/07/25 1452     Modified Jarad Score: 9

## 2025-02-11 ENCOUNTER — TELEMEDICINE (OUTPATIENT)
Dept: PSYCHIATRY | Facility: CLINIC | Age: 55
End: 2025-02-11
Payer: COMMERCIAL

## 2025-02-11 DIAGNOSIS — F43.21 COMPLICATED GRIEF: ICD-10-CM

## 2025-02-11 DIAGNOSIS — F33.2 MODERATELY SEVERE RECURRENT MAJOR DEPRESSION (HCC): Primary | ICD-10-CM

## 2025-02-11 DIAGNOSIS — Z79.899 ENCOUNTER FOR LONG-TERM (CURRENT) USE OF HIGH-RISK MEDICATION: ICD-10-CM

## 2025-02-11 DIAGNOSIS — F41.9 ANXIETY: ICD-10-CM

## 2025-02-11 DIAGNOSIS — F51.04 PSYCHOPHYSIOLOGICAL INSOMNIA: ICD-10-CM

## 2025-02-11 DIAGNOSIS — G62.9 NEUROPATHY: ICD-10-CM

## 2025-02-11 PROCEDURE — 99214 OFFICE O/P EST MOD 30 MIN: CPT | Performed by: PHYSICIAN ASSISTANT

## 2025-02-11 PROCEDURE — 88305 TISSUE EXAM BY PATHOLOGIST: CPT | Performed by: STUDENT IN AN ORGANIZED HEALTH CARE EDUCATION/TRAINING PROGRAM

## 2025-02-11 RX ORDER — QUETIAPINE FUMARATE 400 MG/1
800 TABLET, FILM COATED ORAL
Qty: 180 TABLET | Refills: 0 | Status: SHIPPED | OUTPATIENT
Start: 2025-02-11

## 2025-02-11 RX ORDER — DULOXETIN HYDROCHLORIDE 60 MG/1
60 CAPSULE, DELAYED RELEASE ORAL DAILY
Qty: 90 CAPSULE | Refills: 0 | Status: SHIPPED | OUTPATIENT
Start: 2025-02-11

## 2025-02-11 RX ORDER — HYDROXYZINE HYDROCHLORIDE 25 MG/1
TABLET, FILM COATED ORAL
Qty: 270 TABLET | Refills: 0 | Status: SHIPPED | OUTPATIENT
Start: 2025-02-11

## 2025-02-11 NOTE — ASSESSMENT & PLAN NOTE
Orders:    DULoxetine (CYMBALTA) 60 mg delayed release capsule; Take 1 capsule (60 mg total) by mouth daily    hydrOXYzine HCL (ATARAX) 25 mg tablet; TAKE 1 TABLET BY MOUTH THREE TIMES A DAY AS NEEDED FOR ANXIETY AND INSOMNIA    QUEtiapine (SEROquel) 400 MG tablet; Take 2 tablets (800 mg total) by mouth daily at bedtime    CBC and differential; Future    Comprehensive metabolic panel; Future    Lipid panel; Future

## 2025-02-12 ENCOUNTER — RESULTS FOLLOW-UP (OUTPATIENT)
Age: 55
End: 2025-02-12

## 2025-02-13 ENCOUNTER — TELEPHONE (OUTPATIENT)
Dept: PSYCHIATRY | Facility: CLINIC | Age: 55
End: 2025-02-13

## 2025-03-09 NOTE — PROGRESS NOTES
INTERNAL MEDICINE FOLLOW-UP OFFICE VISIT  [facility]  [address] [facility], [address]    NAME: Earle Damon  AGE: 44 y o  SEX: female    DATE OF ENCOUNTER: 7/31/2020    Assessment and Plan     1  Risk for sexually transmitted disease  - relatively low risk given only potential secondary exposure without active symptoms  - will perform routine screen for syphilis, chlamydia, and gonorrhea  - RPR; Future  - Chlamydia/N  gonorrhoeae RNA, TMA; Future    2  Latent tuberculosis by blood test  - patient has completed course of rifampin  - without symptoms at this time  - most recent CMP reveals no elevation in LFTs  - patient advised that her IGRA will always be positive and that any further treatment depends on symptomatology and chest x-ray findings  - continue to monitor    3  Anxiety and depression  - patient significantly depressed today on exam  - has been without many of her medications for most of the last 3 months since she was discharged from her psychiatrist  - will refill previous regimen  - will also provide referrals for psychiatry and Behavioral Health  - will refer to ambulatory social work care management program for further assistance in establishing with mental health providers  - given significant symptoms, will follow-up in 5 weeks to re-evaluate  - cloNIDine (CATAPRES) 0 1 mg tablet; Take 1 tablet (0 1 mg total) by mouth daily  Dispense: 30 tablet; Refill: 2  - doxepin (SINEquan) 10 mg capsule; Take 3 capsules (30 mg total) by mouth daily at bedtime  Dispense: 30 capsule; Refill: 2  - gabapentin (NEURONTIN) 600 MG tablet; Take 2 tablets (1,200 mg total) by mouth 2 (two) times a day  Dispense: 120 tablet; Refill: 2  - nortriptyline (PAMELOR) 50 mg capsule; Take 2 caps at 8AM and 1 at bedtime  Dispense: 90 capsule; Refill: 1  - QUEtiapine (SEROquel) 400 MG tablet; Take 1 tablet (400 mg total) by mouth 2 (two) times a day  Dispense: 60 tablet;  Refill: 2  - traZODone (DESYREL) 150 mg tablet; Take 3 tablets (450 mg total) by mouth daily at bedtime  Dispense: 90 tablet; Refill: 2  - Ambulatory referral to Psychiatry; Future  - Ambulatory referral to [facility]; Future  - Ambulatory referral to social work care management program; Future    4  Psychophysiological insomnia  - chronic and currently uncontrolled, currently exacerbated by lack of medication availability as well as recent psychosocial stressors  - will continue medications as indicated above  - patient tells me she was referred to sleep medicine in the past and was supposed to be scheduled for sleep study, although she denies history of YAKELIN  - will refer at this time  - Ambulatory referral to Sleep Medicine; Future    5  GERD  - patient advised to obtain H pylori stool antigen ordered at previous visits  - will follow-up results    Orders Placed This Encounter   Procedures    Chlamydia/N  gonorrhoeae RNA, TMA    RPR    Ambulatory referral to Sleep Medicine    Ambulatory referral to Psychiatry    Ambulatory referral to [facility] Ambulatory referral to social work care management program     Follow-up in 5 weeks for symptomatic recheck  - Counseling Documentation: patient was counseled regarding: diagnostic results, instructions for management, risk factor reductions, prognosis, patient and family education, impressions, risks and benefits of treatment options and importance of compliance with treatment  - Counseling Time: counseling time more than 50% of visit: 45 minutes  - Medication Side Effects: Adverse side effects of medications were reviewed with the patient/guardian today  Routine Health Maintenance: Will require discussion regarding cervical cancer screening and pneumonia vaccine at next visit  Advised diet and exercise  Advised to refrain from tobacco, alcohol, and illicit drug use  Advised medical compliance    BMI Counseling: Body mass index is 30 83 kg/m²   The BMI is above normal  Nutrition recommendations include reducing portion sizes, decreasing overall calorie intake, 3-5 servings of fruits/vegetables daily, reducing fast food intake, consuming healthier snacks, decreasing soda and/or juice intake, moderation in carbohydrate intake, increasing intake of lean protein, reducing intake of saturated fat and trans fat and reducing intake of cholesterol  Exercise recommendations include moderate aerobic physical activity for 150 minutes/week and exercising 3-5 times per week  OMT/OPP: During the course of my history and physical, I utilized osteopathic examination modalities to assess the patient  Somatic dysfunctions were not identified during this visit  OMT is not indicated at this time, however patient may benefit in future from osteopathic cranial manipulation given significant psychiatric history  Chief Complaint     Chief Complaint   Patient presents with    Insomnia       History of Present Illness     Abdelrahman Gomez is a 57-year-old female with history significant for anxiety, depression, treated latent tuberculosis, obesity, diabetes, and GERD presents to clinic today for routine follow-up  Last visit 04/16/2020 with Dr Reyes Naqvi  Patient's chief complaint to me this morning is significant insomnia  She tells me she has been suffering from significant insomnia for many years but it has been worse in recent months  She tells me she used to follow with psychiatry, however has recently been discharged from their practice as they no longer take her insurance since May of this year  She states she has been out of many of her standing medications for the last couple of months, and it is over this time frame that her insomnia has been worsening  She tells me her symptoms were much better controlled on her former medical regimen    She also tells me she has been experiencing significant symptoms of depression since being withdrawn from her regular medications  She tells me she is currently going through a lot socially about which she is depressed, most significantly her ongoing divorce from her  and the death of 1 of her primary family members over the last 7 years  She was very tearful during the duration of our conversation but was eventually redirectable and calm  Additionally, she also reports significant anxiety regarding any potential interactions between her brother and her soon-to-be ex- as they have been aggressive towards one another recently in she feels caught in the middle  She tells me she attempted to contact Psychiatry here [facility] but never received a call back  She does tell me she is not in contact with any behavioral health specialist at this time as well  She denies any SI or HI at this time  She tells me without her medications, she finds solace in her work and her Orthodoxy  Patient also has a history of latent tuberculosis diagnosed and treated earlier this year with 4 month course of rifampin  Patient tells me she completed her course and is currently asymptomatic, denying any fevers, chills, unexpected weight loss, productive cough, or shortness of breath  She states she had a 2nd QuantiFERON TB gold test performed through her work (patient works as a home health aide), which was expectedly positive  She inquired whether not she needs a another course of treatment or follow-up chest x-ray  Patient tells me that she would like to be screened for STIs at this time  She states she has not had any new sexual partners besides her , however she discovered recently that he has had multiple sexual encounters with other women  She denies any particular concerning symptoms, including dysuria, hematuria, urethral discharge, rash, or pelvic/abdominal pain      Additionally, it seems as though patient was instructed to obtain stool study for H pylori earlier this year which was not done and she inquires whether she can not completed later today  Review systems otherwise largely negative      The following portions of the patient's history were reviewed and updated as appropriate: allergies, current medications, past family history, past medical history, past social history, past surgical history and problem list     Review of Systems     Review of Systems   Constitutional: Negative for activity change, appetite change, chills, diaphoresis, fatigue, fever and unexpected weight change  HENT: Negative for congestion, postnasal drip, rhinorrhea, sinus pressure, sinus pain, sore throat and trouble swallowing  Eyes: Negative for visual disturbance  Respiratory: Negative for cough, shortness of breath and wheezing  Cardiovascular: Negative for chest pain and palpitations  Gastrointestinal: Negative for abdominal pain, constipation, diarrhea, nausea and vomiting  Genitourinary: Negative for difficulty urinating, dysuria, frequency, hematuria and urgency  Musculoskeletal: Negative for arthralgias and myalgias  Skin: Negative for pallor, rash and wound  Neurological: Negative for dizziness, seizures, weakness, light-headedness, numbness and headaches  Psychiatric/Behavioral: Positive for dysphoric mood and sleep disturbance  Negative for agitation, decreased concentration, hallucinations, self-injury and suicidal ideas  The patient is nervous/anxious          Active Problem List     Patient Active Problem List   Diagnosis    GERD (gastroesophageal reflux disease)    Alopecia    History of prediabetes    Vertigo    Esophageal dysphagia    Latent tuberculosis by blood test    H  pylori infection    Stage 2 chronic kidney disease    Anxiety and depression       Objective     /84 (BP Location: Right arm, Patient Position: Sitting, Cuff Size: Adult)   Pulse 95   Temp (!) 96 7 °F (35 9 °C) (Temporal)   Ht 5' 1" (1 549 m)   Wt 74 kg (163 lb 2 3 oz)   BMI 30 83 kg/m²     Physical Exam Constitutional: She is oriented to person, place, and time  She appears well-developed and well-nourished  No distress  Patient is generally pleasant, calm, and cooperative  HENT:   Head: Normocephalic and atraumatic  Eyes: Pupils are equal, round, and reactive to light  Conjunctivae and EOM are normal  No scleral icterus  Neck: Neck supple  Cardiovascular: Normal rate, regular rhythm, normal heart sounds and intact distal pulses  Exam reveals no gallop and no friction rub  No murmur heard  Pulmonary/Chest: Effort normal and breath sounds normal  No stridor  No respiratory distress  She has no wheezes  She has no rales  Abdominal: Soft  Bowel sounds are normal  She exhibits no distension and no mass  There is no tenderness  There is no rebound and no guarding  Genitourinary:   Genitourinary Comments: Deferred   Musculoskeletal: She exhibits no edema, tenderness or deformity  Lymphadenopathy:     She has no cervical adenopathy  Neurological: She is alert and oriented to person, place, and time  No cranial nerve deficit  Skin: Skin is warm and dry  Capillary refill takes less than 2 seconds  No rash noted  She is not diaphoretic  No erythema  No pallor  Psychiatric: Her speech is normal and behavior is normal  Judgment and thought content normal  Her mood appears not anxious  Her affect is not angry, not blunt, not labile and not inappropriate  She is not actively hallucinating  Cognition and memory are normal  She exhibits a depressed mood  She expresses no homicidal and no suicidal ideation  She expresses no suicidal plans and no homicidal plans  Very tearful on exam, however his redirectable after being instructed to focus on her breathing  She is attentive  Nursing note and vitals reviewed  Pertinent Laboratory/Diagnostic Studies:  Xr Chest 2 Views    Result Date: 3/3/2020  Impression: No acute cardiopulmonary disease   Workstation performed: [id number]       Images and diagnostics reviewed     Current Medications     Current Outpatient Medications:     doxepin (SINEquan) 10 mg capsule, Take 3 capsules (30 mg total) by mouth daily at bedtime, Disp: 30 capsule, Rfl: 2    gabapentin (NEURONTIN) 600 MG tablet, Take 2 tablets (1,200 mg total) by mouth 2 (two) times a day, Disp: 120 tablet, Rfl: 2    ibuprofen (MOTRIN) 800 mg tablet, , Disp: , Rfl:     oxyCODONE (ROXICODONE) 30 MG immediate release tablet, Take 30 mg by mouth 2 (two) times a day, Disp: , Rfl:     QUEtiapine (SEROquel) 400 MG tablet, Take 1 tablet (400 mg total) by mouth 2 (two) times a day, Disp: 60 tablet, Rfl: 2    traZODone (DESYREL) 150 mg tablet, Take 3 tablets (450 mg total) by mouth daily at bedtime, Disp: 90 tablet, Rfl: 2    cloNIDine (CATAPRES) 0 1 mg tablet, Take 1 tablet (0 1 mg total) by mouth daily, Disp: 30 tablet, Rfl: 2    cyclobenzaprine (FLEXERIL) 10 mg tablet, Take 10 mg by mouth 2 (two) times a day as needed, Disp: , Rfl:     diclofenac sodium (VOLTAREN) 1 %, , Disp: , Rfl:     nortriptyline (PAMELOR) 50 mg capsule, Take 2 caps at 8AM and 1 at bedtime, Disp: 90 capsule, Rfl: 1    rifampin (RIFADIN) 300 mg capsule, Take 2 capsules (600 mg total) by mouth daily, Disp: 60 capsule, Rfl: 3    temazepam (RESTORIL) 15 mg capsule, , Disp: , Rfl:     tiZANidine (ZANAFLEX) 4 mg tablet, Take 4 mg by mouth every 8 (eight) hours, Disp: , Rfl: 1    Health Maintenance     Health Maintenance   Topic Date Due    Medicare Annual Wellness Visit (AWV)  1970    Pneumococcal Vaccine: Pediatrics (0 to 5 Years) and At-Risk Patients (6 to 59 Years) (1 of 1 - PPSV23) 11/05/1974    BMI: Followup Plan  11/05/1986    MAMMOGRAM  10/09/2019    Cervical Cancer Screening  03/09/2020    Influenza Vaccine  07/01/2020    CRC Screening: Colonoscopy  02/17/2021    BMI: Adult  07/31/2021    DTaP,Tdap,and Td Vaccines (2 - Td) 11/19/2023    Pneumococcal Vaccine: 65+ Years (1 of 2 - PCV13) 11/05/2033    HIV Screening  Completed    Hepatitis C Screening  Completed    HIB Vaccine  Aged Out    Hepatitis B Vaccine  Aged Out    IPV Vaccine  Aged Out    Hepatitis A Vaccine  Aged Out    Meningococcal ACWY Vaccine  Aged Out    HPV Vaccine  Aged Out     Immunization History   Administered Date(s) Administered     Influenza (IM) Preservative Free 03/30/2017    INFLUENZA 03/30/2017    Influenza TIV (IM) 11/19/2013, 11/13/2014, 11/17/2015    Tdap 11/19/2013       Portions of this note may have been created with voice recognition software  Occasional wrong word or sound a like substitutions may have occurred due to inherent limitations of voice recognition software  Read the chart carefully and recognize, using context, where substitutions have occurred  HERNANDEZ Gupta  Internal Medicine PGY-1  [address] [facility] [address] [address] [address] [facility] [address] [facility]  Office: [phone]  Fax: [phone]

## 2025-04-08 ENCOUNTER — TELEMEDICINE (OUTPATIENT)
Dept: PSYCHIATRY | Facility: CLINIC | Age: 55
End: 2025-04-08
Payer: COMMERCIAL

## 2025-04-08 DIAGNOSIS — F33.2 MODERATELY SEVERE RECURRENT MAJOR DEPRESSION (HCC): Primary | ICD-10-CM

## 2025-04-08 DIAGNOSIS — F41.9 ANXIETY: ICD-10-CM

## 2025-04-08 DIAGNOSIS — G62.9 NEUROPATHY: ICD-10-CM

## 2025-04-08 DIAGNOSIS — F43.21 COMPLICATED GRIEF: ICD-10-CM

## 2025-04-08 DIAGNOSIS — F51.04 PSYCHOPHYSIOLOGICAL INSOMNIA: ICD-10-CM

## 2025-04-08 PROCEDURE — 99214 OFFICE O/P EST MOD 30 MIN: CPT | Performed by: PHYSICIAN ASSISTANT

## 2025-04-08 RX ORDER — ZIPRASIDONE HYDROCHLORIDE 20 MG/1
CAPSULE ORAL
Qty: 90 CAPSULE | Refills: 0 | Status: SHIPPED | OUTPATIENT
Start: 2025-04-08

## 2025-04-08 RX ORDER — QUETIAPINE FUMARATE 400 MG/1
TABLET, FILM COATED ORAL
Start: 2025-04-08

## 2025-04-08 RX ORDER — DULOXETIN HYDROCHLORIDE 60 MG/1
60 CAPSULE, DELAYED RELEASE ORAL DAILY
Qty: 90 CAPSULE | Refills: 0 | Status: SHIPPED | OUTPATIENT
Start: 2025-04-08

## 2025-04-08 RX ORDER — HYDROXYZINE HYDROCHLORIDE 25 MG/1
TABLET, FILM COATED ORAL
Qty: 270 TABLET | Refills: 0 | Status: SHIPPED | OUTPATIENT
Start: 2025-04-08

## 2025-04-08 RX ORDER — QUETIAPINE FUMARATE 100 MG/1
100 TABLET, FILM COATED ORAL
Qty: 90 TABLET | Refills: 0 | Status: SHIPPED | OUTPATIENT
Start: 2025-04-08
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
## 2025-04-09 NOTE — ASSESSMENT & PLAN NOTE
Orders:    DULoxetine (CYMBALTA) 60 mg delayed release capsule; Take 1 capsule (60 mg total) by mouth daily    hydrOXYzine HCL (ATARAX) 25 mg tablet; TAKE 1 TABLET BY MOUTH THREE TIMES A DAY AS NEEDED FOR ANXIETY AND INSOMNIA    QUEtiapine (SEROquel) 400 MG tablet; Take 1 1/2 tabs po qhs

## 2025-04-09 NOTE — BH TREATMENT PLAN
"TREATMENT PLAN (Medication Management Only)        [facility]    Name/Date of Birth/MRN:  Greg Pringle 54 y.o. 1970 MRN: [id number]  Date of Treatment Plan: April 8, 2025  Diagnosis/Diagnoses:   1. Moderately severe recurrent major depression (HCC)    2. Anxiety    3. Complicated grief    4. Psychophysiological insomnia    5. Neuropathy      Strengths/Personal Resources for Self-Care: \"I love hard; I love my family; Listening to music.\"  Area/Areas of need (in own words): \"OK\".  1. Long Term Goal:   Maintain mood stability and control of anxiety  Target Date: 6 months - 10/8/2025  Person/Persons responsible for completion of goal: Greg and Salima  2.  Short Term Objective (s) - How will we reach this goal?:   A.  Provider new recommended medication/dosage changes and/or continue medication(s): Pt is having the same depression, anxiety, and panic intensity, though panic episodes have reduced in frequency a little.  Tx options discussed and she is interested in changing the Quetiapine as opposed to increasing the Duloxetine for her Sxs.  I reviewed options of SGAs she has not tried: Ziprasidone, Vraylar, Aripiprazole and due to SE profile she opted for the Ziprasidone.  I will begin to wean the Quetiapine and extensively reviewed the titration to ensure understanding, including having her repeat the process back to me.  No change in other medications.  Treatment plan done virtually as per [facility]'s administrative directive, since Pt cannot be here to sign in person, and Pt accepts the plan.  Safety Plan was done 3/12/2024 .    Start in 2 weeks: Ziprasidone 20mg (1) cap po qd with dinner meal # 90  Reduce/wean Quetiapine to:  Quetiapine 400mg (1 1/2) tabs po qhs -- Pt has enough from last Rx  Quetiapine 100mg (1) tab po qhs x 1 week # 90  Continue:   Duloxetine 60mg (1) cap po qd # 90  Hydroxyzine 25mg (1) tab po tid prn anxiety and insomnia # " 270  Gabapentin 900mg tid -- per PCP for chronic pain  Trazodone 50mg (1-3) tabs po qhs 1hr before hs -- per sleep specialist  Lorazepam 0.5mg (1) tab po qhs -- per sleep specialist  Propanolol 40mg qd - for migraines per neurologist  F/U PCP, and specialists for medical issues   2nd request for CMP, CBC with diff, and Lipids   Return 5/15/2025 at 4:30PM, call sooner prn      Target Date: 6 months - 10/8/2025  Person/Persons Responsible for Completion of Goal: Renita   Progress Towards Goals: stable, continuing treatment  Treatment Modality:  Medication mgt  Review due 180 days from date of this plan: 6 months - 10/8/2025  Expected length of service: ongoing treatment unless revised  My Physician/PA/NP and I have developed this plan together and I agree to work on the goals and objectives. I understand the treatment goals that were developed for my treatment.  Electronic Signatures: on file (unless signed below)    Salima Bay PA-C 04/08/25

## 2025-05-05 ENCOUNTER — ANNUAL EXAM (OUTPATIENT)
Dept: OBGYN CLINIC | Facility: CLINIC | Age: 55
End: 2025-05-05

## 2025-05-05 VITALS
SYSTOLIC BLOOD PRESSURE: 104 MMHG | HEIGHT: 63 IN | WEIGHT: 212.6 LBS | BODY MASS INDEX: 37.67 KG/M2 | HEART RATE: 88 BPM | DIASTOLIC BLOOD PRESSURE: 67 MMHG

## 2025-05-05 DIAGNOSIS — Z01.419 WOMEN'S ANNUAL ROUTINE GYNECOLOGICAL EXAMINATION: Primary | ICD-10-CM

## 2025-05-05 DIAGNOSIS — Z12.4 CERVICAL CANCER SCREENING: ICD-10-CM

## 2025-05-05 DIAGNOSIS — Z12.31 ENCOUNTER FOR SCREENING MAMMOGRAM FOR MALIGNANT NEOPLASM OF BREAST: ICD-10-CM

## 2025-05-05 PROCEDURE — G0101 CA SCREEN;PELVIC/BREAST EXAM: HCPCS | Performed by: NURSE PRACTITIONER

## 2025-05-05 NOTE — PROGRESS NOTES
"ANNUAL GYNECOLOGICAL EXAMINATION    Greg Pringle is a 54 y.o. female who presents today for annual GYN exam.  Her last pap smear was performed 2022 and result was NILM, HR-HPV negative.  She reports no history of abnormal pap smears in her past.  Her last mammogram was performed  and result was BIRADS3.  She had colonoscopy screening performed in February with recommendation to repeat in 5 years due to history of colon polyps, due again in .  She had HIV screening performed  and it was negative.  She reports menopause about 5 years ago with no PMB.  Patient's last menstrual period was 2020 (approximate).  Her general medical history has been reviewed and she reports it as follows:    Past Medical History:   Diagnosis Date    Abnormal thyroid blood test     last assessed 14    Anemia     last assessed  13    Chronic pain     back    Depression     H. pylori infection 03/10/2020    Migraine     Sleep disorder      Past Surgical History:   Procedure Laterality Date    ABSCESS DRAINAGE      incision - skin abscess    BACK SURGERY          BACK SURGERY      lower    MAMMO STEREOTACTIC BREAST BIOPSY RIGHT (ALL INC) Right 2017    MAMMO STEREOTACTIC BREAST BIOPSY RIGHT (ALL INC) EACH ADD Right 2017    TUBAL LIGATION  approx 1998     OB History          6    Para   6    Term   6            AB        Living   6         SAB        IAB        Ectopic        Multiple        Live Births   6               Social History     Tobacco Use    Smoking status: Former     Current packs/day: 0.00     Types: Cigarettes     Quit date: 2013     Years since quittin.9    Smokeless tobacco: Never    Tobacco comments:     pt \"quit five years ago\"--Pt clarified 2013 during a 2021 visit   Vaping Use    Vaping status: Some Days    Substances: Nicotine   Substance Use Topics    Alcohol use: No    Drug use: Not Currently     Types: Marijuana     Comment: Tried THC in " grammar school and high school      Social History     Substance and Sexual Activity   Sexual Activity Yes    Partners: Male    Birth control/protection: None, Female Sterilization     Cancer-related family history includes Bone cancer (age of onset: 55) in her father; Cancer in her family, father, and paternal grandfather; Colon cancer (age of onset: 90) in her paternal grandfather.    Current Outpatient Medications   Medication Instructions    acetaminophen (TYLENOL) 650 mg, Oral, Every 8 hours PRN    bisacodyl (DULCOLAX) 10 mg, Oral, Once    cholecalciferol (VITAMIN D3) 400 Units, Daily    Diclofenac Sodium (VOLTAREN) 2 g, Topical, 4 times daily    docusate sodium (COLACE) 100 mg, Oral, Daily    DULoxetine (CYMBALTA) 60 mg, Oral, Daily    gabapentin (NEURONTIN) 600 MG tablet TAKE 1.5 TABLETS (900 MG TOTAL) BY MOUTH 3 TIMES A DAY    hydrOXYzine HCL (ATARAX) 25 mg tablet TAKE 1 TABLET BY MOUTH THREE TIMES A DAY AS NEEDED FOR ANXIETY AND INSOMNIA    LORazepam (ATIVAN) 0.5 mg, Oral, Daily at bedtime,       methocarbamol (ROBAXIN) 750 mg, Every 8 hours    Multiple Vitamins-Minerals (multivitamin with minerals) tablet 1 tablet, Daily    naloxone (NARCAN) 4 mg/0.1 mL nasal spray Administer 1 spray into a nostril. If no response after 2-3 minutes, give another dose in the other nostril using a new spray.    omeprazole (PRILOSEC) 40 mg, Oral, Daily    oxyCODONE (ROXICODONE) 15 mg, Oral, 2 times daily PRN    polyethylene glycol (GOLYTELY) 4000 mL solution 4,000 mL, Oral, Once    propranolol (INDERAL) 40 mg, Oral, Daily at bedtime    QUEtiapine (SEROquel) 400 MG tablet Take 1 1/2 tabs po qhs    QUEtiapine (SEROQUEL) 100 mg, Oral, Daily at bedtime, Take 1 tab po qhs x 1 week    traMADol (ULTRAM) 50 mg tablet Every 12 hours    traZODone (DESYREL) 50 mg tablet TAKE 1, 2 OR 3 TABLETS ONE HOUR BEFORE BED    ziprasidone (GEODON) 20 mg capsule Start in 2 weeks and take 1 cap po qd with dinner meal       Review of Systems:  Review  "of Systems   Constitutional: Negative.    Gastrointestinal: Negative.    Genitourinary: Negative.    Skin: Negative.        Physical Exam:  /67 (BP Location: Right arm, Patient Position: Sitting)   Pulse 88   Ht 5' 3\" (1.6 m)   Wt 96.4 kg (212 lb 9.6 oz)   LMP 11/12/2020 (Approximate)   BMI 37.66 kg/m²   Physical Exam  Constitutional:       General: She is not in acute distress.     Appearance: Normal appearance.   Genitourinary:      Vulva and bladder normal.      No lesions in the vagina.      No vaginal erythema or ulceration.        Right Adnexa: not tender and no mass present.     Left Adnexa: not tender and no mass present.     No cervical motion tenderness or lesion.      Uterus is not enlarged or tender.      No uterine mass detected.  Breasts:     Right: No mass, nipple discharge or skin change.      Left: No mass, nipple discharge or skin change.   Cardiovascular:      Rate and Rhythm: Normal rate and regular rhythm.   Pulmonary:      Effort: Pulmonary effort is normal.      Breath sounds: Normal breath sounds.   Abdominal:      General: Abdomen is flat.      Palpations: Abdomen is soft.   Musculoskeletal:      Cervical back: Neck supple.   Neurological:      Mental Status: She is alert.   Skin:     General: Skin is warm and dry.   Psychiatric:         Mood and Affect: Mood normal.         Behavior: Behavior normal.   Vitals reviewed.         Assessment/Plan:   1. Normal well-woman GYN exam.  2. Cervical cancer screening:  Normal cervical exam.  Pap smear up to date, due 2027.      3. STD screening:  Patient declines.    4. Breast cancer screening:  Normal breast exam.  Order placed for bilateral screening mammogram.  Reviewed breast self-awareness.   5. Colon cancer screening:  Up to date.    6. Depression Screening: Patient's depression screening was assessed with a PHQ-2 score of 0. Clinically patient does not have depression. No treatment is required.      7. BMI Counseling: Body mass index " is 37.66 kg/m². Discussed the patient's BMI with her. The BMI is above normal. Exercise recommendations include exercising 3-5 times per week.   8. Return to office in one year for annual exam or sooner if needed.    Reviewed with patient that [facility], but that they will not necessarily be reviewed by me immediately.  Explained that I will review results at my earliest opportunity and contact patient appropriately.

## 2025-05-07 ENCOUNTER — HOSPITAL ENCOUNTER (EMERGENCY)
Facility: HOSPITAL | Age: 55
Discharge: HOME | End: 2025-05-07
Attending: EMERGENCY MEDICINE
Payer: COMMERCIAL

## 2025-05-07 VITALS
HEART RATE: 86 BPM | WEIGHT: 215.39 LBS | BODY MASS INDEX: 38.15 KG/M2 | DIASTOLIC BLOOD PRESSURE: 81 MMHG | OXYGEN SATURATION: 97 % | RESPIRATION RATE: 18 BRPM | TEMPERATURE: 97.9 F | SYSTOLIC BLOOD PRESSURE: 125 MMHG

## 2025-05-07 DIAGNOSIS — H10.9 RIGHT CONJUNCTIVITIS: Primary | ICD-10-CM

## 2025-05-07 PROCEDURE — 99284 EMERGENCY DEPT VISIT MOD MDM: CPT | Performed by: PHYSICIAN ASSISTANT

## 2025-05-07 PROCEDURE — 99283 EMERGENCY DEPT VISIT LOW MDM: CPT

## 2025-05-07 RX ORDER — OFLOXACIN 3 MG/ML
1 SOLUTION/ DROPS OPHTHALMIC 4 TIMES DAILY
Qty: 5 ML | Refills: 0 | Status: SHIPPED | OUTPATIENT
Start: 2025-05-07 | End: 2025-05-14

## 2025-05-07 RX ORDER — TETRACAINE HYDROCHLORIDE 5 MG/ML
1 SOLUTION OPHTHALMIC ONCE
Status: COMPLETED | OUTPATIENT
Start: 2025-05-07 | End: 2025-05-07

## 2025-05-07 RX ADMIN — TETRACAINE HYDROCHLORIDE 1 DROP: 5 SOLUTION OPHTHALMIC at 13:14

## 2025-05-07 RX ADMIN — FLUORESCEIN SODIUM 1 STRIP: 1 STRIP OPHTHALMIC at 13:14

## 2025-05-07 NOTE — ED PROVIDER NOTES
"Time reflects when diagnosis was documented in both MDM as applicable and the Disposition within this note       Time User Action Codes Description Comment    5/7/2025  1:29 PM GeorgetteYadira ngo Add [H10.9] Right conjunctivitis           ED Disposition       ED Disposition   Discharge    Condition   Stable    Date/Time   Wed May 7, 2025  1:29 PM    Comment   Greg Pringle discharge to home/self care.                   Assessment & Plan       Medical Decision Making  Differential diagnosis includes but is not limited to: Corneal abrasion, conjunctivitis, iritis, glaucoma, corneal ulceration    Risk  Prescription drug management.             Medications   fluorescein sodium sterile ophthalmic strip 1 strip (1 strip Both Eyes Given by Other 5/7/25 1314)   tetracaine 0.5 % ophthalmic solution 1 drop (1 drop Both Eyes Given by Other 5/7/25 1314)       ED Risk Strat Scores                    No data recorded        SBIRT 22yo+      Flowsheet Row Most Recent Value   Initial Alcohol Screen: US AUDIT-C     1. How often do you have a drink containing alcohol? 0 Filed at: 05/07/2025 1242   2. How many drinks containing alcohol do you have on a typical day you are drinking?  0 Filed at: 05/07/2025 1242   3b. FEMALE Any Age, or MALE 65+: How often do you have 4 or more drinks on one occassion? 0 Filed at: 05/07/2025 1242   Audit-C Score 0 Filed at: 05/07/2025 1242   NICOLAS: How many times in the past year have you...    Used an illegal drug or used a prescription medication for non-medical reasons? Never Filed at: 05/07/2025 1242                            History of Present Illness       Chief Complaint   Patient presents with    Eye Redness     Right eye redness, drainage and sensitivity to light x 4 days \"started after putting contacts on.\"        Past Medical History:   Diagnosis Date    Abnormal thyroid blood test     last assessed 11/13/14    Anemia     last assessed  11/19/13    Chronic pain     back    Depression     H. " "pylori infection 03/10/2020    Migraine     Sleep disorder       Past Surgical History:   Procedure Laterality Date    ABSCESS DRAINAGE      incision - skin abscess    BACK SURGERY      2002    BACK SURGERY      lower    MAMMO STEREOTACTIC BREAST BIOPSY RIGHT (ALL INC) Right 2017    MAMMO STEREOTACTIC BREAST BIOPSY RIGHT (ALL INC) EACH ADD Right 2017    TUBAL LIGATION  approx 1998      Family History   Problem Relation Age of Onset    Kidney disease Mother         chronic    Diabetes Mother     Cancer Father     Bone cancer Father 55    Heart attack Sister         Acute MI    Diabetes Sister     No Known Problems Sister     No Known Problems Sister     No Known Problems Sister     Hypertension Brother     No Known Problems Brother     No Known Problems Brother     No Known Problems Brother     No Known Problems Daughter     No Known Problems Daughter     No Known Problems Son     No Known Problems Son     No Known Problems Son     No Known Problems Son     No Known Problems Maternal Grandmother     No Known Problems Maternal Grandfather     Cancer Paternal Grandfather     Colon cancer Paternal Grandfather 90    Cancer Family     Diabetes Family     Heart disease Family     Hyperlipidemia Family     Hypertension Family       Social History     Tobacco Use    Smoking status: Former     Current packs/day: 0.00     Types: Cigarettes     Quit date: 2013     Years since quittin.9    Smokeless tobacco: Never    Tobacco comments:     pt \"quit five years ago\"--Pt clarified 2013 during a 2021 visit   Vaping Use    Vaping status: Some Days    Substances: Nicotine   Substance Use Topics    Alcohol use: No    Drug use: Not Currently     Types: Marijuana     Comment: Tried THC in grammar school and high school       E-Cigarette/Vaping    E-Cigarette Use Current Some Day User     Comments last night       E-Cigarette/Vaping Substances    Nicotine Yes     THC No     CBD No     Flavoring No     Other No     " Unknown No       I have reviewed and agree with the history as documented.     54-year-old female presents emergency room for evaluation of right eye irritation.  Patient states a couple weeks ago she thought she had an infection so she stopped wearing her contacts, she did not see anybody for this and did not take any medication for it.  This week she started using a new pair of contacts and her eye got irritated again.  Patient states her glasses broke so that is why she tried using the contacts again.  Denies drainage of the eye.  Denies terrible pain or photophobia.  She does states is a little light sensitive but tolerable.  Denies drainage from the eye.  She does admit to some redness and swelling.   Patient does have an eye doctor appointment at the end of this month.      History provided by:  Patient      Review of Systems   Constitutional:  Negative for chills and fever.   Eyes:  Positive for redness.   Gastrointestinal:  Negative for vomiting.   Skin:  Negative for rash.   Neurological:  Negative for headaches.           Objective       ED Triage Vitals [05/07/25 1242]   Temperature Pulse Blood Pressure Respirations SpO2 Patient Position - Orthostatic VS   97.9 °F (36.6 °C) 86 125/81 18 97 % Sitting      Temp Source Heart Rate Source BP Location FiO2 (%) Pain Score    Oral Monitor Left arm -- --      Vitals      Date and Time Temp Pulse SpO2 Resp BP Pain Score FACES Pain Rating User   05/07/25 1242 97.9 °F (36.6 °C) 86 97 % 18 125/81 -- -- JR            Physical Exam  Vitals and nursing note reviewed.   Constitutional:       Appearance: Normal appearance.   HENT:      Head: Atraumatic.      Right Ear: External ear normal.      Left Ear: External ear normal.      Nose: Nose normal.   Eyes:      General: Lids are normal.         Right eye: No discharge.      Intraocular pressure: Right eye pressure is 11 mmHg. Measurements were taken using an automated tonometer.     Extraocular Movements: Extraocular  movements intact.      Right eye: Normal extraocular motion.      Left eye: Normal extraocular motion.      Conjunctiva/sclera:      Right eye: Right conjunctiva is injected.      Pupils: Pupils are equal, round, and reactive to light.      Right eye: No fluorescein uptake.   Skin:     General: Skin is warm and dry.   Neurological:      Mental Status: She is alert and oriented to person, place, and time.   Psychiatric:         Mood and Affect: Mood normal.         Results Reviewed       None            No orders to display       Procedures    ED Medication and Procedure Management   Prior to Admission Medications   Prescriptions Last Dose Informant Patient Reported? Taking?   DULoxetine (CYMBALTA) 60 mg delayed release capsule  Self No No   Sig: Take 1 capsule (60 mg total) by mouth daily   Patient not taking: Reported on 5/5/2025   Diclofenac Sodium (VOLTAREN) 1 %  Self No No   Sig: Apply 2 g topically 4 (four) times a day   Patient not taking: Reported on 5/5/2025   LORazepam (ATIVAN) 0.5 mg tablet  Self No No   Sig: TAKE 1 TABLET BY MOUTH EVERYDAY AT BEDTIME   Multiple Vitamins-Minerals (multivitamin with minerals) tablet  Self Yes No   Sig: Take 1 tablet by mouth daily   QUEtiapine (SEROquel) 100 mg tablet   No No   Sig: Take 1 tablet (100 mg total) by mouth daily at bedtime Take 1 tab po qhs x 1 week   QUEtiapine (SEROquel) 400 MG tablet  Self No No   Sig: Take 1 1/2 tabs po qhs   acetaminophen (TYLENOL) 650 mg CR tablet  Self No No   Sig: Take 1 tablet (650 mg total) by mouth every 8 (eight) hours as needed for mild pain   Patient not taking: Reported on 5/5/2025   bisacodyl (DULCOLAX) 5 mg EC tablet   No No   Sig: Take 2 tablets (10 mg total) by mouth once for 1 dose   cholecalciferol (VITAMIN D3) 400 units tablet  Self Yes No   Sig: Take 400 Units by mouth daily   docusate sodium (COLACE) 100 mg capsule  Self No No   Sig: Take 1 capsule (100 mg total) by mouth in the morning   Patient not taking: Reported on  5/5/2025   gabapentin (NEURONTIN) 600 MG tablet  Self No No   Sig: TAKE 1.5 TABLETS (900 MG TOTAL) BY MOUTH 3 TIMES A DAY   hydrOXYzine HCL (ATARAX) 25 mg tablet   No No   Sig: TAKE 1 TABLET BY MOUTH THREE TIMES A DAY AS NEEDED FOR ANXIETY AND INSOMNIA   methocarbamol (ROBAXIN) 750 mg tablet  Self Yes No   Sig: Take 750 mg by mouth every 8 (eight) hours   Patient not taking: Reported on 8/8/2024   naloxone (NARCAN) 4 mg/0.1 mL nasal spray  Self No No   Sig: Administer 1 spray into a nostril. If no response after 2-3 minutes, give another dose in the other nostril using a new spray.   Patient not taking: Reported on 4/8/2025   omeprazole (PriLOSEC) 40 MG capsule  Self No No   Sig: TAKE 1 CAPSULE (40 MG TOTAL) BY MOUTH DAILY.   oxyCODONE (ROXICODONE) 15 mg immediate release tablet  Self No No   Sig: Take 1 tablet (15 mg total) by mouth 2 (two) times a day as needed for moderate pain or severe pain Max Daily Amount: 30 mg   Patient not taking: Reported on 4/8/2025   polyethylene glycol (GOLYTELY) 4000 mL solution   No No   Sig: Take 4,000 mL by mouth once for 1 dose   propranolol (INDERAL) 40 mg tablet  Self No No   Sig: Take 1 tablet (40 mg total) by mouth daily at bedtime   traMADol (ULTRAM) 50 mg tablet  Self Yes No   Sig: Every 12 hours   Patient not taking: Reported on 4/8/2025   traZODone (DESYREL) 50 mg tablet   No No   Sig: TAKE 1, 2 OR 3 TABLETS ONE HOUR BEFORE BED   Patient not taking: Reported on 5/5/2025   ziprasidone (GEODON) 20 mg capsule   No No   Sig: Start in 2 weeks and take 1 cap po qd with dinner meal   Patient not taking: Reported on 5/5/2025      Facility-Administered Medications: None     Discharge Medication List as of 5/7/2025  1:36 PM        START taking these medications    Details   ofloxacin (OCUFLOX) 0.3 % ophthalmic solution Administer 1 drop to the right eye 4 (four) times a day for 7 days On days 1 and 2. Then on days 3-7, 1 drop every 6 hours, Starting Wed 5/7/2025, Until Wed  5/14/2025, Normal           CONTINUE these medications which have NOT CHANGED    Details   acetaminophen (TYLENOL) 650 mg CR tablet Take 1 tablet (650 mg total) by mouth every 8 (eight) hours as needed for mild pain, Starting Tue 1/2/2024, Normal      bisacodyl (DULCOLAX) 5 mg EC tablet Take 2 tablets (10 mg total) by mouth once for 1 dose, Starting Thu 2/6/2025, Normal      cholecalciferol (VITAMIN D3) 400 units tablet Take 400 Units by mouth daily, Historical Med      Diclofenac Sodium (VOLTAREN) 1 % Apply 2 g topically 4 (four) times a day, Starting Mon 4/22/2024, Normal      docusate sodium (COLACE) 100 mg capsule Take 1 capsule (100 mg total) by mouth in the morning, Starting Mon 10/31/2022, Normal      DULoxetine (CYMBALTA) 60 mg delayed release capsule Take 1 capsule (60 mg total) by mouth daily, Starting Tue 4/8/2025, Normal      gabapentin (NEURONTIN) 600 MG tablet TAKE 1.5 TABLETS (900 MG TOTAL) BY MOUTH 3 TIMES A DAY, Normal      hydrOXYzine HCL (ATARAX) 25 mg tablet TAKE 1 TABLET BY MOUTH THREE TIMES A DAY AS NEEDED FOR ANXIETY AND INSOMNIA, Normal      LORazepam (ATIVAN) 0.5 mg tablet TAKE 1 TABLET BY MOUTH EVERYDAY AT BEDTIME, Starting Wed 4/2/2025, Normal      methocarbamol (ROBAXIN) 750 mg tablet Take 750 mg by mouth every 8 (eight) hours, Starting Wed 11/22/2023, Historical Med      Multiple Vitamins-Minerals (multivitamin with minerals) tablet Take 1 tablet by mouth daily, Historical Med      naloxone (NARCAN) 4 mg/0.1 mL nasal spray Administer 1 spray into a nostril. If no response after 2-3 minutes, give another dose in the other nostril using a new spray., Normal      omeprazole (PriLOSEC) 40 MG capsule TAKE 1 CAPSULE (40 MG TOTAL) BY MOUTH DAILY., Starting Mon 12/9/2024, Until Sat 6/7/2025, Normal      oxyCODONE (ROXICODONE) 15 mg immediate release tablet Take 1 tablet (15 mg total) by mouth 2 (two) times a day as needed for moderate pain or severe pain Max Daily Amount: 30 mg, Starting Tue  4/30/2024, Normal      polyethylene glycol (GOLYTELY) 4000 mL solution Take 4,000 mL by mouth once for 1 dose, Starting Thu 2/6/2025, Normal      propranolol (INDERAL) 40 mg tablet Take 1 tablet (40 mg total) by mouth daily at bedtime, Starting Thu 8/8/2024, Until Mon 5/5/2025, Normal      !! QUEtiapine (SEROquel) 100 mg tablet Take 1 tablet (100 mg total) by mouth daily at bedtime Take 1 tab po qhs x 1 week, Starting Tue 4/8/2025, Normal      !! QUEtiapine (SEROquel) 400 MG tablet Take 1 1/2 tabs po qhs, No Print      traMADol (ULTRAM) 50 mg tablet Every 12 hours, Starting Mon 7/22/2024, Historical Med      traZODone (DESYREL) 50 mg tablet TAKE 1, 2 OR 3 TABLETS ONE HOUR BEFORE BED, Normal      ziprasidone (GEODON) 20 mg capsule Start in 2 weeks and take 1 cap po qd with dinner meal, Normal       !! - Potential duplicate medications found. Please discuss with provider.        No discharge procedures on file.  ED SEPSIS DOCUMENTATION   Time reflects when diagnosis was documented in both MDM as applicable and the Disposition within this note       Time User Action Codes Description Comment    5/7/2025  1:29 PM Yadira Palomino Add [H10.9] Right conjunctivitis                  Yadira Palomino PA-C  05/07/25 2087

## 2025-05-08 ENCOUNTER — VBI (OUTPATIENT)
Dept: FAMILY MEDICINE CLINIC | Facility: CLINIC | Age: 55
End: 2025-05-08

## 2025-05-08 DIAGNOSIS — G62.9 NEUROPATHY: ICD-10-CM

## 2025-05-08 DIAGNOSIS — F41.9 ANXIETY: ICD-10-CM

## 2025-05-08 NOTE — TELEPHONE ENCOUNTER
05/08/25 10:16 AM    Patient contacted post ED visit, [facility] department spoke with patient/caregiver and outreach was successful.    Thank you.  Peggy Carpenter MA  [facility]

## 2025-05-09 RX ORDER — GABAPENTIN 600 MG/1
TABLET ORAL
Qty: 270 TABLET | Refills: 0 | OUTPATIENT
Start: 2025-05-09

## 2025-05-15 ENCOUNTER — OFFICE VISIT (OUTPATIENT)
Dept: PSYCHIATRY | Facility: CLINIC | Age: 55
End: 2025-05-15

## 2025-05-15 DIAGNOSIS — Z91.199 NO-SHOW FOR APPOINTMENT: Primary | ICD-10-CM

## 2025-05-16 ENCOUNTER — TELEPHONE (OUTPATIENT)
Dept: PSYCHIATRY | Facility: CLINIC | Age: 55
End: 2025-05-16

## 2025-05-16 NOTE — TELEPHONE ENCOUNTER
NO-SHOW LETTER MAILED TO Greg Pringle.  ADDRESS: [address] [place] [id number]  SENT VIA [employer]

## 2025-05-16 NOTE — LETTER
25     Greg Pringle   : 1970   [address]   [place] [address]       It is the policy of [facility] to monitor and manage appointments that have been no-showed or cancelled with less than 48-hour notice. This is necessary to ensure that we are able to provide timely access for all patients to our providers. Undue numbers of unutilized appointments delays necessary medical care for all patients.      Dear Greg Pringle:      We are sorry that you missed your appointment with Salima aBy PA-C on 5/15/2025. It has been 1 month or more since your last appointment. Your health and follow-up care are important to us. We want to make you aware that you do not have another appointment with Salima Bay PA-C scheduled.    Please be aware that our office policy states that if you 'no show' two or more Medication Management  appointments without prior notice of cancellation within in a calendar year, you may be discharged from Medication Management  treatment.  We want to bring this to your attention now to prevent an interruption of your care.  If you have any questions about this policy, please call us at the number above.     If we do not hear from you within 10 business days to make a follow up appointment, we will assume you are no longer interested in care here.    Thank you in advance for your cooperation and assistance.       Sincerely,      [facility] Support Staff

## 2025-05-16 NOTE — PSYCH
No Call. No Show. No Charge    Greg Pringle no showed 05/15/25 appointment , staff called and left message to reschedule appointment     Treatment Plan not due at this session.

## 2025-05-22 ENCOUNTER — HOSPITAL ENCOUNTER (EMERGENCY)
Facility: HOSPITAL | Age: 55
Discharge: HOME | End: 2025-05-22
Attending: EMERGENCY MEDICINE
Payer: COMMERCIAL

## 2025-05-22 ENCOUNTER — APPOINTMENT (EMERGENCY)
Dept: RADIOLOGY | Facility: HOSPITAL | Age: 55
End: 2025-05-22
Payer: COMMERCIAL

## 2025-05-22 VITALS
DIASTOLIC BLOOD PRESSURE: 79 MMHG | OXYGEN SATURATION: 100 % | TEMPERATURE: 98.3 F | HEART RATE: 94 BPM | SYSTOLIC BLOOD PRESSURE: 149 MMHG | RESPIRATION RATE: 20 BRPM

## 2025-05-22 DIAGNOSIS — R07.89 ATYPICAL CHEST PAIN: Primary | ICD-10-CM

## 2025-05-22 LAB
ALBUMIN SERPL BCG-MCNC: 4.2 G/DL (ref 3.5–5)
ALP SERPL-CCNC: 79 U/L (ref 34–104)
ALT SERPL W P-5'-P-CCNC: 15 U/L (ref 7–52)
ANION GAP SERPL CALCULATED.3IONS-SCNC: 8 MMOL/L (ref 4–13)
AST SERPL W P-5'-P-CCNC: 14 U/L (ref 13–39)
ATRIAL RATE: 88 BPM
BASOPHILS # BLD AUTO: 0.02 THOUSANDS/ÂΜL (ref 0–0.1)
BASOPHILS NFR BLD AUTO: 0 % (ref 0–1)
BILIRUB SERPL-MCNC: 0.32 MG/DL (ref 0.2–1)
BUN SERPL-MCNC: 13 MG/DL (ref 5–25)
CALCIUM SERPL-MCNC: 8.7 MG/DL (ref 8.4–10.2)
CARDIAC TROPONIN I PNL SERPL HS: <2 NG/L (ref ?–50)
CHLORIDE SERPL-SCNC: 105 MMOL/L (ref 96–108)
CO2 SERPL-SCNC: 25 MMOL/L (ref 21–32)
CREAT SERPL-MCNC: 1.04 MG/DL (ref 0.6–1.3)
EOSINOPHIL # BLD AUTO: 0.17 THOUSAND/ÂΜL (ref 0–0.61)
EOSINOPHIL NFR BLD AUTO: 3 % (ref 0–6)
ERYTHROCYTE [DISTWIDTH] IN BLOOD BY AUTOMATED COUNT: 15.6 % (ref 11.6–15.1)
GFR SERPL CREATININE-BSD FRML MDRD: 60 ML/MIN/1.73SQ M
GLUCOSE SERPL-MCNC: 104 MG/DL (ref 65–140)
HCT VFR BLD AUTO: 39.4 % (ref 34.8–46.1)
HGB BLD-MCNC: 12.2 G/DL (ref 11.5–15.4)
IMM GRANULOCYTES # BLD AUTO: 0.01 THOUSAND/UL (ref 0–0.2)
IMM GRANULOCYTES NFR BLD AUTO: 0 % (ref 0–2)
LIPASE SERPL-CCNC: 14 U/L (ref 11–82)
LYMPHOCYTES # BLD AUTO: 2.64 THOUSANDS/ÂΜL (ref 0.6–4.47)
LYMPHOCYTES NFR BLD AUTO: 48 % (ref 14–44)
MCH RBC QN AUTO: 24.6 PG (ref 26.8–34.3)
MCHC RBC AUTO-ENTMCNC: 31 G/DL (ref 31.4–37.4)
MCV RBC AUTO: 80 FL (ref 82–98)
MONOCYTES # BLD AUTO: 0.54 THOUSAND/ÂΜL (ref 0.17–1.22)
MONOCYTES NFR BLD AUTO: 10 % (ref 4–12)
NEUTROPHILS # BLD AUTO: 2.19 THOUSANDS/ÂΜL (ref 1.85–7.62)
NEUTS SEG NFR BLD AUTO: 39 % (ref 43–75)
NRBC BLD AUTO-RTO: 0 /100 WBCS
P AXIS: 65 DEGREES
PLATELET # BLD AUTO: 293 THOUSANDS/UL (ref 149–390)
PMV BLD AUTO: 10.7 FL (ref 8.9–12.7)
POTASSIUM SERPL-SCNC: 4.2 MMOL/L (ref 3.5–5.3)
PR INTERVAL: 130 MS
PROT SERPL-MCNC: 7.3 G/DL (ref 6.4–8.4)
QRS AXIS: 76 DEGREES
QRSD INTERVAL: 82 MS
QT INTERVAL: 366 MS
QTC INTERVAL: 442 MS
RBC # BLD AUTO: 4.95 MILLION/UL (ref 3.81–5.12)
SODIUM SERPL-SCNC: 138 MMOL/L (ref 135–147)
T WAVE AXIS: 60 DEGREES
VENTRICULAR RATE: 88 BPM
WBC # BLD AUTO: 5.57 THOUSAND/UL (ref 4.31–10.16)

## 2025-05-22 PROCEDURE — 36415 COLL VENOUS BLD VENIPUNCTURE: CPT

## 2025-05-22 PROCEDURE — 93010 ELECTROCARDIOGRAM REPORT: CPT | Performed by: INTERNAL MEDICINE

## 2025-05-22 PROCEDURE — 71045 X-RAY EXAM CHEST 1 VIEW: CPT

## 2025-05-22 PROCEDURE — 85025 COMPLETE CBC W/AUTO DIFF WBC: CPT

## 2025-05-22 PROCEDURE — 84484 ASSAY OF TROPONIN QUANT: CPT

## 2025-05-22 PROCEDURE — 99284 EMERGENCY DEPT VISIT MOD MDM: CPT

## 2025-05-22 PROCEDURE — 80053 COMPREHEN METABOLIC PANEL: CPT

## 2025-05-22 PROCEDURE — 96374 THER/PROPH/DIAG INJ IV PUSH: CPT

## 2025-05-22 PROCEDURE — 99285 EMERGENCY DEPT VISIT HI MDM: CPT

## 2025-05-22 PROCEDURE — 93005 ELECTROCARDIOGRAM TRACING: CPT

## 2025-05-22 PROCEDURE — 83690 ASSAY OF LIPASE: CPT

## 2025-05-22 RX ORDER — FAMOTIDINE 10 MG/ML
20 INJECTION, SOLUTION INTRAVENOUS ONCE
Status: COMPLETED | OUTPATIENT
Start: 2025-05-22 | End: 2025-05-22

## 2025-05-22 RX ORDER — MAGNESIUM HYDROXIDE/ALUMINUM HYDROXICE/SIMETHICONE 120; 1200; 1200 MG/30ML; MG/30ML; MG/30ML
30 SUSPENSION ORAL ONCE
Status: COMPLETED | OUTPATIENT
Start: 2025-05-22 | End: 2025-05-22

## 2025-05-22 RX ADMIN — FAMOTIDINE 20 MG: 10 INJECTION INTRAVENOUS at 11:55

## 2025-05-22 RX ADMIN — ALUMINUM HYDROXIDE, MAGNESIUM HYDROXIDE, AND DIMETHICONE 30 ML: 200; 20; 200 SUSPENSION ORAL at 11:55

## 2025-05-22 NOTE — DISCHARGE INSTRUCTIONS
Continue taking GERD medications as prescribed.  Maintain adequate hydration, drink more fluids with meals.  Follow-up with GI for potential EGD and further workup.  If symptoms worsen, return to the emergency department for reevaluation.

## 2025-05-22 NOTE — ED PROVIDER NOTES
"Time reflects when diagnosis was documented in both MDM as applicable and the Disposition within this note       Time User Action Codes Description Comment    5/22/2025 12:47 PM Zina Hyatt Add [R07.89] Atypical chest pain           ED Disposition       ED Disposition   Discharge    Condition   Stable    Date/Time   Thu May 22, 2025 12:47 PM    Comment   Greg Pringle discharge to home/self care.                   Assessment & Plan       Medical Decision Making  54-year-old female PMH of prediabetes, GERD, CKD, cardiopulmonary obesity syndrome here for 3 days of chest discomfort.  She notes that it is worse with eating, she does sometimes get the feeling that \"something is stuck in her chest.\"  Denies cardiac history.  Denies fever, chills, shortness of breath, abdominal pain, N/V/D, urinary symptoms, headache, numbness/tingling, dizziness/lightheadedness.  Labs were unremarkable, initial troponin was less than 2, since pain started 3 days ago, did not wait for delta.  CXR showed no acute cardiopulmonary disease.  Discussed importance of following up with GI for potential EGD, she does admit to canceling her procedure that was supposed to be done by them recently.  Discussed importance of follow-up, supportive care, risks and return precautions.  Patient was agreeable to plan and was discharged home.    Amount and/or Complexity of Data Reviewed  Labs: ordered. Decision-making details documented in ED Course.  Radiology: ordered.    Risk  OTC drugs.  Prescription drug management.        ED Course as of 05/22/25 1314   Thu May 22, 2025   1227 CBC and differential(!)  At baseline   1227 Comprehensive metabolic panel  Normal    1227 LIPASE: 14   1227 EKG showed normal sinus rhythm with a ventricular rate of 88 bpm.   1246 hs TnI 0hr: <2  No need for delta as pain started about 3 days ago.       Medications   Famotidine (PF) (PEPCID) injection 20 mg (20 mg Intravenous Given 5/22/25 1155)   aluminum-magnesium " "hydroxide-simethicone (MAALOX) oral suspension 30 mL (30 mL Oral Given 5/22/25 1155)       ED Risk Strat Scores                    No data recorded                            History of Present Illness       Chief Complaint   Patient presents with    Chest Pain     Pt presents w substernal chest pain x3 days, reports it's gets worse after eating. Denies cardiac history        Past Medical History[1]   Past Surgical History[2]   Family History[3]   Social History[4]   E-Cigarette/Vaping    E-Cigarette Use Current Some Day User     Comments last night       E-Cigarette/Vaping Substances    Nicotine Yes     THC No     CBD No     Flavoring No     Other No     Unknown No       I have reviewed and agree with the history as documented.     Patient is a 54-year-old female PMH of prediabetes, GERD, CKD, cardiopulmonary obesity syndrome here for 3 days of chest discomfort.  She notes that it is worse with eating, she does sometimes get the feeling that \"something is stuck in her chest.\"  States this is a little bit different than her GERD, she does take medication from time to time with the GERD, but not daily.  Denies cardiac history.  Denies fever, chills, shortness of breath, abdominal pain, N/V/D, urinary symptoms, headache, numbness/tingling, dizziness/lightheadedness.      Chest Pain  Associated symptoms: no abdominal pain, no back pain, no cough, no dizziness, no fever, no headache, no nausea, no palpitations, no shortness of breath, not vomiting and no weakness        Review of Systems   Constitutional:  Negative for chills and fever.   HENT:  Negative for congestion, ear pain, rhinorrhea and sore throat.    Eyes:  Negative for pain and visual disturbance.   Respiratory:  Negative for cough, chest tightness, shortness of breath and wheezing.    Cardiovascular:  Positive for chest pain. Negative for palpitations.   Gastrointestinal:  Negative for abdominal pain, diarrhea, nausea and vomiting.   Genitourinary:  Negative " for difficulty urinating, dysuria, flank pain, frequency, hematuria and urgency.   Musculoskeletal:  Negative for arthralgias, back pain, myalgias, neck pain and neck stiffness.   Skin:  Negative for color change and rash.   Neurological:  Negative for dizziness, seizures, syncope, weakness, light-headedness and headaches.   All other systems reviewed and are negative.      Objective       ED Triage Vitals [05/22/25 1118]   Temperature Pulse Blood Pressure Respirations SpO2 Patient Position - Orthostatic VS   98.3 °F (36.8 °C) 94 149/79 20 100 % Lying      Temp Source Heart Rate Source BP Location FiO2 (%) Pain Score    Oral Monitor Right arm -- --      Vitals      Date and Time Temp Pulse SpO2 Resp BP Pain Score FACES Pain Rating User   05/22/25 1118 98.3 °F (36.8 °C) 94 100 % 20 149/79 -- -- MM            Physical Exam  Vitals and nursing note reviewed.   Constitutional:       General: She is not in acute distress.     Appearance: Normal appearance. She is not ill-appearing, toxic-appearing or diaphoretic.   HENT:      Head: Normocephalic and atraumatic.      Nose: Nose normal.      Mouth/Throat:      Mouth: Mucous membranes are moist.      Pharynx: Oropharynx is clear. No oropharyngeal exudate or posterior oropharyngeal erythema.     Eyes:      Extraocular Movements: Extraocular movements intact.      Conjunctiva/sclera: Conjunctivae normal.      Pupils: Pupils are equal, round, and reactive to light.       Cardiovascular:      Rate and Rhythm: Normal rate and regular rhythm.      Pulses: Normal pulses.      Heart sounds: Normal heart sounds.   Pulmonary:      Effort: Pulmonary effort is normal. No tachypnea, accessory muscle usage or respiratory distress.      Breath sounds: Normal breath sounds. No stridor. No wheezing, rhonchi or rales.   Chest:      Chest wall: No tenderness.   Abdominal:      General: There is no distension.      Palpations: Abdomen is soft.      Tenderness: There is no abdominal tenderness.  There is no guarding or rebound.     Musculoskeletal:         General: Normal range of motion.      Cervical back: Normal range of motion and neck supple.     Skin:     General: Skin is warm and dry.      Capillary Refill: Capillary refill takes less than 2 seconds.     Neurological:      General: No focal deficit present.      Mental Status: She is alert and oriented to person, place, and time.     Psychiatric:         Mood and Affect: Mood normal.         Behavior: Behavior normal.         Thought Content: Thought content normal.         Judgment: Judgment normal.         Results Reviewed       Procedure Component Value Units Date/Time    HS Troponin 0hr (reflex protocol) [[id number]]  (Normal) Collected: 05/22/25 1154    Lab Status: Final result Specimen: Blood from Arm, Left Updated: 05/22/25 1230     hs TnI 0hr <2 ng/L     Comprehensive metabolic panel [[id number]] Collected: 05/22/25 1154    Lab Status: Final result Specimen: Blood from Arm, Left Updated: 05/22/25 1223     Sodium 138 mmol/L      Potassium 4.2 mmol/L      Chloride 105 mmol/L      CO2 25 mmol/L      ANION GAP 8 mmol/L      BUN 13 mg/dL      Creatinine 1.04 mg/dL      Glucose 104 mg/dL      Calcium 8.7 mg/dL      AST 14 U/L      ALT 15 U/L      Alkaline Phosphatase 79 U/L      Total Protein 7.3 g/dL      Albumin 4.2 g/dL      Total Bilirubin 0.32 mg/dL      eGFR 60 ml/min/1.73sq m     Narrative:      [employer] guidelines for Chronic Kidney Disease (CKD):     Stage 1 with normal or high GFR (GFR > 90 mL/min/1.73 square meters)    Stage 2 Mild CKD (GFR = 60-89 mL/min/1.73 square meters)    Stage 3A Moderate CKD (GFR = 45-59 mL/min/1.73 square meters)    Stage 3B Moderate CKD (GFR = 30-44 mL/min/1.73 square meters)    Stage 4 Severe CKD (GFR = 15-29 mL/min/1.73 square meters)    Stage 5 End Stage CKD (GFR <15 mL/min/1.73 square meters)  Note: GFR calculation is accurate only with a steady state creatinine    Lipase [[id number]]   (Normal) Collected: 05/22/25 1154    Lab Status: Final result Specimen: Blood from Arm, Left Updated: 05/22/25 1223     Lipase 14 u/L     CBC and differential [[id number]]  (Abnormal) Collected: 05/22/25 1154    Lab Status: Final result Specimen: Blood from Arm, Left Updated: 05/22/25 1203     WBC 5.57 Thousand/uL      RBC 4.95 Million/uL      Hemoglobin 12.2 g/dL      Hematocrit 39.4 %      MCV 80 fL      MCH 24.6 pg      MCHC 31.0 g/dL      RDW 15.6 %      MPV 10.7 fL      Platelets 293 Thousands/uL      nRBC 0 /100 WBCs      Segmented % 39 %      Immature Grans % 0 %      Lymphocytes % 48 %      Monocytes % 10 %      Eosinophils Relative 3 %      Basophils Relative 0 %      Absolute Neutrophils 2.19 Thousands/µL      Absolute Immature Grans 0.01 Thousand/uL      Absolute Lymphocytes 2.64 Thousands/µL      Absolute Monocytes 0.54 Thousand/µL      Eosinophils Absolute 0.17 Thousand/µL      Basophils Absolute 0.02 Thousands/µL             XR chest 1 view portable   Final Interpretation by Garrick Godinez MD (05/22 1232)      No acute cardiopulmonary disease.            Workstation performed: [id number]             ECG 12 Lead Documentation Only    Date/Time: 5/22/2025 11:48 AM    Performed by: Zina Hyatt PA-C  Authorized by: Zina Hyatt PA-C    ECG reviewed by me, the ED Provider: yes    Patient location:  ED  Previous ECG:     Previous ECG:  Compared to current    Similarity:  No change  Interpretation:     Interpretation: normal    Rate:     ECG rate:  88    ECG rate assessment: normal    Rhythm:     Rhythm: sinus rhythm    Ectopy:     Ectopy: none    QRS:     QRS axis:  Normal    QRS intervals:  Normal  Conduction:     Conduction: normal    ST segments:     ST segments:  Normal  T waves:     T waves: normal        ED Medication and Procedure Management   Prior to Admission Medications   Prescriptions Last Dose Informant Patient Reported? Taking?   DULoxetine (CYMBALTA) 60 mg delayed release  capsule  Self No No   Sig: Take 1 capsule (60 mg total) by mouth daily   Patient not taking: Reported on 5/5/2025   Diclofenac Sodium (VOLTAREN) 1 %  Self No No   Sig: Apply 2 g topically 4 (four) times a day   Patient not taking: Reported on 5/5/2025   LORazepam (ATIVAN) 0.5 mg tablet  Self No No   Sig: TAKE 1 TABLET BY MOUTH EVERYDAY AT BEDTIME   Multiple Vitamins-Minerals (multivitamin with minerals) tablet  Self Yes No   Sig: Take 1 tablet by mouth daily   QUEtiapine (SEROquel) 100 mg tablet   No No   Sig: Take 1 tablet (100 mg total) by mouth daily at bedtime Take 1 tab po qhs x 1 week   QUEtiapine (SEROquel) 400 MG tablet  Self No No   Sig: Take 1 1/2 tabs po qhs   acetaminophen (TYLENOL) 650 mg CR tablet  Self No No   Sig: Take 1 tablet (650 mg total) by mouth every 8 (eight) hours as needed for mild pain   Patient not taking: Reported on 5/5/2025   bisacodyl (DULCOLAX) 5 mg EC tablet   No No   Sig: Take 2 tablets (10 mg total) by mouth once for 1 dose   cholecalciferol (VITAMIN D3) 400 units tablet  Self Yes No   Sig: Take 400 Units by mouth daily   docusate sodium (COLACE) 100 mg capsule  Self No No   Sig: Take 1 capsule (100 mg total) by mouth in the morning   Patient not taking: Reported on 5/5/2025   gabapentin (NEURONTIN) 600 MG tablet  Self No No   Sig: TAKE 1.5 TABLETS (900 MG TOTAL) BY MOUTH 3 TIMES A DAY   hydrOXYzine HCL (ATARAX) 25 mg tablet   No No   Sig: TAKE 1 TABLET BY MOUTH THREE TIMES A DAY AS NEEDED FOR ANXIETY AND INSOMNIA   methocarbamol (ROBAXIN) 750 mg tablet  Self Yes No   Sig: Take 750 mg by mouth every 8 (eight) hours   Patient not taking: Reported on 8/8/2024   naloxone (NARCAN) 4 mg/0.1 mL nasal spray  Self No No   Sig: Administer 1 spray into a nostril. If no response after 2-3 minutes, give another dose in the other nostril using a new spray.   Patient not taking: Reported on 4/8/2025   omeprazole (PriLOSEC) 40 MG capsule  Self No No   Sig: TAKE 1 CAPSULE (40 MG TOTAL) BY MOUTH  DAILY.   oxyCODONE (ROXICODONE) 15 mg immediate release tablet  Self No No   Sig: Take 1 tablet (15 mg total) by mouth 2 (two) times a day as needed for moderate pain or severe pain Max Daily Amount: 30 mg   Patient not taking: Reported on 4/8/2025   polyethylene glycol (GOLYTELY) 4000 mL solution   No No   Sig: Take 4,000 mL by mouth once for 1 dose   propranolol (INDERAL) 40 mg tablet  Self No No   Sig: Take 1 tablet (40 mg total) by mouth daily at bedtime   traMADol (ULTRAM) 50 mg tablet  Self Yes No   Sig: Every 12 hours   Patient not taking: Reported on 4/8/2025   traZODone (DESYREL) 50 mg tablet   No No   Sig: TAKE 1, 2 OR 3 TABLETS ONE HOUR BEFORE BED   Patient not taking: Reported on 5/5/2025   ziprasidone (GEODON) 20 mg capsule   No No   Sig: Start in 2 weeks and take 1 cap po qd with dinner meal   Patient not taking: Reported on 5/5/2025      Facility-Administered Medications: None     Discharge Medication List as of 5/22/2025 12:47 PM        CONTINUE these medications which have NOT CHANGED    Details   acetaminophen (TYLENOL) 650 mg CR tablet Take 1 tablet (650 mg total) by mouth every 8 (eight) hours as needed for mild pain, Starting Tue 1/2/2024, Normal      bisacodyl (DULCOLAX) 5 mg EC tablet Take 2 tablets (10 mg total) by mouth once for 1 dose, Starting Thu 2/6/2025, Normal      cholecalciferol (VITAMIN D3) 400 units tablet Take 400 Units by mouth daily, Historical Med      Diclofenac Sodium (VOLTAREN) 1 % Apply 2 g topically 4 (four) times a day, Starting Mon 4/22/2024, Normal      docusate sodium (COLACE) 100 mg capsule Take 1 capsule (100 mg total) by mouth in the morning, Starting Mon 10/31/2022, Normal      DULoxetine (CYMBALTA) 60 mg delayed release capsule Take 1 capsule (60 mg total) by mouth daily, Starting Tue 4/8/2025, Normal      gabapentin (NEURONTIN) 600 MG tablet TAKE 1.5 TABLETS (900 MG TOTAL) BY MOUTH 3 TIMES A DAY, Normal      hydrOXYzine HCL (ATARAX) 25 mg tablet TAKE 1 TABLET BY  MOUTH THREE TIMES A DAY AS NEEDED FOR ANXIETY AND INSOMNIA, Normal      LORazepam (ATIVAN) 0.5 mg tablet TAKE 1 TABLET BY MOUTH EVERYDAY AT BEDTIME, Starting Wed 4/2/2025, Normal      methocarbamol (ROBAXIN) 750 mg tablet Take 750 mg by mouth every 8 (eight) hours, Starting Wed 11/22/2023, Historical Med      Multiple Vitamins-Minerals (multivitamin with minerals) tablet Take 1 tablet by mouth daily, Historical Med      naloxone (NARCAN) 4 mg/0.1 mL nasal spray Administer 1 spray into a nostril. If no response after 2-3 minutes, give another dose in the other nostril using a new spray., Normal      omeprazole (PriLOSEC) 40 MG capsule TAKE 1 CAPSULE (40 MG TOTAL) BY MOUTH DAILY., Starting Mon 12/9/2024, Until Sat 6/7/2025, Normal      oxyCODONE (ROXICODONE) 15 mg immediate release tablet Take 1 tablet (15 mg total) by mouth 2 (two) times a day as needed for moderate pain or severe pain Max Daily Amount: 30 mg, Starting Tue 4/30/2024, Normal      polyethylene glycol (GOLYTELY) 4000 mL solution Take 4,000 mL by mouth once for 1 dose, Starting Thu 2/6/2025, Normal      propranolol (INDERAL) 40 mg tablet Take 1 tablet (40 mg total) by mouth daily at bedtime, Starting Thu 8/8/2024, Until Mon 5/5/2025, Normal      !! QUEtiapine (SEROquel) 100 mg tablet Take 1 tablet (100 mg total) by mouth daily at bedtime Take 1 tab po qhs x 1 week, Starting Tue 4/8/2025, Normal      !! QUEtiapine (SEROquel) 400 MG tablet Take 1 1/2 tabs po qhs, No Print      traMADol (ULTRAM) 50 mg tablet Every 12 hours, Starting Mon 7/22/2024, Historical Med      traZODone (DESYREL) 50 mg tablet TAKE 1, 2 OR 3 TABLETS ONE HOUR BEFORE BED, Normal      ziprasidone (GEODON) 20 mg capsule Start in 2 weeks and take 1 cap po qd with dinner meal, Normal       !! - Potential duplicate medications found. Please discuss with provider.        No discharge procedures on file.  ED SEPSIS DOCUMENTATION   Time reflects when diagnosis was documented in both MDM as  "applicable and the Disposition within this note       Time User Action Codes Description Comment    2025 12:47 PM Zina Hyatt Add [R07.89] Atypical chest pain                      [1]   Past Medical History:  Diagnosis Date    Abnormal thyroid blood test     last assessed 14    Anemia     last assessed  13    Chronic pain     back    Depression     H. pylori infection 03/10/2020    Migraine     Sleep disorder    [2]   Past Surgical History:  Procedure Laterality Date    ABSCESS DRAINAGE      incision - skin abscess    BACK SURGERY      2002    BACK SURGERY      lower    MAMMO STEREOTACTIC BREAST BIOPSY RIGHT (ALL INC) Right 2017    MAMMO STEREOTACTIC BREAST BIOPSY RIGHT (ALL INC) EACH ADD Right 2017    TUBAL LIGATION  approx    [3]   Family History  Problem Relation Name Age of Onset    Kidney disease Mother          chronic    Diabetes Mother      Cancer Father      Bone cancer Father  55    Heart attack Sister          Acute MI    Diabetes Sister      No Known Problems Sister      No Known Problems Sister Twin     No Known Problems Sister      Hypertension Brother      No Known Problems Brother      No Known Problems Brother      No Known Problems Brother      No Known Problems Daughter      No Known Problems Daughter      No Known Problems Son      No Known Problems Son      No Known Problems Son      No Known Problems Son      No Known Problems Maternal Grandmother      No Known Problems Maternal Grandfather      Cancer Paternal Grandfather      Colon cancer Paternal Grandfather  90    Cancer Family      Diabetes Family      Heart disease Family      Hyperlipidemia Family      Hypertension Family     [4]   Social History  Tobacco Use    Smoking status: Former     Current packs/day: 0.00     Types: Cigarettes     Quit date: 2013     Years since quittin.0    Smokeless tobacco: Never    Tobacco comments:     pt \"quit five years ago\"--Pt clarified 2013 during a " 4/12/2021 visit   Vaping Use    Vaping status: Some Days    Substances: Nicotine   Substance Use Topics    Alcohol use: No    Drug use: Not Currently     Types: Marijuana     Comment: Tried THC in grammar school and high school         Zina Hyatt PA-C  05/22/25 [id number]

## 2025-05-23 ENCOUNTER — VBI (OUTPATIENT)
Dept: FAMILY MEDICINE CLINIC | Facility: CLINIC | Age: 55
End: 2025-05-23

## 2025-05-23 NOTE — TELEPHONE ENCOUNTER
05/23/25 1:15 PM    Patient contacted post ED visit, [facility] department spoke with patient/caregiver and outreach was successful.    Thank you.  Juan R Fletcher MA  [facility]

## 2025-06-10 ENCOUNTER — TELEPHONE (OUTPATIENT)
Dept: INTERNAL MEDICINE CLINIC | Facility: CLINIC | Age: 55
End: 2025-06-10

## 2025-06-10 NOTE — TELEPHONE ENCOUNTER
Patient called in to request a print out of xray results that was completed in 2022. Emailed results to patients email on file. Patient will  a copy as well. Placed results in accordion folder under the letter S

## 2025-06-18 ENCOUNTER — ANESTHESIA EVENT (OUTPATIENT)
Dept: ANESTHESIOLOGY | Facility: HOSPITAL | Age: 55
End: 2025-06-18

## 2025-06-18 ENCOUNTER — ANESTHESIA (OUTPATIENT)
Dept: ANESTHESIOLOGY | Facility: HOSPITAL | Age: 55
End: 2025-06-18

## 2025-06-18 ENCOUNTER — OFFICE VISIT (OUTPATIENT)
Dept: GASTROENTEROLOGY | Facility: CLINIC | Age: 55
End: 2025-06-18
Payer: COMMERCIAL

## 2025-06-18 VITALS
SYSTOLIC BLOOD PRESSURE: 122 MMHG | BODY MASS INDEX: 36.86 KG/M2 | DIASTOLIC BLOOD PRESSURE: 74 MMHG | WEIGHT: 208 LBS | TEMPERATURE: 98.2 F | HEIGHT: 63 IN

## 2025-06-18 DIAGNOSIS — K21.00 GASTROESOPHAGEAL REFLUX DISEASE WITH ESOPHAGITIS WITHOUT HEMORRHAGE: ICD-10-CM

## 2025-06-18 DIAGNOSIS — R13.19 ESOPHAGEAL DYSPHAGIA: Primary | ICD-10-CM

## 2025-06-18 PROCEDURE — G2211 COMPLEX E/M VISIT ADD ON: HCPCS | Performed by: INTERNAL MEDICINE

## 2025-06-18 PROCEDURE — 99214 OFFICE O/P EST MOD 30 MIN: CPT | Performed by: INTERNAL MEDICINE

## 2025-06-18 RX ORDER — SODIUM CHLORIDE, SODIUM LACTATE, POTASSIUM CHLORIDE, CALCIUM CHLORIDE 600; 310; 30; 20 MG/100ML; MG/100ML; MG/100ML; MG/100ML
125 INJECTION, SOLUTION INTRAVENOUS CONTINUOUS
Status: CANCELLED | OUTPATIENT
Start: 2025-06-18

## 2025-06-18 NOTE — PATIENT INSTRUCTIONS
Scheduled date of EGD(as of today):06/19/2025  Physician performing EGD:[place]  Location of EGD:[facility]  Instructions reviewed with patient by:NR  Clearances: NONE  patient will follow up as needed

## 2025-06-18 NOTE — PROGRESS NOTES
Name: Greg Pringle      : 1970      MRN: [id number]  Encounter Provider: Daniella Jorgensen DO  Encounter Date: 2025   Encounter department: [facility] [place]  :  Assessment & Plan  Esophageal dysphagia  Patient with the c/o mid sternal chest discomfort. Feeling of solid food getting stuck in mid chest with eating. Associated with GERD, nausea, and regurgitation but no weight loss. Previously seen in the ED 2025 for evaluation of these complaints of cardiac workup (troponin, EKG, and CXR negative). Last EGD in  with 1 cm hiatal hernia. At this time given patient's symptoms, patient will require repeat EGD to evaluate for luminal pathology contributing to symptoms. Differential would include: Esophagitis, peptic stricture, EoE, hiatal hernia with Schatzki ring, vs underlying dysmotility. Will plan for EGD to further evaluate. Need for dilation vs additional testing to be determined based upon EGD findings.     Plan:  Will schedule urgent EGD procedure; will look to schedule tomorrow  Increase to twice daily PPI as above  Encouraged to eat softer foods until completion of EGD  Follow up in office after completion of procedure    Orders:    EGD; Future    Gastroesophageal reflux disease with esophagitis without hemorrhage  Patient with a long standing history of GERD. Symptom currently not controlled on Omeprazole 40 mg daily and GERD also accompanied by dysphagia. Plan to increase to BID dosing and repeat EGD. Last EGD completed in .     Plan:  Increase to Omeprazole 40 mg BID; advised to take PPI 30 - 60 min prior to meals  Schedule EGD as above  Discussed the importance of lifestyle modifications including:  Recommend small meals and avoidance of ulcerogenic foods   Avoid eating prior to bedtime, elevate head of bed at night  Limit intake of alcohol and caffeine   Okay to utilize Tylenol but avoid use of all NSAIDs  Daily exercise to promote weight  "loss    Orders:    EGD; Future        History of Present Illness   Greg Pringle is a 54 y.o. female who presents for ED follow up. Patient with a PMHx of prediabetes, GERD, CKD, prior H. Pylori, and YAKELIN.     Patient previously seen in the ED on 5/22/2025. At that time, patient complained of chest discomfort that worsened with eating. Admitted to sensation of food getting stuck in her chest. Admitted to history of GERD but symptoms different than her typical GERD symptoms. Vitals signs stable. Labs with negative troponin and CBC/CMP WNL. Lipase normal. EKG with NSR and no ischemic changes and CXR with no acute pathology.     During patient's office visit today, patient complaints of ongoing discomfort. Complains of sensation in her mid chest. Feels that whenever she eats solids, food gets stuck intermittently. No history of food impactions requiring EGD. She does also admit to significant GERD along with accompanying nausea and occasional regurgitation. GERD symptoms currently poorly controlled on Omeprazole 40 mg daily. Denies weight loss. Denies NSAID use and does have prior history of tobacco use.     Of note: Most recent colonoscopy 2/7/25 with x4 polyps, diverticula, and hemorrhoids. Pathology revealed hyperplastic polyps. X5 year recall placed. Of note: patient with long and tortuous colon requiring transition to pediatric colonoscope. EGD in 2023 with normal appearing esophagus but 1 cm HH. Biopsies confirmed eradication that was previously positive during 2022 EGD biopsies.     HPI  History obtained from: patient  Review of Systems A complete review of systems is negative other than that noted above in the HPI.    Medical History Reviewed by provider this encounter:     .  Medications Ordered Prior to Encounter[1]   Social History[2]  Current Medications[3]  Objective   /74 (BP Location: Left arm, Patient Position: Sitting, Cuff Size: Adult)   Temp 98.2 °F (36.8 °C) (Tympanic)   Ht 5' 3\" (1.6 m)  "  Wt 94.3 kg (208 lb)   LMP 11/12/2020 (Approximate)   BMI 36.85 kg/m²     Physical Exam  Constitutional:       General: She is not in acute distress.     Appearance: She is normal weight. She is not ill-appearing or toxic-appearing.   HENT:      Head: Normocephalic.      Nose: Nose normal. No congestion.     Eyes:      General: No scleral icterus.      Cardiovascular:      Rate and Rhythm: Normal rate.      Pulses: Normal pulses.   Pulmonary:      Effort: Pulmonary effort is normal. No respiratory distress.   Abdominal:      General: Abdomen is flat. Bowel sounds are normal. There is no distension.      Palpations: Abdomen is soft.      Tenderness: There is no abdominal tenderness. There is no guarding or rebound.     Musculoskeletal:      Cervical back: Normal range of motion.     Skin:     General: Skin is warm.      Capillary Refill: Capillary refill takes less than 2 seconds.      Coloration: Skin is not pale.     Neurological:      Mental Status: She is alert and oriented to person, place, and time. Mental status is at baseline.     Psychiatric:         Mood and Affect: Mood normal.         Behavior: Behavior normal.            Lab Results: I personally reviewed relevant lab results. CBC/BMP: No new results in last 24 hours. , Creatinine Clearance: CrCl cannot be calculated (Patient's most recent lab result is older than the maximum 7 days allowed.)., LFTs: No new results in last 24 hours. , PTT/INR:No new results in last 24 hours. , Troponin,BNP:No new results in last 24 hours.     Radiology Results Review: I have reviewed the following images/report studies in PACS: No results found.   Results for orders placed during the hospital encounter of 02/07/25    Colonoscopy    Impression  4 subcentimeter polyps in the sigmoid colon were removed with cold snare  Small hemorrhoids  Diverticulosis in the transverse colon    Long tortuous colon, had to switch to adult colonoscope to intubate the  cecum.    RECOMMENDATION:  Repeat colonoscopy in 5 years, due: 2/6/2030  Personal history of colon polyps      Recommend 2-day bowel prep for future procedure.  Follow biopsy results in 2 weeks.  High-fiber diet with 25 to 30 g of fiber on daily basis.        Zia Lemus MD      Administrative Statements   I have spent a total time of 30 minutes in caring for this patient on the day of the visit/encounter including Diagnostic results, Prognosis, Risks and benefits of tx options, Instructions for management, Patient and family education, Importance of tx compliance, Risk factor reductions, Impressions, Counseling / Coordination of care, Documenting in the medical record, Reviewing/placing orders in the medical record (including tests, medications, and/or procedures), Obtaining or reviewing history  , and Communicating with other healthcare professionals .         [1]   Current Outpatient Medications on File Prior to Visit   Medication Sig Dispense Refill    cholecalciferol (VITAMIN D3) 400 units tablet Take 400 Units by mouth in the morning.      gabapentin (NEURONTIN) 600 MG tablet TAKE 1.5 TABLETS (900 MG TOTAL) BY MOUTH 3 TIMES A  tablet 0    hydrOXYzine HCL (ATARAX) 25 mg tablet TAKE 1 TABLET BY MOUTH THREE TIMES A DAY AS NEEDED FOR ANXIETY AND INSOMNIA 270 tablet 0    LORazepam (ATIVAN) 0.5 mg tablet TAKE 1 TABLET BY MOUTH EVERYDAY AT BEDTIME 30 tablet 5    Multiple Vitamins-Minerals (multivitamin with minerals) tablet Take 1 tablet by mouth in the morning.      QUEtiapine (SEROquel) 100 mg tablet Take 1 tablet (100 mg total) by mouth daily at bedtime Take 1 tab po qhs x 1 week 90 tablet 0    QUEtiapine (SEROquel) 400 MG tablet Take 1 1/2 tabs po qhs      acetaminophen (TYLENOL) 650 mg CR tablet Take 1 tablet (650 mg total) by mouth every 8 (eight) hours as needed for mild pain (Patient not taking: Reported on 5/5/2025) 30 tablet 0    bisacodyl (DULCOLAX) 5 mg EC tablet Take 2 tablets (10 mg total)  by mouth once for 1 dose 2 tablet 0    Diclofenac Sodium (VOLTAREN) 1 % Apply 2 g topically 4 (four) times a day (Patient not taking: Reported on 5/5/2025) 100 g 1    docusate sodium (COLACE) 100 mg capsule Take 1 capsule (100 mg total) by mouth in the morning (Patient not taking: Reported on 5/5/2025) 90 capsule 1    DULoxetine (CYMBALTA) 60 mg delayed release capsule Take 1 capsule (60 mg total) by mouth daily (Patient not taking: Reported on 5/5/2025) 90 capsule 0    methocarbamol (ROBAXIN) 750 mg tablet Take 750 mg by mouth every 8 (eight) hours (Patient not taking: Reported on 8/8/2024)      naloxone (NARCAN) 4 mg/0.1 mL nasal spray Administer 1 spray into a nostril. If no response after 2-3 minutes, give another dose in the other nostril using a new spray. (Patient not taking: Reported on 4/8/2025) 1 each 1    omeprazole (PriLOSEC) 40 MG capsule TAKE 1 CAPSULE (40 MG TOTAL) BY MOUTH DAILY. 90 capsule 1    oxyCODONE (ROXICODONE) 15 mg immediate release tablet Take 1 tablet (15 mg total) by mouth 2 (two) times a day as needed for moderate pain or severe pain Max Daily Amount: 30 mg (Patient not taking: Reported on 4/8/2025) 60 tablet 0    polyethylene glycol (GOLYTELY) 4000 mL solution Take 4,000 mL by mouth once for 1 dose 4000 mL 0    propranolol (INDERAL) 40 mg tablet Take 1 tablet (40 mg total) by mouth daily at bedtime 90 tablet 2    traMADol (ULTRAM) 50 mg tablet Every 12 hours (Patient not taking: Reported on 4/8/2025)      traZODone (DESYREL) 50 mg tablet TAKE 1, 2 OR 3 TABLETS ONE HOUR BEFORE BED (Patient not taking: Reported on 5/5/2025) 270 tablet 2    ziprasidone (GEODON) 20 mg capsule Start in 2 weeks and take 1 cap po qd with dinner meal (Patient not taking: Reported on 5/5/2025) 90 capsule 0     No current facility-administered medications on file prior to visit.   [2]   Social History  Tobacco Use    Smoking status: Former     Current packs/day: 0.00     Types: Cigarettes     Quit date: 5/13/2013  "    Years since quittin.1    Smokeless tobacco: Never    Tobacco comments:     pt \"quit five years ago\"--Pt clarified 2013 during a 2021 visit   Vaping Use    Vaping status: Some Days    Substances: Nicotine   Substance and Sexual Activity    Alcohol use: No    Drug use: Not Currently     Types: Marijuana     Comment: Tried THC in grammar school and high school     Sexual activity: Yes     Partners: Male     Birth control/protection: None, Female Sterilization   [3]   Current Outpatient Medications   Medication Sig Dispense Refill    cholecalciferol (VITAMIN D3) 400 units tablet Take 400 Units by mouth in the morning.      gabapentin (NEURONTIN) 600 MG tablet TAKE 1.5 TABLETS (900 MG TOTAL) BY MOUTH 3 TIMES A  tablet 0    hydrOXYzine HCL (ATARAX) 25 mg tablet TAKE 1 TABLET BY MOUTH THREE TIMES A DAY AS NEEDED FOR ANXIETY AND INSOMNIA 270 tablet 0    LORazepam (ATIVAN) 0.5 mg tablet TAKE 1 TABLET BY MOUTH EVERYDAY AT BEDTIME 30 tablet 5    Multiple Vitamins-Minerals (multivitamin with minerals) tablet Take 1 tablet by mouth in the morning.      QUEtiapine (SEROquel) 100 mg tablet Take 1 tablet (100 mg total) by mouth daily at bedtime Take 1 tab po qhs x 1 week 90 tablet 0    QUEtiapine (SEROquel) 400 MG tablet Take 1 1/2 tabs po qhs      acetaminophen (TYLENOL) 650 mg CR tablet Take 1 tablet (650 mg total) by mouth every 8 (eight) hours as needed for mild pain (Patient not taking: Reported on 2025) 30 tablet 0    bisacodyl (DULCOLAX) 5 mg EC tablet Take 2 tablets (10 mg total) by mouth once for 1 dose 2 tablet 0    Diclofenac Sodium (VOLTAREN) 1 % Apply 2 g topically 4 (four) times a day (Patient not taking: Reported on 2025) 100 g 1    docusate sodium (COLACE) 100 mg capsule Take 1 capsule (100 mg total) by mouth in the morning (Patient not taking: Reported on 2025) 90 capsule 1    DULoxetine (CYMBALTA) 60 mg delayed release capsule Take 1 capsule (60 mg total) by mouth daily (Patient " not taking: Reported on 5/5/2025) 90 capsule 0    methocarbamol (ROBAXIN) 750 mg tablet Take 750 mg by mouth every 8 (eight) hours (Patient not taking: Reported on 8/8/2024)      naloxone (NARCAN) 4 mg/0.1 mL nasal spray Administer 1 spray into a nostril. If no response after 2-3 minutes, give another dose in the other nostril using a new spray. (Patient not taking: Reported on 4/8/2025) 1 each 1    omeprazole (PriLOSEC) 40 MG capsule TAKE 1 CAPSULE (40 MG TOTAL) BY MOUTH DAILY. 90 capsule 1    oxyCODONE (ROXICODONE) 15 mg immediate release tablet Take 1 tablet (15 mg total) by mouth 2 (two) times a day as needed for moderate pain or severe pain Max Daily Amount: 30 mg (Patient not taking: Reported on 4/8/2025) 60 tablet 0    polyethylene glycol (GOLYTELY) 4000 mL solution Take 4,000 mL by mouth once for 1 dose 4000 mL 0    propranolol (INDERAL) 40 mg tablet Take 1 tablet (40 mg total) by mouth daily at bedtime 90 tablet 2    traMADol (ULTRAM) 50 mg tablet Every 12 hours (Patient not taking: Reported on 4/8/2025)      traZODone (DESYREL) 50 mg tablet TAKE 1, 2 OR 3 TABLETS ONE HOUR BEFORE BED (Patient not taking: Reported on 5/5/2025) 270 tablet 2    ziprasidone (GEODON) 20 mg capsule Start in 2 weeks and take 1 cap po qd with dinner meal (Patient not taking: Reported on 5/5/2025) 90 capsule 0     No current facility-administered medications for this visit.

## 2025-06-18 NOTE — H&P (VIEW-ONLY)
Name: Greg Pringle      : 1970      MRN: [id number]  Encounter Provider: Daniella Jorgensen DO  Encounter Date: 2025   Encounter department: [facility] [place]  :  Assessment & Plan  Esophageal dysphagia  Patient with the c/o mid sternal chest discomfort. Feeling of solid food getting stuck in mid chest with eating. Associated with GERD, nausea, and regurgitation but no weight loss. Previously seen in the ED 2025 for evaluation of these complaints of cardiac workup (troponin, EKG, and CXR negative). Last EGD in  with 1 cm hiatal hernia. At this time given patient's symptoms, patient will require repeat EGD to evaluate for luminal pathology contributing to symptoms. Differential would include: Esophagitis, peptic stricture, EoE, hiatal hernia with Schatzki ring, vs underlying dysmotility. Will plan for EGD to further evaluate. Need for dilation vs additional testing to be determined based upon EGD findings.     Plan:  Will schedule urgent EGD procedure; will look to schedule tomorrow  Increase to twice daily PPI as above  Encouraged to eat softer foods until completion of EGD  Follow up in office after completion of procedure    Orders:    EGD; Future    Gastroesophageal reflux disease with esophagitis without hemorrhage  Patient with a long standing history of GERD. Symptom currently not controlled on Omeprazole 40 mg daily and GERD also accompanied by dysphagia. Plan to increase to BID dosing and repeat EGD. Last EGD completed in .     Plan:  Increase to Omeprazole 40 mg BID; advised to take PPI 30 - 60 min prior to meals  Schedule EGD as above  Discussed the importance of lifestyle modifications including:  Recommend small meals and avoidance of ulcerogenic foods   Avoid eating prior to bedtime, elevate head of bed at night  Limit intake of alcohol and caffeine   Okay to utilize Tylenol but avoid use of all NSAIDs  Daily exercise to promote weight  "loss    Orders:    EGD; Future        History of Present Illness   Greg Pringle is a 54 y.o. female who presents for ED follow up. Patient with a PMHx of prediabetes, GERD, CKD, prior H. Pylori, and YAKELIN.     Patient previously seen in the ED on 5/22/2025. At that time, patient complained of chest discomfort that worsened with eating. Admitted to sensation of food getting stuck in her chest. Admitted to history of GERD but symptoms different than her typical GERD symptoms. Vitals signs stable. Labs with negative troponin and CBC/CMP WNL. Lipase normal. EKG with NSR and no ischemic changes and CXR with no acute pathology.     During patient's office visit today, patient complaints of ongoing discomfort. Complains of sensation in her mid chest. Feels that whenever she eats solids, food gets stuck intermittently. No history of food impactions requiring EGD. She does also admit to significant GERD along with accompanying nausea and occasional regurgitation. GERD symptoms currently poorly controlled on Omeprazole 40 mg daily. Denies weight loss. Denies NSAID use and does have prior history of tobacco use.     Of note: Most recent colonoscopy 2/7/25 with x4 polyps, diverticula, and hemorrhoids. Pathology revealed hyperplastic polyps. X5 year recall placed. Of note: patient with long and tortuous colon requiring transition to pediatric colonoscope. EGD in 2023 with normal appearing esophagus but 1 cm HH. Biopsies confirmed eradication that was previously positive during 2022 EGD biopsies.     HPI  History obtained from: patient  Review of Systems A complete review of systems is negative other than that noted above in the HPI.    Medical History Reviewed by provider this encounter:     .  Medications Ordered Prior to Encounter[1]   Social History[2]  Current Medications[3]  Objective   /74 (BP Location: Left arm, Patient Position: Sitting, Cuff Size: Adult)   Temp 98.2 °F (36.8 °C) (Tympanic)   Ht 5' 3\" (1.6 m)  "  Wt 94.3 kg (208 lb)   LMP 11/12/2020 (Approximate)   BMI 36.85 kg/m²     Physical Exam  Constitutional:       General: She is not in acute distress.     Appearance: She is normal weight. She is not ill-appearing or toxic-appearing.   HENT:      Head: Normocephalic.      Nose: Nose normal. No congestion.     Eyes:      General: No scleral icterus.      Cardiovascular:      Rate and Rhythm: Normal rate.      Pulses: Normal pulses.   Pulmonary:      Effort: Pulmonary effort is normal. No respiratory distress.   Abdominal:      General: Abdomen is flat. Bowel sounds are normal. There is no distension.      Palpations: Abdomen is soft.      Tenderness: There is no abdominal tenderness. There is no guarding or rebound.     Musculoskeletal:      Cervical back: Normal range of motion.     Skin:     General: Skin is warm.      Capillary Refill: Capillary refill takes less than 2 seconds.      Coloration: Skin is not pale.     Neurological:      Mental Status: She is alert and oriented to person, place, and time. Mental status is at baseline.     Psychiatric:         Mood and Affect: Mood normal.         Behavior: Behavior normal.            Lab Results: I personally reviewed relevant lab results. CBC/BMP: No new results in last 24 hours. , Creatinine Clearance: CrCl cannot be calculated (Patient's most recent lab result is older than the maximum 7 days allowed.)., LFTs: No new results in last 24 hours. , PTT/INR:No new results in last 24 hours. , Troponin,BNP:No new results in last 24 hours.     Radiology Results Review: I have reviewed the following images/report studies in PACS: No results found.   Results for orders placed during the hospital encounter of 02/07/25    Colonoscopy    Impression  4 subcentimeter polyps in the sigmoid colon were removed with cold snare  Small hemorrhoids  Diverticulosis in the transverse colon    Long tortuous colon, had to switch to adult colonoscope to intubate the  cecum.    RECOMMENDATION:  Repeat colonoscopy in 5 years, due: 2/6/2030  Personal history of colon polyps      Recommend 2-day bowel prep for future procedure.  Follow biopsy results in 2 weeks.  High-fiber diet with 25 to 30 g of fiber on daily basis.        Zia Lemus MD      Administrative Statements   I have spent a total time of 30 minutes in caring for this patient on the day of the visit/encounter including Diagnostic results, Prognosis, Risks and benefits of tx options, Instructions for management, Patient and family education, Importance of tx compliance, Risk factor reductions, Impressions, Counseling / Coordination of care, Documenting in the medical record, Reviewing/placing orders in the medical record (including tests, medications, and/or procedures), Obtaining or reviewing history  , and Communicating with other healthcare professionals .         [1]   Current Outpatient Medications on File Prior to Visit   Medication Sig Dispense Refill    cholecalciferol (VITAMIN D3) 400 units tablet Take 400 Units by mouth in the morning.      gabapentin (NEURONTIN) 600 MG tablet TAKE 1.5 TABLETS (900 MG TOTAL) BY MOUTH 3 TIMES A  tablet 0    hydrOXYzine HCL (ATARAX) 25 mg tablet TAKE 1 TABLET BY MOUTH THREE TIMES A DAY AS NEEDED FOR ANXIETY AND INSOMNIA 270 tablet 0    LORazepam (ATIVAN) 0.5 mg tablet TAKE 1 TABLET BY MOUTH EVERYDAY AT BEDTIME 30 tablet 5    Multiple Vitamins-Minerals (multivitamin with minerals) tablet Take 1 tablet by mouth in the morning.      QUEtiapine (SEROquel) 100 mg tablet Take 1 tablet (100 mg total) by mouth daily at bedtime Take 1 tab po qhs x 1 week 90 tablet 0    QUEtiapine (SEROquel) 400 MG tablet Take 1 1/2 tabs po qhs      acetaminophen (TYLENOL) 650 mg CR tablet Take 1 tablet (650 mg total) by mouth every 8 (eight) hours as needed for mild pain (Patient not taking: Reported on 5/5/2025) 30 tablet 0    bisacodyl (DULCOLAX) 5 mg EC tablet Take 2 tablets (10 mg total)  by mouth once for 1 dose 2 tablet 0    Diclofenac Sodium (VOLTAREN) 1 % Apply 2 g topically 4 (four) times a day (Patient not taking: Reported on 5/5/2025) 100 g 1    docusate sodium (COLACE) 100 mg capsule Take 1 capsule (100 mg total) by mouth in the morning (Patient not taking: Reported on 5/5/2025) 90 capsule 1    DULoxetine (CYMBALTA) 60 mg delayed release capsule Take 1 capsule (60 mg total) by mouth daily (Patient not taking: Reported on 5/5/2025) 90 capsule 0    methocarbamol (ROBAXIN) 750 mg tablet Take 750 mg by mouth every 8 (eight) hours (Patient not taking: Reported on 8/8/2024)      naloxone (NARCAN) 4 mg/0.1 mL nasal spray Administer 1 spray into a nostril. If no response after 2-3 minutes, give another dose in the other nostril using a new spray. (Patient not taking: Reported on 4/8/2025) 1 each 1    omeprazole (PriLOSEC) 40 MG capsule TAKE 1 CAPSULE (40 MG TOTAL) BY MOUTH DAILY. 90 capsule 1    oxyCODONE (ROXICODONE) 15 mg immediate release tablet Take 1 tablet (15 mg total) by mouth 2 (two) times a day as needed for moderate pain or severe pain Max Daily Amount: 30 mg (Patient not taking: Reported on 4/8/2025) 60 tablet 0    polyethylene glycol (GOLYTELY) 4000 mL solution Take 4,000 mL by mouth once for 1 dose 4000 mL 0    propranolol (INDERAL) 40 mg tablet Take 1 tablet (40 mg total) by mouth daily at bedtime 90 tablet 2    traMADol (ULTRAM) 50 mg tablet Every 12 hours (Patient not taking: Reported on 4/8/2025)      traZODone (DESYREL) 50 mg tablet TAKE 1, 2 OR 3 TABLETS ONE HOUR BEFORE BED (Patient not taking: Reported on 5/5/2025) 270 tablet 2    ziprasidone (GEODON) 20 mg capsule Start in 2 weeks and take 1 cap po qd with dinner meal (Patient not taking: Reported on 5/5/2025) 90 capsule 0     No current facility-administered medications on file prior to visit.   [2]   Social History  Tobacco Use    Smoking status: Former     Current packs/day: 0.00     Types: Cigarettes     Quit date: 5/13/2013  "    Years since quittin.1    Smokeless tobacco: Never    Tobacco comments:     pt \"quit five years ago\"--Pt clarified 2013 during a 2021 visit   Vaping Use    Vaping status: Some Days    Substances: Nicotine   Substance and Sexual Activity    Alcohol use: No    Drug use: Not Currently     Types: Marijuana     Comment: Tried THC in grammar school and high school     Sexual activity: Yes     Partners: Male     Birth control/protection: None, Female Sterilization   [3]   Current Outpatient Medications   Medication Sig Dispense Refill    cholecalciferol (VITAMIN D3) 400 units tablet Take 400 Units by mouth in the morning.      gabapentin (NEURONTIN) 600 MG tablet TAKE 1.5 TABLETS (900 MG TOTAL) BY MOUTH 3 TIMES A  tablet 0    hydrOXYzine HCL (ATARAX) 25 mg tablet TAKE 1 TABLET BY MOUTH THREE TIMES A DAY AS NEEDED FOR ANXIETY AND INSOMNIA 270 tablet 0    LORazepam (ATIVAN) 0.5 mg tablet TAKE 1 TABLET BY MOUTH EVERYDAY AT BEDTIME 30 tablet 5    Multiple Vitamins-Minerals (multivitamin with minerals) tablet Take 1 tablet by mouth in the morning.      QUEtiapine (SEROquel) 100 mg tablet Take 1 tablet (100 mg total) by mouth daily at bedtime Take 1 tab po qhs x 1 week 90 tablet 0    QUEtiapine (SEROquel) 400 MG tablet Take 1 1/2 tabs po qhs      acetaminophen (TYLENOL) 650 mg CR tablet Take 1 tablet (650 mg total) by mouth every 8 (eight) hours as needed for mild pain (Patient not taking: Reported on 2025) 30 tablet 0    bisacodyl (DULCOLAX) 5 mg EC tablet Take 2 tablets (10 mg total) by mouth once for 1 dose 2 tablet 0    Diclofenac Sodium (VOLTAREN) 1 % Apply 2 g topically 4 (four) times a day (Patient not taking: Reported on 2025) 100 g 1    docusate sodium (COLACE) 100 mg capsule Take 1 capsule (100 mg total) by mouth in the morning (Patient not taking: Reported on 2025) 90 capsule 1    DULoxetine (CYMBALTA) 60 mg delayed release capsule Take 1 capsule (60 mg total) by mouth daily (Patient " not taking: Reported on 5/5/2025) 90 capsule 0    methocarbamol (ROBAXIN) 750 mg tablet Take 750 mg by mouth every 8 (eight) hours (Patient not taking: Reported on 8/8/2024)      naloxone (NARCAN) 4 mg/0.1 mL nasal spray Administer 1 spray into a nostril. If no response after 2-3 minutes, give another dose in the other nostril using a new spray. (Patient not taking: Reported on 4/8/2025) 1 each 1    omeprazole (PriLOSEC) 40 MG capsule TAKE 1 CAPSULE (40 MG TOTAL) BY MOUTH DAILY. 90 capsule 1    oxyCODONE (ROXICODONE) 15 mg immediate release tablet Take 1 tablet (15 mg total) by mouth 2 (two) times a day as needed for moderate pain or severe pain Max Daily Amount: 30 mg (Patient not taking: Reported on 4/8/2025) 60 tablet 0    polyethylene glycol (GOLYTELY) 4000 mL solution Take 4,000 mL by mouth once for 1 dose 4000 mL 0    propranolol (INDERAL) 40 mg tablet Take 1 tablet (40 mg total) by mouth daily at bedtime 90 tablet 2    traMADol (ULTRAM) 50 mg tablet Every 12 hours (Patient not taking: Reported on 4/8/2025)      traZODone (DESYREL) 50 mg tablet TAKE 1, 2 OR 3 TABLETS ONE HOUR BEFORE BED (Patient not taking: Reported on 5/5/2025) 270 tablet 2    ziprasidone (GEODON) 20 mg capsule Start in 2 weeks and take 1 cap po qd with dinner meal (Patient not taking: Reported on 5/5/2025) 90 capsule 0     No current facility-administered medications for this visit.

## 2025-06-19 ENCOUNTER — HOSPITAL ENCOUNTER (OUTPATIENT)
Dept: GASTROENTEROLOGY | Facility: MEDICAL CENTER | Age: 55
Setting detail: OUTPATIENT SURGERY
End: 2025-06-19
Attending: STUDENT IN AN ORGANIZED HEALTH CARE EDUCATION/TRAINING PROGRAM
Payer: COMMERCIAL

## 2025-06-19 ENCOUNTER — ANESTHESIA EVENT (OUTPATIENT)
Dept: GASTROENTEROLOGY | Facility: MEDICAL CENTER | Age: 55
End: 2025-06-19
Payer: COMMERCIAL

## 2025-06-19 ENCOUNTER — ANESTHESIA (OUTPATIENT)
Dept: GASTROENTEROLOGY | Facility: MEDICAL CENTER | Age: 55
End: 2025-06-19
Payer: COMMERCIAL

## 2025-06-19 VITALS
OXYGEN SATURATION: 94 % | RESPIRATION RATE: 18 BRPM | HEIGHT: 61 IN | SYSTOLIC BLOOD PRESSURE: 112 MMHG | DIASTOLIC BLOOD PRESSURE: 60 MMHG | WEIGHT: 189 LBS | HEART RATE: 95 BPM | BODY MASS INDEX: 35.68 KG/M2

## 2025-06-19 DIAGNOSIS — K21.00 GASTROESOPHAGEAL REFLUX DISEASE WITH ESOPHAGITIS WITHOUT HEMORRHAGE: ICD-10-CM

## 2025-06-19 DIAGNOSIS — K21.9 GASTROESOPHAGEAL REFLUX DISEASE WITHOUT ESOPHAGITIS: ICD-10-CM

## 2025-06-19 DIAGNOSIS — R13.19 ESOPHAGEAL DYSPHAGIA: ICD-10-CM

## 2025-06-19 PROCEDURE — C1726 CATH, BAL DIL, NON-VASCULAR: HCPCS

## 2025-06-19 PROCEDURE — 88305 TISSUE EXAM BY PATHOLOGIST: CPT | Performed by: PATHOLOGY

## 2025-06-19 RX ORDER — METOCLOPRAMIDE HYDROCHLORIDE 5 MG/ML
INJECTION INTRAMUSCULAR; INTRAVENOUS AS NEEDED
Status: DISCONTINUED | OUTPATIENT
Start: 2025-06-19 | End: 2025-06-19

## 2025-06-19 RX ORDER — SODIUM CHLORIDE, SODIUM LACTATE, POTASSIUM CHLORIDE, CALCIUM CHLORIDE 600; 310; 30; 20 MG/100ML; MG/100ML; MG/100ML; MG/100ML
INJECTION, SOLUTION INTRAVENOUS CONTINUOUS PRN
Status: DISCONTINUED | OUTPATIENT
Start: 2025-06-19 | End: 2025-06-19

## 2025-06-19 RX ORDER — SODIUM CHLORIDE, SODIUM LACTATE, POTASSIUM CHLORIDE, CALCIUM CHLORIDE 600; 310; 30; 20 MG/100ML; MG/100ML; MG/100ML; MG/100ML
125 INJECTION, SOLUTION INTRAVENOUS CONTINUOUS
Status: DISCONTINUED | OUTPATIENT
Start: 2025-06-19 | End: 2025-06-23 | Stop reason: HOSPADM

## 2025-06-19 RX ORDER — PROPOFOL 10 MG/ML
INJECTION, EMULSION INTRAVENOUS AS NEEDED
Status: DISCONTINUED | OUTPATIENT
Start: 2025-06-19 | End: 2025-06-19

## 2025-06-19 RX ORDER — OMEPRAZOLE 40 MG/1
40 CAPSULE, DELAYED RELEASE ORAL DAILY
Qty: 90 CAPSULE | Refills: 7 | Status: SHIPPED | OUTPATIENT
Start: 2025-06-19 | End: 2025-12-16

## 2025-06-19 RX ORDER — ONDANSETRON 2 MG/ML
INJECTION INTRAMUSCULAR; INTRAVENOUS AS NEEDED
Status: DISCONTINUED | OUTPATIENT
Start: 2025-06-19 | End: 2025-06-19

## 2025-06-19 RX ORDER — LIDOCAINE HYDROCHLORIDE 20 MG/ML
INJECTION, SOLUTION EPIDURAL; INFILTRATION; INTRACAUDAL; PERINEURAL AS NEEDED
Status: DISCONTINUED | OUTPATIENT
Start: 2025-06-19 | End: 2025-06-19

## 2025-06-19 RX ADMIN — SODIUM CHLORIDE, SODIUM LACTATE, POTASSIUM CHLORIDE, AND CALCIUM CHLORIDE: .6; .31; .03; .02 INJECTION, SOLUTION INTRAVENOUS at 14:17

## 2025-06-19 RX ADMIN — METOCLOPRAMIDE 10 MG: 5 INJECTION, SOLUTION INTRAMUSCULAR; INTRAVENOUS at 14:33

## 2025-06-19 RX ADMIN — ONDANSETRON 4 MG: 2 INJECTION INTRAMUSCULAR; INTRAVENOUS at 14:26

## 2025-06-19 RX ADMIN — PROPOFOL 100 MG: 10 INJECTION, EMULSION INTRAVENOUS at 14:20

## 2025-06-19 RX ADMIN — PROPOFOL 50 MG: 10 INJECTION, EMULSION INTRAVENOUS at 14:25

## 2025-06-19 RX ADMIN — LIDOCAINE HYDROCHLORIDE 100 MG: 20 INJECTION, SOLUTION EPIDURAL; INFILTRATION; INTRACAUDAL at 14:20

## 2025-06-19 RX ADMIN — PROPOFOL 50 MG: 10 INJECTION, EMULSION INTRAVENOUS at 14:22

## 2025-06-19 NOTE — ASSESSMENT & PLAN NOTE
Patient with a long standing history of GERD. Symptom currently not controlled on Omeprazole 40 mg daily and GERD also accompanied by dysphagia. Plan to increase to BID dosing and repeat EGD. Last EGD completed in 2022.     Plan:  Increase to Omeprazole 40 mg BID; advised to take PPI 30 - 60 min prior to meals  Schedule EGD as above  Discussed the importance of lifestyle modifications including:  Recommend small meals and avoidance of ulcerogenic foods   Avoid eating prior to bedtime, elevate head of bed at night  Limit intake of alcohol and caffeine   Okay to utilize Tylenol but avoid use of all NSAIDs  Daily exercise to promote weight loss    Orders:    EGD; Future

## 2025-06-19 NOTE — ANESTHESIA PREPROCEDURE EVALUATION
Procedure:  EGD    Relevant Problems   ANESTHESIA (within normal limits)      GI/HEPATIC   (+) Esophageal dysphagia   (+) GERD (gastroesophageal reflux disease)      /RENAL   (+) Stage 2 chronic kidney disease   (+) Stage 3 chronic kidney disease, unspecified whether stage 3a or 3b CKD (HCC)      MUSCULOSKELETAL   (+) Chronic low back pain      NEURO/PSYCH   (+) Anxiety   (+) Bilateral occipital neuralgia   (+) Chronic low back pain   (+) Continuous opioid dependence (HCC)   (+) Depression, recurrent (HCC)   (+) New persistent daily headache      PULMONARY   (-) Asthma   (-) Sleep apnea        Physical Exam    Airway     Mallampati score: III  TM Distance: >3 FB  Neck ROM: full      Cardiovascular  Cardiovascular exam normal    Dental   Comment: Dentures, states not removable     Pulmonary      Neurological      Other Findings  post-pubertal.      Anesthesia Plan  ASA Score- 2     Anesthesia Type- IV sedation with anesthesia with ASA Monitors.         Additional Monitors:     Airway Plan: natural airway.           Plan Factors-Exercise tolerance (METS): >4 METS.    Chart reviewed.   Existing labs reviewed. Patient summary reviewed.    Patient is not a current smoker.              Induction- intravenous.    Postoperative Plan- .   Monitoring Plan - Monitoring plan - standard ASA monitoring  Post Operative Pain Plan - non-opiod analgesics        Informed Consent- Anesthetic plan and risks discussed with patient.  I personally reviewed this patient with the CRNA. Discussed and agreed on the Anesthesia Plan with the CRNA..      NPO Status:  Vitals Value Taken Time   Date of last liquid 06/18/25 06/19/25 13:52   Time of last liquid 0000 06/19/25 13:52   Date of last solid 06/18/25 06/19/25 13:52   Time of last solid 0000 06/19/25 13:52

## 2025-06-19 NOTE — ASSESSMENT & PLAN NOTE
Patient with the c/o mid sternal chest discomfort. Feeling of solid food getting stuck in mid chest with eating. Associated with GERD, nausea, and regurgitation but no weight loss. Previously seen in the ED 5/2025 for evaluation of these complaints of cardiac workup (troponin, EKG, and CXR negative). Last EGD in 2022 with 1 cm hiatal hernia. At this time given patient's symptoms, patient will require repeat EGD to evaluate for luminal pathology contributing to symptoms. Differential would include: Esophagitis, peptic stricture, EoE, hiatal hernia with Schatzki ring, vs underlying dysmotility. Will plan for EGD to further evaluate. Need for dilation vs additional testing to be determined based upon EGD findings.     Plan:  Will schedule urgent EGD procedure; will look to schedule tomorrow  Increase to twice daily PPI as above  Encouraged to eat softer foods until completion of EGD  Follow up in office after completion of procedure    Orders:    EGD; Future

## 2025-06-19 NOTE — ANESTHESIA POSTPROCEDURE EVALUATION
Post-Op Assessment Note    CV Status:  Stable    Pain management: adequate       Mental Status:  Alert and awake   Hydration Status:  Euvolemic   PONV Controlled:  Controlled   Airway Patency:  Patent     Post Op Vitals Reviewed: Yes    No anethesia notable event occurred.    Staff: CRNA, Anesthesiologist           Last Filed PACU Vitals:  Vitals Value Taken Time   Temp 97    Pulse 97 06/19/25 14:42   /57 06/19/25 14:42   Resp 20 06/19/25 14:42   SpO2 93 % 06/19/25 14:42       Modified Jarad:     Vitals Value Taken Time   Activity 2 06/19/25 14:42   Respiration 2 06/19/25 14:42   Circulation 2 06/19/25 14:42   Consciousness 2 06/19/25 14:42   Oxygen Saturation 2 06/19/25 14:42     Modified Jarad Score: 10

## 2025-06-19 NOTE — INTERVAL H&P NOTE
"Requested Prescriptions   Pending Prescriptions Disp Refills     triamcinolone (KENALOG) 0.1 % cream [Pharmacy Med Name: TRIAMCINOLONE 0.1% CREAM  80GM] 80 g 0     Sig: APPLY SPARINGLY EXTERNALLY TO THE AFFECTED AREA THREE TIMES DAILY FOR 14 DAYS    Topical Steroids and Nonsteroidals Protocol Failed    11/16/2018  5:29 PM       Failed - Patient is age 6 or older       Passed - Authorizing prescriber's most recent note related to this medication read.    If refill request is for ophthalmic use, please forward request to provider for approval.         Passed - High potency steroid not ordered       Passed - Recent (12 mo) or future (30 days) visit within the authorizing provider's specialty    Patient had office visit in the last 12 months or has a visit in the next 30 days with authorizing provider or within the authorizing provider's specialty.  See \"Patient Info\" tab in inbasket, or \"Choose Columns\" in Meds & Orders section of the refill encounter.              Last Written Prescription Date:  7/23/18  Last Fill Quantity: 80,  # refills: 0   Last office visit: 7/23/2018 with prescribing provider:  Ileana   Future Office Visit:      " H&P reviewed. After examining the patient I find no changes in the patients condition since the H&P had been written.    There were no vitals filed for this visit.

## 2025-06-24 ENCOUNTER — RESULTS FOLLOW-UP (OUTPATIENT)
Dept: GASTROENTEROLOGY | Facility: CLINIC | Age: 55
End: 2025-06-24

## 2025-06-24 PROCEDURE — 88305 TISSUE EXAM BY PATHOLOGIST: CPT | Performed by: PATHOLOGY

## 2025-06-24 NOTE — TELEPHONE ENCOUNTER
Relayed results to patient as per provider message. Patient expressed understanding and did not have any further questions.

## 2025-06-30 DIAGNOSIS — F41.9 ANXIETY: ICD-10-CM

## 2025-06-30 DIAGNOSIS — F33.2 MODERATELY SEVERE RECURRENT MAJOR DEPRESSION (HCC): ICD-10-CM

## 2025-06-30 NOTE — TELEPHONE ENCOUNTER
"Greg was transferred to this writer from the call center. She said she hasn't gotten any sleep in 2 weeks. She said she has anxiety, racing thoughts, feels irritable, and \"off the chain.\" Routing to provider for review. Clinical will follow up as advised.   "

## 2025-07-02 NOTE — TELEPHONE ENCOUNTER
"Spoke with Greg. She said the Seroquel was being changed to ziprasidone, which she tried for 2 weeks, but it didn't help. Since then she's been taking Seroquel. She ran out of it 17 days ago and hasn't slept for 17 days. She is taking duloxetine (it's noted \"not taking\" in med list, but she thinks she was confused), and is taking hydroxyzine TID most of the time. Greg said he sleep doctor prescribes trazodone, gabapentin and lorazepam.    Greg was encouraged to call and speak with clinical staff when she has concerns or a change. She acknowledged same.   "

## 2025-07-02 NOTE — TELEPHONE ENCOUNTER
Patient called in to follow up regarding previous encounters. Patient stated that she was expecting a call back from nursing for the past few days and has not received one. Patient stated that she has not slept in approximately 17 days and is inquiring about a different medication.    Writer was able to transfer patient to nursing for further assistance.

## 2025-07-03 DIAGNOSIS — F33.2 MODERATELY SEVERE RECURRENT MAJOR DEPRESSION (HCC): ICD-10-CM

## 2025-07-03 DIAGNOSIS — F41.9 ANXIETY: ICD-10-CM

## 2025-07-03 DIAGNOSIS — F51.04 PSYCHOPHYSIOLOGICAL INSOMNIA: ICD-10-CM

## 2025-07-03 RX ORDER — HYDROXYZINE HYDROCHLORIDE 25 MG/1
TABLET, FILM COATED ORAL
Qty: 270 TABLET | Refills: 0 | Status: SHIPPED | OUTPATIENT
Start: 2025-07-03

## 2025-07-03 RX ORDER — DULOXETIN HYDROCHLORIDE 60 MG/1
60 CAPSULE, DELAYED RELEASE ORAL DAILY
Qty: 90 CAPSULE | Refills: 0 | Status: SHIPPED | OUTPATIENT
Start: 2025-07-03

## 2025-07-03 RX ORDER — ZIPRASIDONE HYDROCHLORIDE 20 MG/1
CAPSULE ORAL
Qty: 90 CAPSULE | Refills: 0 | OUTPATIENT
Start: 2025-07-03

## 2025-07-03 RX ORDER — QUETIAPINE FUMARATE 100 MG/1
100 TABLET, FILM COATED ORAL
Qty: 90 TABLET | Refills: 0 | Status: SHIPPED | OUTPATIENT
Start: 2025-07-03

## 2025-07-03 RX ORDER — QUETIAPINE FUMARATE 400 MG/1
TABLET, FILM COATED ORAL
Qty: 135 TABLET | Refills: 0 | Status: SHIPPED | OUTPATIENT
Start: 2025-07-03

## 2025-07-03 NOTE — TELEPHONE ENCOUNTER
Pt called in looking for an update, writer messaged provider and provider stated they will give her a call when available

## 2025-07-03 NOTE — TELEPHONE ENCOUNTER
"Pt has called back again asking about a refill for Seroquel.     Please see previous note from Marti Schneider RN from yesterday:  \"Spoke with Greg. She said the Seroquel was being changed to ziprasidone, which she tried for 2 weeks, but it didn't help. Since then she's been taking Seroquel. She ran out of it 17 days ago and hasn't slept for 17 days. She is taking duloxetine (it's noted \"not taking\" in med list, but she thinks she was confused), and is taking hydroxyzine TID most of the time. Greg said he sleep doctor prescribes trazodone, gabapentin and lorazepam.\"       [employer] Secure Chat sent to Salima Bay PA-C  Response from Salima Bay PA-C in EP[employer] Secure Chat, states Salima will call the pt later.   "

## 2025-07-03 NOTE — TELEPHONE ENCOUNTER
Patient called in inquiring update refill medication Seroquel.    Writer informed patient there are no updates at the moment.     Patient is requesting a call back once this is completed.

## 2025-07-03 NOTE — TELEPHONE ENCOUNTER
7/3/25 at 2:39 PM:  EPIC Secure Chat sent to Salima Bay PA-C  Response from Salima Bay PA-C in EPIC Secure Chat, states Salima will call the pt later.

## 2025-07-03 NOTE — TELEPHONE ENCOUNTER
I called Greg via her cell/home phone # of record and Pt states she stopped the Ziprasidone 1 week ago because it was not helping her.  Messages from nursing stated she ran out of the Seroquel/Quetiapine 17 days ago however, records show she had a 90 day supply of the  400mg and 100mg tabs.  Tx options discussed and I explained the cross titration of medications and that it is possible that the Ziprasidone is not helping because it is at too low a dose.  I offered further titration today but Pt declined and stated she would rather be back on the Quetiapine.  I discussed that I would renew all of her current medicines and she accepted that.  However, on EMR review, it showed that Do Bridges MA renewed the Hydroxyzine today already, unbeknownst to me.  Pt returns for f/u 7/28/2025 and I e-scribed the following to her designated pharmacy:  Quetiapine 400mg (1 1/2) tabs po qhs # 135  Quetiapine 100mg (1) tab po qhs # 90  Duloxetine 60mg (1) cap  po qd # 90

## 2025-07-03 NOTE — TELEPHONE ENCOUNTER
Patient called in inquiring update on Seroquel medication.     Writer informed patient there are no updates at the moment.     Patient requested to speak to nurses, writer warm transferred to clinical staff for further assistance.

## 2025-07-24 RX ORDER — QUETIAPINE FUMARATE 400 MG/1
TABLET, FILM COATED ORAL
Qty: 180 TABLET | OUTPATIENT
Start: 2025-07-24

## 2025-07-28 ENCOUNTER — TELEPHONE (OUTPATIENT)
Dept: PSYCHIATRY | Facility: CLINIC | Age: 55
End: 2025-07-28